# Patient Record
Sex: MALE | Race: WHITE | NOT HISPANIC OR LATINO | URBAN - METROPOLITAN AREA
[De-identification: names, ages, dates, MRNs, and addresses within clinical notes are randomized per-mention and may not be internally consistent; named-entity substitution may affect disease eponyms.]

---

## 2017-08-30 ENCOUNTER — OFFICE (AMBULATORY)
Dept: URBAN - METROPOLITAN AREA CLINIC 64 | Facility: CLINIC | Age: 72
End: 2017-08-30

## 2017-08-30 VITALS
HEIGHT: 73 IN | WEIGHT: 212 LBS | SYSTOLIC BLOOD PRESSURE: 164 MMHG | DIASTOLIC BLOOD PRESSURE: 95 MMHG | HEART RATE: 70 BPM

## 2017-08-30 DIAGNOSIS — K21.9 GASTRO-ESOPHAGEAL REFLUX DISEASE WITHOUT ESOPHAGITIS: ICD-10-CM

## 2017-08-30 PROCEDURE — 99213 OFFICE O/P EST LOW 20 MIN: CPT | Performed by: INTERNAL MEDICINE

## 2017-08-30 RX ORDER — ESOMEPRAZOLE MAGNESIUM 40 MG/1
40 CAPSULE, DELAYED RELEASE ORAL
Qty: 90 | Refills: 3 | Status: ACTIVE
Start: 2013-06-27

## 2017-08-30 RX ORDER — CALCIUM CARBONATE/VITAMIN D3 600 MG-10
TABLET ORAL
Qty: 60 | Refills: 1 | Status: ACTIVE

## 2017-08-30 RX ORDER — ALUMINUM ZIRCONIUM OCTACHLOROHYDREX GLY 16 G/100G
3.4 GEL TOPICAL
Qty: 1 | Refills: 11 | Status: COMPLETED
End: 2023-10-09

## 2017-10-20 ENCOUNTER — OFFICE VISIT (OUTPATIENT)
Dept: NEUROLOGY | Facility: CLINIC | Age: 72
End: 2017-10-20

## 2017-10-20 VITALS
BODY MASS INDEX: 27.83 KG/M2 | SYSTOLIC BLOOD PRESSURE: 140 MMHG | DIASTOLIC BLOOD PRESSURE: 78 MMHG | HEIGHT: 73 IN | WEIGHT: 210 LBS

## 2017-10-20 DIAGNOSIS — E87.1 HYPONATREMIA: ICD-10-CM

## 2017-10-20 DIAGNOSIS — G50.0 FOTHERGILL'S NEURALGIA: Primary | ICD-10-CM

## 2017-10-20 PROCEDURE — 99213 OFFICE O/P EST LOW 20 MIN: CPT | Performed by: PSYCHIATRY & NEUROLOGY

## 2017-10-20 RX ORDER — OXCARBAZEPINE 300 MG/1
450 TABLET, FILM COATED ORAL 2 TIMES DAILY
Qty: 270 TABLET | Refills: 3 | Status: SHIPPED | OUTPATIENT
Start: 2017-10-20 | End: 2018-07-20 | Stop reason: SDUPTHER

## 2017-10-20 NOTE — PROGRESS NOTES
Subjective:     Patient ID: Mode Duffy is a 71 y.o. male.    History of Present Illness     The patient's trigeminal neuralgia symptoms are under fairly good control.  It was worse a few weeks ago but his on back to baseline which is next to nothing.  Is also been diagnosed as having hyponatremia.  Sodium was as low as 126 but is back to 131.  The following portions of the patient's history were reviewed and updated as appropriate: allergies, current medications, past family history, past medical history, past social history, past surgical history and problem list.      Current Outpatient Prescriptions:   •  acebutolol (SECTRAL) 200 MG capsule, , Disp: , Rfl:   •  amLODIPine (NORVASC) 10 MG tablet, Take  by mouth Daily., Disp: , Rfl:   •  aspirin 81 MG chewable tablet, Chew Daily., Disp: , Rfl:   •  atorvastatin (LIPITOR) 20 MG tablet, Take  by mouth., Disp: , Rfl:   •  b complex-C-folic acid 1 MG capsule, Take 1 capsule by mouth Daily., Disp: , Rfl:   •  Cinnamon 500 MG capsule, Take 500 mg by mouth Daily., Disp: , Rfl:   •  enalapril (VASOTEC) 5 MG tablet, Take  by mouth Daily., Disp: , Rfl:   •  esomeprazole (NEXIUM) 40 MG capsule, Take  by mouth Daily., Disp: , Rfl:   •  glucosamine sulfate 500 MG capsule capsule, Take  by mouth 3 (Three) Times a Day With Meals., Disp: , Rfl:   •  isosorbide dinitrate (ISORDIL) 30 MG tablet, Take  by mouth., Disp: , Rfl:   •  Omega-3 Fatty Acids (FISH OIL) 1000 MG capsule capsule, Take  by mouth., Disp: , Rfl:   •  OXcarbazepine (TRILEPTAL) 300 MG tablet, Take 1.5 tablets by mouth 2 (Two) Times a Day., Disp: 270 tablet, Rfl: 3  •  Probiotic Product (PROBIOTIC ADVANCED PO), Take  by mouth., Disp: , Rfl:     Review of Systems   Constitutional: Negative.    Neurological: Negative for dizziness, tremors, seizures, syncope, facial asymmetry, speech difficulty, weakness, light-headedness, numbness and headaches.   Psychiatric/Behavioral: Negative.         Objective:    Neurologic  Exam  Mental status examination was appropriate.  Funduscopy, visual fields, eye movements and pupillary reflexes were normal.  No facial weakness was noted.  Gait was normal.  No pattern of focal weakness was noted.  Physical Exam    Assessment/Plan:     Mode was seen today for neuralgia.    Diagnoses and all orders for this visit:    Fothergill's neuralgia    Hyponatremia    Other orders  -     OXcarbazepine (TRILEPTAL) 300 MG tablet; Take 1.5 tablets by mouth 2 (Two) Times a Day.         Prescription drug management - Trileptal as above.    Encouraged a bit more salt intake.  Follow-up in the office in one year. Thank you for allowing me to share in the care of this patient.  Temo Moody M.D.

## 2018-07-12 ENCOUNTER — TELEPHONE (OUTPATIENT)
Dept: NEUROLOGY | Facility: CLINIC | Age: 73
End: 2018-07-12

## 2018-07-12 NOTE — TELEPHONE ENCOUNTER
----- Message from Hamlet Ballesteros sent at 7/11/2018  2:04 PM EDT -----  Contact: [phone]  PT CALLED AND SAYS THAT HE IS EXPERIENCING PAIN FROM RIGHT EAR TO TEETH. PT SAYS THAT HE DOES NOT THINK THE TRILEPTAL IS WORKING. PLEASE ADVISE AND CALL PT [phone]

## 2018-07-20 ENCOUNTER — OFFICE VISIT (OUTPATIENT)
Dept: NEUROLOGY | Facility: CLINIC | Age: 73
End: 2018-07-20

## 2018-07-20 VITALS
SYSTOLIC BLOOD PRESSURE: 140 MMHG | DIASTOLIC BLOOD PRESSURE: 72 MMHG | WEIGHT: 215 LBS | BODY MASS INDEX: 28.49 KG/M2 | HEIGHT: 73 IN

## 2018-07-20 DIAGNOSIS — G50.0 FOTHERGILL'S NEURALGIA: ICD-10-CM

## 2018-07-20 DIAGNOSIS — E87.1 HYPONATREMIA: Primary | ICD-10-CM

## 2018-07-20 PROCEDURE — 99213 OFFICE O/P EST LOW 20 MIN: CPT | Performed by: PSYCHIATRY & NEUROLOGY

## 2018-07-20 RX ORDER — OXCARBAZEPINE 300 MG/1
600 TABLET, FILM COATED ORAL EVERY 12 HOURS SCHEDULED
Qty: 360 TABLET | Refills: 3 | Status: SHIPPED | OUTPATIENT
Start: 2018-07-20 | End: 2018-12-04 | Stop reason: SDUPTHER

## 2018-07-20 NOTE — PROGRESS NOTES
Subjective:     Patient ID: Mode Duffy is a 72 y.o. male.    History of Present Illness     Pain is increasing on the right side of the face over the past month or so.  It is worse when he is eating.  He is currently on oxcarbazepine 450 mg twice daily.  Last sodium was 1:30.  He is compliant.  He does have a pacemaker but had a normal CAT scan of the head back in 2012.  The following portions of the patient's history were reviewed and updated as appropriate: allergies, current medications, past family history, past medical history, past social history, past surgical history and problem list.      Current Outpatient Prescriptions:   •  acebutolol (SECTRAL) 200 MG capsule, , Disp: , Rfl:   •  amLODIPine (NORVASC) 10 MG tablet, Take  by mouth Daily., Disp: , Rfl:   •  aspirin 81 MG chewable tablet, Chew Daily., Disp: , Rfl:   •  atorvastatin (LIPITOR) 20 MG tablet, Take  by mouth., Disp: , Rfl:   •  b complex-C-folic acid 1 MG capsule, Take 1 capsule by mouth Daily., Disp: , Rfl:   •  Cinnamon 500 MG capsule, Take 500 mg by mouth Daily., Disp: , Rfl:   •  enalapril (VASOTEC) 5 MG tablet, Take  by mouth Daily., Disp: , Rfl:   •  esomeprazole (NEXIUM) 40 MG capsule, Take  by mouth Daily., Disp: , Rfl:   •  glucosamine sulfate 500 MG capsule capsule, Take  by mouth 3 (Three) Times a Day With Meals., Disp: , Rfl:   •  isosorbide dinitrate (ISORDIL) 30 MG tablet, Take  by mouth., Disp: , Rfl:   •  Omega-3 Fatty Acids (FISH OIL) 1000 MG capsule capsule, Take  by mouth., Disp: , Rfl:   •  OXcarbazepine (TRILEPTAL) 300 MG tablet, Take 2 tablets by mouth Every 12 (Twelve) Hours., Disp: 360 tablet, Rfl: 3  •  Probiotic Product (PROBIOTIC ADVANCED PO), Take  by mouth., Disp: , Rfl:     Review of Systems   Constitutional: Negative.    Neurological: Negative.    Psychiatric/Behavioral: Negative.         Objective:    Neurologic Exam  Mental status examination was appropriate.  Funduscopy, visual fields, eye movements and  pupillary reflexes were normal.  No facial weakness was noted.  Gait was normal.  No pattern of focal weakness was noted.  Muscles of mastication were normal.  Facial sensation was normal in all 3 distributions.  Physical Exam    Assessment/Plan:     Mode was seen today for neurologic problem.    Diagnoses and all orders for this visit:    Hyponatremia    Fothergill's neuralgia    Other orders  -     OXcarbazepine (TRILEPTAL) 300 MG tablet; Take 2 tablets by mouth Every 12 (Twelve) Hours.         Increase oxcarbazepine to 600 mg twice daily.  Call with results of next sodium level.  Call in a few weeks if the facial pain is not improved.  Prescription drug management - meds as above    Follow-up in the office in one year. Thank you for allowing me to share in the care of this patient.  Temo Moody M.D.

## 2018-09-06 ENCOUNTER — TELEPHONE (OUTPATIENT)
Dept: NEUROLOGY | Facility: CLINIC | Age: 73
End: 2018-09-06

## 2018-09-06 NOTE — TELEPHONE ENCOUNTER
----- Message from Hamlet Ballesteros sent at 9/6/2018  9:49 AM EDT -----  Contact: [phone]  PT CALLING BECAUSE HE WANTS TO KNOW IF ANY ERECTIL DIFFUNCTION MEDICATION WOULD INTERFERE WITH TRILEPTAL. PLEASE ADVISE AND CALL PT [phone]

## 2018-09-20 ENCOUNTER — TELEPHONE (OUTPATIENT)
Dept: NEUROLOGY | Facility: CLINIC | Age: 73
End: 2018-09-20

## 2018-09-20 NOTE — TELEPHONE ENCOUNTER
The patient called in on Saturday 9/8/18.  His trigeminal neuralgia pain had increased.  He stated he was on oxcarbazepine, 600 mg twice daily.  He indicated some trouble with hyponatremia but most recent sodium he stated was 131.  I told him to increase his oxcarbazepine to 900 mg twice daily and contact Dr. Moody later in the week.    Krzysztofs

## 2018-10-08 ENCOUNTER — OFFICE (AMBULATORY)
Dept: URBAN - METROPOLITAN AREA CLINIC 64 | Facility: CLINIC | Age: 73
End: 2018-10-08

## 2018-10-08 VITALS
HEIGHT: 73 IN | DIASTOLIC BLOOD PRESSURE: 99 MMHG | SYSTOLIC BLOOD PRESSURE: 140 MMHG | HEART RATE: 70 BPM | WEIGHT: 208 LBS

## 2018-10-08 DIAGNOSIS — K21.9 GASTRO-ESOPHAGEAL REFLUX DISEASE WITHOUT ESOPHAGITIS: ICD-10-CM

## 2018-10-08 PROCEDURE — 99213 OFFICE O/P EST LOW 20 MIN: CPT | Performed by: INTERNAL MEDICINE

## 2018-10-08 RX ORDER — ESOMEPRAZOLE MAGNESIUM 40 MG/1
40 CAPSULE, DELAYED RELEASE ORAL
Qty: 90 | Refills: 3 | Status: ACTIVE
Start: 2013-06-27

## 2018-10-08 RX ORDER — AMITRIPTYLINE HYDROCHLORIDE 10 MG/1
TABLET, FILM COATED ORAL
Qty: 0 | Refills: 0 | Status: ACTIVE

## 2018-12-04 RX ORDER — OXCARBAZEPINE 300 MG/1
600 TABLET, FILM COATED ORAL EVERY 12 HOURS SCHEDULED
Qty: 360 TABLET | Refills: 2 | Status: SHIPPED | OUTPATIENT
Start: 2018-12-04 | End: 2019-07-19

## 2019-01-17 ENCOUNTER — TELEPHONE (OUTPATIENT)
Dept: NEUROLOGY | Facility: CLINIC | Age: 74
End: 2019-01-17

## 2019-01-17 NOTE — TELEPHONE ENCOUNTER
----- Message from Tressa Bray sent at 1/16/2019  1:12 PM EST -----  Contact: [phone]  Mr. Duffy called to let Dr. Lucas know that he had the cyber knife procedure at [facility].  So far so good.  No pain.  They want to wean him off of his medication, like a pill at a time until he is off of it, if he has no pain.    Before he quit the medication, he want Dr. Lucas's opinion on this.  They were supposed to send his reports to Dr. Lucas, but I did not see them in his chart.

## 2019-04-29 ENCOUNTER — HOSPITAL ENCOUNTER (OUTPATIENT)
Dept: CARDIOLOGY | Facility: HOSPITAL | Age: 74
Discharge: HOME | End: 2019-04-29
Attending: INTERNAL MEDICINE | Admitting: INTERNAL MEDICINE

## 2019-07-19 ENCOUNTER — OFFICE VISIT (OUTPATIENT)
Dept: NEUROLOGY | Facility: CLINIC | Age: 74
End: 2019-07-19

## 2019-07-19 VITALS
WEIGHT: 205 LBS | HEIGHT: 71 IN | SYSTOLIC BLOOD PRESSURE: 122 MMHG | OXYGEN SATURATION: 98 % | BODY MASS INDEX: 28.7 KG/M2 | DIASTOLIC BLOOD PRESSURE: 80 MMHG | HEART RATE: 75 BPM

## 2019-07-19 DIAGNOSIS — G50.0 FOTHERGILL'S NEURALGIA: ICD-10-CM

## 2019-07-19 DIAGNOSIS — E87.1 HYPONATREMIA: Primary | ICD-10-CM

## 2019-07-19 PROCEDURE — 99212 OFFICE O/P EST SF 10 MIN: CPT | Performed by: PSYCHIATRY & NEUROLOGY

## 2019-07-19 RX ORDER — FLUTICASONE PROPIONATE 50 MCG
SPRAY, SUSPENSION (ML) NASAL
COMMUNITY
Start: 2019-05-18 | End: 2019-10-21

## 2019-07-19 RX ORDER — SODIUM PHOSPHATE,MONO-DIBASIC 19G-7G/118
1 ENEMA (ML) RECTAL 2 TIMES DAILY WITH MEALS
COMMUNITY

## 2019-07-19 NOTE — PROGRESS NOTES
Subjective:     Patient ID: Mode Duffy is a 73 y.o. male.    History of Present Illness  Was seen back in the office for trigeminal neuralgia and associated hyponatremia.  Last November he had CyberKnife surgery and was weaned off oxcarbazepine.  He is starting to have a bit more pain but does not want to go back on medication at this point.    The following portions of the patient's history were reviewed and updated as appropriate: allergies, current medications, past family history, past medical history, past social history, past surgical history and problem list.      Current Outpatient Medications:   •  acebutolol (SECTRAL) 200 MG capsule, , Disp: , Rfl:   •  amLODIPine (NORVASC) 10 MG tablet, Take  by mouth Daily., Disp: , Rfl:   •  aspirin 81 MG chewable tablet, Chew Daily., Disp: , Rfl:   •  atorvastatin (LIPITOR) 20 MG tablet, Take  by mouth., Disp: , Rfl:   •  b complex-C-folic acid 1 MG capsule, Take 1 capsule by mouth Daily., Disp: , Rfl:   •  Cinnamon 500 MG capsule, Take 500 mg by mouth Daily., Disp: , Rfl:   •  enalapril (VASOTEC) 5 MG tablet, Take 20 mg by mouth Daily., Disp: , Rfl:   •  esomeprazole (NEXIUM) 40 MG capsule, Take  by mouth Daily., Disp: , Rfl:   •  fluticasone (FLONASE) 50 MCG/ACT nasal spray, FLUTICASONE PROPIONATE 50 MCG/ACT SUSP, Disp: , Rfl:   •  glucosamine-chondroitin 500-400 MG capsule capsule, Take  by mouth 3 (Three) Times a Day With Meals., Disp: , Rfl:   •  magnesium oxide 250 MG tablet, TAKE 2 TABLETS BY MOUTH IN THE MORNING FOR 30 DAYS, Disp: , Rfl: 0  •  Omega-3 Fatty Acids (FISH OIL) 1000 MG capsule capsule, Take  by mouth., Disp: , Rfl:   •  Probiotic Product (PROBIOTIC ADVANCED PO), Take  by mouth., Disp: , Rfl:   •  TURMERIC PO, Take  by mouth., Disp: , Rfl:   •  glucosamine sulfate 500 MG capsule capsule, Take  by mouth 3 (Three) Times a Day With Meals., Disp: , Rfl:   •  isosorbide dinitrate (ISORDIL) 30 MG tablet, Take  by mouth., Disp: , Rfl:     Review of  Systems   Constitutional: Negative.    HENT: Positive for ear pain. Negative for facial swelling and trouble swallowing.    Eyes: Negative.    Respiratory: Negative.    Cardiovascular: Negative.    Gastrointestinal: Negative.    Endocrine: Negative.    Genitourinary: Negative.    Musculoskeletal: Positive for back pain and gait problem. Negative for neck pain.   Skin: Negative.    Neurological: Negative for dizziness, tremors, seizures, syncope, facial asymmetry, speech difficulty, weakness, light-headedness, numbness and headaches.   Hematological: Negative for adenopathy. Bruises/bleeds easily.   Psychiatric/Behavioral: Positive for sleep disturbance. Negative for agitation, behavioral problems, confusion, decreased concentration, dysphoric mood, hallucinations, self-injury and suicidal ideas. The patient is not nervous/anxious and is not hyperactive.           I have reviewed ROS completed by medical assistant.     Objective:    Neurologic Exam  Mental status examination was appropriate.  Funduscopy, visual fields, eye movements and pupillary reflexes were normal.  No facial weakness was noted.  Gait was normal.  No pattern of focal weakness was noted.  Masseters strong bilaterally.  No facial sensory deficit.  Physical Exam    Assessment/Plan:     Mode was seen today for fothergill's neuralgia.    Diagnoses and all orders for this visit:    Hyponatremia    Fothergill's neuralgia       Will continue without medication for the time being.  Will see as needed in the office.  Thank you for allowing me to share in the care of this patient.  Temo Moody M.D.

## 2019-07-23 ENCOUNTER — TELEPHONE (OUTPATIENT)
Dept: NEUROLOGY | Facility: CLINIC | Age: 74
End: 2019-07-23

## 2019-07-23 RX ORDER — OXCARBAZEPINE 150 MG/1
150 TABLET, FILM COATED ORAL 2 TIMES DAILY
Qty: 60 TABLET | Refills: 11 | Status: SHIPPED | OUTPATIENT
Start: 2019-07-23 | End: 2019-08-13 | Stop reason: SDUPTHER

## 2019-07-23 NOTE — TELEPHONE ENCOUNTER
----- Message from Hamlet Ballesteros sent at 7/23/2019  8:05 AM EDT -----  Contact: pt [phone]  Pt calling because he is in a lot of pain and wants to know how much of the oxcarbazepin he should start taking again. Please advise and call pt at [phone]

## 2019-08-13 ENCOUNTER — TELEPHONE (OUTPATIENT)
Dept: NEUROLOGY | Facility: CLINIC | Age: 74
End: 2019-08-13

## 2019-08-13 RX ORDER — OXCARBAZEPINE 150 MG/1
TABLET, FILM COATED ORAL
Qty: 90 TABLET | Refills: 10 | Status: SHIPPED | OUTPATIENT
Start: 2019-08-13 | End: 2019-12-26 | Stop reason: SDUPTHER

## 2019-09-27 ENCOUNTER — OFFICE (AMBULATORY)
Dept: URBAN - METROPOLITAN AREA CLINIC 64 | Facility: CLINIC | Age: 74
End: 2019-09-27

## 2019-09-27 VITALS
DIASTOLIC BLOOD PRESSURE: 76 MMHG | WEIGHT: 211 LBS | HEIGHT: 73 IN | HEART RATE: 70 BPM | SYSTOLIC BLOOD PRESSURE: 135 MMHG

## 2019-09-27 DIAGNOSIS — K59.00 CONSTIPATION, UNSPECIFIED: ICD-10-CM

## 2019-09-27 DIAGNOSIS — K21.9 GASTRO-ESOPHAGEAL REFLUX DISEASE WITHOUT ESOPHAGITIS: ICD-10-CM

## 2019-09-27 PROCEDURE — 99213 OFFICE O/P EST LOW 20 MIN: CPT | Performed by: INTERNAL MEDICINE

## 2019-09-27 RX ORDER — ESOMEPRAZOLE MAGNESIUM 40 MG/1
CAPSULE, DELAYED RELEASE ORAL
Qty: 90 | Refills: 0 | Status: ACTIVE

## 2019-09-27 RX ORDER — AMITRIPTYLINE HYDROCHLORIDE 10 MG/1
TABLET, FILM COATED ORAL
Qty: 0 | Refills: 0 | Status: ACTIVE

## 2019-10-21 ENCOUNTER — OFFICE VISIT (OUTPATIENT)
Dept: CARDIOLOGY | Facility: CLINIC | Age: 74
End: 2019-10-21

## 2019-10-21 VITALS
HEIGHT: 71 IN | SYSTOLIC BLOOD PRESSURE: 160 MMHG | OXYGEN SATURATION: 95 % | DIASTOLIC BLOOD PRESSURE: 91 MMHG | HEART RATE: 70 BPM | WEIGHT: 214.8 LBS | BODY MASS INDEX: 30.07 KG/M2

## 2019-10-21 DIAGNOSIS — E78.2 MIXED HYPERLIPIDEMIA: ICD-10-CM

## 2019-10-21 DIAGNOSIS — I49.5 SICK SINUS SYNDROME (HCC): ICD-10-CM

## 2019-10-21 DIAGNOSIS — I25.10 CARDIOVASCULAR DISEASE: ICD-10-CM

## 2019-10-21 DIAGNOSIS — R00.1 BRADYCARDIA: ICD-10-CM

## 2019-10-21 DIAGNOSIS — Z95.0 CARDIAC PACEMAKER IN SITU: ICD-10-CM

## 2019-10-21 DIAGNOSIS — I42.9 CARDIOMYOPATHY, UNSPECIFIED TYPE (HCC): ICD-10-CM

## 2019-10-21 DIAGNOSIS — R07.9 CHEST PAIN, UNSPECIFIED TYPE: ICD-10-CM

## 2019-10-21 DIAGNOSIS — I25.10 CORONARY ARTERY DISEASE INVOLVING NATIVE CORONARY ARTERY OF NATIVE HEART, ANGINA PRESENCE UNSPECIFIED: Primary | ICD-10-CM

## 2019-10-21 DIAGNOSIS — I10 ESSENTIAL HYPERTENSION: ICD-10-CM

## 2019-10-21 PROCEDURE — 99214 OFFICE O/P EST MOD 30 MIN: CPT | Performed by: INTERNAL MEDICINE

## 2019-10-21 PROCEDURE — 93000 ELECTROCARDIOGRAM COMPLETE: CPT | Performed by: INTERNAL MEDICINE

## 2019-10-21 NOTE — PROGRESS NOTES
Cardiology Office Visit      Encounter Date:  10/21/2019    Patient ID:   Mode Duffy is a 73 y.o. male.    Reason For Followup:  Coronary artery disease  Sick sinus syndrome status post permanent pacemaker placement    Brief Clinical History:  Dear Dr. Vidal, Temo Hadley MD    I had the pleasure of seeing Mode Duffy today. As you are well aware, this is a 73 y.o. male with a known history of ischemic heart disease. He underwent cardiac catheterization with resultant PCI of an obtuse marginal branch and diagonal branch in November 2014. He has additional history of hypertension with hypertensive cardiovascular disease, dyslipidemia, antiplatelet therapy as well as sick sinus syndrome status post permanent pacemaker placement. He presents today for followup on the above conditions.    Interval History:  He denies any chest pain pressure heaviness or tightness.  He does report some occasional burning in his chest.  There is no relationship to activity.  Its random and is not propagated by exercise.  In fact he does physical therapy and walking and has no discomfort. He denies any shortness of breath out of character.  He denies any PND orthopnea.  He denies any syncope or near-syncope.  He denies any palpitations.  He reports feeling well from a cardiac perspective. His blood pressure is elevated in the office today But is home readings are much better.      We discussed the results of his most recent stress test.  No ischemia was noted however his left ventricular ejection fraction was calculated at 26%.  We discussed options and will proceed with a 2D echocardiogram.  He additionally has a permanent pacemaker and has not seen electrophysiology in some time.  We will set up an appointment for this.      Assessment & Plan    Impressions:  Coronary artery disease status post PCI and stenting most recently in November of 2014.        This was cutting Balloon angioplasty of a circumflex branch and a diagonal  "branch.   Dyslipidemia.  Hypertension with hypertensive cardiovascular disease with a white coat component  Cardiomyopathy most recent calculated ejection fraction 26% nuclear stress test April 2019  Sick sinus syndrome status post permanent pacemaker placement.  Lumbago  Hyponatremia secondary to Trileptal  Renal cysts  History of trigeminal neuralgia presently treated with Trileptal.  Hip pain currently undergoing preoperative cardiovascular risk assessment for right hip surgery.    Recommendations:  Check 2-D echo  Cardiac electrophysiology evaluation  Follow-up in 6 months, sooner should there be difficulties       Objective:    Vitals:  Vitals:    10/21/19 1041   BP: 160/91   BP Location: Left arm   Pulse: 70   SpO2: 95%   Weight: 97.4 kg (214 lb 12.8 oz)   Height: 180.3 cm (71\")       Physical Exam:    General: Alert, cooperative, no distress, appears stated age  Head:  Normocephalic, atraumatic, mucous membranes moist  Eyes:  Conjunctiva/corneas clear, EOM's intact     Neck:  Supple,  no adenopathy;      Lungs: Clear to auscultation bilaterally, no wheezes rhonchi rales are noted  Chest wall: No tenderness  Heart::  Regular rate and rhythm, S1 and S2 normal, 1/6 holosystolic murmur.  No rub or gallop  Abdomen: Soft, non-tender, nondistended bowel sounds active  Extremities: No cyanosis, clubbing, or edema  Pulses: 2+ and symmetric all extremities  Skin:  No rashes or lesions  Neuro/psych: A&O x3. CN II through XII are grossly intact with appropriate affect      Allergies:  No Known Allergies    Medication Review:     Current Outpatient Medications:   •  acebutolol (SECTRAL) 200 MG capsule, , Disp: , Rfl:   •  amLODIPine (NORVASC) 10 MG tablet, Take  by mouth Daily., Disp: , Rfl:   •  aspirin 81 MG chewable tablet, Chew Daily., Disp: , Rfl:   •  atorvastatin (LIPITOR) 20 MG tablet, Take  by mouth., Disp: , Rfl:   •  b complex-C-folic acid 1 MG capsule, Take 1 capsule by mouth Daily., Disp: , Rfl:   •  Cinnamon " 500 MG capsule, Take 500 mg by mouth Daily., Disp: , Rfl:   •  enalapril (VASOTEC) 5 MG tablet, Take 20 mg by mouth Daily., Disp: , Rfl:   •  esomeprazole (NEXIUM) 40 MG capsule, Take  by mouth Daily., Disp: , Rfl:   •  glucosamine-chondroitin 500-400 MG capsule capsule, Take  by mouth 3 (Three) Times a Day With Meals., Disp: , Rfl:   •  isosorbide dinitrate (ISORDIL) 30 MG tablet, Take  by mouth., Disp: , Rfl:   •  magnesium oxide 250 MG tablet, TAKE 2 TABLETS BY MOUTH IN THE MORNING FOR 30 DAYS, Disp: , Rfl: 0  •  Omega-3 Fatty Acids (FISH OIL) 1000 MG capsule capsule, Take  by mouth., Disp: , Rfl:   •  OXcarbazepine (TRILEPTAL) 150 MG tablet, 1 am, 2 hs, Disp: 90 tablet, Rfl: 10  •  Probiotic Product (PROBIOTIC ADVANCED PO), Take  by mouth., Disp: , Rfl:   •  TURMERIC PO, Take  by mouth., Disp: , Rfl:     Family History:  Family History   Problem Relation Age of Onset   • Heart disease Father        Past Medical History:  Past Medical History:   Diagnosis Date   • Coronary artery disease    • Detached retina    • Hyperlipidemia    • Hypertension    • Renal cyst    • Seizures (CMS/HCC)        Past surgical History:  Past Surgical History:   Procedure Laterality Date   • APPENDECTOMY     • CARDIAC CATHETERIZATION     • CORONARY STENT PLACEMENT     • NO PAST SURGERIES     • PROSTATE SURGERY     • REFRACTIVE SURGERY     • TOTAL HIP ARTHROPLASTY         Social History:  Social History     Socioeconomic History   • Marital status:      Spouse name: Not on file   • Number of children: Not on file   • Years of education: Not on file   • Highest education level: Not on file   Tobacco Use   • Smoking status: Former Smoker   • Smokeless tobacco: Never Used   Substance and Sexual Activity   • Alcohol use: Yes     Comment: RED WINE BEFORE DINNER   • Drug use: No       Review of Systems:  The following systems were reviewed as they relate to the cardiovascular system: Constitutional, Eyes, ENT, Cardiovascular,  Respiratory, Gastrointestinal, Integumentary, Neurological, Psychiatric, Hematologic, Endocrine, Musculoskeletal, and Genitourinary. The pertinent cardiovascular findings are reported above with all other cardiovascular points within those systems being negative.    Diagnostic Study Review:     Current Electrocardiogram:    ECG 12 Lead  Date/Time: 10/21/2019 3:44 PM  Performed by: Yonathan Carrizales DO  Authorized by: Yonathan Carrizales DO   Comparison: not compared with previous ECG   Previous ECG: no previous ECG available  Comments: Ventricular paced rhythm with a rate of 70 bpm.  No further analysis attempted.              NOTE: The following portions of the patient's history were reviewed and updated this visit as appropriate: allergies, current medications, past family history, past medical history, past social history, past surgical history and problem list.

## 2019-10-29 ENCOUNTER — HOSPITAL ENCOUNTER (OUTPATIENT)
Dept: CARDIOLOGY | Facility: HOSPITAL | Age: 74
Discharge: HOME | End: 2019-10-29
Admitting: INTERNAL MEDICINE

## 2019-10-29 VITALS
HEIGHT: 71 IN | WEIGHT: 214 LBS | SYSTOLIC BLOOD PRESSURE: 157 MMHG | DIASTOLIC BLOOD PRESSURE: 89 MMHG | BODY MASS INDEX: 29.96 KG/M2 | HEART RATE: 72 BPM

## 2019-10-29 DIAGNOSIS — R07.9 CHEST PAIN, UNSPECIFIED TYPE: ICD-10-CM

## 2019-10-29 DIAGNOSIS — I42.9 CARDIOMYOPATHY, UNSPECIFIED TYPE (HCC): ICD-10-CM

## 2019-10-29 DIAGNOSIS — I25.10 CORONARY ARTERY DISEASE INVOLVING NATIVE CORONARY ARTERY OF NATIVE HEART, ANGINA PRESENCE UNSPECIFIED: ICD-10-CM

## 2019-10-29 PROCEDURE — 93306 TTE W/DOPPLER COMPLETE: CPT

## 2019-10-30 PROBLEM — E78.5 HYPERLIPIDEMIA: Status: ACTIVE | Noted: 2019-10-30

## 2019-10-30 PROBLEM — I10 HYPERTENSION: Status: ACTIVE | Noted: 2019-10-30

## 2019-10-30 PROBLEM — I25.10 CORONARY ARTERY DISEASE: Status: ACTIVE | Noted: 2019-10-30

## 2019-11-19 LAB
ASCENDING AORTA: 3.6 CM
BH CV ECHO MEAS - ACS: 2.5 CM
BH CV ECHO MEAS - AI DEC SLOPE: 387.5 CM/SEC^2
BH CV ECHO MEAS - AI DEC TIME: 1.1 SEC
BH CV ECHO MEAS - AI MAX PG: 83.2 MMHG
BH CV ECHO MEAS - AI MAX VEL: 455.9 CM/SEC
BH CV ECHO MEAS - AI P1/2T: 344.6 MSEC
BH CV ECHO MEAS - AO MAX PG (FULL): 3.9 MMHG
BH CV ECHO MEAS - AO MAX PG: 7.9 MMHG
BH CV ECHO MEAS - AO MEAN PG (FULL): 3 MMHG
BH CV ECHO MEAS - AO MEAN PG: 5.3 MMHG
BH CV ECHO MEAS - AO ROOT AREA (BSA CORRECTED): 1.7
BH CV ECHO MEAS - AO ROOT AREA: 10.3 CM^2
BH CV ECHO MEAS - AO ROOT DIAM: 3.6 CM
BH CV ECHO MEAS - AO V2 MAX: 140.3 CM/SEC
BH CV ECHO MEAS - AO V2 MEAN: 112.4 CM/SEC
BH CV ECHO MEAS - AO V2 VTI: 30.3 CM
BH CV ECHO MEAS - ASC AORTA: 3.6 CM
BH CV ECHO MEAS - AVA(I,A): 3.2 CM^2
BH CV ECHO MEAS - AVA(I,D): 3.2 CM^2
BH CV ECHO MEAS - AVA(V,A): 3.2 CM^2
BH CV ECHO MEAS - AVA(V,D): 3.2 CM^2
BH CV ECHO MEAS - BSA(HAYCOCK): 2.2 M^2
BH CV ECHO MEAS - BSA: 2.2 M^2
BH CV ECHO MEAS - BZI_BMI: 29.8 KILOGRAMS/M^2
BH CV ECHO MEAS - BZI_METRIC_HEIGHT: 180.3 CM
BH CV ECHO MEAS - BZI_METRIC_WEIGHT: 97.1 KG
BH CV ECHO MEAS - EDV(CUBED): 165.6 ML
BH CV ECHO MEAS - EDV(MOD-SP2): 152.3 ML
BH CV ECHO MEAS - EDV(MOD-SP4): 176.6 ML
BH CV ECHO MEAS - EDV(TEICH): 146.9 ML
BH CV ECHO MEAS - EF(CUBED): 62.9 %
BH CV ECHO MEAS - EF(MOD-BP): 41 %
BH CV ECHO MEAS - EF(MOD-SP2): 38.4 %
BH CV ECHO MEAS - EF(MOD-SP4): 42.6 %
BH CV ECHO MEAS - EF(TEICH): 53.8 %
BH CV ECHO MEAS - ESV(CUBED): 61.5 ML
BH CV ECHO MEAS - ESV(MOD-SP2): 93.8 ML
BH CV ECHO MEAS - ESV(MOD-SP4): 101.5 ML
BH CV ECHO MEAS - ESV(TEICH): 67.8 ML
BH CV ECHO MEAS - FS: 28.1 %
BH CV ECHO MEAS - IVS/LVPW: 1.1
BH CV ECHO MEAS - IVSD: 1.3 CM
BH CV ECHO MEAS - LA DIMENSION(2D): 4.4 CM
BH CV ECHO MEAS - LA DIMENSION: 4.5 CM
BH CV ECHO MEAS - LA/AO: 1.2
BH CV ECHO MEAS - LAT PEAK E' VEL: 4 CM/SEC
BH CV ECHO MEAS - LV DIASTOLIC VOL/BSA (35-75): 81.4 ML/M^2
BH CV ECHO MEAS - LV IVRT: 0.1 SEC
BH CV ECHO MEAS - LV MASS(C)D: 274.2 GRAMS
BH CV ECHO MEAS - LV MASS(C)DI: 126.3 GRAMS/M^2
BH CV ECHO MEAS - LV MAX PG: 4 MMHG
BH CV ECHO MEAS - LV MEAN PG: 2.3 MMHG
BH CV ECHO MEAS - LV SYSTOLIC VOL/BSA (12-30): 46.7 ML/M^2
BH CV ECHO MEAS - LV V1 MAX: 99.4 CM/SEC
BH CV ECHO MEAS - LV V1 MEAN: 71.5 CM/SEC
BH CV ECHO MEAS - LV V1 VTI: 21.1 CM
BH CV ECHO MEAS - LVIDD: 5.5 CM
BH CV ECHO MEAS - LVIDS: 3.9 CM
BH CV ECHO MEAS - LVOT AREA: 4.5 CM^2
BH CV ECHO MEAS - LVOT DIAM: 2.4 CM
BH CV ECHO MEAS - LVPWD: 1.1 CM
BH CV ECHO MEAS - MED PEAK E' VEL: 5 CM/SEC
BH CV ECHO MEAS - MV A MAX VEL: 63.6 CM/SEC
BH CV ECHO MEAS - MV DEC SLOPE: 187 CM/SEC^2
BH CV ECHO MEAS - MV DEC TIME: 0.24 SEC
BH CV ECHO MEAS - MV E MAX VEL: 44.2 CM/SEC
BH CV ECHO MEAS - MV E/A: 0.69
BH CV ECHO MEAS - MV MAX PG: 1.8 MMHG
BH CV ECHO MEAS - MV MEAN PG: 0.8 MMHG
BH CV ECHO MEAS - MV P1/2T: 46 MSEC
BH CV ECHO MEAS - MV V2 MAX: 67 CM/SEC
BH CV ECHO MEAS - MV V2 MEAN: 42.2 CM/SEC
BH CV ECHO MEAS - MV V2 VTI: 15.9 CM
BH CV ECHO MEAS - MVA(P1/2T): 4.7 CM2
BH CV ECHO MEAS - MVA(VTI): 6 CM^2
BH CV ECHO MEAS - PA ACC SLOPE: 449 CM/SEC2
BH CV ECHO MEAS - PA ACC TIME: 0.12 SEC
BH CV ECHO MEAS - PA MAX PG (FULL): 2 MMHG
BH CV ECHO MEAS - PA MAX PG: 3.4 MMHG
BH CV ECHO MEAS - PA MEAN PG (FULL): 0.78 MMHG
BH CV ECHO MEAS - PA MEAN PG: 1.6 MMHG
BH CV ECHO MEAS - PA PR(ACCEL): 23.2 MMHG
BH CV ECHO MEAS - PA V2 MAX: 91.9 CM/SEC
BH CV ECHO MEAS - PA V2 MEAN: 60.2 CM/SEC
BH CV ECHO MEAS - PA V2 VTI: 16 CM
BH CV ECHO MEAS - PAPD(PI EDV): 13 MMHG
BH CV ECHO MEAS - PI END-D VEL: 113.7 CM/SEC
BH CV ECHO MEAS - PI MAX PG: 22.8 MMHG
BH CV ECHO MEAS - PI MAX VEL: 238.6 CM/SEC
BH CV ECHO MEAS - PULM A REVS DUR: 0.09 SEC
BH CV ECHO MEAS - PULM A REVS VEL: 17.5 CM/SEC
BH CV ECHO MEAS - PULM DIAS VEL: 53.4 CM/SEC
BH CV ECHO MEAS - PULM S/D: 1.1
BH CV ECHO MEAS - PULM SYS VEL: 59.1 CM/SEC
BH CV ECHO MEAS - PVA(I,A): 4.7 CM^2
BH CV ECHO MEAS - PVA(I,D): 4.7 CM^2
BH CV ECHO MEAS - PVA(V,A): 3.7 CM^2
BH CV ECHO MEAS - PVA(V,D): 3.7 CM^2
BH CV ECHO MEAS - QP/QS: 0.78
BH CV ECHO MEAS - RAP SYSTOLE: 8 MMHG
BH CV ECHO MEAS - RV MAX PG: 1.4 MMHG
BH CV ECHO MEAS - RV MEAN PG: 0.83 MMHG
BH CV ECHO MEAS - RV V1 MAX: 58.1 CM/SEC
BH CV ECHO MEAS - RV V1 MEAN: 43 CM/SEC
BH CV ECHO MEAS - RV V1 VTI: 12.9 CM
BH CV ECHO MEAS - RVOT AREA: 5.8 CM^2
BH CV ECHO MEAS - RVOT DIAM: 2.7 CM
BH CV ECHO MEAS - RVSP: 40.1 MMHG
BH CV ECHO MEAS - SI(AO): 143.7 ML/M^2
BH CV ECHO MEAS - SI(CUBED): 48 ML/M^2
BH CV ECHO MEAS - SI(LVOT): 44.2 ML/M^2
BH CV ECHO MEAS - SI(MOD-SP2): 26.9 ML/M^2
BH CV ECHO MEAS - SI(MOD-SP4): 34.6 ML/M^2
BH CV ECHO MEAS - SI(TEICH): 36.4 ML/M^2
BH CV ECHO MEAS - SV(AO): 312 ML
BH CV ECHO MEAS - SV(CUBED): 104.1 ML
BH CV ECHO MEAS - SV(LVOT): 95.9 ML
BH CV ECHO MEAS - SV(MOD-SP2): 58.5 ML
BH CV ECHO MEAS - SV(MOD-SP4): 75.2 ML
BH CV ECHO MEAS - SV(RVOT): 74.7 ML
BH CV ECHO MEAS - SV(TEICH): 79.1 ML
BH CV ECHO MEAS - TAPSE (>1.6): 2.2 CM2
BH CV ECHO MEAS - TR MAX VEL: 283.2 CM/SEC
BH CV ECHO MEASUREMENTS AVERAGE E/E' RATIO: 9.82
BH CV XLRA - RV BASE: 3.5 CM
BH CV XLRA - RV MID: 3.3 CM
BH CV XLRA - TDI S': 10 CM/SEC
IVRT: 98 MSEC
LEFT ATRIUM VOLUME INDEX: 32 ML/M2
LEFT ATRIUM VOLUME: 70 CM3
MAXIMAL PREDICTED HEART RATE: 147 BPM
PV VALVE AREA: 4.7 CM2
STRESS TARGET HR: 125 BPM

## 2019-11-19 PROCEDURE — 93306 TTE W/DOPPLER COMPLETE: CPT | Performed by: INTERNAL MEDICINE

## 2019-12-11 ENCOUNTER — TELEPHONE (OUTPATIENT)
Dept: CARDIOLOGY | Facility: CLINIC | Age: 74
End: 2019-12-11

## 2019-12-11 NOTE — TELEPHONE ENCOUNTER
His echocardiogram appears to be in line with previous studies as far as his LV systolic function is concerned.  No recommended changes at the present time.

## 2019-12-11 NOTE — TELEPHONE ENCOUNTER
Patient called and l/m v/m requesting his echo results .   Echo done 10-29-19/ results in chart.   Obtained prior echo done in 2014 and scanned into system for comparison.   F/u not until April / Dr. Jackson appointment 1-10-20.

## 2019-12-12 NOTE — TELEPHONE ENCOUNTER
Called patient and informed that Dr. Carrizales reported his echo appears to be in line with previous studies as far as his LV systolic function is concerned. No recommended changes at this time.   May further discuss at f/u appointment with Dr. Carrizales if has further questions.   Patient verbalized understanding and agreeable.

## 2019-12-23 RX ORDER — ATORVASTATIN CALCIUM 20 MG/1
20 TABLET, FILM COATED ORAL DAILY
Qty: 90 TABLET | Refills: 0 | Status: SHIPPED | OUTPATIENT
Start: 2019-12-23 | End: 2020-02-26 | Stop reason: SDUPTHER

## 2019-12-26 ENCOUNTER — TELEPHONE (OUTPATIENT)
Dept: NEUROLOGY | Facility: CLINIC | Age: 74
End: 2019-12-26

## 2019-12-26 RX ORDER — OXCARBAZEPINE 150 MG/1
300 TABLET, FILM COATED ORAL 2 TIMES DAILY
Qty: 120 TABLET | Refills: 7 | Status: SHIPPED | OUTPATIENT
Start: 2019-12-26 | End: 2019-12-27 | Stop reason: SDUPTHER

## 2019-12-27 ENCOUNTER — TELEPHONE (OUTPATIENT)
Dept: NEUROLOGY | Facility: CLINIC | Age: 74
End: 2019-12-27

## 2019-12-27 RX ORDER — OXCARBAZEPINE 150 MG/1
300 TABLET, FILM COATED ORAL 2 TIMES DAILY
Qty: 120 TABLET | Refills: 7 | Status: SHIPPED | OUTPATIENT
Start: 2019-12-27 | End: 2020-01-28 | Stop reason: SDUPTHER

## 2020-01-13 ENCOUNTER — OFFICE VISIT (OUTPATIENT)
Dept: CARDIOLOGY | Facility: CLINIC | Age: 75
End: 2020-01-13

## 2020-01-13 ENCOUNTER — PREP FOR SURGERY (OUTPATIENT)
Dept: OTHER | Facility: HOSPITAL | Age: 75
End: 2020-01-13

## 2020-01-13 ENCOUNTER — CLINICAL SUPPORT NO REQUIREMENTS (OUTPATIENT)
Dept: CARDIOLOGY | Facility: CLINIC | Age: 75
End: 2020-01-13

## 2020-01-13 VITALS
SYSTOLIC BLOOD PRESSURE: 163 MMHG | HEART RATE: 80 BPM | BODY MASS INDEX: 31.1 KG/M2 | OXYGEN SATURATION: 99 % | WEIGHT: 223 LBS | DIASTOLIC BLOOD PRESSURE: 89 MMHG

## 2020-01-13 DIAGNOSIS — I49.5 SICK SINUS SYNDROME (HCC): Primary | ICD-10-CM

## 2020-01-13 DIAGNOSIS — I50.22 CHRONIC SYSTOLIC CONGESTIVE HEART FAILURE (HCC): ICD-10-CM

## 2020-01-13 DIAGNOSIS — I42.9 CARDIOMYOPATHY, UNSPECIFIED TYPE (HCC): ICD-10-CM

## 2020-01-13 DIAGNOSIS — R00.1 BRADYCARDIA: ICD-10-CM

## 2020-01-13 DIAGNOSIS — Z95.0 CARDIAC PACEMAKER IN SITU: ICD-10-CM

## 2020-01-13 DIAGNOSIS — Z95.0 CARDIAC PACEMAKER IN SITU: Primary | ICD-10-CM

## 2020-01-13 DIAGNOSIS — I10 ESSENTIAL HYPERTENSION: ICD-10-CM

## 2020-01-13 PROCEDURE — 99214 OFFICE O/P EST MOD 30 MIN: CPT | Performed by: INTERNAL MEDICINE

## 2020-01-13 RX ORDER — SODIUM CHLORIDE 0.9 % (FLUSH) 0.9 %
3 SYRINGE (ML) INJECTION EVERY 12 HOURS SCHEDULED
Status: CANCELLED | OUTPATIENT
Start: 2020-01-13

## 2020-01-13 RX ORDER — SODIUM CHLORIDE 0.9 % (FLUSH) 0.9 %
10 SYRINGE (ML) INJECTION AS NEEDED
Status: CANCELLED | OUTPATIENT
Start: 2020-01-13

## 2020-01-13 RX ORDER — AMITRIPTYLINE HYDROCHLORIDE 10 MG/1
20 TABLET, FILM COATED ORAL NIGHTLY
COMMUNITY
End: 2020-12-30 | Stop reason: SDUPTHER

## 2020-01-13 NOTE — PROGRESS NOTES
CC--complete heart block, coronary artery disease    Sub--74-year-old male patient has underlying complete heart block with a dual-chamber pacemaker which is implanted several years ago with multiple battery changes  Patient came for evaluation  Unfortunately has continuous RV pacing with LV dysfunction with EF between 35 to 40%  He does have symptoms of lower extremity edema and tiredness and intermittent symptoms of chest discomfort with acid reflux  A prior history of coronary artery disease with remote stenting of his marginal branch  He also has history of hypertension and hyperlipidemia  Denies any TIA or stroke  Denies any bleeding diathesis  Chronic medical problems include complete heart block with pacemaker in situ, iatrogenic left bundle branch block with RV pacing, chronic class II heart failure symptoms with LV dysfunction and coronary artery disease        Past Medical History:   Diagnosis Date   • Coronary artery disease    • Detached retina    • Hyperlipidemia    • Hypertension    • Renal cyst    • Seizures (CMS/HCC)      Past Surgical History:   Procedure Laterality Date   • APPENDECTOMY     • CARDIAC CATHETERIZATION     • CORONARY STENT PLACEMENT     • NO PAST SURGERIES     • PROSTATE SURGERY     • REFRACTIVE SURGERY     • TOTAL HIP ARTHROPLASTY       Family History   Problem Relation Age of Onset   • Heart disease Father      Social History     Tobacco Use   • Smoking status: Former Smoker   • Smokeless tobacco: Never Used   Substance Use Topics   • Alcohol use: Yes     Comment: RED WINE BEFORE DINNER   • Drug use: No     Allergies:  Patient has no known allergies.    Review of Systems   General:  positive for fatigue and tiredness  Eyes: No redness  Cardiovascular: No chest pain, no palpitations  Respiratory:   positive for class 2 shortness of breath  Gastrointestinal: No nausea or vomiting, bleeding  Genitourinary: no hematuria or dysuria  Musculoskeletal: No arthralgia or myalgia  Skin: No  rash  Neurologic: No numbness, tingling, syncope  Hematologic/Lymphatic: No abnormal bleeding      Physical Exam  VITALS REVIEWED--blood pressure 163/89 pulse rate is 70 patient is afebrile respiration 12 times a minute    General:      well developed, well nourished, in no acute distress.    Head:      normocephalic and atraumatic.    Eyes:      PERRL/EOM intact, conjunctiva and sclera clear with out nystagmus.    Neck:      no masses, thyromegaly,  trachea central with normal respiratory effort and PMI displaced laterally  Lungs:      clear bilaterally to auscultation.    Heart:       Underlying paced rhythm without any murmurs gallops or rubs  Msk:      no deformity or scoliosis noted of thoracic or lumbar spine.    Pulses:      pulses normal in all 4 extremities.    Extremities:       no cyanosis or clubbing--trace left pedal edema and trace right pedal edema.    Neurologic:      no focal deficits.   alert oriented x3  Skin:      intact without lesions or rashes.    Psych:      alert and cooperative; normal mood and affect; normal attention span and concentration.        Assessment and plan    Dual-chamber pacemaker in situ with normal function  Continuous RV pacing with iatrogenic left bundle branch block  LV dysfunction with EF between 35 to 40%  Coronary artery disease  Chronic combined class II heart failure symptoms  Patient recommended up gradation to biventricular pacing system to alleviate his symptoms and risks and benefits and outcomes educated and scheduled    Electronically signed by Dominick Jackson MD, 01/13/20, 4:57 PM.

## 2020-01-15 PROCEDURE — 93280 PM DEVICE PROGR EVAL DUAL: CPT | Performed by: INTERNAL MEDICINE

## 2020-01-27 ENCOUNTER — TELEPHONE (OUTPATIENT)
Dept: NEUROLOGY | Facility: CLINIC | Age: 75
End: 2020-01-27

## 2020-01-27 NOTE — TELEPHONE ENCOUNTER
Patient wants to know if Dr. Moody can change his oxcarbazepine rx from 150mg tablets, two tablets BID to 300mg tablets BID and send in a 90 day as this is more cost effective.

## 2020-01-28 ENCOUNTER — LAB (OUTPATIENT)
Dept: LAB | Facility: HOSPITAL | Age: 75
End: 2020-01-28

## 2020-01-28 ENCOUNTER — ANESTHESIA EVENT (OUTPATIENT)
Dept: CARDIOLOGY | Facility: HOSPITAL | Age: 75
End: 2020-01-28

## 2020-01-28 DIAGNOSIS — I42.9 CARDIOMYOPATHY, UNSPECIFIED TYPE (HCC): ICD-10-CM

## 2020-01-28 DIAGNOSIS — Z95.0 CARDIAC PACEMAKER IN SITU: ICD-10-CM

## 2020-01-28 DIAGNOSIS — I50.22 CHRONIC SYSTOLIC CONGESTIVE HEART FAILURE (HCC): ICD-10-CM

## 2020-01-28 LAB
ALBUMIN SERPL-MCNC: 4.3 G/DL (ref 3.5–5.2)
ALBUMIN/GLOB SERPL: 1.8 G/DL
ALP SERPL-CCNC: 62 U/L (ref 39–117)
ALT SERPL W P-5'-P-CCNC: 25 U/L (ref 1–41)
ANION GAP SERPL CALCULATED.3IONS-SCNC: 14 MMOL/L (ref 5–15)
AST SERPL-CCNC: 19 U/L (ref 1–40)
BASOPHILS # BLD AUTO: 0.06 10*3/MM3 (ref 0–0.2)
BASOPHILS NFR BLD AUTO: 1.2 % (ref 0–1.5)
BILIRUB SERPL-MCNC: 0.5 MG/DL (ref 0.2–1.2)
BUN BLD-MCNC: 15 MG/DL (ref 8–23)
BUN/CREAT SERPL: 15.5 (ref 7–25)
CALCIUM SPEC-SCNC: 9.1 MG/DL (ref 8.6–10.5)
CHLORIDE SERPL-SCNC: 98 MMOL/L (ref 98–107)
CO2 SERPL-SCNC: 24 MMOL/L (ref 22–29)
CREAT BLD-MCNC: 0.97 MG/DL (ref 0.76–1.27)
DEPRECATED RDW RBC AUTO: 43 FL (ref 37–54)
EOSINOPHIL # BLD AUTO: 0.19 10*3/MM3 (ref 0–0.4)
EOSINOPHIL NFR BLD AUTO: 3.7 % (ref 0.3–6.2)
ERYTHROCYTE [DISTWIDTH] IN BLOOD BY AUTOMATED COUNT: 12.6 % (ref 12.3–15.4)
GFR SERPL CREATININE-BSD FRML MDRD: 76 ML/MIN/1.73
GLOBULIN UR ELPH-MCNC: 2.4 GM/DL
GLUCOSE BLD-MCNC: 107 MG/DL (ref 65–99)
HCT VFR BLD AUTO: 43.5 % (ref 37.5–51)
HGB BLD-MCNC: 15.5 G/DL (ref 13–17.7)
IMM GRANULOCYTES # BLD AUTO: 0.01 10*3/MM3 (ref 0–0.05)
IMM GRANULOCYTES NFR BLD AUTO: 0.2 % (ref 0–0.5)
LYMPHOCYTES # BLD AUTO: 1.53 10*3/MM3 (ref 0.7–3.1)
LYMPHOCYTES NFR BLD AUTO: 29.5 % (ref 19.6–45.3)
MAGNESIUM SERPL-MCNC: 2.1 MG/DL (ref 1.6–2.4)
MCH RBC QN AUTO: 33.5 PG (ref 26.6–33)
MCHC RBC AUTO-ENTMCNC: 35.6 G/DL (ref 31.5–35.7)
MCV RBC AUTO: 94.2 FL (ref 79–97)
MONOCYTES # BLD AUTO: 0.44 10*3/MM3 (ref 0.1–0.9)
MONOCYTES NFR BLD AUTO: 8.5 % (ref 5–12)
NEUTROPHILS # BLD AUTO: 2.95 10*3/MM3 (ref 1.7–7)
NEUTROPHILS NFR BLD AUTO: 56.9 % (ref 42.7–76)
NRBC BLD AUTO-RTO: 0 /100 WBC (ref 0–0.2)
PLATELET # BLD AUTO: 249 10*3/MM3 (ref 140–450)
PMV BLD AUTO: 8.8 FL (ref 6–12)
POTASSIUM BLD-SCNC: 4.4 MMOL/L (ref 3.5–5.2)
PROT SERPL-MCNC: 6.7 G/DL (ref 6–8.5)
RBC # BLD AUTO: 4.62 10*6/MM3 (ref 4.14–5.8)
SODIUM BLD-SCNC: 136 MMOL/L (ref 136–145)
WBC NRBC COR # BLD: 5.18 10*3/MM3 (ref 3.4–10.8)

## 2020-01-28 PROCEDURE — 85025 COMPLETE CBC W/AUTO DIFF WBC: CPT

## 2020-01-28 PROCEDURE — 36415 COLL VENOUS BLD VENIPUNCTURE: CPT

## 2020-01-28 PROCEDURE — 83735 ASSAY OF MAGNESIUM: CPT

## 2020-01-28 PROCEDURE — 80053 COMPREHEN METABOLIC PANEL: CPT

## 2020-01-28 RX ORDER — OXCARBAZEPINE 300 MG/1
300 TABLET, FILM COATED ORAL 2 TIMES DAILY
Qty: 180 TABLET | Refills: 2 | Status: SHIPPED | OUTPATIENT
Start: 2020-01-28 | End: 2020-10-28 | Stop reason: SDUPTHER

## 2020-01-29 ENCOUNTER — HOSPITAL ENCOUNTER (OUTPATIENT)
Facility: HOSPITAL | Age: 75
Setting detail: HOSPITAL OUTPATIENT SURGERY
Discharge: HOME | End: 2020-01-29
Attending: INTERNAL MEDICINE | Admitting: INTERNAL MEDICINE

## 2020-01-29 ENCOUNTER — ANESTHESIA (OUTPATIENT)
Dept: CARDIOLOGY | Facility: HOSPITAL | Age: 75
End: 2020-01-29

## 2020-01-29 VITALS
SYSTOLIC BLOOD PRESSURE: 91 MMHG | OXYGEN SATURATION: 92 % | DIASTOLIC BLOOD PRESSURE: 57 MMHG | RESPIRATION RATE: 8 BRPM | HEART RATE: 70 BPM

## 2020-01-29 VITALS
DIASTOLIC BLOOD PRESSURE: 82 MMHG | RESPIRATION RATE: 18 BRPM | TEMPERATURE: 98.3 F | HEART RATE: 71 BPM | WEIGHT: 220.24 LBS | HEIGHT: 73 IN | SYSTOLIC BLOOD PRESSURE: 128 MMHG | OXYGEN SATURATION: 95 % | BODY MASS INDEX: 29.19 KG/M2

## 2020-01-29 DIAGNOSIS — I50.22 CHRONIC SYSTOLIC CONGESTIVE HEART FAILURE (HCC): ICD-10-CM

## 2020-01-29 DIAGNOSIS — Z95.0 CARDIAC PACEMAKER IN SITU: ICD-10-CM

## 2020-01-29 DIAGNOSIS — I42.9 CARDIOMYOPATHY, UNSPECIFIED TYPE (HCC): ICD-10-CM

## 2020-01-29 LAB — MRSA DNA SPEC QL NAA+PROBE: NORMAL

## 2020-01-29 PROCEDURE — 0 IOPAMIDOL PER 1 ML: Performed by: INTERNAL MEDICINE

## 2020-01-29 PROCEDURE — 25010000002 CEFAZOLIN PER 500 MG: Performed by: INTERNAL MEDICINE

## 2020-01-29 PROCEDURE — 87641 MR-STAPH DNA AMP PROBE: CPT | Performed by: INTERNAL MEDICINE

## 2020-01-29 PROCEDURE — 36005 INJECTION EXT VENOGRAPHY: CPT | Performed by: INTERNAL MEDICINE

## 2020-01-29 PROCEDURE — C1769 GUIDE WIRE: HCPCS | Performed by: INTERNAL MEDICINE

## 2020-01-29 PROCEDURE — C1887 CATHETER, GUIDING: HCPCS | Performed by: INTERNAL MEDICINE

## 2020-01-29 PROCEDURE — 25010000002 PROPOFOL 10 MG/ML EMULSION: Performed by: ANESTHESIOLOGIST ASSISTANT

## 2020-01-29 PROCEDURE — 75820 VEIN X-RAY ARM/LEG: CPT | Performed by: INTERNAL MEDICINE

## 2020-01-29 PROCEDURE — 25010000002 FENTANYL CITRATE (PF) 100 MCG/2ML SOLUTION: Performed by: ANESTHESIOLOGIST ASSISTANT

## 2020-01-29 PROCEDURE — C1894 INTRO/SHEATH, NON-LASER: HCPCS | Performed by: INTERNAL MEDICINE

## 2020-01-29 RX ORDER — FENTANYL CITRATE 50 UG/ML
INJECTION, SOLUTION INTRAMUSCULAR; INTRAVENOUS AS NEEDED
Status: DISCONTINUED | OUTPATIENT
Start: 2020-01-29 | End: 2020-01-29 | Stop reason: SURG

## 2020-01-29 RX ORDER — SODIUM CHLORIDE 9 MG/ML
9 INJECTION, SOLUTION INTRAVENOUS CONTINUOUS PRN
Status: DISCONTINUED | OUTPATIENT
Start: 2020-01-29 | End: 2020-01-29 | Stop reason: HOSPADM

## 2020-01-29 RX ORDER — HYDROCODONE BITARTRATE AND ACETAMINOPHEN 5; 325 MG/1; MG/1
1 TABLET ORAL EVERY 8 HOURS PRN
Qty: 15 TABLET | Refills: 0 | Status: SHIPPED | OUTPATIENT
Start: 2020-01-29 | End: 2020-03-07 | Stop reason: HOSPADM

## 2020-01-29 RX ORDER — CEPHALEXIN 500 MG/1
500 CAPSULE ORAL 3 TIMES DAILY
Qty: 21 CAPSULE | Refills: 0 | Status: SHIPPED | OUTPATIENT
Start: 2020-01-29 | End: 2020-03-27

## 2020-01-29 RX ORDER — ACETAMINOPHEN 325 MG/1
650 TABLET ORAL EVERY 4 HOURS PRN
Status: DISCONTINUED | OUTPATIENT
Start: 2020-01-29 | End: 2020-01-29 | Stop reason: HOSPADM

## 2020-01-29 RX ORDER — SODIUM CHLORIDE 0.9 % (FLUSH) 0.9 %
10 SYRINGE (ML) INJECTION AS NEEDED
Status: DISCONTINUED | OUTPATIENT
Start: 2020-01-29 | End: 2020-01-29 | Stop reason: HOSPADM

## 2020-01-29 RX ORDER — HYDROCODONE BITARTRATE AND ACETAMINOPHEN 5; 325 MG/1; MG/1
1 TABLET ORAL EVERY 4 HOURS PRN
Status: DISCONTINUED | OUTPATIENT
Start: 2020-01-29 | End: 2020-01-29 | Stop reason: HOSPADM

## 2020-01-29 RX ORDER — SODIUM CHLORIDE 0.9 % (FLUSH) 0.9 %
10 SYRINGE (ML) INJECTION EVERY 12 HOURS SCHEDULED
Status: DISCONTINUED | OUTPATIENT
Start: 2020-01-29 | End: 2020-01-29 | Stop reason: HOSPADM

## 2020-01-29 RX ORDER — SODIUM CHLORIDE 0.9 % (FLUSH) 0.9 %
3 SYRINGE (ML) INJECTION EVERY 12 HOURS SCHEDULED
Status: DISCONTINUED | OUTPATIENT
Start: 2020-01-29 | End: 2020-01-29 | Stop reason: HOSPADM

## 2020-01-29 RX ORDER — LIDOCAINE HYDROCHLORIDE AND EPINEPHRINE BITARTRATE 20; .01 MG/ML; MG/ML
INJECTION, SOLUTION SUBCUTANEOUS AS NEEDED
Status: DISCONTINUED | OUTPATIENT
Start: 2020-01-29 | End: 2020-01-29 | Stop reason: HOSPADM

## 2020-01-29 RX ORDER — ACETAMINOPHEN 650 MG/1
650 SUPPOSITORY RECTAL EVERY 4 HOURS PRN
Status: DISCONTINUED | OUTPATIENT
Start: 2020-01-29 | End: 2020-01-29 | Stop reason: HOSPADM

## 2020-01-29 RX ORDER — PHENYLEPHRINE HCL IN 0.9% NACL 0.5 MG/5ML
SYRINGE (ML) INTRAVENOUS AS NEEDED
Status: DISCONTINUED | OUTPATIENT
Start: 2020-01-29 | End: 2020-01-29 | Stop reason: SURG

## 2020-01-29 RX ADMIN — WATER 2 G: 1000 INJECTION, SOLUTION INTRAVENOUS at 07:45

## 2020-01-29 RX ADMIN — SODIUM CHLORIDE 9 ML/HR: 900 INJECTION, SOLUTION INTRAVENOUS at 07:20

## 2020-01-29 RX ADMIN — PROPOFOL 100 MCG/KG/MIN: 10 INJECTION, EMULSION INTRAVENOUS at 08:01

## 2020-01-29 RX ADMIN — FENTANYL CITRATE 50 MCG: 50 INJECTION, SOLUTION INTRAMUSCULAR; INTRAVENOUS at 07:59

## 2020-01-29 RX ADMIN — PHENYLEPHRINE HYDROCHLORIDE 100 MCG: 10 INJECTION INTRAVENOUS at 08:38

## 2020-01-29 NOTE — ANESTHESIA POSTPROCEDURE EVALUATION
Patient: Mode Duffy    Procedure Summary     Date:  01/29/20 Room / Location:  [place] CATH LAB 3 / [facility] CATH INVASIVE LOCATION    Anesthesia Start:  0755 Anesthesia Stop:  0900    Procedure:  Biventricular Device Upgrade (N/A ) Diagnosis:       Cardiac pacemaker in situ      Chronic systolic congestive heart failure (CMS/HCC)      Cardiomyopathy, unspecified type (CMS/HCC)      (Selective and nonselective venography of subclavian brachiocephalic trunk without complications)    Provider:  Dominick Jackson MD Provider:  Dede Alvarado MD    Anesthesia Type:  MAC ASA Status:  4          Anesthesia Type: MAC    Vitals  Vitals Value Taken Time   /77 1/29/2020  9:31 AM   Temp     Pulse 70 1/29/2020  9:31 AM   Resp 8 1/29/2020  8:54 AM   SpO2 99 % 1/29/2020  9:31 AM   Vitals shown include unvalidated device data.        Post Anesthesia Care and Evaluation    Patient location during evaluation: PACU  Patient participation: complete - patient participated  Level of consciousness: awake  Pain score: 0 (See nurse's notes for pain score)  Pain management: adequate  Airway patency: patent  Anesthetic complications: No anesthetic complications  PONV Status: none  Cardiovascular status: acceptable  Respiratory status: acceptable  Hydration status: acceptable    Comments: Patient seen and examined postoperatively; vital signs stable; SpO2 greater than or equal to 90%; cardiopulmonary status stable; nausea/vomiting adequately controlled; pain adequately controlled; no apparent anesthesia complications; patient discharged from anesthesia care when discharge criteria were met

## 2020-01-29 NOTE — ANESTHESIA PREPROCEDURE EVALUATION
Anesthesia Evaluation     Patient summary reviewed and Nursing notes reviewed   NPO Solid Status: > 8 hours  NPO Liquid Status: > 8 hours           Airway   Mallampati: II  TM distance: >3 FB  Neck ROM: full  No difficulty expected  Dental - normal exam     Pulmonary - negative pulmonary ROS and normal exam   Cardiovascular - normal exam    (+) pacemaker pacemaker, hypertension, CAD, CHF Systolic <55%, hyperlipidemia,       Neuro/Psych  (+) seizures, numbness,     GI/Hepatic/Renal/Endo    (+)   renal disease,     Musculoskeletal (-) negative ROS    Abdominal  - normal exam    Bowel sounds: normal.   Substance History - negative use     OB/GYN negative ob/gyn ROS         Other                        Anesthesia Plan    ASA 4     MAC     intravenous induction     Anesthetic plan, all risks, benefits, and alternatives have been provided, discussed and informed consent has been obtained with: patient.    Plan discussed with CAA.

## 2020-01-30 ENCOUNTER — OFFICE VISIT (OUTPATIENT)
Dept: CARDIAC SURGERY | Facility: CLINIC | Age: 75
End: 2020-01-30

## 2020-01-30 VITALS
RESPIRATION RATE: 20 BRPM | SYSTOLIC BLOOD PRESSURE: 137 MMHG | BODY MASS INDEX: 29.05 KG/M2 | WEIGHT: 219.2 LBS | TEMPERATURE: 98.1 F | HEART RATE: 71 BPM | DIASTOLIC BLOOD PRESSURE: 75 MMHG | OXYGEN SATURATION: 92 % | HEIGHT: 73 IN

## 2020-01-30 DIAGNOSIS — Z95.0 CARDIAC PACEMAKER IN SITU: Primary | ICD-10-CM

## 2020-01-30 DIAGNOSIS — I10 ESSENTIAL HYPERTENSION: ICD-10-CM

## 2020-01-30 DIAGNOSIS — I49.5 SICK SINUS SYNDROME (HCC): ICD-10-CM

## 2020-01-30 DIAGNOSIS — I42.9 CARDIOMYOPATHY, UNSPECIFIED TYPE (HCC): ICD-10-CM

## 2020-01-30 PROCEDURE — 99204 OFFICE O/P NEW MOD 45 MIN: CPT | Performed by: THORACIC SURGERY (CARDIOTHORACIC VASCULAR SURGERY)

## 2020-02-01 NOTE — PROGRESS NOTES
2/1/2020    Seen on 1/30/20    Chief Complaint     Evaluation for LV lead placement    History of Present Illness:       Dear Dr. Jackson and Colleagues,  It was nice to see Mode Duffy in consultation at your request. He is a 74 y.o. male with a history of cardiomyopathy, sick sinus syndrome status post dual-chamber pacemaker who has had continuous RV pacing for many years with the development of an ejection fraction of 35 to 40% and has developed more fatigue and dyspnea.  He has a complete heart block, iatrogenic left bundle branch block with RV pacing, and chronic class II heart failure symptoms with LV dysfunction and some coronary artery disease. He denies chest pain, syncope, TIA or lower extremity edema.  His echocardiogram showed an ejection fraction of 36%, mild to moderate aortic valve regurgitation, left atrial cavity enlargement and LV hypertrophy.  He also has hypertension hypercholesterolemia.  Pacemaker lead placement was attempted and not possible    Patient Active Problem List   Diagnosis   • Fothergill's neuralgia   • Hyponatremia   • Cardiac pacemaker in situ   • Bradycardia   • Cardiovascular disease   • Hypertension   • Coronary artery disease   • Hyperlipidemia   • Sick sinus syndrome (CMS/HCC)   • Chronic systolic congestive heart failure (CMS/HCC)   • Cardiomyopathy (CMS/HCC)       Past Medical History:   Diagnosis Date   • Coronary artery disease    • Detached retina    • Hyperlipidemia    • Hypertension    • Renal cyst    • Seizures (CMS/HCC)        Past Surgical History:   Procedure Laterality Date   • APPENDECTOMY     • CARDIAC CATHETERIZATION     • CARDIAC ELECTROPHYSIOLOGY PROCEDURE N/A 1/29/2020    Procedure: Biventricular Device Upgrade;  Surgeon: Dominick Jackson MD;  Location: [facility] INVASIVE LOCATION;  Service: Cardiovascular   • CORONARY STENT PLACEMENT     • NO PAST SURGERIES     • PROSTATE SURGERY     • REFRACTIVE SURGERY     • TOTAL HIP ARTHROPLASTY         No  Known Allergies      Current Outpatient Medications:   •  acebutolol (SECTRAL) 200 MG capsule, , Disp: , Rfl:   •  amitriptyline (ELAVIL) 10 MG tablet, Take 20 mg by mouth Every Night., Disp: , Rfl:   •  amLODIPine (NORVASC) 10 MG tablet, Take 10 mg by mouth Daily., Disp: , Rfl:   •  aspirin 81 MG tablet, Take 81 mg by mouth Daily., Disp: , Rfl:   •  atorvastatin (LIPITOR) 20 MG tablet, Take 1 tablet by mouth Daily. 200001, Disp: 90 tablet, Rfl: 0  •  b complex-C-folic acid 1 MG capsule, Take 1 capsule by mouth Daily., Disp: , Rfl:   •  cephalexin (KEFLEX) 500 MG capsule, Take 1 capsule by mouth 3 (Three) Times a Day., Disp: 21 capsule, Rfl: 0  •  Cinnamon 500 MG capsule, Take 500 mg by mouth 2 (Two) Times a Day., Disp: , Rfl:   •  enalapril (VASOTEC) 5 MG tablet, Take 20 mg by mouth Daily., Disp: , Rfl:   •  esomeprazole (NEXIUM) 40 MG capsule, Take 40 mg by mouth Daily., Disp: , Rfl:   •  glucosamine-chondroitin 500-400 MG capsule capsule, Take 1 capsule by mouth 2 (Two) Times a Day With Meals., Disp: , Rfl:   •  HYDROcodone-acetaminophen (NORCO) 5-325 MG per tablet, Take 1 tablet by mouth Every 8 (Eight) Hours As Needed for Moderate Pain ., Disp: 15 tablet, Rfl: 0  •  isosorbide dinitrate (ISORDIL) 30 MG tablet, Take 30 mg by mouth Daily., Disp: , Rfl:   •  magnesium oxide 250 MG tablet, Take 250 mg by mouth Daily., Disp: , Rfl: 0  •  Omega-3 Fatty Acids (FISH OIL) 1000 MG capsule capsule, Take 1,000 mg by mouth 2 (Two) Times a Day With Meals., Disp: , Rfl:   •  OXcarbazepine (TRILEPTAL) 300 MG tablet, Take 1 tablet by mouth 2 (Two) Times a Day., Disp: 180 tablet, Rfl: 2  •  Probiotic Product (PROBIOTIC ADVANCED PO), Take 1 capsule by mouth Daily., Disp: , Rfl:   •  TURMERIC PO, Take 1 capsule by mouth Daily., Disp: , Rfl:     Social History     Socioeconomic History   • Marital status:      Spouse name: Not on file   • Number of children: Not on file   • Years of education: Not on file   • Highest  education level: Not on file   Tobacco Use   • Smoking status: Former Smoker     Last attempt to quit: 1968     Years since quittin.1   • Smokeless tobacco: Never Used   Substance and Sexual Activity   • Alcohol use: Yes     Comment: RED WINE BEFORE DINNER   • Drug use: No       Family History   Problem Relation Age of Onset   • Heart disease Father      Review of Systems   Constitutional: Positive for fatigue.   Respiratory: Positive for shortness of breath.    Cardiovascular: Positive for palpitations. Negative for chest pain.   Genitourinary: Negative for hematuria.   Neurological: Positive for syncope. Negative for dizziness.   All other systems reviewed and are negative.      Physical Exam:    Vital Signs:  Weight: 99.4 kg (219 lb 3.2 oz)   Body mass index is 28.92 kg/m².  Temp: 98.1 °F (36.7 °C)   Heart Rate: 71   BP: 137/75     Physical Exam   Constitutional: He is oriented to person, place, and time. He appears well-developed and well-nourished.   HENT:   Head: Normocephalic and atraumatic.   Nose: Nose normal.   Mouth/Throat: Oropharynx is clear and moist.   Eyes: Pupils are equal, round, and reactive to light. Conjunctivae are normal.   Neck: Normal range of motion. Neck supple. No JVD present. No thyromegaly present.   Cardiovascular: Normal rate, regular rhythm, normal heart sounds and intact distal pulses. PMI is not displaced. Exam reveals no gallop and no friction rub.   No murmur heard.  Pulses:       Radial pulses are 2+ on the right side, and 2+ on the left side.        Posterior tibial pulses are 2+ on the right side, and 2+ on the left side.   Pulmonary/Chest: Effort normal and breath sounds normal. He has no rales.   Abdominal: Soft. Bowel sounds are normal. He exhibits no distension and no mass. There is no tenderness.   Musculoskeletal: Normal range of motion. He exhibits no tenderness or deformity.   Lymphadenopathy:     He has no cervical adenopathy.   Neurological: He is alert and  oriented to person, place, and time. He has normal reflexes.   Skin: Skin is warm and dry. No rash noted. No erythema.   Psychiatric: He has a normal mood and affect. His behavior is normal.        Assessment:     Problem List Items Addressed This Visit        Cardiovascular and Mediastinum    Cardiac pacemaker in situ - Primary    Hypertension    Sick sinus syndrome (CMS/HCC)    Cardiomyopathy (CMS/HCC)          1. Sick sinus syndrome status post pacemaker placement in the past with recent attempt to lead placement to upgrade pacemaker to biventricular  2. Hypertension, well controlled  3. Heart failure class II with progression of symptoms.  Presumably there is sequela of chronic RV pacing and cardiomyopathy      Recommendation/Plan:     1. I offered the patient a minithoracotomy LV lead placement and hopefully upgrade to biventricular pacing.  I discussed with the patient the risks, benefits and alternatives of surgery and he wishes to proceed.  Operative mortality less than 1% and risk of complications around 15%.  He wishes to proceed . I would like to wait 2 to 4 weeks to allow better healing of the pacemaker pocket that was recently revised    Thank you for allowing me to participate in his care.    Regards,    Justin Aguilar M.D.

## 2020-02-04 ENCOUNTER — PREP FOR SURGERY (OUTPATIENT)
Dept: OTHER | Facility: HOSPITAL | Age: 75
End: 2020-02-04

## 2020-02-04 DIAGNOSIS — R09.89 OTHER SPECIFIED SYMPTOMS AND SIGNS INVOLVING THE CIRCULATORY AND RESPIRATORY SYSTEMS: ICD-10-CM

## 2020-02-04 DIAGNOSIS — Z01.810 PRE-OPERATIVE CARDIOVASCULAR EXAMINATION, HIGH RISK SURGERY: ICD-10-CM

## 2020-02-04 DIAGNOSIS — I50.9 CHRONIC HEART FAILURE, UNSPECIFIED HEART FAILURE TYPE (HCC): Primary | ICD-10-CM

## 2020-02-04 DIAGNOSIS — R79.9 ABNORMAL FINDING OF BLOOD CHEMISTRY, UNSPECIFIED: ICD-10-CM

## 2020-02-04 RX ORDER — CHLORHEXIDINE GLUCONATE 0.12 MG/ML
15 RINSE ORAL EVERY 12 HOURS
Status: CANCELLED | OUTPATIENT
Start: 2020-02-04 | End: 2020-02-05

## 2020-02-04 RX ORDER — ACETAMINOPHEN 325 MG/1
650 TABLET ORAL EVERY 4 HOURS PRN
Status: CANCELLED | OUTPATIENT
Start: 2020-02-04

## 2020-02-04 RX ORDER — CHLORHEXIDINE GLUCONATE 500 MG/1
1 CLOTH TOPICAL EVERY 12 HOURS PRN
Status: CANCELLED | OUTPATIENT
Start: 2020-02-04

## 2020-02-04 RX ORDER — CHLORHEXIDINE GLUCONATE 0.12 MG/ML
15 RINSE ORAL ONCE
Status: CANCELLED | OUTPATIENT
Start: 2020-02-04 | End: 2020-02-04

## 2020-02-04 RX ORDER — SODIUM CHLORIDE 0.9 % (FLUSH) 0.9 %
30 SYRINGE (ML) INJECTION ONCE AS NEEDED
Status: CANCELLED | OUTPATIENT
Start: 2020-02-04

## 2020-02-04 RX ORDER — SODIUM CHLORIDE 9 MG/ML
30 INJECTION, SOLUTION INTRAVENOUS CONTINUOUS PRN
Status: CANCELLED | OUTPATIENT
Start: 2020-02-04

## 2020-02-04 RX ORDER — NITROGLYCERIN 0.4 MG/1
0.4 TABLET SUBLINGUAL
Status: CANCELLED | OUTPATIENT
Start: 2020-02-04

## 2020-02-04 RX ORDER — ALPRAZOLAM 0.25 MG/1
0.25 TABLET ORAL ONCE
Status: CANCELLED | OUTPATIENT
Start: 2020-02-04 | End: 2020-02-04

## 2020-02-04 RX ORDER — SODIUM CHLORIDE 0.9 % (FLUSH) 0.9 %
3-10 SYRINGE (ML) INJECTION AS NEEDED
Status: CANCELLED | OUTPATIENT
Start: 2020-02-04

## 2020-02-04 RX ORDER — SODIUM CHLORIDE 0.9 % (FLUSH) 0.9 %
3 SYRINGE (ML) INJECTION EVERY 12 HOURS SCHEDULED
Status: CANCELLED | OUTPATIENT
Start: 2020-02-04

## 2020-02-11 RX ORDER — ACEBUTOLOL HYDROCHLORIDE 200 MG/1
CAPSULE ORAL
Qty: 90 CAPSULE | Refills: 1 | Status: SHIPPED | OUTPATIENT
Start: 2020-02-11 | End: 2020-02-18

## 2020-02-18 ENCOUNTER — TELEPHONE (OUTPATIENT)
Dept: CARDIAC SURGERY | Facility: CLINIC | Age: 75
End: 2020-02-18

## 2020-02-18 RX ORDER — ACEBUTOLOL HYDROCHLORIDE 200 MG/1
200 CAPSULE ORAL DAILY
COMMUNITY
End: 2020-07-30

## 2020-02-18 NOTE — TELEPHONE ENCOUNTER
Spoke to Mr. Duffy with his surgery information. PAT is scheduled for 3-3-2020 with an arrival time of 1100. Surgery is scheduled for 3-4-2020 as a to follow case. PAT will notify him as to his arrival time. He is to stop his TUMERIC and FISH OIL one week prior to surgery. He expressed a verbal understanding of these times. He was instructed to call the office with any further questions.

## 2020-02-26 ENCOUNTER — APPOINTMENT (OUTPATIENT)
Dept: PREADMISSION TESTING | Facility: HOSPITAL | Age: 75
End: 2020-02-26

## 2020-02-26 ENCOUNTER — APPOINTMENT (OUTPATIENT)
Dept: CARDIOLOGY | Facility: HOSPITAL | Age: 75
End: 2020-02-26

## 2020-02-26 RX ORDER — ATORVASTATIN CALCIUM 20 MG/1
20 TABLET, FILM COATED ORAL DAILY
Qty: 90 TABLET | Refills: 0 | Status: SHIPPED | OUTPATIENT
Start: 2020-02-26 | End: 2020-05-18

## 2020-02-26 NOTE — TELEPHONE ENCOUNTER
Patient to have lipids done at [facility] as pre-op . Order in chart dated 2-4-20.   Informed patient must have current cholestrol labs for further refills.

## 2020-03-02 ENCOUNTER — HOSPITAL ENCOUNTER (OUTPATIENT)
Dept: RESPIRATORY THERAPY | Facility: HOSPITAL | Age: 75
Discharge: HOME | End: 2020-03-02

## 2020-03-02 ENCOUNTER — APPOINTMENT (OUTPATIENT)
Dept: PREADMISSION TESTING | Facility: HOSPITAL | Age: 75
End: 2020-03-02

## 2020-03-02 ENCOUNTER — HOSPITAL ENCOUNTER (OUTPATIENT)
Dept: GENERAL RADIOLOGY | Facility: HOSPITAL | Age: 75
Discharge: HOME | End: 2020-03-02

## 2020-03-02 ENCOUNTER — APPOINTMENT (OUTPATIENT)
Dept: CARDIOLOGY | Facility: HOSPITAL | Age: 75
End: 2020-03-02

## 2020-03-02 ENCOUNTER — HOSPITAL ENCOUNTER (OUTPATIENT)
Dept: CARDIOLOGY | Facility: HOSPITAL | Age: 75
Discharge: HOME | End: 2020-03-02
Admitting: NURSE PRACTITIONER

## 2020-03-02 ENCOUNTER — APPOINTMENT (OUTPATIENT)
Dept: LAB | Facility: HOSPITAL | Age: 75
End: 2020-03-02

## 2020-03-02 VITALS
TEMPERATURE: 98.4 F | BODY MASS INDEX: 28.93 KG/M2 | HEIGHT: 73 IN | DIASTOLIC BLOOD PRESSURE: 84 MMHG | SYSTOLIC BLOOD PRESSURE: 153 MMHG | WEIGHT: 218.25 LBS | OXYGEN SATURATION: 95 % | HEART RATE: 70 BPM

## 2020-03-02 DIAGNOSIS — I50.9 CHRONIC HEART FAILURE, UNSPECIFIED HEART FAILURE TYPE (HCC): ICD-10-CM

## 2020-03-02 DIAGNOSIS — Z01.810 PRE-OPERATIVE CARDIOVASCULAR EXAMINATION, HIGH RISK SURGERY: ICD-10-CM

## 2020-03-02 DIAGNOSIS — R09.89 OTHER SPECIFIED SYMPTOMS AND SIGNS INVOLVING THE CIRCULATORY AND RESPIRATORY SYSTEMS: ICD-10-CM

## 2020-03-02 DIAGNOSIS — R79.9 ABNORMAL FINDING OF BLOOD CHEMISTRY, UNSPECIFIED: ICD-10-CM

## 2020-03-02 LAB
ABO GROUP BLD: NORMAL
ALBUMIN SERPL-MCNC: 4.2 G/DL (ref 3.5–5.2)
ALBUMIN/GLOB SERPL: 2 G/DL
ALP SERPL-CCNC: 75 U/L (ref 39–117)
ALT SERPL W P-5'-P-CCNC: 30 U/L (ref 1–41)
ANION GAP SERPL CALCULATED.3IONS-SCNC: 11 MMOL/L (ref 5–15)
APTT PPP: 23.3 SECONDS (ref 24–31)
AST SERPL-CCNC: 20 U/L (ref 1–40)
BASOPHILS # BLD AUTO: 0 10*3/MM3 (ref 0–0.2)
BASOPHILS NFR BLD AUTO: 0.9 % (ref 0–1.5)
BH CV XLRA MEAS LEFT CCA RATIO VEL: 104 CM/SEC
BH CV XLRA MEAS LEFT DIST CCA EDV: 17.2 CM/SEC
BH CV XLRA MEAS LEFT DIST CCA PSV: 70.2 CM/SEC
BH CV XLRA MEAS LEFT DIST ICA EDV: -16.9 CM/SEC
BH CV XLRA MEAS LEFT DIST ICA PSV: -47.4 CM/SEC
BH CV XLRA MEAS LEFT ICA RATIO VEL: -57.4 CM/SEC
BH CV XLRA MEAS LEFT ICA/CCA RATIO: -0.55
BH CV XLRA MEAS LEFT PROX CCA EDV: 16.2 CM/SEC
BH CV XLRA MEAS LEFT PROX CCA PSV: 104 CM/SEC
BH CV XLRA MEAS LEFT PROX ECA PSV: -92.7 CM/SEC
BH CV XLRA MEAS LEFT PROX ICA EDV: -18.8 CM/SEC
BH CV XLRA MEAS LEFT PROX ICA PSV: -57.4 CM/SEC
BH CV XLRA MEAS LEFT PROX SCLA PSV: 234 CM/SEC
BH CV XLRA MEAS LEFT VERTEBRAL A PSV: 29.5 CM/SEC
BH CV XLRA MEAS RIGHT CCA RATIO VEL: 60.6 CM/SEC
BH CV XLRA MEAS RIGHT DIST CCA EDV: 14 CM/SEC
BH CV XLRA MEAS RIGHT DIST CCA PSV: 60.6 CM/SEC
BH CV XLRA MEAS RIGHT DIST ICA EDV: -15.2 CM/SEC
BH CV XLRA MEAS RIGHT DIST ICA PSV: -51.5 CM/SEC
BH CV XLRA MEAS RIGHT ICA RATIO VEL: -90.4 CM/SEC
BH CV XLRA MEAS RIGHT ICA/CCA RATIO: -1.5
BH CV XLRA MEAS RIGHT PROX CCA EDV: -16.2 CM/SEC
BH CV XLRA MEAS RIGHT PROX CCA PSV: -95.7 CM/SEC
BH CV XLRA MEAS RIGHT PROX ECA PSV: -135 CM/SEC
BH CV XLRA MEAS RIGHT PROX ICA EDV: -21.7 CM/SEC
BH CV XLRA MEAS RIGHT PROX ICA PSV: -90.4 CM/SEC
BH CV XLRA MEAS RIGHT PROX SCLA PSV: 206 CM/SEC
BH CV XLRA MEAS RIGHT VERTEBRAL A PSV: -46.6 CM/SEC
BILIRUB SERPL-MCNC: 0.5 MG/DL (ref 0.2–1.2)
BILIRUB UR QL STRIP: NEGATIVE
BLD GP AB SCN SERPL QL: NEGATIVE
BUN BLD-MCNC: 18 MG/DL (ref 8–23)
BUN/CREAT SERPL: 18.9 (ref 7–25)
CALCIUM SPEC-SCNC: 9 MG/DL (ref 8.6–10.5)
CHLORIDE SERPL-SCNC: 102 MMOL/L (ref 98–107)
CHOLEST SERPL-MCNC: 137 MG/DL (ref 0–200)
CLARITY UR: CLEAR
CLOSE TME COLL+ADP + EPINEP PNL BLD: 99 % (ref 86–100)
CO2 SERPL-SCNC: 26 MMOL/L (ref 22–29)
COLOR UR: YELLOW
CREAT BLD-MCNC: 0.95 MG/DL (ref 0.76–1.27)
DEPRECATED RDW RBC AUTO: 43.8 FL (ref 37–54)
EOSINOPHIL # BLD AUTO: 0.2 10*3/MM3 (ref 0–0.4)
EOSINOPHIL NFR BLD AUTO: 4.6 % (ref 0.3–6.2)
ERYTHROCYTE [DISTWIDTH] IN BLOOD BY AUTOMATED COUNT: 13.2 % (ref 12.3–15.4)
GFR SERPL CREATININE-BSD FRML MDRD: 77 ML/MIN/1.73
GLOBULIN UR ELPH-MCNC: 2.1 GM/DL
GLUCOSE BLD-MCNC: 163 MG/DL (ref 65–99)
GLUCOSE UR STRIP-MCNC: NEGATIVE MG/DL
HBA1C MFR BLD: 5.9 % (ref 3.5–5.6)
HCT VFR BLD AUTO: 42.6 % (ref 37.5–51)
HDLC SERPL-MCNC: 55 MG/DL (ref 40–60)
HGB BLD-MCNC: 15 G/DL (ref 13–17.7)
HGB UR QL STRIP.AUTO: NEGATIVE
INR PPP: 0.98 (ref 0.9–1.1)
KETONES UR QL STRIP: NEGATIVE
LDLC SERPL CALC-MCNC: 59 MG/DL (ref 0–100)
LDLC/HDLC SERPL: 1.07 {RATIO}
LEUKOCYTE ESTERASE UR QL STRIP.AUTO: NEGATIVE
LYMPHOCYTES # BLD AUTO: 1.3 10*3/MM3 (ref 0.7–3.1)
LYMPHOCYTES NFR BLD AUTO: 26.1 % (ref 19.6–45.3)
MCH RBC QN AUTO: 33.6 PG (ref 26.6–33)
MCHC RBC AUTO-ENTMCNC: 35.3 G/DL (ref 31.5–35.7)
MCV RBC AUTO: 95.1 FL (ref 79–97)
MONOCYTES # BLD AUTO: 0.3 10*3/MM3 (ref 0.1–0.9)
MONOCYTES NFR BLD AUTO: 6.5 % (ref 5–12)
MRSA DNA SPEC QL NAA+PROBE: NORMAL
NEUTROPHILS # BLD AUTO: 3 10*3/MM3 (ref 1.7–7)
NEUTROPHILS NFR BLD AUTO: 61.9 % (ref 42.7–76)
NITRITE UR QL STRIP: NEGATIVE
NRBC BLD AUTO-RTO: 0 /100 WBC (ref 0–0.2)
PH UR STRIP.AUTO: 5.5 [PH] (ref 5–8)
PLATELET # BLD AUTO: 225 10*3/MM3 (ref 140–450)
PMV BLD AUTO: 6.6 FL (ref 6–12)
POTASSIUM BLD-SCNC: 4.1 MMOL/L (ref 3.5–5.2)
PROT SERPL-MCNC: 6.3 G/DL (ref 6–8.5)
PROT UR QL STRIP: NEGATIVE
PROTHROMBIN TIME: 10.3 SECONDS (ref 9.6–11.7)
RBC # BLD AUTO: 4.48 10*6/MM3 (ref 4.14–5.8)
RH BLD: POSITIVE
SODIUM BLD-SCNC: 139 MMOL/L (ref 136–145)
SP GR UR STRIP: 1.02 (ref 1–1.03)
T&S EXPIRATION DATE: NORMAL
TRIGL SERPL-MCNC: 115 MG/DL (ref 0–150)
UROBILINOGEN UR QL STRIP: NORMAL
VLDLC SERPL-MCNC: 23 MG/DL
WBC NRBC COR # BLD: 4.9 10*3/MM3 (ref 3.4–10.8)

## 2020-03-02 PROCEDURE — 81003 URINALYSIS AUTO W/O SCOPE: CPT | Performed by: NURSE PRACTITIONER

## 2020-03-02 PROCEDURE — 85730 THROMBOPLASTIN TIME PARTIAL: CPT | Performed by: NURSE PRACTITIONER

## 2020-03-02 PROCEDURE — 94729 DIFFUSING CAPACITY: CPT

## 2020-03-02 PROCEDURE — 93010 ELECTROCARDIOGRAM REPORT: CPT | Performed by: INTERNAL MEDICINE

## 2020-03-02 PROCEDURE — 86923 COMPATIBILITY TEST ELECTRIC: CPT

## 2020-03-02 PROCEDURE — 85018 HEMOGLOBIN: CPT

## 2020-03-02 PROCEDURE — 86900 BLOOD TYPING SEROLOGIC ABO: CPT

## 2020-03-02 PROCEDURE — 94799 UNLISTED PULMONARY SVC/PX: CPT

## 2020-03-02 PROCEDURE — 82330 ASSAY OF CALCIUM: CPT

## 2020-03-02 PROCEDURE — 82803 BLOOD GASES ANY COMBINATION: CPT

## 2020-03-02 PROCEDURE — 83605 ASSAY OF LACTIC ACID: CPT

## 2020-03-02 PROCEDURE — 93005 ELECTROCARDIOGRAM TRACING: CPT

## 2020-03-02 PROCEDURE — 80053 COMPREHEN METABOLIC PANEL: CPT | Performed by: NURSE PRACTITIONER

## 2020-03-02 PROCEDURE — 85025 COMPLETE CBC W/AUTO DIFF WBC: CPT | Performed by: NURSE PRACTITIONER

## 2020-03-02 PROCEDURE — 85576 BLOOD PLATELET AGGREGATION: CPT | Performed by: NURSE PRACTITIONER

## 2020-03-02 PROCEDURE — 86850 RBC ANTIBODY SCREEN: CPT | Performed by: NURSE PRACTITIONER

## 2020-03-02 PROCEDURE — 36600 WITHDRAWAL OF ARTERIAL BLOOD: CPT

## 2020-03-02 PROCEDURE — 83036 HEMOGLOBIN GLYCOSYLATED A1C: CPT | Performed by: NURSE PRACTITIONER

## 2020-03-02 PROCEDURE — 86900 BLOOD TYPING SEROLOGIC ABO: CPT | Performed by: NURSE PRACTITIONER

## 2020-03-02 PROCEDURE — 94010 BREATHING CAPACITY TEST: CPT

## 2020-03-02 PROCEDURE — 36415 COLL VENOUS BLD VENIPUNCTURE: CPT

## 2020-03-02 PROCEDURE — 87641 MR-STAPH DNA AMP PROBE: CPT | Performed by: NURSE PRACTITIONER

## 2020-03-02 PROCEDURE — 71046 X-RAY EXAM CHEST 2 VIEWS: CPT

## 2020-03-02 PROCEDURE — 85610 PROTHROMBIN TIME: CPT | Performed by: NURSE PRACTITIONER

## 2020-03-02 PROCEDURE — 94727 GAS DIL/WSHOT DETER LNG VOL: CPT

## 2020-03-02 PROCEDURE — 80061 LIPID PANEL: CPT | Performed by: NURSE PRACTITIONER

## 2020-03-02 PROCEDURE — 80051 ELECTROLYTE PANEL: CPT

## 2020-03-02 PROCEDURE — 86901 BLOOD TYPING SEROLOGIC RH(D): CPT

## 2020-03-02 PROCEDURE — 86901 BLOOD TYPING SEROLOGIC RH(D): CPT | Performed by: NURSE PRACTITIONER

## 2020-03-02 PROCEDURE — 93880 EXTRACRANIAL BILAT STUDY: CPT

## 2020-03-02 RX ORDER — CETIRIZINE HYDROCHLORIDE 10 MG/1
1 TABLET ORAL DAILY
COMMUNITY

## 2020-03-02 NOTE — H&P
Patient Care Team:  Temo Vidal MD as PCP - General  Temo Vidal MD as PCP - Family Medicine  Simone Shepherd MD as PCP - Claims Attributed    Chief complaint I need heart surgery    Subjective     History of Present Illness: Patient is a 74 year old  male with known PMH of Cardiomyopathy, SSS s/p PPM, HTN, and HLD. He presents to [facility] for pre-admission testing prior to scheduled LV lead placement with Dr. Aguilar on 3/4/2020. He denies any exacerbation of symptoms and states he is still ambulating well at home. Labs and studies reviewed and all questions answered.    Review of Systems   Constitutional: Positive for fatigue.   Respiratory: Positive for shortness of breath.    Cardiovascular: Negative for chest pain.        Past Medical History:   Diagnosis Date   • Arthritis    • BPH (benign prostatic hyperplasia)    • Coronary artery disease     dr. Carrizales   • Deviated septum    • GERD (gastroesophageal reflux disease)    • History of trigeminal neuralgia    • Hyperlipidemia    • Hypertension    • Renal cyst    • Sciatica    • Sleep apnea     unable to tolerate machine     Past Surgical History:   Procedure Laterality Date   • APPENDECTOMY     • CARDIAC CATHETERIZATION     • CARDIAC ELECTROPHYSIOLOGY PROCEDURE N/A 2020    Procedure: Biventricular Device Upgrade;  Surgeon: Dominick Jackson MD;  Location: [facility] INVASIVE LOCATION;  Service: Cardiovascular   • CORONARY STENT PLACEMENT  2014    x 2   • PROSTATE SURGERY     • REFRACTIVE SURGERY     • TOTAL HIP ARTHROPLASTY Bilateral     different times     Family History   Problem Relation Age of Onset   • Heart disease Father      Social History     Tobacco Use   • Smoking status: Former Smoker     Last attempt to quit: 1968     Years since quittin.2   • Smokeless tobacco: Never Used   Substance Use Topics   • Alcohol use: Yes     Alcohol/week: 7.0 standard drinks     Types: 7 Glasses of wine per week     Comment: RED  WINE BEFORE DINNER   • Drug use: No       Objective      Vital Signs  Temp:  [98.4 °F (36.9 °C)] 98.4 °F (36.9 °C)  Heart Rate:  [70] 70  BP: (153)/(84) 153/84    Physical Exam   Constitutional: He is oriented to person, place, and time. He appears well-developed and well-nourished.   HENT:   Head: Normocephalic and atraumatic.   Eyes: Pupils are equal, round, and reactive to light. EOM are normal.   Neck: Normal range of motion. Neck supple.   Cardiovascular: Normal rate, regular rhythm, normal heart sounds and intact distal pulses.   Pulmonary/Chest: Effort normal and breath sounds normal.   Abdominal: Soft. Bowel sounds are normal.   Musculoskeletal: Normal range of motion.   Neurological: He is alert and oriented to person, place, and time.   Skin: Skin is warm and dry.   Psychiatric: He has a normal mood and affect. His behavior is normal. Judgment and thought content normal.       Results Review:   I reviewed the patient's new clinical results.      Assessment/Plan       Chronic heart failure (CMS/HCC)      Assessment & Plan  Cardiomyopathy  SSS s/p PPM  CAD s/p PCI (2014)  HTN  HLD    Plans for LV lead placement with Dr. Aguliar on 3/4/2020.    I discussed the patients findings and my recommendations with patient    YVAN DIEHLBERTHA  03/02/20  4:36 PM

## 2020-03-03 ENCOUNTER — ANESTHESIA EVENT (OUTPATIENT)
Dept: PERIOP | Facility: HOSPITAL | Age: 75
End: 2020-03-03

## 2020-03-03 ENCOUNTER — APPOINTMENT (OUTPATIENT)
Dept: PREADMISSION TESTING | Facility: HOSPITAL | Age: 75
End: 2020-03-03

## 2020-03-04 ENCOUNTER — HOSPITAL ENCOUNTER (INPATIENT)
Facility: HOSPITAL | Age: 75
LOS: 3 days | Discharge: HOME | End: 2020-03-07
Attending: THORACIC SURGERY (CARDIOTHORACIC VASCULAR SURGERY) | Admitting: THORACIC SURGERY (CARDIOTHORACIC VASCULAR SURGERY)

## 2020-03-04 ENCOUNTER — APPOINTMENT (OUTPATIENT)
Dept: GENERAL RADIOLOGY | Facility: HOSPITAL | Age: 75
End: 2020-03-04

## 2020-03-04 ENCOUNTER — ANESTHESIA (OUTPATIENT)
Dept: PERIOP | Facility: HOSPITAL | Age: 75
End: 2020-03-04

## 2020-03-04 DIAGNOSIS — I44.2 COMPLETE HEART BLOCK (HCC): ICD-10-CM

## 2020-03-04 DIAGNOSIS — I49.5 SICK SINUS SYNDROME (HCC): Primary | ICD-10-CM

## 2020-03-04 DIAGNOSIS — I50.22 CHRONIC SYSTOLIC CONGESTIVE HEART FAILURE (HCC): ICD-10-CM

## 2020-03-04 DIAGNOSIS — I50.9 CHRONIC HEART FAILURE, UNSPECIFIED HEART FAILURE TYPE (HCC): ICD-10-CM

## 2020-03-04 DIAGNOSIS — Z95.0 CARDIAC PACEMAKER IN SITU: ICD-10-CM

## 2020-03-04 DIAGNOSIS — I42.9 CARDIOMYOPATHY, UNSPECIFIED TYPE (HCC): ICD-10-CM

## 2020-03-04 LAB
ALBUMIN SERPL-MCNC: 4.2 G/DL (ref 3.5–5.2)
ANION GAP SERPL CALCULATED.3IONS-SCNC: 12 MMOL/L (ref 5–15)
APTT PPP: 23.4 SECONDS (ref 24–31)
ARTERIAL PATENCY WRIST A: ABNORMAL
ARTERIAL PATENCY WRIST A: POSITIVE
ATMOSPHERIC PRESS: ABNORMAL MM[HG]
ATMOSPHERIC PRESS: ABNORMAL MM[HG]
BASE EXCESS BLDA CALC-SCNC: -1.1 MMOL/L (ref 0–3)
BASE EXCESS BLDA CALC-SCNC: -2.4 MMOL/L (ref 0–3)
BASOPHILS # BLD AUTO: 0 10*3/MM3 (ref 0–0.2)
BASOPHILS NFR BLD AUTO: 0.3 % (ref 0–1.5)
BDY SITE: ABNORMAL
BDY SITE: ABNORMAL
BUN BLD-MCNC: 18 MG/DL (ref 8–23)
BUN/CREAT SERPL: 24.3 (ref 7–25)
CA-I BLDA-SCNC: 1.11 MMOL/L (ref 1.15–1.33)
CA-I BLDA-SCNC: 1.2 MMOL/L (ref 1.15–1.33)
CA-I SERPL ISE-MCNC: 1.17 MMOL/L (ref 1.2–1.3)
CALCIUM SPEC-SCNC: 8.5 MG/DL (ref 8.6–10.5)
CHLORIDE SERPL-SCNC: 104 MMOL/L (ref 98–107)
CO2 BLDA-SCNC: 23.7 MMOL/L (ref 22–29)
CO2 BLDA-SCNC: 25.7 MMOL/L (ref 22–29)
CO2 SERPL-SCNC: 20 MMOL/L (ref 22–29)
CREAT BLD-MCNC: 0.74 MG/DL (ref 0.76–1.27)
D-LACTATE SERPL-SCNC: 1.7 MMOL/L (ref 0.5–2)
DEPRECATED RDW RBC AUTO: 43.8 FL (ref 37–54)
EOSINOPHIL # BLD AUTO: 0 10*3/MM3 (ref 0–0.4)
EOSINOPHIL NFR BLD AUTO: 0 % (ref 0.3–6.2)
ERYTHROCYTE [DISTWIDTH] IN BLOOD BY AUTOMATED COUNT: 13.1 % (ref 12.3–15.4)
GFR SERPL CREATININE-BSD FRML MDRD: 103 ML/MIN/1.73
GLUCOSE BLD-MCNC: 154 MG/DL (ref 65–99)
GLUCOSE BLDC GLUCOMTR-MCNC: 102 MG/DL (ref 70–105)
GLUCOSE BLDC GLUCOMTR-MCNC: 139 MG/DL (ref 70–105)
GLUCOSE BLDC GLUCOMTR-MCNC: 157 MG/DL (ref 74–100)
GLUCOSE BLDC GLUCOMTR-MCNC: 189 MG/DL (ref 74–100)
HCO3 BLDA-SCNC: 22.6 MMOL/L (ref 21–28)
HCO3 BLDA-SCNC: 24.2 MMOL/L (ref 21–28)
HCT VFR BLD AUTO: 42.8 % (ref 37.5–51)
HCT VFR BLDA CALC: 42 % (ref 38–51)
HCT VFR BLDA CALC: 42 % (ref 38–51)
HEMODILUTION: NO
HEMODILUTION: NO
HGB BLD-MCNC: 14.9 G/DL (ref 13–17.7)
HGB BLDA-MCNC: 14.2 G/DL (ref 12–17)
HGB BLDA-MCNC: 14.4 G/DL (ref 12–17)
HOROWITZ INDEX BLD+IHG-RTO: 21 %
INR PPP: 0.94 (ref 0.9–1.1)
LYMPHOCYTES # BLD AUTO: 0.4 10*3/MM3 (ref 0.7–3.1)
LYMPHOCYTES NFR BLD AUTO: 4.3 % (ref 19.6–45.3)
MCH RBC QN AUTO: 33.4 PG (ref 26.6–33)
MCHC RBC AUTO-ENTMCNC: 34.8 G/DL (ref 31.5–35.7)
MCV RBC AUTO: 95.7 FL (ref 79–97)
MODALITY: ABNORMAL
MODALITY: ABNORMAL
MONOCYTES # BLD AUTO: 0.2 10*3/MM3 (ref 0.1–0.9)
MONOCYTES NFR BLD AUTO: 2 % (ref 5–12)
NEUTROPHILS # BLD AUTO: 9.4 10*3/MM3 (ref 1.7–7)
NEUTROPHILS NFR BLD AUTO: 93.4 % (ref 42.7–76)
NRBC BLD AUTO-RTO: 0 /100 WBC (ref 0–0.2)
PCO2 BLDA: 34.1 MM HG (ref 35–48)
PCO2 BLDA: 47.9 MM HG (ref 35–48)
PH BLDA: 7.31 PH UNITS (ref 7.35–7.45)
PH BLDA: 7.43 PH UNITS (ref 7.35–7.45)
PHOSPHATE SERPL-MCNC: 3.5 MG/DL (ref 2.5–4.5)
PLATELET # BLD AUTO: 210 10*3/MM3 (ref 140–450)
PMV BLD AUTO: 6.4 FL (ref 6–12)
PO2 BLDA: 58.9 MM HG (ref 83–108)
PO2 BLDA: 89.7 MM HG (ref 83–108)
POTASSIUM BLD-SCNC: 4.4 MMOL/L (ref 3.5–5.2)
POTASSIUM BLDA-SCNC: 4 MMOL/L (ref 3.5–4.5)
POTASSIUM BLDA-SCNC: 4.3 MMOL/L (ref 3.5–4.5)
PROTHROMBIN TIME: 10 SECONDS (ref 9.6–11.7)
RBC # BLD AUTO: 4.47 10*6/MM3 (ref 4.14–5.8)
SAO2 % BLDCOA: 87.4 % (ref 94–98)
SAO2 % BLDCOA: 97.2 % (ref 94–98)
SODIUM BLD-SCNC: 136 MMOL/L (ref 136–145)
SODIUM BLDA-SCNC: 137 MMOL/L (ref 138–146)
SODIUM BLDA-SCNC: 139 MMOL/L (ref 138–146)
WBC NRBC COR # BLD: 10.1 10*3/MM3 (ref 3.4–10.8)

## 2020-03-04 PROCEDURE — 0JH606Z INSERTION OF PACEMAKER, DUAL CHAMBER INTO CHEST SUBCUTANEOUS TISSUE AND FASCIA, OPEN APPROACH: ICD-10-PCS | Performed by: THORACIC SURGERY (CARDIOTHORACIC VASCULAR SURGERY)

## 2020-03-04 PROCEDURE — 25010000003 BUPIVACAINE LIPOSOME 1.3 % SUSPENSION: Performed by: THORACIC SURGERY (CARDIOTHORACIC VASCULAR SURGERY)

## 2020-03-04 PROCEDURE — 71045 X-RAY EXAM CHEST 1 VIEW: CPT

## 2020-03-04 PROCEDURE — 25010000002 MIDAZOLAM PER 1 MG: Performed by: ANESTHESIOLOGY

## 2020-03-04 PROCEDURE — 25010000003 CEFAZOLIN PER 500 MG: Performed by: THORACIC SURGERY (CARDIOTHORACIC VASCULAR SURGERY)

## 2020-03-04 PROCEDURE — 25010000002 HYDRALAZINE PER 20 MG: Performed by: NURSE PRACTITIONER

## 2020-03-04 PROCEDURE — 82330 ASSAY OF CALCIUM: CPT

## 2020-03-04 PROCEDURE — 33228 REMV&REPLC PM GEN DUAL LEAD: CPT | Performed by: THORACIC SURGERY (CARDIOTHORACIC VASCULAR SURGERY)

## 2020-03-04 PROCEDURE — C2621 PMKR, OTHER THAN SING/DUAL: HCPCS | Performed by: THORACIC SURGERY (CARDIOTHORACIC VASCULAR SURGERY)

## 2020-03-04 PROCEDURE — 93005 ELECTROCARDIOGRAM TRACING: CPT | Performed by: NURSE PRACTITIONER

## 2020-03-04 PROCEDURE — 82803 BLOOD GASES ANY COMBINATION: CPT

## 2020-03-04 PROCEDURE — 0JPT0PZ REMOVAL OF CARDIAC RHYTHM RELATED DEVICE FROM TRUNK SUBCUTANEOUS TISSUE AND FASCIA, OPEN APPROACH: ICD-10-PCS | Performed by: THORACIC SURGERY (CARDIOTHORACIC VASCULAR SURGERY)

## 2020-03-04 PROCEDURE — 25010000002 MAGNESIUM SULFATE IN D5W 1G/100ML (PREMIX) 1-5 GM/100ML-% SOLUTION: Performed by: NURSE PRACTITIONER

## 2020-03-04 PROCEDURE — 25010000002 MORPHINE PER 10 MG: Performed by: NURSE PRACTITIONER

## 2020-03-04 PROCEDURE — 85610 PROTHROMBIN TIME: CPT | Performed by: NURSE PRACTITIONER

## 2020-03-04 PROCEDURE — 99233 SBSQ HOSP IP/OBS HIGH 50: CPT | Performed by: INTERNAL MEDICINE

## 2020-03-04 PROCEDURE — 80069 RENAL FUNCTION PANEL: CPT | Performed by: NURSE PRACTITIONER

## 2020-03-04 PROCEDURE — 03HY32Z INSERTION OF MONITORING DEVICE INTO UPPER ARTERY, PERCUTANEOUS APPROACH: ICD-10-PCS | Performed by: ANESTHESIOLOGY

## 2020-03-04 PROCEDURE — 82330 ASSAY OF CALCIUM: CPT | Performed by: NURSE PRACTITIONER

## 2020-03-04 PROCEDURE — 94799 UNLISTED PULMONARY SVC/PX: CPT

## 2020-03-04 PROCEDURE — 85730 THROMBOPLASTIN TIME PARTIAL: CPT | Performed by: NURSE PRACTITIONER

## 2020-03-04 PROCEDURE — C1898 LEAD, PMKR, OTHER THAN TRANS: HCPCS | Performed by: THORACIC SURGERY (CARDIOTHORACIC VASCULAR SURGERY)

## 2020-03-04 PROCEDURE — C9290 INJ, BUPIVACAINE LIPOSOME: HCPCS | Performed by: THORACIC SURGERY (CARDIOTHORACIC VASCULAR SURGERY)

## 2020-03-04 PROCEDURE — 80051 ELECTROLYTE PANEL: CPT

## 2020-03-04 PROCEDURE — 33222 RELOCATION POCKET PACEMAKER: CPT | Performed by: THORACIC SURGERY (CARDIOTHORACIC VASCULAR SURGERY)

## 2020-03-04 PROCEDURE — 25010000002 DEXAMETHASONE PER 1 MG: Performed by: ANESTHESIOLOGY

## 2020-03-04 PROCEDURE — 25010000002 PROPOFOL 10 MG/ML EMULSION: Performed by: ANESTHESIOLOGY

## 2020-03-04 PROCEDURE — 33202 INSERT EPICARD ELTRD OPEN: CPT | Performed by: THORACIC SURGERY (CARDIOTHORACIC VASCULAR SURGERY)

## 2020-03-04 PROCEDURE — 25010000002 HYDROMORPHONE PER 4 MG: Performed by: ANESTHESIOLOGY

## 2020-03-04 PROCEDURE — 4A133B1 MONITORING OF ARTERIAL PRESSURE, PERIPHERAL, PERCUTANEOUS APPROACH: ICD-10-PCS | Performed by: ANESTHESIOLOGY

## 2020-03-04 PROCEDURE — 25010000002 CEFAZOLIN PER 500 MG: Performed by: NURSE PRACTITIONER

## 2020-03-04 PROCEDURE — 25010000002 FENTANYL CITRATE (PF) 100 MCG/2ML SOLUTION: Performed by: ANESTHESIOLOGY

## 2020-03-04 PROCEDURE — 4A133J1 MONITORING OF ARTERIAL PULSE, PERIPHERAL, PERCUTANEOUS APPROACH: ICD-10-PCS | Performed by: ANESTHESIOLOGY

## 2020-03-04 PROCEDURE — 25010000002 ONDANSETRON PER 1 MG: Performed by: ANESTHESIOLOGY

## 2020-03-04 PROCEDURE — 85025 COMPLETE CBC W/AUTO DIFF WBC: CPT | Performed by: NURSE PRACTITIONER

## 2020-03-04 PROCEDURE — C1729 CATH, DRAINAGE: HCPCS | Performed by: THORACIC SURGERY (CARDIOTHORACIC VASCULAR SURGERY)

## 2020-03-04 PROCEDURE — 02HL3JZ INSERTION OF PACEMAKER LEAD INTO LEFT VENTRICLE, PERCUTANEOUS APPROACH: ICD-10-PCS | Performed by: THORACIC SURGERY (CARDIOTHORACIC VASCULAR SURGERY)

## 2020-03-04 PROCEDURE — 02H63JZ INSERTION OF PACEMAKER LEAD INTO RIGHT ATRIUM, PERCUTANEOUS APPROACH: ICD-10-PCS | Performed by: THORACIC SURGERY (CARDIOTHORACIC VASCULAR SURGERY)

## 2020-03-04 PROCEDURE — 85018 HEMOGLOBIN: CPT

## 2020-03-04 PROCEDURE — 93010 ELECTROCARDIOGRAM REPORT: CPT | Performed by: INTERNAL MEDICINE

## 2020-03-04 PROCEDURE — 82962 GLUCOSE BLOOD TEST: CPT

## 2020-03-04 PROCEDURE — C1751 CATH, INF, PER/CENT/MIDLINE: HCPCS | Performed by: ANESTHESIOLOGY

## 2020-03-04 PROCEDURE — 93005 ELECTROCARDIOGRAM TRACING: CPT | Performed by: THORACIC SURGERY (CARDIOTHORACIC VASCULAR SURGERY)

## 2020-03-04 DEVICE — IMPLANTABLE DEVICE: Type: IMPLANTABLE DEVICE | Site: HEART | Status: FUNCTIONAL

## 2020-03-04 DEVICE — IMPLANTABLE DEVICE
Type: IMPLANTABLE DEVICE | Site: HEART | Status: FUNCTIONAL
Brand: EDORA 8 HF-T

## 2020-03-04 RX ORDER — CHLORHEXIDINE GLUCONATE 500 MG/1
1 CLOTH TOPICAL EVERY 12 HOURS PRN
Status: DISCONTINUED | OUTPATIENT
Start: 2020-03-04 | End: 2020-03-04

## 2020-03-04 RX ORDER — MORPHINE SULFATE 4 MG/ML
2 INJECTION, SOLUTION INTRAMUSCULAR; INTRAVENOUS
Status: DISCONTINUED | OUTPATIENT
Start: 2020-03-04 | End: 2020-03-07 | Stop reason: HOSPADM

## 2020-03-04 RX ORDER — SODIUM CHLORIDE 0.9 % (FLUSH) 0.9 %
3-10 SYRINGE (ML) INJECTION AS NEEDED
Status: DISCONTINUED | OUTPATIENT
Start: 2020-03-04 | End: 2020-03-04 | Stop reason: HOSPADM

## 2020-03-04 RX ORDER — NALOXONE HCL 0.4 MG/ML
0.4 VIAL (ML) INJECTION
Status: DISCONTINUED | OUTPATIENT
Start: 2020-03-04 | End: 2020-03-04

## 2020-03-04 RX ORDER — POTASSIUM CHLORIDE 20 MEQ/1
20 TABLET, EXTENDED RELEASE ORAL AS NEEDED
Status: DISCONTINUED | OUTPATIENT
Start: 2020-03-05 | End: 2020-03-07 | Stop reason: HOSPADM

## 2020-03-04 RX ORDER — OXCARBAZEPINE 150 MG/1
300 TABLET, FILM COATED ORAL 2 TIMES DAILY
Status: DISCONTINUED | OUTPATIENT
Start: 2020-03-05 | End: 2020-03-04

## 2020-03-04 RX ORDER — POTASSIUM CHLORIDE 7.45 MG/ML
10 INJECTION INTRAVENOUS
Status: DISCONTINUED | OUTPATIENT
Start: 2020-03-04 | End: 2020-03-07 | Stop reason: HOSPADM

## 2020-03-04 RX ORDER — OXCARBAZEPINE 150 MG/1
300 TABLET, FILM COATED ORAL 2 TIMES DAILY
Status: DISCONTINUED | OUTPATIENT
Start: 2020-03-04 | End: 2020-03-07 | Stop reason: HOSPADM

## 2020-03-04 RX ORDER — CHLORHEXIDINE GLUCONATE 500 MG/1
1 CLOTH TOPICAL EVERY 12 HOURS PRN
Status: DISCONTINUED | OUTPATIENT
Start: 2020-03-04 | End: 2020-03-04 | Stop reason: HOSPADM

## 2020-03-04 RX ORDER — ACETAMINOPHEN 650 MG/1
650 SUPPOSITORY RECTAL EVERY 6 HOURS
Status: COMPLETED | OUTPATIENT
Start: 2020-03-04 | End: 2020-03-05

## 2020-03-04 RX ORDER — HYDROMORPHONE HCL 110MG/55ML
0.5 PATIENT CONTROLLED ANALGESIA SYRINGE INTRAVENOUS
Status: DISCONTINUED | OUTPATIENT
Start: 2020-03-04 | End: 2020-03-04 | Stop reason: HOSPADM

## 2020-03-04 RX ORDER — NALOXONE HYDROCHLORIDE 0.4 MG/ML
0.2 INJECTION, SOLUTION INTRAMUSCULAR; INTRAVENOUS; SUBCUTANEOUS
Status: DISCONTINUED | OUTPATIENT
Start: 2020-03-04 | End: 2020-03-04

## 2020-03-04 RX ORDER — CHLORHEXIDINE GLUCONATE 0.12 MG/ML
15 RINSE ORAL ONCE
Status: COMPLETED | OUTPATIENT
Start: 2020-03-04 | End: 2020-03-04

## 2020-03-04 RX ORDER — LIDOCAINE HYDROCHLORIDE 10 MG/ML
0.5 INJECTION, SOLUTION EPIDURAL; INFILTRATION; INTRACAUDAL; PERINEURAL ONCE AS NEEDED
Status: DISCONTINUED | OUTPATIENT
Start: 2020-03-04 | End: 2020-03-04 | Stop reason: HOSPADM

## 2020-03-04 RX ORDER — XYLITOL/YERBA SANTA
2 AEROSOL, SPRAY WITH PUMP (ML) MUCOUS MEMBRANE EVERY 4 HOURS PRN
Status: DISCONTINUED | OUTPATIENT
Start: 2020-03-04 | End: 2020-03-07 | Stop reason: HOSPADM

## 2020-03-04 RX ORDER — HYDRALAZINE HYDROCHLORIDE 20 MG/ML
20 INJECTION INTRAMUSCULAR; INTRAVENOUS ONCE
Status: COMPLETED | OUTPATIENT
Start: 2020-03-04 | End: 2020-03-04

## 2020-03-04 RX ORDER — MAGNESIUM SULFATE 1 G/100ML
1 INJECTION INTRAVENOUS EVERY 8 HOURS
Status: COMPLETED | OUTPATIENT
Start: 2020-03-04 | End: 2020-03-05

## 2020-03-04 RX ORDER — ASPIRIN 325 MG
325 TABLET ORAL ONCE
Status: COMPLETED | OUTPATIENT
Start: 2020-03-04 | End: 2020-03-04

## 2020-03-04 RX ORDER — DEXAMETHASONE SODIUM PHOSPHATE 4 MG/ML
INJECTION, SOLUTION INTRA-ARTICULAR; INTRALESIONAL; INTRAMUSCULAR; INTRAVENOUS; SOFT TISSUE AS NEEDED
Status: DISCONTINUED | OUTPATIENT
Start: 2020-03-04 | End: 2020-03-04 | Stop reason: SURG

## 2020-03-04 RX ORDER — HYDRALAZINE HYDROCHLORIDE 20 MG/ML
10 INJECTION INTRAMUSCULAR; INTRAVENOUS EVERY 4 HOURS PRN
Status: DISCONTINUED | OUTPATIENT
Start: 2020-03-04 | End: 2020-03-07 | Stop reason: HOSPADM

## 2020-03-04 RX ORDER — MAGNESIUM HYDROXIDE 1200 MG/15ML
LIQUID ORAL AS NEEDED
Status: DISCONTINUED | OUTPATIENT
Start: 2020-03-04 | End: 2020-03-04 | Stop reason: HOSPADM

## 2020-03-04 RX ORDER — CETIRIZINE HYDROCHLORIDE 10 MG/1
10 TABLET ORAL DAILY
Status: DISCONTINUED | OUTPATIENT
Start: 2020-03-05 | End: 2020-03-07 | Stop reason: HOSPADM

## 2020-03-04 RX ORDER — AMITRIPTYLINE HYDROCHLORIDE 10 MG/1
20 TABLET, FILM COATED ORAL NIGHTLY
Status: DISCONTINUED | OUTPATIENT
Start: 2020-03-05 | End: 2020-03-04

## 2020-03-04 RX ORDER — NITROGLYCERIN 0.4 MG/1
0.4 TABLET SUBLINGUAL
Status: DISCONTINUED | OUTPATIENT
Start: 2020-03-04 | End: 2020-03-04 | Stop reason: HOSPADM

## 2020-03-04 RX ORDER — SODIUM CHLORIDE 9 MG/ML
30 INJECTION, SOLUTION INTRAVENOUS CONTINUOUS PRN
Status: DISCONTINUED | OUTPATIENT
Start: 2020-03-04 | End: 2020-03-04 | Stop reason: HOSPADM

## 2020-03-04 RX ORDER — PROPOFOL 10 MG/ML
VIAL (ML) INTRAVENOUS AS NEEDED
Status: DISCONTINUED | OUTPATIENT
Start: 2020-03-04 | End: 2020-03-04 | Stop reason: SURG

## 2020-03-04 RX ORDER — HYDROCODONE BITARTRATE AND ACETAMINOPHEN 7.5; 325 MG/1; MG/1
2 TABLET ORAL EVERY 4 HOURS PRN
Status: DISCONTINUED | OUTPATIENT
Start: 2020-03-04 | End: 2020-03-04 | Stop reason: HOSPADM

## 2020-03-04 RX ORDER — SODIUM CHLORIDE 9 MG/ML
9 INJECTION, SOLUTION INTRAVENOUS CONTINUOUS PRN
Status: DISCONTINUED | OUTPATIENT
Start: 2020-03-04 | End: 2020-03-04 | Stop reason: HOSPADM

## 2020-03-04 RX ORDER — SODIUM CHLORIDE 0.9 % (FLUSH) 0.9 %
3 SYRINGE (ML) INJECTION EVERY 12 HOURS SCHEDULED
Status: DISCONTINUED | OUTPATIENT
Start: 2020-03-04 | End: 2020-03-04 | Stop reason: HOSPADM

## 2020-03-04 RX ORDER — MIDAZOLAM HYDROCHLORIDE 1 MG/ML
INJECTION INTRAMUSCULAR; INTRAVENOUS AS NEEDED
Status: DISCONTINUED | OUTPATIENT
Start: 2020-03-04 | End: 2020-03-04 | Stop reason: SURG

## 2020-03-04 RX ORDER — ASPIRIN 81 MG/1
81 TABLET ORAL DAILY
Status: DISCONTINUED | OUTPATIENT
Start: 2020-03-05 | End: 2020-03-07 | Stop reason: HOSPADM

## 2020-03-04 RX ORDER — NALOXONE HYDROCHLORIDE 0.4 MG/ML
0.4 INJECTION, SOLUTION INTRAMUSCULAR; INTRAVENOUS; SUBCUTANEOUS
Status: DISCONTINUED | OUTPATIENT
Start: 2020-03-04 | End: 2020-03-07 | Stop reason: HOSPADM

## 2020-03-04 RX ORDER — MEPERIDINE HYDROCHLORIDE 25 MG/ML
12.5 INJECTION INTRAMUSCULAR; INTRAVENOUS; SUBCUTANEOUS
Status: DISCONTINUED | OUTPATIENT
Start: 2020-03-04 | End: 2020-03-04 | Stop reason: HOSPADM

## 2020-03-04 RX ORDER — PANTOPRAZOLE SODIUM 40 MG/1
40 TABLET, DELAYED RELEASE ORAL
Status: DISCONTINUED | OUTPATIENT
Start: 2020-03-05 | End: 2020-03-07 | Stop reason: HOSPADM

## 2020-03-04 RX ORDER — PANTOPRAZOLE SODIUM 40 MG/10ML
40 INJECTION, POWDER, LYOPHILIZED, FOR SOLUTION INTRAVENOUS ONCE
Status: COMPLETED | OUTPATIENT
Start: 2020-03-04 | End: 2020-03-04

## 2020-03-04 RX ORDER — NEOSTIGMINE METHYLSULFATE 5 MG/5 ML
SYRINGE (ML) INTRAVENOUS AS NEEDED
Status: DISCONTINUED | OUTPATIENT
Start: 2020-03-04 | End: 2020-03-04 | Stop reason: SURG

## 2020-03-04 RX ORDER — ALPRAZOLAM 0.25 MG/1
0.25 TABLET ORAL ONCE
Status: COMPLETED | OUTPATIENT
Start: 2020-03-04 | End: 2020-03-04

## 2020-03-04 RX ORDER — CHLORHEXIDINE GLUCONATE 0.12 MG/ML
15 RINSE ORAL EVERY 12 HOURS
Status: DISCONTINUED | OUTPATIENT
Start: 2020-03-04 | End: 2020-03-04 | Stop reason: SDUPTHER

## 2020-03-04 RX ORDER — HYDROCODONE BITARTRATE AND ACETAMINOPHEN 5; 325 MG/1; MG/1
1 TABLET ORAL EVERY 4 HOURS PRN
Status: DISCONTINUED | OUTPATIENT
Start: 2020-03-04 | End: 2020-03-07 | Stop reason: HOSPADM

## 2020-03-04 RX ORDER — ACETAMINOPHEN 325 MG/1
650 TABLET ORAL EVERY 6 HOURS
Status: COMPLETED | OUTPATIENT
Start: 2020-03-04 | End: 2020-03-05

## 2020-03-04 RX ORDER — ACETAMINOPHEN 325 MG/1
650 TABLET ORAL EVERY 4 HOURS PRN
Status: DISCONTINUED | OUTPATIENT
Start: 2020-03-04 | End: 2020-03-04 | Stop reason: HOSPADM

## 2020-03-04 RX ORDER — SODIUM CHLORIDE 0.9 % (FLUSH) 0.9 %
30 SYRINGE (ML) INJECTION ONCE AS NEEDED
Status: DISCONTINUED | OUTPATIENT
Start: 2020-03-04 | End: 2020-03-04 | Stop reason: HOSPADM

## 2020-03-04 RX ORDER — AMITRIPTYLINE HYDROCHLORIDE 10 MG/1
20 TABLET, FILM COATED ORAL NIGHTLY
Status: DISCONTINUED | OUTPATIENT
Start: 2020-03-04 | End: 2020-03-07 | Stop reason: HOSPADM

## 2020-03-04 RX ORDER — POTASSIUM CHLORIDE 1.5 G/1.77G
20 POWDER, FOR SOLUTION ORAL AS NEEDED
Status: DISCONTINUED | OUTPATIENT
Start: 2020-03-05 | End: 2020-03-07 | Stop reason: HOSPADM

## 2020-03-04 RX ORDER — CHLORHEXIDINE GLUCONATE 0.12 MG/ML
15 RINSE ORAL EVERY 12 HOURS
Status: DISCONTINUED | OUTPATIENT
Start: 2020-03-05 | End: 2020-03-07 | Stop reason: HOSPADM

## 2020-03-04 RX ORDER — ROCURONIUM BROMIDE 10 MG/ML
INJECTION, SOLUTION INTRAVENOUS AS NEEDED
Status: DISCONTINUED | OUTPATIENT
Start: 2020-03-04 | End: 2020-03-04 | Stop reason: SURG

## 2020-03-04 RX ORDER — ATORVASTATIN CALCIUM 40 MG/1
40 TABLET, FILM COATED ORAL NIGHTLY
Status: DISCONTINUED | OUTPATIENT
Start: 2020-03-05 | End: 2020-03-06 | Stop reason: SDUPTHER

## 2020-03-04 RX ORDER — FENTANYL CITRATE 50 UG/ML
INJECTION, SOLUTION INTRAMUSCULAR; INTRAVENOUS AS NEEDED
Status: DISCONTINUED | OUTPATIENT
Start: 2020-03-04 | End: 2020-03-04 | Stop reason: SURG

## 2020-03-04 RX ORDER — SENNA PLUS 8.6 MG/1
1 TABLET ORAL 2 TIMES DAILY
Status: DISCONTINUED | OUTPATIENT
Start: 2020-03-05 | End: 2020-03-07 | Stop reason: HOSPADM

## 2020-03-04 RX ORDER — ONDANSETRON 2 MG/ML
4 INJECTION INTRAMUSCULAR; INTRAVENOUS ONCE AS NEEDED
Status: DISCONTINUED | OUTPATIENT
Start: 2020-03-04 | End: 2020-03-04 | Stop reason: HOSPADM

## 2020-03-04 RX ORDER — POLYETHYLENE GLYCOL 3350 17 G/17G
17 POWDER, FOR SOLUTION ORAL 2 TIMES DAILY
Status: DISCONTINUED | OUTPATIENT
Start: 2020-03-05 | End: 2020-03-07 | Stop reason: HOSPADM

## 2020-03-04 RX ORDER — NALOXONE HYDROCHLORIDE 0.4 MG/ML
0.2 INJECTION, SOLUTION INTRAMUSCULAR; INTRAVENOUS; SUBCUTANEOUS ONCE
Status: DISCONTINUED | OUTPATIENT
Start: 2020-03-04 | End: 2020-03-07 | Stop reason: HOSPADM

## 2020-03-04 RX ORDER — LABETALOL HYDROCHLORIDE 5 MG/ML
5 INJECTION, SOLUTION INTRAVENOUS
Status: DISCONTINUED | OUTPATIENT
Start: 2020-03-04 | End: 2020-03-04 | Stop reason: HOSPADM

## 2020-03-04 RX ORDER — GLYCOPYRROLATE 1 MG/5 ML
SYRINGE (ML) INTRAVENOUS AS NEEDED
Status: DISCONTINUED | OUTPATIENT
Start: 2020-03-04 | End: 2020-03-04 | Stop reason: SURG

## 2020-03-04 RX ORDER — ONDANSETRON 2 MG/ML
INJECTION INTRAMUSCULAR; INTRAVENOUS AS NEEDED
Status: DISCONTINUED | OUTPATIENT
Start: 2020-03-04 | End: 2020-03-04 | Stop reason: SURG

## 2020-03-04 RX ORDER — DEXMEDETOMIDINE HYDROCHLORIDE 4 UG/ML
.2-1.5 INJECTION, SOLUTION INTRAVENOUS
Status: DISCONTINUED | OUTPATIENT
Start: 2020-03-04 | End: 2020-03-05

## 2020-03-04 RX ORDER — ONDANSETRON 2 MG/ML
4 INJECTION INTRAMUSCULAR; INTRAVENOUS EVERY 6 HOURS PRN
Status: DISCONTINUED | OUTPATIENT
Start: 2020-03-04 | End: 2020-03-07 | Stop reason: HOSPADM

## 2020-03-04 RX ORDER — EPHEDRINE SULFATE/0.9% NACL/PF 25 MG/5 ML
SYRINGE (ML) INTRAVENOUS AS NEEDED
Status: DISCONTINUED | OUTPATIENT
Start: 2020-03-04 | End: 2020-03-04 | Stop reason: SURG

## 2020-03-04 RX ADMIN — ALPRAZOLAM 0.25 MG: 0.25 TABLET ORAL at 08:11

## 2020-03-04 RX ADMIN — ASPIRIN 325 MG ORAL TABLET 325 MG: 325 PILL ORAL at 21:27

## 2020-03-04 RX ADMIN — MUPIROCIN 1 APPLICATION: 20 OINTMENT TOPICAL at 21:27

## 2020-03-04 RX ADMIN — HYDROMORPHONE HYDROCHLORIDE 0.5 MG: 2 INJECTION INTRAMUSCULAR; INTRAVENOUS; SUBCUTANEOUS at 12:09

## 2020-03-04 RX ADMIN — PROPOFOL 150 MG: 10 INJECTION, EMULSION INTRAVENOUS at 09:51

## 2020-03-04 RX ADMIN — SODIUM CHLORIDE: 0.9 INJECTION, SOLUTION INTRAVENOUS at 10:36

## 2020-03-04 RX ADMIN — HYDRALAZINE HYDROCHLORIDE 20 MG: 20 INJECTION INTRAMUSCULAR; INTRAVENOUS at 15:21

## 2020-03-04 RX ADMIN — PANTOPRAZOLE SODIUM 40 MG: 40 INJECTION, POWDER, FOR SOLUTION INTRAVENOUS at 17:13

## 2020-03-04 RX ADMIN — FENTANYL CITRATE 50 MCG: 50 INJECTION, SOLUTION INTRAMUSCULAR; INTRAVENOUS at 10:58

## 2020-03-04 RX ADMIN — ROCURONIUM BROMIDE 40 MG: 10 INJECTION, SOLUTION INTRAVENOUS at 09:51

## 2020-03-04 RX ADMIN — Medication 0.4 MG: at 11:04

## 2020-03-04 RX ADMIN — HYDROCODONE BITARTRATE AND ACETAMINOPHEN 1 TABLET: 5; 325 TABLET ORAL at 17:14

## 2020-03-04 RX ADMIN — PROPOFOL 50 MG: 10 INJECTION, EMULSION INTRAVENOUS at 10:58

## 2020-03-04 RX ADMIN — Medication 3 MG: at 11:04

## 2020-03-04 RX ADMIN — FENTANYL CITRATE 50 MCG: 50 INJECTION, SOLUTION INTRAMUSCULAR; INTRAVENOUS at 09:51

## 2020-03-04 RX ADMIN — MAGNESIUM SULFATE HEPTAHYDRATE 1 G: 1 INJECTION, SOLUTION INTRAVENOUS at 14:01

## 2020-03-04 RX ADMIN — MORPHINE SULFATE 2 MG: 4 INJECTION INTRAVENOUS at 15:21

## 2020-03-04 RX ADMIN — OXCARBAZEPINE 300 MG: 150 TABLET, FILM COATED ORAL at 23:28

## 2020-03-04 RX ADMIN — CEFAZOLIN SODIUM 2 G: 1 INJECTION, POWDER, FOR SOLUTION INTRAMUSCULAR; INTRAVENOUS at 09:54

## 2020-03-04 RX ADMIN — CHLORHEXIDINE GLUCONATE 1 APPLICATION: 500 CLOTH TOPICAL at 08:52

## 2020-03-04 RX ADMIN — ONDANSETRON 4 MG: 2 INJECTION INTRAMUSCULAR; INTRAVENOUS at 10:37

## 2020-03-04 RX ADMIN — MUPIROCIN 1 APPLICATION: 20 OINTMENT TOPICAL at 08:52

## 2020-03-04 RX ADMIN — SODIUM CHLORIDE: 0.9 INJECTION, SOLUTION INTRAVENOUS at 09:46

## 2020-03-04 RX ADMIN — ACETAMINOPHEN 650 MG: 325 TABLET, FILM COATED ORAL at 21:27

## 2020-03-04 RX ADMIN — MAGNESIUM SULFATE HEPTAHYDRATE 1 G: 1 INJECTION, SOLUTION INTRAVENOUS at 23:30

## 2020-03-04 RX ADMIN — CHLORHEXIDINE GLUCONATE 0.12% ORAL RINSE 15 ML: 1.2 LIQUID ORAL at 08:51

## 2020-03-04 RX ADMIN — ROCURONIUM BROMIDE 10 MG: 10 INJECTION, SOLUTION INTRAVENOUS at 10:25

## 2020-03-04 RX ADMIN — HYDROMORPHONE HYDROCHLORIDE 0.5 MG: 2 INJECTION INTRAMUSCULAR; INTRAVENOUS; SUBCUTANEOUS at 12:30

## 2020-03-04 RX ADMIN — HYDROMORPHONE HYDROCHLORIDE 0.5 MG: 2 INJECTION INTRAMUSCULAR; INTRAVENOUS; SUBCUTANEOUS at 12:13

## 2020-03-04 RX ADMIN — Medication 0.2 MG: at 10:30

## 2020-03-04 RX ADMIN — AMITRIPTYLINE HYDROCHLORIDE 20 MG: 10 TABLET, FILM COATED ORAL at 23:28

## 2020-03-04 RX ADMIN — DEXAMETHASONE SODIUM PHOSPHATE 4 MG: 4 INJECTION, SOLUTION INTRAMUSCULAR; INTRAVENOUS at 10:35

## 2020-03-04 RX ADMIN — ACETAMINOPHEN 650 MG: 325 TABLET, FILM COATED ORAL at 14:01

## 2020-03-04 RX ADMIN — METOPROLOL TARTRATE 12.5 MG: 25 TABLET ORAL at 08:11

## 2020-03-04 RX ADMIN — Medication 10 MG: at 10:25

## 2020-03-04 RX ADMIN — FENTANYL CITRATE 50 MCG: 50 INJECTION, SOLUTION INTRAMUSCULAR; INTRAVENOUS at 09:46

## 2020-03-04 RX ADMIN — PROPOFOL 40 MG: 10 INJECTION, EMULSION INTRAVENOUS at 11:24

## 2020-03-04 RX ADMIN — CEFAZOLIN SODIUM 2 G: 10 INJECTION, POWDER, FOR SOLUTION INTRAVENOUS at 17:14

## 2020-03-04 RX ADMIN — MIDAZOLAM 2 MG: 1 INJECTION INTRAMUSCULAR; INTRAVENOUS at 09:46

## 2020-03-04 NOTE — ANESTHESIA PROCEDURE NOTES
Central Line      Patient reassessed immediately prior to procedure    Patient location during procedure: OR  Start time: 3/4/2020 9:54 AM  Stop Time:3/4/2020 10:04 AM  Indications: vascular access and MD/Surgeon request  Staff  Anesthesiologist: Azeem Conklin MD  Preanesthetic Checklist  Completed: patient identified, site marked, surgical consent, pre-op evaluation, timeout performed, IV checked, risks and benefits discussed and monitors and equipment checked  Central Line Prep  Sterile Tech:cap, gloves, mask, sterile barriers and gown  Prep: chloraprep  Patient monitoring: continuous pulse oximetry, EKG and blood pressure monitoring  Central Line Procedure  Laterality:right  Location:internal jugular  Catheter Type:Cordis, MAC and double lumen  Catheter Size:9 Fr  Guidance:ultrasound guided  Assessment  Post procedure:biopatch applied, line sutured, occlusive dressing applied and secured with tape  Assessement:chest x-ray ordered, blood return through all ports, Kristian Test and free fluid flow  Complications:no  Patient Tolerance:patient tolerated the procedure well with no apparent complications             Patient taken to treatment room before rooming/vitals done today.Sharon Preston/RUDY

## 2020-03-04 NOTE — ANESTHESIA PREPROCEDURE EVALUATION
Anesthesia Evaluation     Patient summary reviewed and Nursing notes reviewed   NPO Solid Status: > 8 hours  NPO Liquid Status: > 8 hours           Airway   Mallampati: II  TM distance: >3 FB  Neck ROM: full  No difficulty expected  Dental - normal exam     Pulmonary - normal exam   (+) sleep apnea,   Cardiovascular - normal exam    (+) hypertension, CAD, cardiac stents more than 12 months ago CHF Systolic <55%,       Neuro/Psych- negative ROS  GI/Hepatic/Renal/Endo    (+)  GERD,      Musculoskeletal (-) negative ROS    Abdominal  - normal exam    Bowel sounds: normal.   Substance History - negative use     OB/GYN negative ob/gyn ROS         Other        ROS/Med Hx Other: EF 35-40%                  Anesthesia Plan    ASA 3     general     intravenous induction     Anesthetic plan, all risks, benefits, and alternatives have been provided, discussed and informed consent has been obtained with: patient.

## 2020-03-04 NOTE — ANESTHESIA PROCEDURE NOTES
Arterial Line      Patient reassessed immediately prior to procedure    Patient location during procedure: OR  Start time: 3/4/2020 9:48 AM  Stop Time:3/4/2020 9:51 AM       Line placed for hemodynamic monitoring and MD/Surgeon request.  Performed By   Anesthesiologist: Azeem Conklin MD  Preanesthetic Checklist  Completed: patient identified, site marked, surgical consent, pre-op evaluation, timeout performed, IV checked, risks and benefits discussed and monitors and equipment checked  Arterial Line Prep   Sterile Tech: cap, gloves and mask  Prep: alcohol swabs  Patient monitoring: blood pressure monitoring, continuous pulse oximetry and EKG  Arterial Line Procedure   Laterality:left  Location:  radial artery  Catheter size: 20 G   Guidance: landmark technique and palpation technique  Number of attempts: 1  Successful placement: yes  Post Assessment   Dressing Type: occlusive dressing applied, secured with tape and wrist guard applied.   Complications no  Circ/Move/Sens Assessment: normal.   Patient Tolerance: patient tolerated the procedure well with no apparent complications

## 2020-03-04 NOTE — ANESTHESIA PROCEDURE NOTES
Airway  Urgency: elective    Date/Time: 3/4/2020 9:52 AM  Airway not difficult    General Information and Staff    Patient location during procedure: OR  Anesthesiologist: Azeem Conklin MD    Indications and Patient Condition  Indications for airway management: airway protection    Preoxygenated: yes  MILS not maintained throughout  Mask difficulty assessment: 2 - vent by mask + OA or adjuvant +/- NMBA    Final Airway Details  Final airway type: endotracheal airway      Successful airway: ETT  Cuffed: yes   Successful intubation technique: direct laryngoscopy  Facilitating devices/methods: intubating stylet  Endotracheal tube insertion site: oral  Blade: Sin  Blade size: 4  ETT size (mm): 7.5  Cormack-Lehane Classification: grade IIa - partial view of glottis  Placement verified by: chest auscultation and capnometry   Measured from: teeth  Number of attempts at approach: 1  Assessment: lips, teeth, and gum same as pre-op and atraumatic intubation

## 2020-03-04 NOTE — CONSULTS
Pulmonary/ Critical Care/ sleep medicine Consult Note        Patient Name:  Mode Duffy    :  1945    Medical Record:  [id number]    Requesting Physician    Justin Aguilar MD    Primary Care Physician     Temo Vidal MD    Reason for consultation    Mode Duffy is a 74 y.o. male who was referred for consultation for pulmonary management.  74 year old male presented to the hospital and underwent a LV lead placement with Dr. Aguilar today 3/4/20.  Post-operatively the patient is requiring 6 liters per nasal cannula.  He denies use of oxygen at home, no past history of thrombus, and no COPD.  He is a past smoker in his early 20's.  He does have a SHELTON diagnosis with a past sleep study evaluation at [facility] approximately 10 years ago.  He is not using a CPAP.     Typical bedtime is 10 pm and awakens at 7 am.  He does snore and takes occasional naps.    Review of Systems    Constitutional:  Denies fever or chills + post op pain  Eyes:  Denies change in visual acuity   HENT:  Denies nasal congestion or sore throat   Respiratory:  Denies cough or shortness of breath   Cardiovascular:  Denies chest pain or edema   GI:  Denies abdominal pain, nausea, vomiting, bloody stools or diarrhea   :  Denies dysuria   Musculoskeletal:  Denies back pain or joint pain   Integument:  Denies rash   Neurologic:  Denies headache, focal weakness or sensory changes   Endocrine:  Denies polyuria or polydipsia   Lymphatic:  Denies swollen glands   Psychiatric:  Denies depression or anxiety     Medical History    Past Medical History:   Diagnosis Date   • Arthritis    • BPH (benign prostatic hyperplasia)    • Coronary artery disease     dr. Carrizales   • Deviated septum    • GERD (gastroesophageal reflux disease)    • History of trigeminal neuralgia    • Hyperlipidemia    • Hypertension    • Renal cyst    • Sciatica    • Sleep apnea     unable to tolerate machine        Surgical History    Past Surgical History:    Procedure Laterality Date   • APPENDECTOMY     • CARDIAC CATHETERIZATION     • CARDIAC ELECTROPHYSIOLOGY PROCEDURE N/A 2020    Procedure: Biventricular Device Upgrade;  Surgeon: Dominick Jackson MD;  Location: [facility] INVASIVE LOCATION;  Service: Cardiovascular   • CORONARY STENT PLACEMENT  2014    x 2   • PROSTATE SURGERY     • REFRACTIVE SURGERY     • TOTAL HIP ARTHROPLASTY Bilateral     different times        Family History    Family History   Problem Relation Age of Onset   • Heart disease Father        Social History    Social History     Tobacco Use   • Smoking status: Former Smoker     Last attempt to quit: 1968     Years since quittin.2   • Smokeless tobacco: Never Used   Substance Use Topics   • Alcohol use: Yes     Alcohol/week: 7.0 standard drinks     Types: 7 Glasses of wine per week     Comment: RED WINE BEFORE DINNER        Allergies    No Known Allergies    Medications    Scheduled Meds:  acetaminophen 650 mg Rectal Q6H   Or      acetaminophen 650 mg Oral Q6H   [START ON 3/5/2020] amitriptyline 20 mg Oral Nightly   [START ON 3/5/2020] aspirin 81 mg Oral Daily   aspirin 325 mg Oral Once   [START ON 3/5/2020] atorvastatin 40 mg Oral Nightly   ceFAZolin 2 g Intravenous Q8H   [START ON 3/5/2020] cetirizine 10 mg Oral Daily   [START ON 3/5/2020] chlorhexidine 15 mL Mouth/Throat Q12H   [START ON 3/6/2020] enoxaparin 40 mg Subcutaneous Daily   magnesium sulfate 1 g Intravenous Q8H   mupirocin 1 application Each Nare BID   naloxone 0.2 mg Intravenous Once   [START ON 3/5/2020] OXcarbazepine 300 mg Oral BID   [START ON 3/5/2020] pantoprazole 40 mg Oral Q AM   pantoprazole 40 mg Intravenous Once   [START ON 3/5/2020] polyethylene glycol 17 g Oral BID   [START ON 3/5/2020] senna 1 tablet Oral BID     Continuous Infusions:  dexmedetomidine 0.2-1.5 mcg/kg/hr   insulin 0-50 Units/hr     PRN Meds:.HYDROcodone-acetaminophen  •  insulin  •  Morphine **AND** [DISCONTINUED] naloxone  •  MOUTH KOTE  •   naloxone  •  ondansetron  •  [START ON 3/5/2020] potassium chloride **OR** [START ON 3/5/2020] potassium chloride  •  potassium chloride **OR** potassium chloride      Physical Exam    tMax 24 hrs:  Temp (24hrs), Av.2 °F (36.2 °C), Min:95.9 °F (35.5 °C), Max:98.6 °F (37 °C)    Vitals Ranges:  Temp:  [95.9 °F (35.5 °C)-98.6 °F (37 °C)] 97.5 °F (36.4 °C)  Heart Rate:  [60-70] 60  Resp:  [18-27] 20  BP: (126-155)/(72-87) 149/82  Arterial Line BP: (151-161)/(57-70) 161/69  Intake and Output Last 3 Shifts:  No intake/output data recorded.    Constitutional:  no acute distress, non-toxic appearance   Eyes:  PERRL, conjunctiva normal   HENT:  Atraumatic, external ears normal, nose normal, oropharynx moist, no pharyngeal exudates. Neck supple. Mallampati 3  Respiratory:  No respiratory distress, normal breath sounds, no rales, no wheezing, chest tube in place   Cardiovascular:  Normal rate, normal rhythm, no murmurs, no gallops, no rubs   GI:  Soft, nondistended, normal bowel sounds, nontender, no organomegaly, no mass, no rebound, no guarding   :  No costovertebral angle tenderness   Musculoskeletal:  No edema, no tenderness, no deformities. Back- no tenderness  Integument:  Well hydrated, no rash. Left upper chest surgical incision intact    Lymphatic:  No lymphadenopathy noted   Neurologic:  Alert & oriented x 3, CN 2-12 normal, normal motor function, normal sensory function, no focal deficits noted   Psychiatric:  Speech and behavior appropriate     labs    Lab Results (last 24 hours)     Procedure Component Value Units Date/Time    Blood Gas, Arterial [[id number]]  (Abnormal) Collected:  20 1400    Specimen:  Arterial Blood Updated:  20 1404     Site Arterial Line     Naveed's Test N/A     pH, Arterial 7.312 pH units      pCO2, Arterial 47.9 mm Hg      pO2, Arterial 58.9 mm Hg      HCO3, Arterial 24.2 mmol/L      Base Excess, Arterial -2.4 mmol/L      Comment: Serial Number: [id number]:  [id number]         O2 Saturation, Arterial 87.4 %      CO2 Content 25.7 mmol/L      Barometric Pressure for Blood Gas --     Comment: N/A        Modality Oxy Mask     Hemodilution No    POCT Electrolytes +HGB +HCT [[id number]]  (Abnormal) Collected:  03/04/20 1400    Specimen:  Blood Updated:  03/04/20 1404     Sodium 137 mmol/L      POC Potassium 4.3 mmol/L      Ionized Calcium 1.11 mmol/L      Comment: Serial Number: [id number]:  [id number]        Glucose 157 mg/dL      Hematocrit 42 %      Hemoglobin 14.2 g/dL     POC Glucose Once [[id number]]  (Abnormal) Collected:  03/04/20 1354    Specimen:  Blood Updated:  03/04/20 1356     Glucose 139 mg/dL      Comment: Serial Number: [id number]:  [id number]       POC Glucose Once [[id number]]  (Normal) Collected:  03/04/20 0734    Specimen:  Blood Updated:  03/04/20 0741     Glucose 102 mg/dL      Comment: Serial Number: [id number]:  [id number]             Imaging & Other Studies    Imaging Results (Last 72 Hours)     Procedure Component Value Units Date/Time    XR Chest 1 View [[id number]] Collected:  03/04/20 1326     Updated:  03/04/20 1329    Narrative:       DATE OF EXAM:  3/4/2020 12:38 PM     PROCEDURE:  XR CHEST 1 VW-     INDICATIONS:  Central Line placement RIJ, left thoracotomy/chest tube; I50.9-Heart  failure, unspecified     COMPARISON:  No Comparisons Available     TECHNIQUE:   Single radiographic AP view of the chest was obtained.     FINDINGS:  Cardiac size is enlarged. There are postoperative changes of pacemaker  placement. There is a right IJ sheath in place with the tip in the SVC  and no pneumothorax.        Impression:       Cardiomegaly with postoperative changes of transvenous pacing device  placement. New right IJ sheath with the tip in the SVC and no  pneumothorax     Electronically Signed By-Adriel Gamino On:3/4/2020 1:27 PM  This report was finalized on [id number] by  Adriel Gamino, .          Assessment      Chronic heart failure (CMS/HCC)    Acute hypoxia    -likely multi-factorial to anesthesia, possible some atelectasis, and pain  -cont with supplemental oxygen as needed for sats 92%.  Has improved from 10 L to 6 L  -CXR reviewed  -encourage IS   -OOB when ok with cv sx     SHELTON  -recommend a repeat sleep study as an outpatient to re-establish use of CPAP   -Typical bedtime is 10 pm and awakens at 7 am.  He does snore and takes occasional naps.    SSS s/p PPM  -s/p LV lead placement 3/4/20  -Chest tube per CV sx     HTN  -resume home med when appropriate     HLD  -statin    DVT ppy:  -SCDs  -lovenox 3/6/20    Diet per sx       See orders. The plan was discussed with the patient and/or family.   I thank you for this opportunity to take part in this patient's care and will follow the patient along with you.

## 2020-03-04 NOTE — ANESTHESIA POSTPROCEDURE EVALUATION
Patient: Mode Duffy    Procedure Summary     Date:  03/04/20 Room / Location:  [facility] CVOR 01 / [facility]    Anesthesia Start:  0946 Anesthesia Stop:  1147    Procedure:  THORACOTOMY MINI WITH LEAD PLACEMENT (Left Chest) Diagnosis:       Chronic heart failure, unspecified heart failure type (CMS/HCC)      (Chronic heart failure, unspecified heart failure type (CMS/HCC) [I50.9])    Surgeon:  Justin Aguilar MD Provider:  Azeem Conklin MD    Anesthesia Type:  general ASA Status:  3          Anesthesia Type: general    Vitals  Vitals Value Taken Time   /82 3/4/2020 12:31 PM   Temp 96.6 °F (35.9 °C) 3/4/2020 12:31 PM   Pulse 60 3/4/2020 12:18 PM   Resp 18 3/4/2020 12:31 PM   SpO2 91 % 3/4/2020 12:31 PM           Post Anesthesia Care and Evaluation    Patient location during evaluation: PACU  Patient participation: complete - patient participated  Level of consciousness: awake  Pain scale: See nurse's notes for pain score.  Pain management: adequate  Airway patency: patent  Anesthetic complications: No anesthetic complications  PONV Status: none  Cardiovascular status: acceptable  Respiratory status: acceptable  Hydration status: acceptable    Comments: Patient seen and examined postoperatively; vital signs stable; SpO2 greater than or equal to 90%; cardiopulmonary status stable; nausea/vomiting adequately controlled; pain adequately controlled; no apparent anesthesia complications; patient discharged from anesthesia care when discharge criteria were met

## 2020-03-04 NOTE — PROGRESS NOTES
CC--status post biventricular pacemaker upgrade with placement of epicardial LV lead and incidental finding of RV lead pacing malfunction and requested my evaluation for pacemaker optimization         Sub--74-year-old male patient has underlying complete heart block with a dual-chamber pacemaker which is implanted several years ago with multiple battery changes--Unfortunately has continuous RV pacing with LV dysfunction with EF between 35 to 40%--He does have symptoms of lower extremity edema and tiredness and intermittent symptoms of chest discomfort with acid reflux  A prior history of coronary artery disease with remote stenting of his marginal branch  He also has history of hypertension and hyperlipidemia  Denies any TIA or stroke  Denies any bleeding diathesis  Chronic medical problems include complete heart block with pacemaker in situ, iatrogenic left bundle branch block with RV pacing, chronic class II heart failure symptoms with LV dysfunction and coronary artery disease  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 and transvenous LV lead could not be placed on this patient and was sent for epicardial LV lead placement for symptomatic relief and patient underwent surgical LV lead epicardial placement today and post placement of LV lead patient was found to have RV lead malfunction with severely reduced impedance consistent with insulation problem with a lack of pacing from bipolar mode and could only be paced unipolar after discussing with me about different options  Patient's RV lead impedance was 430 ohms before procedure and it is below 150 ohms postprocedure consistent with insulation break  My consultation was requested for further evaluation to see if transvenous RV lead could be placed from right side with tunneling to the left side  Patient was seen for surgery and was complaining of significant chest wall pain from chest tube placement           Medical History        Past  Medical History:   Diagnosis Date   • Coronary artery disease     • Detached retina     • Hyperlipidemia     • Hypertension     • Renal cyst     • Seizures (CMS/HCC)           Surgical History         Past Surgical History:   Procedure Laterality Date   • APPENDECTOMY       • CARDIAC CATHETERIZATION       • CORONARY STENT PLACEMENT       • NO PAST SURGERIES       • PROSTATE SURGERY       • REFRACTIVE SURGERY       • TOTAL HIP ARTHROPLASTY                   Family History   Problem Relation Age of Onset   • Heart disease Father        Social History            Tobacco Use   • Smoking status: Former Smoker   • Smokeless tobacco: Never Used   Substance Use Topics   • Alcohol use: Yes       Comment: RED WINE BEFORE DINNER   • Drug use: No      Allergies:  Patient has no known allergies.     Review of Systems   General:  positive for fatigue and tiredness  Eyes: No redness  Cardiovascular: No chest pain, no palpitations  Respiratory:   positive for class 2 shortness of breath  Gastrointestinal: No nausea or vomiting, bleeding  Genitourinary: no hematuria or dysuria  Musculoskeletal: No arthralgia or myalgia  Skin: No rash  Neurologic: No numbness, tingling, syncope  Hematologic/Lymphatic: No abnormal bleeding        Physical Exam  VITALS REVIEWED--blood pressure 163/89 pulse rate is 70 patient is afebrile respiration 12 times a minute     General:      well developed, well nourished, in no acute distress.    Head:      normocephalic and atraumatic.    Eyes:      PERRL/EOM intact, conjunctiva and sclera clear with out nystagmus.    Neck:      no masses, thyromegaly,  trachea central with normal respiratory effort and PMI displaced laterally  Lungs:      clear bilaterally to auscultation.    Heart:       Underlying paced rhythm without any murmurs gallops or rubs  Msk:      no deformity or scoliosis noted of thoracic or lumbar spine.    Pulses:      pulses normal in all 4 extremities.    Extremities:       no cyanosis or  clubbing--trace left pedal edema and trace right pedal edema.    Neurologic:      no focal deficits.   alert oriented x3  Skin:      intact without lesions or rashes.    Psych:      alert and cooperative; normal mood and affect; normal attention span and concentration.          Assessment and plan     Dual-chamber pacemaker in situ --upgrade to biventricular pacing system with appropriately functioning epicardial LV lead and was found to have RV lead malfunction consistent with insulation failure --very low impedance and no pacing through bipolar mode and could only be paced unipolar --options for placement of RV pacing lead includes surgical versus transvenous and the transvenous options include--vascular specialist referral with the balloon venoplasty of subclavian brachiocephalic junction with placement of transvenous RV pacing lead versus placing a right ventricular pacing lead via right subclavian approach and tunneling the lead to the left side--after discussing options with the patient and family--family consented for balloon venoplasty and right ventricular transvenous lead placement and risks and benefits and outcomes educated and this was discussed with Dr. Aguilar and Dr. Greenberg  Continuous RV pacing with iatrogenic left bundle branch block  LV dysfunction with EF between 35 to 40%  Coronary artery disease  Chronic combined class II heart failure symptoms        CBC    Results from last 7 days   Lab Units 03/04/20  1641 03/04/20  1400 03/02/20  0853 03/02/20  0840   WBC 10*3/mm3 10.10  --   --  4.90   HEMOGLOBIN g/dL 14.9  --   --  15.0   HEMOGLOBIN, POC g/dL  --  14.2 14.4  --    PLATELETS 10*3/mm3 210  --   --  225     BMP   Results from last 7 days   Lab Units 03/04/20  1641 03/02/20  0840   SODIUM mmol/L 136 139   POTASSIUM mmol/L 4.4 4.1   CHLORIDE mmol/L 104 102   CO2 mmol/L 20.0* 26.0   BUN mg/dL 18 18   CREATININE mg/dL 0.74* 0.95   GLUCOSE mg/dL 154* 163*   PHOSPHORUS mg/dL 3.5  --      CMP    Results from last 7 days   Lab Units 03/04/20  1641 03/02/20  0840   SODIUM mmol/L 136 139   POTASSIUM mmol/L 4.4 4.1   CHLORIDE mmol/L 104 102   CO2 mmol/L 20.0* 26.0   BUN mg/dL 18 18   CREATININE mg/dL 0.74* 0.95   GLUCOSE mg/dL 154* 163*   ALBUMIN g/dL 4.20 4.20   BILIRUBIN mg/dL  --  0.5   ALK PHOS U/L  --  75   AST (SGOT) U/L  --  20   ALT (SGPT) U/L  --  30     Radiology(recent) Xr Chest 1 View    Result Date: 3/4/2020  Cardiomegaly with postoperative changes of transvenous pacing device placement. New right IJ sheath with the tip in the SVC and no pneumothorax  Electronically Signed By-Adriel Gamino On:3/4/2020 1:27 PM This report was finalized on [id number] by  Adriel Gamino, .      Electronically signed by Dominick Jackson MD, 03/04/20, 6:03 PM.

## 2020-03-05 ENCOUNTER — ANESTHESIA (OUTPATIENT)
Dept: CARDIOLOGY | Facility: HOSPITAL | Age: 75
End: 2020-03-05

## 2020-03-05 ENCOUNTER — APPOINTMENT (OUTPATIENT)
Dept: GENERAL RADIOLOGY | Facility: HOSPITAL | Age: 75
End: 2020-03-05

## 2020-03-05 ENCOUNTER — ANESTHESIA EVENT (OUTPATIENT)
Dept: CARDIOLOGY | Facility: HOSPITAL | Age: 75
End: 2020-03-05

## 2020-03-05 PROBLEM — T82.110A PACEMAKER LEAD MALFUNCTION: Status: ACTIVE | Noted: 2020-03-05

## 2020-03-05 PROBLEM — I44.2 COMPLETE HEART BLOCK (HCC): Status: ACTIVE | Noted: 2020-03-05

## 2020-03-05 LAB
ABO + RH BLD: NORMAL
ABO + RH BLD: NORMAL
ALBUMIN SERPL-MCNC: 3.8 G/DL (ref 3.5–5.2)
ANION GAP SERPL CALCULATED.3IONS-SCNC: 9 MMOL/L (ref 5–15)
BASOPHILS # BLD AUTO: 0 10*3/MM3 (ref 0–0.2)
BASOPHILS NFR BLD AUTO: 0.3 % (ref 0–1.5)
BH BB BLOOD EXPIRATION DATE: NORMAL
BH BB BLOOD EXPIRATION DATE: NORMAL
BH BB BLOOD TYPE BARCODE: 5100
BH BB BLOOD TYPE BARCODE: 5100
BH BB DISPENSE STATUS: NORMAL
BH BB DISPENSE STATUS: NORMAL
BH BB PRODUCT CODE: NORMAL
BH BB PRODUCT CODE: NORMAL
BH BB UNIT NUMBER: NORMAL
BH BB UNIT NUMBER: NORMAL
BUN BLD-MCNC: 14 MG/DL (ref 8–23)
BUN/CREAT SERPL: 17.1 (ref 7–25)
CA-I SERPL ISE-MCNC: 1.17 MMOL/L (ref 1.2–1.3)
CALCIUM SPEC-SCNC: 8.5 MG/DL (ref 8.6–10.5)
CHLORIDE SERPL-SCNC: 101 MMOL/L (ref 98–107)
CO2 SERPL-SCNC: 24 MMOL/L (ref 22–29)
CREAT BLD-MCNC: 0.82 MG/DL (ref 0.76–1.27)
DEPRECATED RDW RBC AUTO: 44.2 FL (ref 37–54)
EOSINOPHIL # BLD AUTO: 0 10*3/MM3 (ref 0–0.4)
EOSINOPHIL NFR BLD AUTO: 0.1 % (ref 0.3–6.2)
ERYTHROCYTE [DISTWIDTH] IN BLOOD BY AUTOMATED COUNT: 13.2 % (ref 12.3–15.4)
GFR SERPL CREATININE-BSD FRML MDRD: 92 ML/MIN/1.73
GLUCOSE BLD-MCNC: 130 MG/DL (ref 65–99)
HCT VFR BLD AUTO: 39.5 % (ref 37.5–51)
HGB BLD-MCNC: 14 G/DL (ref 13–17.7)
INR PPP: 1.04 (ref 0.9–1.1)
LYMPHOCYTES # BLD AUTO: 0.6 10*3/MM3 (ref 0.7–3.1)
LYMPHOCYTES NFR BLD AUTO: 7.2 % (ref 19.6–45.3)
MAGNESIUM SERPL-MCNC: 2.3 MG/DL (ref 1.6–2.4)
MCH RBC QN AUTO: 33.9 PG (ref 26.6–33)
MCHC RBC AUTO-ENTMCNC: 35.4 G/DL (ref 31.5–35.7)
MCV RBC AUTO: 95.6 FL (ref 79–97)
MONOCYTES # BLD AUTO: 0.7 10*3/MM3 (ref 0.1–0.9)
MONOCYTES NFR BLD AUTO: 7.7 % (ref 5–12)
NEUTROPHILS # BLD AUTO: 7.6 10*3/MM3 (ref 1.7–7)
NEUTROPHILS NFR BLD AUTO: 84.7 % (ref 42.7–76)
NRBC BLD AUTO-RTO: 0 /100 WBC (ref 0–0.2)
NT-PROBNP SERPL-MCNC: 770 PG/ML (ref 5–900)
PHOSPHATE SERPL-MCNC: 3.3 MG/DL (ref 2.5–4.5)
PLATELET # BLD AUTO: 208 10*3/MM3 (ref 140–450)
PMV BLD AUTO: 6.5 FL (ref 6–12)
POTASSIUM BLD-SCNC: 4.4 MMOL/L (ref 3.5–5.2)
PROTHROMBIN TIME: 10.9 SECONDS (ref 9.6–11.7)
RBC # BLD AUTO: 4.13 10*6/MM3 (ref 4.14–5.8)
SODIUM BLD-SCNC: 134 MMOL/L (ref 136–145)
UNIT  ABO: NORMAL
UNIT  ABO: NORMAL
UNIT  RH: NORMAL
UNIT  RH: NORMAL
WBC NRBC COR # BLD: 9 10*3/MM3 (ref 3.4–10.8)

## 2020-03-05 PROCEDURE — 99152 MOD SED SAME PHYS/QHP 5/>YRS: CPT | Performed by: INTERNAL MEDICINE

## 2020-03-05 PROCEDURE — C1769 GUIDE WIRE: HCPCS | Performed by: INTERNAL MEDICINE

## 2020-03-05 PROCEDURE — 25010000002 FENTANYL CITRATE (PF) 100 MCG/2ML SOLUTION: Performed by: INTERNAL MEDICINE

## 2020-03-05 PROCEDURE — 93005 ELECTROCARDIOGRAM TRACING: CPT | Performed by: NURSE PRACTITIONER

## 2020-03-05 PROCEDURE — 97530 THERAPEUTIC ACTIVITIES: CPT

## 2020-03-05 PROCEDURE — C1894 INTRO/SHEATH, NON-LASER: HCPCS | Performed by: INTERNAL MEDICINE

## 2020-03-05 PROCEDURE — 33216 INSERT 1 ELECTRODE PM-DEFIB: CPT | Performed by: INTERNAL MEDICINE

## 2020-03-05 PROCEDURE — C1887 CATHETER, GUIDING: HCPCS | Performed by: INTERNAL MEDICINE

## 2020-03-05 PROCEDURE — 25010000002 CEFAZOLIN PER 500 MG: Performed by: NURSE PRACTITIONER

## 2020-03-05 PROCEDURE — 02HK3JZ INSERTION OF PACEMAKER LEAD INTO RIGHT VENTRICLE, PERCUTANEOUS APPROACH: ICD-10-PCS | Performed by: INTERNAL MEDICINE

## 2020-03-05 PROCEDURE — 25010000002 PROPOFOL 10 MG/ML EMULSION: Performed by: ANESTHESIOLOGY

## 2020-03-05 PROCEDURE — 25010000002 MAGNESIUM SULFATE IN D5W 1G/100ML (PREMIX) 1-5 GM/100ML-% SOLUTION: Performed by: NURSE PRACTITIONER

## 2020-03-05 PROCEDURE — 80069 RENAL FUNCTION PANEL: CPT | Performed by: NURSE PRACTITIONER

## 2020-03-05 PROCEDURE — 99024 POSTOP FOLLOW-UP VISIT: CPT | Performed by: NURSE PRACTITIONER

## 2020-03-05 PROCEDURE — 25010000002 HYDRALAZINE PER 20 MG: Performed by: NURSE PRACTITIONER

## 2020-03-05 PROCEDURE — 25010000002 MIDAZOLAM PER 1 MG: Performed by: INTERNAL MEDICINE

## 2020-03-05 PROCEDURE — 97167 OT EVAL HIGH COMPLEX 60 MIN: CPT

## 2020-03-05 PROCEDURE — 83735 ASSAY OF MAGNESIUM: CPT | Performed by: NURSE PRACTITIONER

## 2020-03-05 PROCEDURE — 99153 MOD SED SAME PHYS/QHP EA: CPT | Performed by: INTERNAL MEDICINE

## 2020-03-05 PROCEDURE — 25010000002 PROPOFOL 1000 MG/100ML EMULSION: Performed by: INTERNAL MEDICINE

## 2020-03-05 PROCEDURE — 25010000002 HYDROMORPHONE PER 4 MG: Performed by: INTERNAL MEDICINE

## 2020-03-05 PROCEDURE — 85025 COMPLETE CBC W/AUTO DIFF WBC: CPT | Performed by: NURSE PRACTITIONER

## 2020-03-05 PROCEDURE — 85610 PROTHROMBIN TIME: CPT | Performed by: NURSE PRACTITIONER

## 2020-03-05 PROCEDURE — 02PA0MZ REMOVAL OF CARDIAC LEAD FROM HEART, OPEN APPROACH: ICD-10-PCS | Performed by: INTERNAL MEDICINE

## 2020-03-05 PROCEDURE — 25010000002 HYDRALAZINE PER 20 MG: Performed by: INTERNAL MEDICINE

## 2020-03-05 PROCEDURE — 71045 X-RAY EXAM CHEST 1 VIEW: CPT

## 2020-03-05 PROCEDURE — 82330 ASSAY OF CALCIUM: CPT | Performed by: NURSE PRACTITIONER

## 2020-03-05 PROCEDURE — C1898 LEAD, PMKR, OTHER THAN TRANS: HCPCS | Performed by: INTERNAL MEDICINE

## 2020-03-05 PROCEDURE — 25010000002 VANCOMYCIN 10 G RECONSTITUTED SOLUTION: Performed by: NURSE PRACTITIONER

## 2020-03-05 PROCEDURE — 0 IOPAMIDOL PER 1 ML: Performed by: INTERNAL MEDICINE

## 2020-03-05 PROCEDURE — 25010000002 MORPHINE PER 10 MG: Performed by: INTERNAL MEDICINE

## 2020-03-05 PROCEDURE — 0JWT0PZ REVISION OF CARDIAC RHYTHM RELATED DEVICE IN TRUNK SUBCUTANEOUS TISSUE AND FASCIA, OPEN APPROACH: ICD-10-PCS | Performed by: INTERNAL MEDICINE

## 2020-03-05 PROCEDURE — 83880 ASSAY OF NATRIURETIC PEPTIDE: CPT | Performed by: INTERNAL MEDICINE

## 2020-03-05 PROCEDURE — C1892 INTRO/SHEATH,FIXED,PEEL-AWAY: HCPCS | Performed by: INTERNAL MEDICINE

## 2020-03-05 DEVICE — IMPLANTABLE DEVICE: Type: IMPLANTABLE DEVICE | Status: FUNCTIONAL

## 2020-03-05 RX ORDER — FENTANYL CITRATE 50 UG/ML
INJECTION, SOLUTION INTRAMUSCULAR; INTRAVENOUS AS NEEDED
Status: DISCONTINUED | OUTPATIENT
Start: 2020-03-05 | End: 2020-03-05 | Stop reason: HOSPADM

## 2020-03-05 RX ORDER — SODIUM CHLORIDE 0.9 % (FLUSH) 0.9 %
10 SYRINGE (ML) INJECTION AS NEEDED
Status: DISCONTINUED | OUTPATIENT
Start: 2020-03-05 | End: 2020-03-05 | Stop reason: HOSPADM

## 2020-03-05 RX ORDER — ONDANSETRON 2 MG/ML
4 INJECTION INTRAMUSCULAR; INTRAVENOUS EVERY 6 HOURS PRN
Status: DISCONTINUED | OUTPATIENT
Start: 2020-03-05 | End: 2020-03-07 | Stop reason: HOSPADM

## 2020-03-05 RX ORDER — SODIUM CHLORIDE 0.9 % (FLUSH) 0.9 %
3 SYRINGE (ML) INJECTION EVERY 12 HOURS SCHEDULED
Status: DISCONTINUED | OUTPATIENT
Start: 2020-03-05 | End: 2020-03-05 | Stop reason: HOSPADM

## 2020-03-05 RX ORDER — PROPOFOL 10 MG/ML
INJECTION, EMULSION INTRAVENOUS CONTINUOUS PRN
Status: COMPLETED | OUTPATIENT
Start: 2020-03-05 | End: 2020-03-05

## 2020-03-05 RX ORDER — VANCOMYCIN HYDROCHLORIDE
1500
Status: COMPLETED | OUTPATIENT
Start: 2020-03-05 | End: 2020-03-05

## 2020-03-05 RX ORDER — HYDROMORPHONE HCL 110MG/55ML
PATIENT CONTROLLED ANALGESIA SYRINGE INTRAVENOUS AS NEEDED
Status: DISCONTINUED | OUTPATIENT
Start: 2020-03-05 | End: 2020-03-05 | Stop reason: HOSPADM

## 2020-03-05 RX ORDER — LIDOCAINE HYDROCHLORIDE 20 MG/ML
INJECTION, SOLUTION INFILTRATION; PERINEURAL AS NEEDED
Status: DISCONTINUED | OUTPATIENT
Start: 2020-03-05 | End: 2020-03-05 | Stop reason: HOSPADM

## 2020-03-05 RX ORDER — MIDAZOLAM HYDROCHLORIDE 1 MG/ML
INJECTION INTRAMUSCULAR; INTRAVENOUS AS NEEDED
Status: DISCONTINUED | OUTPATIENT
Start: 2020-03-05 | End: 2020-03-05 | Stop reason: HOSPADM

## 2020-03-05 RX ORDER — SODIUM CHLORIDE 9 MG/ML
INJECTION, SOLUTION INTRAVENOUS CONTINUOUS PRN
Status: DISCONTINUED | OUTPATIENT
Start: 2020-03-05 | End: 2020-03-05 | Stop reason: SURG

## 2020-03-05 RX ORDER — ATORVASTATIN CALCIUM 20 MG/1
20 TABLET, FILM COATED ORAL NIGHTLY
Status: DISCONTINUED | OUTPATIENT
Start: 2020-03-05 | End: 2020-03-07 | Stop reason: HOSPADM

## 2020-03-05 RX ORDER — CALCIUM GLUCONATE 20 MG/ML
2 INJECTION, SOLUTION INTRAVENOUS ONCE
Status: DISCONTINUED | OUTPATIENT
Start: 2020-03-05 | End: 2020-03-07 | Stop reason: HOSPADM

## 2020-03-05 RX ORDER — AMLODIPINE BESYLATE 5 MG/1
10 TABLET ORAL NIGHTLY
Status: DISCONTINUED | OUTPATIENT
Start: 2020-03-05 | End: 2020-03-07 | Stop reason: HOSPADM

## 2020-03-05 RX ADMIN — HYDRALAZINE HYDROCHLORIDE 10 MG: 20 INJECTION INTRAMUSCULAR; INTRAVENOUS at 20:26

## 2020-03-05 RX ADMIN — SODIUM CHLORIDE: 0.9 INJECTION, SOLUTION INTRAVENOUS at 18:46

## 2020-03-05 RX ADMIN — PANTOPRAZOLE SODIUM 40 MG: 40 TABLET, DELAYED RELEASE ORAL at 07:20

## 2020-03-05 RX ADMIN — STANDARDIZED SENNA CONCENTRATE 1 TABLET: 8.6 TABLET ORAL at 08:12

## 2020-03-05 RX ADMIN — MORPHINE SULFATE 2 MG: 4 INJECTION INTRAVENOUS at 20:58

## 2020-03-05 RX ADMIN — OXCARBAZEPINE 300 MG: 150 TABLET, FILM COATED ORAL at 08:12

## 2020-03-05 RX ADMIN — PROPOFOL 150 MCG/KG/MIN: 10 INJECTION, EMULSION INTRAVENOUS at 18:54

## 2020-03-05 RX ADMIN — CETIRIZINE HYDROCHLORIDE 10 MG: 10 TABLET, FILM COATED ORAL at 08:12

## 2020-03-05 RX ADMIN — CEFAZOLIN SODIUM 2 G: 10 INJECTION, POWDER, FOR SOLUTION INTRAVENOUS at 09:48

## 2020-03-05 RX ADMIN — ASPIRIN 81 MG: 81 TABLET, DELAYED RELEASE ORAL at 08:12

## 2020-03-05 RX ADMIN — MUPIROCIN 1 APPLICATION: 20 OINTMENT TOPICAL at 22:40

## 2020-03-05 RX ADMIN — CHLORHEXIDINE GLUCONATE 0.12% ORAL RINSE 15 ML: 1.2 LIQUID ORAL at 08:12

## 2020-03-05 RX ADMIN — VANCOMYCIN HYDROCHLORIDE 1500 MG: 10 INJECTION, POWDER, LYOPHILIZED, FOR SOLUTION INTRAVENOUS at 16:27

## 2020-03-05 RX ADMIN — AMLODIPINE BESYLATE 10 MG: 5 TABLET ORAL at 23:53

## 2020-03-05 RX ADMIN — MUPIROCIN 1 APPLICATION: 20 OINTMENT TOPICAL at 08:13

## 2020-03-05 RX ADMIN — ACETAMINOPHEN 650 MG: 325 TABLET, FILM COATED ORAL at 03:01

## 2020-03-05 RX ADMIN — ATORVASTATIN CALCIUM 40 MG: 40 TABLET, FILM COATED ORAL at 23:53

## 2020-03-05 RX ADMIN — CEFAZOLIN SODIUM 2 G: 10 INJECTION, POWDER, FOR SOLUTION INTRAVENOUS at 03:01

## 2020-03-05 RX ADMIN — HYDROCODONE BITARTRATE AND ACETAMINOPHEN 1 TABLET: 5; 325 TABLET ORAL at 23:53

## 2020-03-05 RX ADMIN — ACETAMINOPHEN 650 MG: 325 TABLET, FILM COATED ORAL at 08:12

## 2020-03-05 RX ADMIN — MAGNESIUM SULFATE HEPTAHYDRATE 1 G: 1 INJECTION, SOLUTION INTRAVENOUS at 07:20

## 2020-03-05 RX ADMIN — HYDRALAZINE HYDROCHLORIDE 10 MG: 20 INJECTION INTRAMUSCULAR; INTRAVENOUS at 08:12

## 2020-03-05 NOTE — PROGRESS NOTES
" LOS: 1 day   Patient Care Team:  Temo Vidal MD as PCP - General  Temo Vidal MD as PCP - Family Medicine  Simone Shepherd MD as PCP - Claims Attributed    Chief Complaint: heart block    Subjective:  Symptoms:  No shortness of breath or chest pain.    Diet:  No nausea or vomiting.    Activity level: Normal.    Pain:  Pain is requiring pain medication and well controlled.          Vital Signs  Temp:  [95.9 °F (35.5 °C)-98 °F (36.7 °C)] 98 °F (36.7 °C)  Heart Rate:  [60-84] 62  Resp:  [18-27] 20  BP: (126-149)/(72-82) 149/82  Arterial Line BP: (126-161)/(50-70) 144/64  Body mass index is 28.94 kg/m².    Intake/Output Summary (Last 24 hours) at 3/5/2020 0931  Last data filed at 3/5/2020 0700  Gross per 24 hour   Intake 2312 ml   Output 1135 ml   Net 1177 ml     No intake/output data recorded.          03/04/20  1318 03/05/20  0400 03/05/20  0700   Weight: 99.7 kg (219 lb 12.8 oz) 99 kg (218 lb 4.1 oz) 99.5 kg (219 lb 5.7 oz)         Objective:  General Appearance:  Comfortable.    Vital signs: (most recent): Blood pressure 149/82, pulse 62, temperature 98 °F (36.7 °C), temperature source Oral, resp. rate 20, height 185.4 cm (73\"), weight 99.5 kg (219 lb 5.7 oz), SpO2 94 %.    Lungs:  Normal effort and normal respiratory rate.  Breath sounds clear to auscultation.    Heart: Normal rate.  Regular rhythm.  S1 normal and S2 normal.    Abdomen: Abdomen is soft and non-distended.    Extremities: There is no dependent edema.    Pulses: Distal pulses are intact.    Neurological: Patient is alert and oriented to person, place and time.    Skin:  Warm and dry.              Results Review:        WBC WBC   Date Value Ref Range Status   03/05/2020 9.00 3.40 - 10.80 10*3/mm3 Final   03/04/2020 10.10 3.40 - 10.80 10*3/mm3 Final      HGB Hemoglobin   Date Value Ref Range Status   03/05/2020 14.0 13.0 - 17.7 g/dL Final   03/04/2020 14.9 13.0 - 17.7 g/dL Final   03/04/2020 14.2 12.0 - 17.0 g/dL Final      HCT " Hematocrit   Date Value Ref Range Status   03/05/2020 39.5 37.5 - 51.0 % Final   03/04/2020 42.8 37.5 - 51.0 % Final   03/04/2020 42 38 - 51 % Final      Platelets Platelets   Date Value Ref Range Status   03/05/2020 208 140 - 450 10*3/mm3 Final   03/04/2020 210 140 - 450 10*3/mm3 Final        PT/INR:    Protime   Date Value Ref Range Status   03/05/2020 10.9 9.6 - 11.7 Seconds Final   03/04/2020 10.0 9.6 - 11.7 Seconds Final   /  INR   Date Value Ref Range Status   03/05/2020 1.04 0.90 - 1.10 Final   03/04/2020 0.94 0.90 - 1.10 Final       Sodium Sodium   Date Value Ref Range Status   03/05/2020 134 (L) 136 - 145 mmol/L Final   03/04/2020 136 136 - 145 mmol/L Final      Potassium Potassium   Date Value Ref Range Status   03/05/2020 4.4 3.5 - 5.2 mmol/L Final   03/04/2020 4.4 3.5 - 5.2 mmol/L Final      Chloride Chloride   Date Value Ref Range Status   03/05/2020 101 98 - 107 mmol/L Final   03/04/2020 104 98 - 107 mmol/L Final      Bicarbonate CO2   Date Value Ref Range Status   03/05/2020 24.0 22.0 - 29.0 mmol/L Final   03/04/2020 20.0 (L) 22.0 - 29.0 mmol/L Final      BUN BUN   Date Value Ref Range Status   03/05/2020 14 8 - 23 mg/dL Final   03/04/2020 18 8 - 23 mg/dL Final      Creatinine Creatinine   Date Value Ref Range Status   03/05/2020 0.82 0.76 - 1.27 mg/dL Final   03/04/2020 0.74 (L) 0.76 - 1.27 mg/dL Final      Calcium Calcium   Date Value Ref Range Status   03/05/2020 8.5 (L) 8.6 - 10.5 mg/dL Final   03/04/2020 8.5 (L) 8.6 - 10.5 mg/dL Final      Magnesium Magnesium   Date Value Ref Range Status   03/05/2020 2.3 1.6 - 2.4 mg/dL Final        Troponin No components found for: TROPONIN*   BNP No results found for: BNP    ECHOCARDIOGRAM:     STRESS MYOVIEW:    CARDIAC CATHETERIZATION:    OTHER:           amitriptyline 20 mg Oral Nightly   aspirin 81 mg Oral Daily   atorvastatin 40 mg Oral Nightly   ceFAZolin 2 g Intravenous Q8H   cetirizine 10 mg Oral Daily   chlorhexidine 15 mL Mouth/Throat Q12H   [START  ON 3/6/2020] enoxaparin 40 mg Subcutaneous Daily   mupirocin 1 application Each Nare BID   naloxone 0.2 mg Intravenous Once   OXcarbazepine 300 mg Oral BID   pantoprazole 40 mg Oral Q AM   polyethylene glycol 17 g Oral BID   senna 1 tablet Oral BID       insulin 0-50 Units/hr         Patient Active Problem List   Diagnosis Code   • Fothergill's neuralgia G50.0   • Hyponatremia E87.1   • Cardiac pacemaker in situ Z95.0   • Bradycardia R00.1   • Cardiovascular disease I25.10   • Hypertension I10   • Coronary artery disease I25.10   • Hyperlipidemia E78.5   • Sick sinus syndrome (CMS/HCC) I49.5   • Chronic systolic congestive heart failure (CMS/HCC) I50.22   • Cardiomyopathy (CMS/HCC) I42.9   • Chronic heart failure (CMS/HCC) I50.9       Assessment & Plan    -Complete heart block, iatrogenic LBBB with continuous RV pacing----s/p surgical LV epicardial lead 3/4/2020  -RV lead malfunction post op, insulation break (RV sensing driven device)  -Chronic class II systolic heart failure  -LV dysfunction, EF 35-40%  -CAD, remote PCI  -HTN  -HL    Plan for RV revision with Dr. Greenberg this afternoon, orders placed.  Educated patient and family regarding need for synchronization of ventricles, RV issue, and pacer dependency.      Please call for any questions or concerns.  Mohini Kaur, BERTHA  03/05/20  9:31 AM  Mohini Kaur, EP cardiology, [phone] (cell)  **>25 minutes in face to face conversation regarding treatment plan, education, and answering any questions the patient may have had

## 2020-03-05 NOTE — THERAPY DISCHARGE NOTE
Acute Care - Occupational Therapy Initial Eval/Discharge  [facility]     Patient Name: Mode Duffy  : 1945  MRN: [id number]  Today's Date: 3/5/2020               Admit Date: 3/4/2020       ICD-10-CM ICD-9-CM   1. Sick sinus syndrome (CMS/HCC) I49.5 427.81   2. Chronic heart failure, unspecified heart failure type (CMS/HCC) I50.9 428.9     Patient Active Problem List   Diagnosis   • Fothergill's neuralgia   • Hyponatremia   • Cardiac pacemaker in situ   • Bradycardia   • Cardiovascular disease   • Hypertension   • Coronary artery disease   • Hyperlipidemia   • Sick sinus syndrome (CMS/HCC)   • Chronic systolic congestive heart failure (CMS/HCC)   • Cardiomyopathy (CMS/HCC)   • Chronic heart failure (CMS/HCC)     Past Medical History:   Diagnosis Date   • Arthritis    • BPH (benign prostatic hyperplasia)    • Coronary artery disease     dr. Carrizales   • Deviated septum    • GERD (gastroesophageal reflux disease)    • History of trigeminal neuralgia    • Hyperlipidemia    • Hypertension    • Renal cyst    • Sciatica    • Sleep apnea     unable to tolerate machine     Past Surgical History:   Procedure Laterality Date   • APPENDECTOMY     • CARDIAC CATHETERIZATION     • CARDIAC ELECTROPHYSIOLOGY PROCEDURE N/A 2020    Procedure: Biventricular Device Upgrade;  Surgeon: Dominick Jackson MD;  Location: [facility] INVASIVE LOCATION;  Service: Cardiovascular   • CORONARY STENT PLACEMENT  2014    x 2   • PROSTATE SURGERY     • REFRACTIVE SURGERY     • TOTAL HIP ARTHROPLASTY Bilateral     different times          OT ASSESSMENT FLOWSHEET (last 12 hours)      Occupational Therapy Evaluation     Row Name 20 1200                   OT Evaluation Time/Intention    Subjective Information  no complaints  -MH        Document Type  evaluation  -MH        Mode of Treatment  occupational therapy  -MH        Patient Effort  good  -MH        Symptoms Noted During/After Treatment  none  -MH        Comment  RN states  OT cleared for conservative activity only due to the need to have a second procedure to replace the 2nd pacemaker lead. Only one was able to be done yesterday. Pt was able to self-monitor most of his mvmts & pain was limiting his use of the Lt arm because he had a CT placed through his Lt ribcage. Pt is Lt-hand dominant. RN denied the need to place arm in sling.   -           General Information    Patient Observations  alert;cooperative;agree to therapy  -        Patient/Family Observations  spouse & other family member present initially but took leave.  -        Prior Level of Function  independent:;all household mobility;ADL's;driving  -        Equipment Currently Used at Home  none  -        Pertinent History of Current Functional Problem  Pt is 75 y/o M who had pacemaker placed origionally in 1995 or therabouts. He has come to [facility] to have the pacemaker leads revisioned. One lead was done already but he will need another procedure today at 4 to revise the 2nd unstable lead.  -        Existing Precautions/Restrictions  fall;cardiac;pacemaker  -           Relationship/Environment    Lives With  spouse he & spouse are retired  -           Resource/Environmental Concerns    Current Living Arrangements  home/apartment/condo  -           Home Main Entrance    Number of Stairs, Main Entrance  one  -           Cognitive Assessment/Intervention- PT/OT    Orientation Status (Cognition)  oriented x 4  -        Follows Commands (Cognition)  WFL  -           Safety Issues, Functional Mobility    Safety Issues Affecting Function (Mobility)  safety precautions follow-through/compliance mildly impulsive w/ his LUE.   -        Impairments Affecting Function (Mobility)  balance  -        Comment, Safety Issues/Impairments (Mobility)  Pt reports he feels his balance has been declining for several months, but denies falls. Able to stand briefly on each leg alone w/ CGA.  -           Functional Mobility     Functional Mobility- Ind. Level  supervision required  -           Transfer Assessment/Treatment    Transfer Assessment/Treatment  sit-stand transfer;stand-sit transfer  -           Sit-Stand Transfer    Sit-Stand [place] (Transfers)  supervision;verbal cues  -           Stand-Sit Transfer    Stand-Sit [place] (Transfers)  supervision;verbal cues  -           ADL Assessment/Intervention    BADL Assessment/Intervention  other (see comments) discussed changes in mvmt patterns w/ LUE.  -           BADL Safety/Performance    Skilled BADL Treatment/Intervention  cognitive/safety deficit modifications;[facility] process/adaptation training;compensatory training  -           General ROM    GENERAL ROM COMMENTS  WFL except LUE is restricted due to pacemaker precautions.  -           MMT (Manual Muscle Testing)    General MMT Comments  grossly WFL.  was 4-/5  -           Positioning and Restraints    Pre-Treatment Position  sitting in chair/recliner  -        Post Treatment Position  chair  -           Pain Assessment    Additional Documentation  Pain Scale: Numbers Pre/Post-Treatment (Group)  -           Pain Scale: Numbers Pre/Post-Treatment    Pain Scale: Numbers, Pretreatment  2/10  -        Pain Scale: Numbers, Post-Treatment  3/10  -        Pain Location - Side  -- Lt chest wall  -           Wound 03/04/20 Left lower;lateral chest Incision    Wound - Properties Group Date first assessed: 03/04/20  -AT Present on Hospital Admission: N  -AT Side: Left  -AT Orientation: lower;lateral  -AT Location: chest  -AT Primary Wound Type: Incision  -AT       Plan of Care Review    Plan of Care Reviewed With  patient  -        Progress  no change  -        Outcome Summary  Pt has mildly impaired balance but no falls are reported. He is having his pacemaker leads revised & should be safe from a functional standpoint to go home w/ spouse at d/c. He was mildly impulsive w/ his LUE & itis noted  to be his dominany extremity. OT provided education & handout to prevent overuse or overreach of his LUE during the healing phase. Pt would benefit from OP cardiac rehab to address his balance issue & to safely increase his exercise regimen. Spouse can drive him to appointments. Pt would like to know his copay for OP cardiac rehab visits at a clinic near his home. OT will sign off. Appreciate the oportunity to educate & assess this pleasant man.  -           Clinical Impression (OT)    Criteria for Skilled Therapeutic Interventions Met (OT Eval)  other (see comments) needs addressed. No other needs identified.  -        Therapy Frequency (OT Eval)  evaluation only  -        Anticipated Discharge Disposition (OT)  home with OP services;home with assist  -           Living Environment    Home Accessibility  stairs to enter home  -          User Key  (r) = Recorded By, (t) = Taken By, (c) = Cosigned By    Initials Name Effective Dates    Sofia Gleason OT 03/01/19 -     AT Hardeep Gomez RN 03/01/19 -               OT Recommendation and Plan  Outcome Summary/Treatment Plan (OT)  Anticipated Discharge Disposition (OT): home with OP services, home with assist  Therapy Frequency (OT Eval): evaluation only  Plan of Care Review  Plan of Care Reviewed With: patient  Plan of Care Reviewed With: patient  Outcome Summary: Pt has mildly impaired balance but no falls are reported. He is having his pacemaker leads revised & should be safe from a functional standpoint to go home w/ spouse at d/c. He was mildly impulsive w/ his LUE & itis noted to be his dominany extremity. OT provided education & handout to prevent overuse or overreach of his LUE during the healing phase. Pt would benefit from OP cardiac rehab to address his balance issue & to safely increase his exercise regimen. Spouse can drive him to appointments. Pt would like to know his copay for OP cardiac rehab visits at a clinic near his home. OT will sign  off. Appreciate the oportunity to educate & assess this pleasant man.         Outcome Measures     Row Name 03/05/20 1300             How much help from another person do you currently need...    Turning from your back to your side while in flat bed without using bedrails?  4  -MH      Moving from lying on back to sitting on the side of a flat bed without bedrails?  3  -MH      Moving to and from a bed to a chair (including a wheelchair)?  4  -MH      Standing up from a chair using your arms (e.g., wheelchair, bedside chair)?  4  -MH      Climbing 3-5 steps with a railing?  3  -MH      To walk in hospital room?  4  -MH      AM-PAC 6 Clicks Score (PT)  22  -         Functional Assessment    Outcome Measure Options  AM-PAC 6 Clicks Basic Mobility (PT)  -        User Key  (r) = Recorded By, (t) = Taken By, (c) = Cosigned By    Initials Name Provider Type     Sofia Siu OT Occupational Therapist          Time Calculation:   Time Calculation- OT     Row Name 03/05/20 1323             Time Calculation- OT    OT Start Time  1030  -      OT Stop Time  1050  -      OT Time Calculation (min)  20 min  -      Total Timed Code Minutes- OT  10 minute(s)  -      OT Received On  03/05/20  -        User Key  (r) = Recorded By, (t) = Taken By, (c) = Cosigned By    Initials Name Provider Type     Sofia Siu OT Occupational Therapist        Therapy Suggested Charges     Code   Minutes Charges    None           Therapy Charges for Today     Code Description Service Date Service Provider Modifiers Qty    [id number]  OT EVAL HIGH COMPLEXITY 3 3/5/2020 Sofia Siu OT GO 1    [id number]  OT THERAPEUTIC ACT EA 15 MIN 3/5/2020 Sofia Siu OT GO 1               OT Discharge Summary  Anticipated Discharge Disposition (OT): home with OP services, home with assist    Sofia Siu OT  3/5/2020

## 2020-03-05 NOTE — OP NOTE
Operative Note    Date of Dictation: 03/05/20    Date of Procedure: 03/04/20    Referring Physician: Renetta Tan M.D.    Preoperative diagnosis:   1.  Cardiomyopathy  2.  Status post dual-chamber pacemaker implantation  3.  Congestive heart failure  4.  Left subclavian vein stenosis with inability to place coronary sinus lead for dual-chamber pacemaker    Postoperative diagnosis:   Same    Procedure:   1.  Left mini anterolateral thoracotomy with placement of LV pacing lead  2.  Revision of left infraclavicular pacemaker pocket  3.  Placement of a dual-chamber pacemaker generator    Surgeon: Justin Aguilar MD     Assistants: MAKSIM Elliott    Anesthesia: General endotracheal anesthesia and CAILIN    Findings:  Severe scarring in the pacemaker pocket    Estimated Blood Loss: Minimal documented in the anesthesia record    Description of the procedure:     The patient was placed supine on the operative table. Anesthesia was given and lines placed. The patients chest was prepped and draped using the usual sterile technique.  The patient was placed supine with the left side elevated 10 degrees.  A 6 cm fourth intercostal space left thoracotomy incision was performed with knife and all the layers carried down to the chest wall using electrocautery.  I entered into the fourth intercostal space above the fifth rib with electrocautery and extended incision approximately 10 cm.  I placed a Finochietto retractor and spread the rib space further for exposure.  I opened the pericardium above the phrenic nerve and extended incision North and South with exposure of the lateral wall of the left ventricle.  Use a screw lead in the mid upper left lateral wall,[facility] reference #[id number], serial #[id number] which was placed without difficulty.  The pacing threshold was approximately 1 mV, impedance was 600 ohms.  Following I made an incision in the same prior incision in the pacemaker pocket and carried down all the  layers using electrocautery.  It was significant scarring and I exteriorized the prior generator.  That was removed and a new generator was placed. [place] 8 HF-T Biotronik generator was placed and the RV LV lead and RA leads were connected.  The LV lead looked better with a decrease in the pacing threshold from 3 to 1 mV however the RV lead had an impedance of less than 100 OHMS suggesting an  Insulation problem.  I did not see any break in the silicone cover but is possible that there was a cautery injury that somewhat affected the lead.  The generator settings were mode of DDD, lower rate of 60 upper rate of 130.  I irrigated the cavity with antibiotic solution and I opened the pocket further posteriorly into the chest to be able to accommodate the new generator.  Of note, the LV lead was transposed over the chest wall under the pectoralis muscle into the generator to be able to connect it.  I closed the generator pocket in layers using rupture material and subcuticular for the skin.  I placed a 28 Russian chest tube in the left pleural space and a #2 Vicryl pericostal suture and then closed the wound in layers again using rupture material and subcuticular for the skin.  I secured the chest tube to the skin and connected to a Pleur-evac.  We applied  sterile dressings.    Specimen removed: none    Complications:  none           Disposition: Cardiovascular recovery room           Condition: Critical but stable.

## 2020-03-05 NOTE — PLAN OF CARE
Problem: Patient Care Overview  Goal: Plan of Care Review  Outcome: Ongoing (interventions implemented as appropriate)  Flowsheets  Taken 3/5/2020 1322  Progress: no change  Plan of Care Reviewed With: patient  Taken 3/5/2020 1200  Outcome Summary: Pt has mildly impaired balance but no falls are reported. He is having his pacemaker leads revised & should be safe from a functional standpoint to go home w/ spouse at d/c. He was mildly impulsive w/ his LUE & it is noted to be his dominany extremity. OT provided education & handout to prevent overuse or overreach of his LUE during the healing phase. Pt would benefit from OP cardiac rehab to address his balance issue & to safely increase his exercise regimen. Spouse can drive him to appointments. Pt would like to know his copay for OP cardiac rehab visits at a clinic near his home. OT will sign off. Appreciate the oportunity to educate & assess this pleasant man.

## 2020-03-05 NOTE — PLAN OF CARE
Problem: Patient Care Overview  Goal: Plan of Care Review  Outcome: Ongoing (interventions implemented as appropriate)  Note:   Pt resting comfortably in the chair, Minimal pain to incision and chest tube sites. No acute distress noted at this time.

## 2020-03-05 NOTE — PROGRESS NOTES
Pulmonary/ Critical Care/ sleep medicine PROGRESS Note        Patient Name:  Mode Duffy    :  1945    Medical Record:  [id number]    Requesting Physician    Justin Aguilar MD    Primary Care Physician     Temo Vidal MD    Reason for consultation    Mode Duffy is a 74 y.o. male who was referred for consultation for pulmonary management.  74 year old male presented to the hospital and underwent a LV lead placement with Dr. Aguilar today 3/4/20.  Post-operatively the patient is requiring 6 liters per nasal cannula.  He denies use of oxygen at home, no past history of thrombus, and no COPD.  He is a past smoker in his early 20's.  He does have a SHELTON diagnosis with a past sleep study evaluation at [place] approximately 10 years ago.  He is not using a CPAP.     Typical bedtime is 10 pm and awakens at 7 am.  He does snore and takes occasional naps.    3/5:  On 5 liters per nasal cannula.  No issues overnight. OOB to chair    Review of Systems    As above     Medical History    Past Medical History:   Diagnosis Date   • Arthritis    • BPH (benign prostatic hyperplasia)    • Coronary artery disease     dr. Carrizales   • Deviated septum    • GERD (gastroesophageal reflux disease)    • History of trigeminal neuralgia    • Hyperlipidemia    • Hypertension    • Renal cyst    • Sciatica    • Sleep apnea     unable to tolerate machine        Surgical History    Past Surgical History:   Procedure Laterality Date   • APPENDECTOMY     • CARDIAC CATHETERIZATION     • CARDIAC ELECTROPHYSIOLOGY PROCEDURE N/A 2020    Procedure: Biventricular Device Upgrade;  Surgeon: Dominick Jackson MD;  Location: [facility] INVASIVE LOCATION;  Service: Cardiovascular   • CORONARY STENT PLACEMENT  2014    x 2   • PROSTATE SURGERY     • REFRACTIVE SURGERY     • TOTAL HIP ARTHROPLASTY Bilateral     different times        Family History    Family History   Problem Relation Age of Onset   • Heart disease Father         Social History    Social History     Tobacco Use   • Smoking status: Former Smoker     Last attempt to quit: 1968     Years since quittin.2   • Smokeless tobacco: Never Used   Substance Use Topics   • Alcohol use: Yes     Alcohol/week: 7.0 standard drinks     Types: 7 Glasses of wine per week     Comment: RED WINE BEFORE DINNER        Allergies    No Known Allergies    Medications    Scheduled Meds:    amitriptyline 20 mg Oral Nightly   aspirin 81 mg Oral Daily   atorvastatin 40 mg Oral Nightly   ceFAZolin 2 g Intravenous Q8H   cetirizine 10 mg Oral Daily   chlorhexidine 15 mL Mouth/Throat Q12H   [START ON 3/6/2020] enoxaparin 40 mg Subcutaneous Daily   mupirocin 1 application Each Nare BID   naloxone 0.2 mg Intravenous Once   OXcarbazepine 300 mg Oral BID   pantoprazole 40 mg Oral Q AM   polyethylene glycol 17 g Oral BID   senna 1 tablet Oral BID     Continuous Infusions:    dexmedetomidine 0.2-1.5 mcg/kg/hr   insulin 0-50 Units/hr     PRN Meds:.hydrALAZINE  •  HYDROcodone-acetaminophen  •  insulin  •  Morphine **AND** [DISCONTINUED] naloxone  •  MOUTH KOTE  •  naloxone  •  ondansetron  •  potassium chloride **OR** potassium chloride  •  potassium chloride **OR** potassium chloride      Physical Exam    tMax 24 hrs:  Temp (24hrs), Av.3 °F (36.3 °C), Min:95.9 °F (35.5 °C), Max:98 °F (36.7 °C)    Vitals Ranges:  Temp:  [95.9 °F (35.5 °C)-98 °F (36.7 °C)] 98 °F (36.7 °C)  Heart Rate:  [60-84] 62  Resp:  [18-27] 20  BP: (126-149)/(72-82) 149/82  Arterial Line BP: (126-161)/(50-70) 144/64  Intake and Output Last 3 Shifts:  I/O last 3 completed shifts:  In: 2312 [P.O.:960; I.V.:1352]  Out: 1135 [Urine:925; Blood:20; Chest Tube:190]    Constitutional:  no acute distress, non-toxic appearance   Eyes:  PERRL, conjunctiva normal   HENT:  Atraumatic, external ears normal, nose normal, oropharynx moist, no pharyngeal exudates. Neck supple. Mallampati 3  Respiratory:  No respiratory distress, normal breath  sounds, no rales, no wheezing, chest tube in place   Cardiovascular:  Normal rate, normal rhythm, no murmurs, no gallops, no rubs   GI:  Soft, nondistended, normal bowel sounds, nontender, no organomegaly, no mass, no rebound, no guarding   :  No costovertebral angle tenderness   Musculoskeletal:  No edema, no tenderness, no deformities. Back- no tenderness  Integument:  Well hydrated, no rash. Left upper chest surgical incision intact    Lymphatic:  No lymphadenopathy noted   Neurologic:  Alert & oriented x 3, CN 2-12 normal, normal motor function, normal sensory function, no focal deficits noted   Psychiatric:  Speech and behavior appropriate     labs    Lab Results (last 24 hours)     Procedure Component Value Units Date/Time    Calcium, Ionized [[id number]]  (Abnormal) Collected:  03/05/20 0452    Specimen:  Blood Updated:  03/05/20 0527     Ionized Calcium 1.17 mmol/L     Renal Function Panel [[id number]]  (Abnormal) Collected:  03/05/20 0452    Specimen:  Blood Updated:  03/05/20 0524     Glucose 130 mg/dL      BUN 14 mg/dL      Creatinine 0.82 mg/dL      Sodium 134 mmol/L      Potassium 4.4 mmol/L      Chloride 101 mmol/L      CO2 24.0 mmol/L      Calcium 8.5 mg/dL      Albumin 3.80 g/dL      Phosphorus 3.3 mg/dL      Anion Gap 9.0 mmol/L      BUN/Creatinine Ratio 17.1     eGFR Non African Amer 92 mL/min/1.73     Narrative:       GFR Normal >60  Chronic Kidney Disease <60  Kidney Failure <15      Magnesium [[id number]]  (Normal) Collected:  03/05/20 0452    Specimen:  Blood Updated:  03/05/20 0522     Magnesium 2.3 mg/dL     CBC & Differential [[id number]] Collected:  03/05/20 0452    Specimen:  Blood Updated:  03/05/20 0510    Narrative:       The following orders were created for panel order CBC & Differential.  Procedure                               Abnormality         Status                     ---------                               -----------         ------                     CBC Auto  Differential[[id number]]        Abnormal            Final result                 Please view results for these tests on the individual orders.    Protime-INR [[id number]]  (Normal) Collected:  03/05/20 0452    Specimen:  Blood Updated:  03/05/20 0510     Protime 10.9 Seconds      INR 1.04    CBC Auto Differential [[id number]]  (Abnormal) Collected:  03/05/20 0452    Specimen:  Blood Updated:  03/05/20 0510     WBC 9.00 10*3/mm3      RBC 4.13 10*6/mm3      Hemoglobin 14.0 g/dL      Hematocrit 39.5 %      MCV 95.6 fL      MCH 33.9 pg      MCHC 35.4 g/dL      RDW 13.2 %      RDW-SD 44.2 fl      MPV 6.5 fL      Platelets 208 10*3/mm3      Neutrophil % 84.7 %      Lymphocyte % 7.2 %      Monocyte % 7.7 %      Eosinophil % 0.1 %      Basophil % 0.3 %      Neutrophils, Absolute 7.60 10*3/mm3      Lymphocytes, Absolute 0.60 10*3/mm3      Monocytes, Absolute 0.70 10*3/mm3      Eosinophils, Absolute 0.00 10*3/mm3      Basophils, Absolute 0.00 10*3/mm3      nRBC 0.0 /100 WBC     Renal Function Panel [[id number]]  (Abnormal) Collected:  03/04/20 1641    Specimen:  Blood Updated:  03/04/20 1722     Glucose 154 mg/dL      BUN 18 mg/dL      Creatinine 0.74 mg/dL      Sodium 136 mmol/L      Potassium 4.4 mmol/L      Chloride 104 mmol/L      CO2 20.0 mmol/L      Calcium 8.5 mg/dL      Albumin 4.20 g/dL      Phosphorus 3.5 mg/dL      Anion Gap 12.0 mmol/L      BUN/Creatinine Ratio 24.3     eGFR Non African Amer 103 mL/min/1.73     Narrative:       GFR Normal >60  Chronic Kidney Disease <60  Kidney Failure <15      Calcium, Ionized [[id number]]  (Abnormal) Collected:  03/04/20 1641    Specimen:  Blood Updated:  03/04/20 1710     Ionized Calcium 1.17 mmol/L     Protime-INR [[id number]]  (Normal) Collected:  03/04/20 1641    Specimen:  Blood Updated:  03/04/20 1708     Protime 10.0 Seconds      INR 0.94    aPTT [[id number]]  (Abnormal) Collected:  03/04/20 1641    Specimen:  Blood Updated:  03/04/20 1708     PTT 23.4 seconds     CBC &  Differential [[id number]] Collected:  03/04/20 1641    Specimen:  Blood Updated:  03/04/20 1700    Narrative:       The following orders were created for panel order CBC & Differential.  Procedure                               Abnormality         Status                     ---------                               -----------         ------                     CBC Auto Differential[[id number]]        Abnormal            Final result                 Please view results for these tests on the individual orders.    CBC Auto Differential [[id number]]  (Abnormal) Collected:  03/04/20 1641    Specimen:  Blood Updated:  03/04/20 1700     WBC 10.10 10*3/mm3      RBC 4.47 10*6/mm3      Hemoglobin 14.9 g/dL      Hematocrit 42.8 %      MCV 95.7 fL      MCH 33.4 pg      MCHC 34.8 g/dL      RDW 13.1 %      RDW-SD 43.8 fl      MPV 6.4 fL      Platelets 210 10*3/mm3      Neutrophil % 93.4 %      Lymphocyte % 4.3 %      Monocyte % 2.0 %      Eosinophil % 0.0 %      Basophil % 0.3 %      Neutrophils, Absolute 9.40 10*3/mm3      Lymphocytes, Absolute 0.40 10*3/mm3      Monocytes, Absolute 0.20 10*3/mm3      Eosinophils, Absolute 0.00 10*3/mm3      Basophils, Absolute 0.00 10*3/mm3      nRBC 0.0 /100 WBC     Blood Gas, Arterial [[id number]]  (Abnormal) Collected:  03/04/20 1400    Specimen:  Arterial Blood Updated:  03/04/20 1404     Site Arterial Line     Naveed's Test N/A     pH, Arterial 7.312 pH units      pCO2, Arterial 47.9 mm Hg      pO2, Arterial 58.9 mm Hg      HCO3, Arterial 24.2 mmol/L      Base Excess, Arterial -2.4 mmol/L      Comment: Serial Number: [id number]:  [id number]        O2 Saturation, Arterial 87.4 %      CO2 Content 25.7 mmol/L      Barometric Pressure for Blood Gas --     Comment: N/A        Modality Oxy Mask     Hemodilution No    POCT Electrolytes +HGB +HCT [[id number]]  (Abnormal) Collected:  03/04/20 1400    Specimen:  Blood Updated:  03/04/20 1404     Sodium 137 mmol/L      POC Potassium 4.3 mmol/L      Ionized  Calcium 1.11 mmol/L      Comment: Serial Number: [id number]:  [id number]        Glucose 157 mg/dL      Hematocrit 42 %      Hemoglobin 14.2 g/dL     POC Glucose Once [[id number]]  (Abnormal) Collected:  03/04/20 1354    Specimen:  Blood Updated:  03/04/20 1356     Glucose 139 mg/dL      Comment: Serial Number: [id number]:  [id number]             Imaging & Other Studies    Imaging Results (Last 72 Hours)     Procedure Component Value Units Date/Time    XR Chest 1 View [[id number]] Collected:  03/05/20 0726     Updated:  03/05/20 0731    Narrative:       XR CHEST 1 VW-     Date of Exam: 3/5/2020 6:01 AM     Indication: Post-Op Heart Surgery; I50.9-Heart failure, unspecified.     Comparison Exams: 03/04/2020     Technique: Single AP chest radiograph     FINDINGS:  A left subclavian pacemaker is in place. A right internal jugular sheath  is in place. The lungs are clear. The heart and mediastinal contours  appear stable. The pulmonary vasculature appears normal. The osseous  structures appear intact.       Impression:       No acute cardiopulmonary process identified.     Electronically Signed By-DR. Quan Sung MD On:3/5/2020 7:29 AM  This report was finalized on [id number] by DR. Quan Sung MD.    XR Chest 1 View [[id number]] Collected:  03/04/20 1326     Updated:  03/04/20 1329    Narrative:       DATE OF EXAM:  3/4/2020 12:38 PM     PROCEDURE:  XR CHEST 1 VW-     INDICATIONS:  Central Line placement RIJ, left thoracotomy/chest tube; I50.9-Heart  failure, unspecified     COMPARISON:  No Comparisons Available     TECHNIQUE:   Single radiographic AP view of the chest was obtained.     FINDINGS:  Cardiac size is enlarged. There are postoperative changes of pacemaker  placement. There is a right IJ sheath in place with the tip in the SVC  and no pneumothorax.        Impression:       Cardiomegaly with postoperative changes of transvenous pacing device  placement. New right IJ sheath with the tip in the  SVC and no  pneumothorax     Electronically Signed By-Adriel Gamino On:3/4/2020 1:27 PM  This report was finalized on [id number] by  Adriel Gamino, .          Assessment      Chronic heart failure (CMS/HCC)    Acute hypoxia   -likely multi-factorial to anesthesia, possible some atelectasis, and pain  -cont with supplemental oxygen as needed for sats 92%.  Has improved from 10 L to 5 L  -CXR reviewed  -encourage IS   -OOB when ok with cv sx   -check a BNP today    SHELTON  -recommend a repeat sleep study as an outpatient to re-establish use of CPAP   -Typical bedtime is 10 pm and awakens at 7 am.  He does snore and takes occasional naps.    SSS s/p PPM  -s/p LV lead placement 3/4/20  -Chest tube per CV sx - possible D/C today  -Cardiology Dr. Greenberg, following for possible RV lead replacement    HTN  -resume home med when appropriate     HLD  -statin    DVT ppy:  -SCDs  -lovenox 3/6/20    Diet per sx  PT eval and treat   D/C krishna MENDOZA in [facility]

## 2020-03-05 NOTE — PROGRESS NOTES
Discharge Planning Assessment  [facility]     Patient Name: Mode Duffy  MRN: [id number]  Today's Date: 3/5/2020    Admit Date: 3/4/2020    Discharge Needs Assessment     Row Name 03/05/20 0910       Living Environment    Lives With  spouse    Current Living Arrangements  home/apartment/condo    Primary Care Provided by  spouse/significant other;self    Provides Primary Care For  no one    Family Caregiver if Needed  none    Able to Return to Prior Arrangements  yes       Resource/Environmental Concerns    Resource/Environmental Concerns  none    Transportation Concerns  car, none       Transition Planning    Patient/Family Anticipates Transition to  home with family    Patient/Family Anticipated Services at Transition  none       Discharge Needs Assessment    Readmission Within the Last 30 Days  no previous admission in last 30 days    Concerns to be Addressed  no discharge needs identified    Equipment Currently Used at Home  none    Anticipated Changes Related to Illness  none    Equipment Needed After Discharge  -- Pending    Provided Post Acute Provider List?  N/A    Discharge Coordination/Progress  Anticipate routine discharge home with spouse at this time. Denies any needs.         Discharge Plan     Row Name 03/05/20 0911       Plan    Plan  Anticipate routine discharge home with spouse at this time. Denies any needs.     Patient/Family in Agreement with Plan  yes    Plan Comments  Barriers to discharge: Potential surgery         Demographic Summary     Row Name 03/05/20 0908       General Information    Admission Type  inpatient    Arrived From  home    Reason for Consult  discharge planning    Preferred Language  English     Used During This Interaction  no       Contact Information    Permission Granted to Share Info With          Functional Status     Row Name 03/05/20 0909       Functional Status    Usual Activity Tolerance  excellent    Current Activity Tolerance  good        Functional Status, IADL    Medications  independent    Meal Preparation  independent    Housekeeping  independent    Laundry  independent    Shopping  independent       Mental Status    General Appearance WDL  WDL       Mental Status Summary    Recent Changes in Mental Status/Cognitive Functioning  no changes              Anna Naegele RN Case Manager  [address]  [address] [phone]  office  [phone]  fax  [email or web address]  [facility]

## 2020-03-05 NOTE — ANESTHESIA PREPROCEDURE EVALUATION
Anesthesia Evaluation                  Airway   Dental      Pulmonary    (+) a smoker Former, sleep apnea,   Cardiovascular     ECG reviewed    (+) pacemaker pacemaker, hypertension, CAD, dysrhythmias Bradycardia, CHF Systolic <55%, hyperlipidemia,       Neuro/Psych  (+) numbness,     GI/Hepatic/Renal/Endo    (+)  GERD,      Musculoskeletal     Abdominal    Substance History      OB/GYN          Other   arthritis,      ROS/Med Hx Other: Sick sinus syndrome, cardiomyopathy, sciatica, BPH                Anesthesia Plan    ASA 4 - emergent     MAC   (Called for assistance during case, pt assessed on table, sedated, chart quickly reviewed.  )  intravenous induction

## 2020-03-05 NOTE — PROGRESS NOTES
S/P POD# 1 LV Lead Placement--Andrani  EF 36-40% (echo)    Subjective:  Patient up to chair. Complains of mild incisional pain.    Drips:  None      Intake/Output Summary (Last 24 hours) at 3/5/2020 1143  Last data filed at 3/5/2020 0700  Gross per 24 hour   Intake 1112 ml   Output 1115 ml   Net -3 ml     Temp:  [95.9 °F (35.5 °C)-98 °F (36.7 °C)] 98 °F (36.7 °C)  Heart Rate:  [60-84] 72  Resp:  [18-27] 20  BP: (125-149)/(54-82) 133/58  Arterial Line BP: (126-162)/(50-70) 149/57  CT 70/8    Results from last 7 days   Lab Units 03/05/20  0452 03/04/20  1641  03/02/20  0840   WBC 10*3/mm3 9.00 10.10  --  4.90   HEMOGLOBIN g/dL 14.0 14.9  --  15.0   HEMOGLOBIN, POC   --   --    < >  --    HEMATOCRIT % 39.5 42.8  --  42.6   HEMATOCRIT POC   --   --    < >  --    PLATELETS 10*3/mm3 208 210  --  225   INR  1.04 0.94  --  0.98    < > = values in this interval not displayed.     Results from last 7 days   Lab Units 03/05/20  0452   CREATININE mg/dL 0.82   POTASSIUM mmol/L 4.4   SODIUM mmol/L 134*   MAGNESIUM mg/dL 2.3   PHOSPHORUS mg/dL 3.3     ica 1.17    Physical Exam:  Neuro intact, nad, up to chair  Tele:  PPM 60  Diminished bases, 98% 5L NC  dsg c/d/i, No drainage  Benign abd, No BM  No edema    Assessment/Plan:  Principal Problem:    Chronic heart failure (CMS/HCC)  Active Problems:    Sick sinus syndrome (CMS/HCC)    Complete heart block s/p LV epicardial lead placment  HFrEF (EF 36-40%)-- maximize meds as able  CAD s/p PCI (2014)-- aspirin, statin  HTN-- stable  HLD-- statin    Routine care  De-escalate  Mobilize  Replete calcium  Plans for RV Lead exchange by Dr. Greenberg this afternoon.    YVAN DIEHL, APRN  3/5/2020  11:43 AM

## 2020-03-06 ENCOUNTER — APPOINTMENT (OUTPATIENT)
Dept: GENERAL RADIOLOGY | Facility: HOSPITAL | Age: 75
End: 2020-03-06

## 2020-03-06 LAB
ANION GAP SERPL CALCULATED.3IONS-SCNC: 10 MMOL/L (ref 5–15)
BUN BLD-MCNC: 17 MG/DL (ref 8–23)
BUN/CREAT SERPL: 19.8 (ref 7–25)
CALCIUM SPEC-SCNC: 8.7 MG/DL (ref 8.6–10.5)
CHLORIDE SERPL-SCNC: 101 MMOL/L (ref 98–107)
CO2 SERPL-SCNC: 24 MMOL/L (ref 22–29)
CREAT BLD-MCNC: 0.86 MG/DL (ref 0.76–1.27)
DEPRECATED RDW RBC AUTO: 44.6 FL (ref 37–54)
ERYTHROCYTE [DISTWIDTH] IN BLOOD BY AUTOMATED COUNT: 13.3 % (ref 12.3–15.4)
GFR SERPL CREATININE-BSD FRML MDRD: 87 ML/MIN/1.73
GLUCOSE BLD-MCNC: 149 MG/DL (ref 65–99)
HCT VFR BLD AUTO: 41 % (ref 37.5–51)
HGB BLD-MCNC: 14.2 G/DL (ref 13–17.7)
MCH RBC QN AUTO: 33.3 PG (ref 26.6–33)
MCHC RBC AUTO-ENTMCNC: 34.7 G/DL (ref 31.5–35.7)
MCV RBC AUTO: 96 FL (ref 79–97)
PLATELET # BLD AUTO: 216 10*3/MM3 (ref 140–450)
PMV BLD AUTO: 6.7 FL (ref 6–12)
POTASSIUM BLD-SCNC: 4.5 MMOL/L (ref 3.5–5.2)
RBC # BLD AUTO: 4.28 10*6/MM3 (ref 4.14–5.8)
SODIUM BLD-SCNC: 135 MMOL/L (ref 136–145)
WBC NRBC COR # BLD: 7.9 10*3/MM3 (ref 3.4–10.8)

## 2020-03-06 PROCEDURE — 97116 GAIT TRAINING THERAPY: CPT

## 2020-03-06 PROCEDURE — 25010000002 VANCOMYCIN 1 G RECONSTITUTED SOLUTION 1 EACH VIAL: Performed by: INTERNAL MEDICINE

## 2020-03-06 PROCEDURE — 71045 X-RAY EXAM CHEST 1 VIEW: CPT

## 2020-03-06 PROCEDURE — 85027 COMPLETE CBC AUTOMATED: CPT | Performed by: INTERNAL MEDICINE

## 2020-03-06 PROCEDURE — 80048 BASIC METABOLIC PNL TOTAL CA: CPT | Performed by: INTERNAL MEDICINE

## 2020-03-06 PROCEDURE — 97162 PT EVAL MOD COMPLEX 30 MIN: CPT

## 2020-03-06 PROCEDURE — 99024 POSTOP FOLLOW-UP VISIT: CPT | Performed by: NURSE PRACTITIONER

## 2020-03-06 PROCEDURE — 25010000002 HYDRALAZINE PER 20 MG: Performed by: INTERNAL MEDICINE

## 2020-03-06 PROCEDURE — 93005 ELECTROCARDIOGRAM TRACING: CPT | Performed by: NURSE PRACTITIONER

## 2020-03-06 PROCEDURE — 25010000002 MORPHINE PER 10 MG: Performed by: INTERNAL MEDICINE

## 2020-03-06 PROCEDURE — 25010000002 FUROSEMIDE PER 20 MG: Performed by: NURSE PRACTITIONER

## 2020-03-06 PROCEDURE — 94799 UNLISTED PULMONARY SVC/PX: CPT

## 2020-03-06 RX ORDER — FUROSEMIDE 10 MG/ML
20 INJECTION INTRAMUSCULAR; INTRAVENOUS ONCE
Status: COMPLETED | OUTPATIENT
Start: 2020-03-06 | End: 2020-03-06

## 2020-03-06 RX ADMIN — POLYETHYLENE GLYCOL 3350 17 G: 17 POWDER, FOR SOLUTION ORAL at 08:02

## 2020-03-06 RX ADMIN — CHLORHEXIDINE GLUCONATE 0.12% ORAL RINSE 15 ML: 1.2 LIQUID ORAL at 20:54

## 2020-03-06 RX ADMIN — CETIRIZINE HYDROCHLORIDE 10 MG: 10 TABLET, FILM COATED ORAL at 08:03

## 2020-03-06 RX ADMIN — POLYETHYLENE GLYCOL 3350 17 G: 17 POWDER, FOR SOLUTION ORAL at 20:54

## 2020-03-06 RX ADMIN — OXCARBAZEPINE 300 MG: 150 TABLET, FILM COATED ORAL at 10:11

## 2020-03-06 RX ADMIN — AMLODIPINE BESYLATE 10 MG: 5 TABLET ORAL at 20:54

## 2020-03-06 RX ADMIN — MUPIROCIN 1 APPLICATION: 20 OINTMENT TOPICAL at 20:55

## 2020-03-06 RX ADMIN — STANDARDIZED SENNA CONCENTRATE 1 TABLET: 8.6 TABLET ORAL at 08:03

## 2020-03-06 RX ADMIN — STANDARDIZED SENNA CONCENTRATE 1 TABLET: 8.6 TABLET ORAL at 20:54

## 2020-03-06 RX ADMIN — SODIUM CHLORIDE 1000 MG: 900 INJECTION, SOLUTION INTRAVENOUS at 05:05

## 2020-03-06 RX ADMIN — OXCARBAZEPINE 300 MG: 150 TABLET, FILM COATED ORAL at 20:54

## 2020-03-06 RX ADMIN — CHLORHEXIDINE GLUCONATE 0.12% ORAL RINSE 15 ML: 1.2 LIQUID ORAL at 08:03

## 2020-03-06 RX ADMIN — OXCARBAZEPINE 300 MG: 150 TABLET, FILM COATED ORAL at 00:16

## 2020-03-06 RX ADMIN — ASPIRIN 81 MG: 81 TABLET, DELAYED RELEASE ORAL at 08:03

## 2020-03-06 RX ADMIN — HYDROCODONE BITARTRATE AND ACETAMINOPHEN 1 TABLET: 5; 325 TABLET ORAL at 09:53

## 2020-03-06 RX ADMIN — FUROSEMIDE 20 MG: 10 INJECTION, SOLUTION INTRAMUSCULAR; INTRAVENOUS at 11:06

## 2020-03-06 RX ADMIN — AMITRIPTYLINE HYDROCHLORIDE 20 MG: 10 TABLET, FILM COATED ORAL at 00:16

## 2020-03-06 RX ADMIN — HYDROCODONE BITARTRATE AND ACETAMINOPHEN 1 TABLET: 5; 325 TABLET ORAL at 05:06

## 2020-03-06 RX ADMIN — HYDROCODONE BITARTRATE AND ACETAMINOPHEN 1 TABLET: 5; 325 TABLET ORAL at 18:48

## 2020-03-06 RX ADMIN — ATORVASTATIN CALCIUM 20 MG: 20 TABLET, FILM COATED ORAL at 20:54

## 2020-03-06 RX ADMIN — PANTOPRAZOLE SODIUM 40 MG: 40 TABLET, DELAYED RELEASE ORAL at 05:06

## 2020-03-06 RX ADMIN — MUPIROCIN 1 APPLICATION: 20 OINTMENT TOPICAL at 08:03

## 2020-03-06 RX ADMIN — MORPHINE SULFATE 2 MG: 4 INJECTION INTRAVENOUS at 00:05

## 2020-03-06 RX ADMIN — HYDRALAZINE HYDROCHLORIDE 10 MG: 20 INJECTION INTRAMUSCULAR; INTRAVENOUS at 00:05

## 2020-03-06 NOTE — PLAN OF CARE
Problem: Patient Care Overview  Goal: Plan of Care Review  Outcome: Ongoing (interventions implemented as appropriate)  Flowsheets (Taken 3/6/2020 0235)  Progress: improving  Plan of Care Reviewed With: patient  Note:   Pt came from Cath lab via nurse and anesthesiologist.  Patient is on 4L nonrebreathing mask. 2 sheaths were left in place from cath lab. Orders to D/C krishna and remove sheaths. Patient tolerated procedure well and removal of lines. Patient requested for dinner. Educated about limbNo complaints or signs of distress at this time . Will continue to monitor.      Problem: Patient Care Overview  Goal: Plan of Care Review  Outcome: Ongoing (interventions implemented as appropriate)  Flowsheets (Taken 3/6/2020 0235)  Progress: improving  Plan of Care Reviewed With: patient  Note:   Pt came from Cath lab via nurse and anesthesiologist.  Patient is on 4L nonrebreathing mask. 2 sheaths were left in place from cath lab. Orders to D/C krishna and remove sheaths. Patient tolerated procedure well and removal of lines. Patient requested for dinner. Educated about limbNo complaints or signs of distress at this time . Will continue to monitor.   Goal: Individualization and Mutuality  Outcome: Ongoing (interventions implemented as appropriate)  Goal: Discharge Needs Assessment  Outcome: Ongoing (interventions implemented as appropriate)  Goal: Interprofessional Rounds/Family Conf  Outcome: Ongoing (interventions implemented as appropriate)     Problem: Skin Injury Risk (Adult)  Goal: Identify Related Risk Factors and Signs and Symptoms  Outcome: Ongoing (interventions implemented as appropriate)  Goal: Skin Health and Integrity  Outcome: Ongoing (interventions implemented as appropriate)     Problem: Fall Risk (Adult)  Goal: Identify Related Risk Factors and Signs and Symptoms  Outcome: Ongoing (interventions implemented as appropriate)  Goal: Absence of Fall  Outcome: Ongoing (interventions implemented as  appropriate)

## 2020-03-06 NOTE — PROGRESS NOTES
S/P POD# 2 LV Lead Placement--Jeff  EF 36-40% (echo)    Subjective:  Patient up to chair. Complains of mild incisional pain but states it is much improved from yesterday.    Drips:  None      Intake/Output Summary (Last 24 hours) at 3/6/2020 1050  Last data filed at 3/6/2020 0849  Gross per 24 hour   Intake 1190 ml   Output 775 ml   Net 415 ml     Temp:  [97.4 °F (36.3 °C)-98.7 °F (37.1 °C)] 98.1 °F (36.7 °C)  Heart Rate:  [60-99] 93  BP: (138-186)/() 150/68  Arterial Line BP: (134-169)/(50-73) 169/73  CT 20/8    Results from last 7 days   Lab Units 03/06/20  0532 03/05/20  0452 03/04/20  1641  03/02/20  0840   WBC 10*3/mm3 7.90 9.00 10.10  --  4.90   HEMOGLOBIN g/dL 14.2 14.0 14.9  --  15.0   HEMOGLOBIN, POC   --   --   --    < >  --    HEMATOCRIT % 41.0 39.5 42.8  --  42.6   HEMATOCRIT POC   --   --   --    < >  --    PLATELETS 10*3/mm3 216 208 210  --  225   INR   --  1.04 0.94  --  0.98    < > = values in this interval not displayed.     Results from last 7 days   Lab Units 03/06/20  0532 03/05/20  0452   CREATININE mg/dL 0.86 0.82   POTASSIUM mmol/L 4.5 4.4   SODIUM mmol/L 135* 134*   MAGNESIUM mg/dL  --  2.3   PHOSPHORUS mg/dL  --  3.3       Physical Exam:  Neuro intact, nad, up to chair  Tele:  PPM 80  Diminished bases, 92% room air  dsg c/d/i, No drainage  Benign abd, No BM  No edema    Assessment/Plan:  Principal Problem:    Chronic heart failure (CMS/HCC)  Active Problems:    Sick sinus syndrome (CMS/HCC)    Complete heart block (CMS/HCC)    Pacemaker lead malfunction    Complete heart block s/p LV epicardial lead placement  RV Lead insulation break s/p RV Lead replacement  HFrEF (EF 36-40%)-- maximize meds as able  CAD s/p PCI (2014)-- aspirin, statin  HTN-- stable  HLD-- statin    Routine care  Mobilize  DC central line and chest tube  Gentle diuresis    YVAN DIEHL, APRN  3/6/2020  10:50 AM

## 2020-03-06 NOTE — PROGRESS NOTES
Continued Stay Note  [facility]     Patient Name: Mode Duffy  MRN: [id number]  Today's Date: 3/6/2020    Admit Date: 3/4/2020    Discharge Plan     Row Name 03/06/20 1258       Plan    Plan  Patient preferring to go home at this time. Denies any needs at this time     Plan Comments  Barriers to discharge: Complete heart block s/p LV epicardial lead placement RV Lead insulation break s/p RV Lead replacement.             Anna [place] [address]   [phone]  office  [phone]  fax  [email or web address]

## 2020-03-06 NOTE — PLAN OF CARE
74 year old male presented to the hospital and underwent a LV lead placement on 3/04 and s/p RV Lead replacement 3/06. Pt doing well post-op and demo's CGA with sit<>stand transfers. Pt educated on bilateral UE precautions this session and able to verbalize understanding. Pt requires cues to scoot to edge of chair prior to stand. Pt ambulates without AD x150' with CGA. Pt does have underlying balance deficits and would recommend OP PT to decreased risk of falls.

## 2020-03-06 NOTE — PROGRESS NOTES
Pulmonary/ Critical Care/ sleep medicine PROGRESS Note        Patient Name:  Mode Duffy    :  1945    Medical Record:  [id number]    Requesting Physician    Justin Aguilar MD    Primary Care Physician     Temo Vidal MD    Reason for consultation    Mode Duffy is a 74 y.o. male who was referred for consultation for pulmonary management.  74 year old male presented to the hospital and underwent a LV lead placement with Dr. Aguilar today 3/4/20.  Post-operatively the patient is requiring 6 liters per nasal cannula.  He denies use of oxygen at home, no past history of thrombus, and no COPD.  He is a past smoker in his early 20's.  He does have a SHELTON diagnosis with a past sleep study evaluation at [facility] approximately 10 years ago.  He is not using a CPAP.     Typical bedtime is 10 pm and awakens at 7 am.  He does snore and takes occasional naps.    3:  On 5 liters per nasal cannula.  No issues overnight. OOB to chair  3/6: tolerating room air, denies SOA. +post procedure pain.  Tolerated breakfast    Review of Systems    As above     Medical History    Past Medical History:   Diagnosis Date   • Arthritis    • BPH (benign prostatic hyperplasia)    • Coronary artery disease     dr. Carrizales   • Deviated septum    • GERD (gastroesophageal reflux disease)    • History of trigeminal neuralgia    • Hyperlipidemia    • Hypertension    • Renal cyst    • Sciatica    • Sleep apnea     unable to tolerate machine        Surgical History    Past Surgical History:   Procedure Laterality Date   • APPENDECTOMY     • CARDIAC CATHETERIZATION     • CARDIAC ELECTROPHYSIOLOGY PROCEDURE N/A 2020    Procedure: Biventricular Device Upgrade;  Surgeon: Dominick Jackson MD;  Location: [facility] INVASIVE LOCATION;  Service: Cardiovascular   • CORONARY STENT PLACEMENT  2014    x 2   • PROSTATE SURGERY     • REFRACTIVE SURGERY     • TOTAL HIP ARTHROPLASTY Bilateral     different times        Family History     Family History   Problem Relation Age of Onset   • Heart disease Father        Social History    Social History     Tobacco Use   • Smoking status: Former Smoker     Last attempt to quit:      Years since quittin.2   • Smokeless tobacco: Never Used   Substance Use Topics   • Alcohol use: Yes     Alcohol/week: 7.0 standard drinks     Types: 7 Glasses of wine per week     Comment: RED WINE BEFORE DINNER        Allergies    No Known Allergies    Medications    Scheduled Meds:    amitriptyline 20 mg Oral Nightly   amLODIPine 10 mg Oral Nightly   aspirin 81 mg Oral Daily   atorvastatin 20 mg Oral Nightly   calcium gluconate 2 g Intravenous Once   cetirizine 10 mg Oral Daily   chlorhexidine 15 mL Mouth/Throat Q12H   enoxaparin 40 mg Subcutaneous Daily   mupirocin 1 application Each Nare BID   naloxone 0.2 mg Intravenous Once   OXcarbazepine 300 mg Oral BID   pantoprazole 40 mg Oral Q AM   polyethylene glycol 17 g Oral BID   senna 1 tablet Oral BID     Continuous Infusions:     PRN Meds:.hydrALAZINE  •  HYDROcodone-acetaminophen  •  Morphine **AND** [DISCONTINUED] naloxone  •  MOUTH KOTE  •  naloxone  •  ondansetron  •  ondansetron  •  potassium chloride **OR** potassium chloride  •  potassium chloride **OR** potassium chloride      Physical Exam    tMax 24 hrs:  Temp (24hrs), Av °F (36.7 °C), Min:97.4 °F (36.3 °C), Max:98.7 °F (37.1 °C)    Vitals Ranges:  Temp:  [97.4 °F (36.3 °C)-98.7 °F (37.1 °C)] 98.1 °F (36.7 °C)  Heart Rate:  [60-99] 99  BP: (133-186)/() 145/82  Arterial Line BP: (134-169)/(50-73) 169/73  Intake and Output Last 3 Shifts:  I/O last 3 completed shifts:  In:  [P.O.:1680; I.V.:302]  Out: 1500 [Urine:1200; Chest Tube:300]    Constitutional:  no acute distress, non-toxic appearance   Eyes:  PERRL, conjunctiva normal   HENT:  Atraumatic, external ears normal, nose normal. Neck supple. RIJ cordis  Respiratory:  No respiratory distress, normal breath sounds, no rales, no wheezing,  chest tube in place   Cardiovascular:  paced rhythm, no murmurs, no gallops, no rubs   GI:  Soft, nondistended, normal bowel sounds, nontender, no rebound, no guarding   :  deferred   Musculoskeletal:  No edema, no tenderness, no deformities.  Integument:  Well hydrated, no rash. Bilateral upper chest dressings intact    Neurologic:  Alert & oriented x 3, CN 2-12 normal, normal motor function, normal sensory function, no focal deficits noted   Psychiatric:  Speech and behavior appropriate     labs    Lab Results (last 24 hours)     Procedure Component Value Units Date/Time    Basic Metabolic Panel [[id number]]  (Abnormal) Collected:  03/06/20 0532    Specimen:  Blood Updated:  03/06/20 0607     Glucose 149 mg/dL      BUN 17 mg/dL      Creatinine 0.86 mg/dL      Sodium 135 mmol/L      Potassium 4.5 mmol/L      Chloride 101 mmol/L      CO2 24.0 mmol/L      Calcium 8.7 mg/dL      eGFR Non African Amer 87 mL/min/1.73      BUN/Creatinine Ratio 19.8     Anion Gap 10.0 mmol/L     Narrative:       GFR Normal >60  Chronic Kidney Disease <60  Kidney Failure <15      CBC (No Diff) [[id number]]  (Abnormal) Collected:  03/06/20 0532    Specimen:  Blood Updated:  03/06/20 0551     WBC 7.90 10*3/mm3      RBC 4.28 10*6/mm3      Hemoglobin 14.2 g/dL      Hematocrit 41.0 %      MCV 96.0 fL      MCH 33.3 pg      MCHC 34.7 g/dL      RDW 13.3 %      RDW-SD 44.6 fl      MPV 6.7 fL      Platelets 216 10*3/mm3     BNP [[id number]]  (Normal) Collected:  03/05/20 0452    Specimen:  Blood Updated:  03/05/20 0917     proBNP 770.0 pg/mL     Narrative:       Among patients with dyspnea, NT-proBNP is highly sensitive for the detection of acute congestive heart failure. In addition NT-proBNP of <300 pg/ml effectively rules out acute congestive heart failure with 99% negative predictive value.    Results may be falsely decreased if patient taking Biotin.            Imaging & Other Studies    Imaging Results (Last 72 Hours)     Procedure Component  Value Units Date/Time    XR Chest 1 View [[id number]] Collected:  03/06/20 0726     Updated:  03/06/20 0730    Narrative:       XR CHEST 1 VW-     Date of Exam: 3/6/2020 5:56 AM     Indication: Post-Op Heart Surgery; I49.5-Sick sinus syndrome;  I50.9-Heart failure, unspecified.     Comparison: 03/05/2020     Technique: A single view of the chest was obtained.     FINDINGS:      Right internal jugular central venous catheter is unchanged.  Left  subclavian transvenous pacemaker is also unchanged.  Left-sided  thoracostomy tube remains in place.  No evidence of pneumothorax.  There  is mild cardiomegaly.  Pulmonary vessels are within normal limits.   There is chronic elevation of the right hemidiaphragm.  There is minimal  left basilar atelectasis.  No new airspace consolidation or pleural  effusion identified.             Impression:             1.  No interval tube or line change.  No evidence pneumothorax.  2.  Minimal left basilar atelectasis.  3.  Chronic elevation right hemidiaphragm.        Electronically Signed By-Kali Santana On:3/6/2020 7:28 AM  This report was finalized on [id number] by  Kali Santana, .    XR Chest 1 View [[id number]] Collected:  03/05/20 2101     Updated:  03/05/20 2108    Narrative:       EXAMINATION: XR CHEST 1 VW-     DATE OF EXAM: 3/5/2020 8:32 PM     INDICATION: Post ICD / Pacer Implant; I49.5-Sick sinus syndrome;  I50.9-Heart failure, unspecified.     COMPARISON: Chest radiograph dated 03/05/2020 at 5:55     TECHNIQUE: Portable AP view of the chest was obtained.     FINDINGS:  There is a left chest wall pacemaker with leads terminating at the right  atrium and right ventricle. There is a new lead which courses towards  the right subclavian region and terminates over the right ventricle.  There is a left-sided chest tube present. There is no visible  pneumothorax on either side. There is linear atelectasis within the  right lung base. No pleural effusion or pneumothorax.        Impression:       1. Interval revision of the left-sided pacemaker without evidence of  pneumothorax.  2. Left-sided chest tube in unchanged position.  3. Bandlike atelectasis within the right lung base.     Electronically Signed By-Silvino Alva On:3/5/2020 9:05 PM  This report was finalized on [id number] by  Silvino Alva, .    XR Chest 1 View [[id number]] Collected:  03/05/20 0726     Updated:  03/05/20 0731    Narrative:       XR CHEST 1 VW-     Date of Exam: 3/5/2020 6:01 AM     Indication: Post-Op Heart Surgery; I50.9-Heart failure, unspecified.     Comparison Exams: 03/04/2020     Technique: Single AP chest radiograph     FINDINGS:  A left subclavian pacemaker is in place. A right internal jugular sheath  is in place. The lungs are clear. The heart and mediastinal contours  appear stable. The pulmonary vasculature appears normal. The osseous  structures appear intact.       Impression:       No acute cardiopulmonary process identified.     Electronically Signed By-DR. Quan Sung MD On:3/5/2020 7:29 AM  This report was finalized on [id number] by DR. Quan Sung MD.    XR Chest 1 View [[id number]] Collected:  03/04/20 1326     Updated:  03/04/20 1329    Narrative:       DATE OF EXAM:  3/4/2020 12:38 PM     PROCEDURE:  XR CHEST 1 VW-     INDICATIONS:  Central Line placement RIJ, left thoracotomy/chest tube; I50.9-Heart  failure, unspecified     COMPARISON:  No Comparisons Available     TECHNIQUE:   Single radiographic AP view of the chest was obtained.     FINDINGS:  Cardiac size is enlarged. There are postoperative changes of pacemaker  placement. There is a right IJ sheath in place with the tip in the SVC  and no pneumothorax.        Impression:       Cardiomegaly with postoperative changes of transvenous pacing device  placement. New right IJ sheath with the tip in the SVC and no  pneumothorax     Electronically Signed By-Adriel Gamino On:3/4/2020 1:27 PM  This report was finalized on  [id number] by  Adriel Gamino, .          Assessment      Chronic heart failure (CMS/HCC)    Sick sinus syndrome (CMS/HCC)    Complete heart block (CMS/HCC)    Pacemaker lead malfunction    Acute hypoxia   -likely multi-factorial to anesthesia, possible some atelectasis, and pain  -supplemental oxygen if needed for sats 92%.  Now tolerating room air  -CXR reviewed  -encourage IS   -Increase mobility    SHELTON  -recommend a repeat sleep study as an outpatient to re-establish use of CPAP   -Typical bedtime is 10 pm and awakens at 7 am.  He does snore and takes occasional naps.    SSS s/p PPM  -s/p LV lead placement 3/4/20 via left mini anterior lateral thoracotomy  -Chest tube per CV sx  -Cardiology Dr. Greenberg, RV lead placement through right subclavian vein approach    HTN  -resume home med when appropriate     HLD  -statin    DVT ppy:  -SCDs when in bed/increase mobility  -lovenox held for procedures    Diet per sx  PT eval and treat   D/C krishna MENDOZA in [facility]

## 2020-03-06 NOTE — CONSULTS
Cardiac rehab evaluation, CHF. Brochure and 'Cardiac Rehabilitation for Congestive Heart Failure' given. Patient ef 35-40% and Stage II not criteria for insurance coverage. Patient states he stays active, walks 2.5 miles a day when able. Appears energetic. Okay to call following discharge. MAYNOR Rosa

## 2020-03-06 NOTE — PLAN OF CARE
Problem: Patient Care Overview  Goal: Plan of Care Review  Outcome: Ongoing (interventions implemented as appropriate)  Flowsheets (Taken 3/6/2020 1606)  Progress: improving  Plan of Care Reviewed With: patient; spouse  Outcome Summary: Chest tube and central line discontinued this AM. VSS throughout shift. No complaints throughout shift. All drsgs changed. Pt and spouse in agreeance with plan of care. Will continue to monitor.

## 2020-03-06 NOTE — ANESTHESIA POSTPROCEDURE EVALUATION
Patient: Mode Duffy    Procedure Summary     Date:  03/05/20 Room / Location:  [place] [place] CATH LAB 3 / [facility] CATH INVASIVE LOCATION    Anesthesia Start:  1846 Anesthesia Stop:  1950    Procedures:       Percutaneous Subclavian Intervention (N/A )      Lead Revision (N/A ) Diagnosis:       Sick sinus syndrome (CMS/HCC)      (complete heart block)    Provider:  Nick Greenberg MD Provider:  Manan Kuhn MD    Anesthesia Type:  MAC ASA Status:  4 - Emergent          Anesthesia Type: MAC    Vitals  No vitals data found for the desired time range.          Post Anesthesia Care and Evaluation    Patient location during evaluation: ICU  Patient participation: complete - patient participated  Level of consciousness: awake  Pain scale: See nurse's notes for pain score.  Pain management: adequate  Airway patency: patent  Anesthetic complications: No anesthetic complications  PONV Status: none  Cardiovascular status: acceptable  Respiratory status: acceptable  Hydration status: acceptable    Comments: Patient seen and examined postoperatively; vital signs stable; SpO2 greater than or equal to 90%; cardiopulmonary status stable; nausea/vomiting adequately controlled; pain adequately controlled; no apparent anesthesia complications; patient discharged from anesthesia care when discharge criteria were met

## 2020-03-06 NOTE — THERAPY EVALUATION
Patient Name: Mode Duffy  : 1945    MRN: [id number]                              Today's Date: 3/6/2020       Admit Date: 3/4/2020    Visit Dx:     ICD-10-CM ICD-9-CM   1. Sick sinus syndrome (CMS/HCC) I49.5 427.81   2. Chronic heart failure, unspecified heart failure type (CMS/HCC) I50.9 428.9     Patient Active Problem List   Diagnosis   • Fothergill's neuralgia   • Hyponatremia   • Cardiac pacemaker in situ   • Bradycardia   • Cardiovascular disease   • Hypertension   • Coronary artery disease   • Hyperlipidemia   • Sick sinus syndrome (CMS/HCC)   • Chronic systolic congestive heart failure (CMS/HCC)   • Cardiomyopathy (CMS/HCC)   • Chronic heart failure (CMS/HCC)   • Complete heart block (CMS/HCC)   • Pacemaker lead malfunction     Past Medical History:   Diagnosis Date   • Arthritis    • BPH (benign prostatic hyperplasia)    • Coronary artery disease     dr. Carrizales   • Deviated septum    • GERD (gastroesophageal reflux disease)    • History of trigeminal neuralgia    • Hyperlipidemia    • Hypertension    • Renal cyst    • Sciatica    • Sleep apnea     unable to tolerate machine     Past Surgical History:   Procedure Laterality Date   • APPENDECTOMY     • CARDIAC CATHETERIZATION     • CARDIAC CATHETERIZATION N/A 3/5/2020    Procedure: Percutaneous Subclavian Intervention;  Surgeon: Nick Greenberg MD;  Location: [facility] CATH INVASIVE LOCATION;  Service: Cardiovascular;  Laterality: N/A;   • CARDIAC ELECTROPHYSIOLOGY PROCEDURE N/A 2020    Procedure: Biventricular Device Upgrade;  Surgeon: Dominick Jackson MD;  Location: [facility] CATH INVASIVE LOCATION;  Service: Cardiovascular   • CARDIAC ELECTROPHYSIOLOGY PROCEDURE N/A 3/5/2020    Procedure: Lead Revision;  Surgeon: Nick Greenberg MD;  Location: [facility] CATH INVASIVE LOCATION;  Service: Cardiovascular;  Laterality: N/A;   • CORONARY STENT PLACEMENT  2014    x 2   • PROSTATE SURGERY     • REFRACTIVE SURGERY     • TOTAL HIP  ARTHROPLASTY Bilateral     different times     General Information     Row Name 03/06/20 1219          PT Evaluation Time/Intention    Document Type  evaluation  -KB     Mode of Treatment  physical therapy  -KB     Row Name 03/06/20 1219          General Information    Patient Profile Reviewed?  yes  -KB     Prior Level of Function  independent:  -KB     Existing Precautions/Restrictions  pacemaker;fall  -KB     Row Name 03/06/20 1219          Relationship/Environment    Lives With  spouse  -KB     Row Name 03/06/20 1219          Resource/Environmental Concerns    Current Living Arrangements  home/apartment/condo  -KB     Row Name 03/06/20 1219          Home Main Entrance    Number of Stairs, Main Entrance  none  -KB     Row Name 03/06/20 1219          Cognitive Assessment/Intervention- PT/OT    Orientation Status (Cognition)  oriented x 4  -KB     Row Name 03/06/20 1219          Safety Issues, Functional Mobility    Safety Issues Affecting Function (Mobility)  safety precautions follow-through/compliance  -KB     Impairments Affecting Function (Mobility)  endurance/activity tolerance;strength  -KB       User Key  (r) = Recorded By, (t) = Taken By, (c) = Cosigned By    Initials Name Provider Type    KB Thelma Forrest, PT Physical Therapist        Mobility     Row Name 03/06/20 1220          Bed Mobility Assessment/Treatment    Comment (Bed Mobility)  sitting in chair   -KB     Row Name 03/06/20 1220          Sit-Stand Transfer    Sit-Stand [place] (Transfers)  contact guard  -KB     Row Name 03/06/20 1220          Gait/Stairs Assessment/Training    Gait/Stairs Assessment/Training  gait/ambulation independence  -KB     [place] Level (Gait)  contact guard  -KB     Distance in Feet (Gait)  150  -KB     Deviations/Abnormal Patterns (Gait)  dino decreased;gait speed decreased;base of support, narrow  -KB     Comment (Gait/Stairs)  pt with decreased balance reactions during turns, offsteady but able to  self-correct   -KB       User Key  (r) = Recorded By, (t) = Taken By, (c) = Cosigned By    Initials Name Provider Type    Thelma Wilson, PT Physical Therapist        Obj/Interventions     Row Name 03/06/20 1221          General ROM    GENERAL ROM COMMENTS  BLE WFL  -KB     Row Name 03/06/20 1221          MMT (Manual Muscle Testing)    General MMT Comments  BLE grossly 4/5   -KB     Row Name 03/06/20 1221          Static Sitting Balance    Level of [place] (Unsupported Sitting, Static Balance)  independent  -KB     Sitting Position (Unsupported Sitting, Static Balance)  sitting in chair  -KB     Time Able to Maintain Position (Unsupported Sitting, Static Balance)  more than 5 minutes  -KB     Row Name 03/06/20 1221          Dynamic Sitting Balance    Level of [place], Reaches Outside Midline (Sitting, Dynamic Balance)  supervision  -KB     Sitting Position, Reaches Outside Midline (Sitting, Dynamic Balance)  sitting in chair  -KB     Row Name 03/06/20 1221          Static Standing Balance    Level of [place] (Supported Standing, Static Balance)  contact guard assist  -KB     Time Able to Maintain Position (Supported Standing, Static Balance)  45 to 60 seconds  -KB       User Key  (r) = Recorded By, (t) = Taken By, (c) = Cosigned By    Initials Name Provider Type    Thelma Wilson, PT Physical Therapist        Goals/Plan     Row Name 03/06/20 1227          Bed Mobility Goal 1 (PT)    Activity/Assistive Device (Bed Mobility Goal 1, PT)  bed mobility activities, all  -KB     [place] Level/Cues Needed (Bed Mobility Goal 1, PT)  independent  -KB     Time Frame (Bed Mobility Goal 1, PT)  long term goal (LTG);2 weeks  -KB     Row Name 03/06/20 1227          Transfer Goal 1 (PT)    Activity/Assistive Device (Transfer Goal 1, PT)  sit-to-stand/stand-to-sit  -KB     [place] Level/Cues Needed (Transfer Goal 1, PT)  independent  -KB     Time Frame (Transfer Goal 1, PT)  long term goal (LTG);2 weeks   -     Row Name 03/06/20 1227          Gait Training Goal 1 (PT)    Activity/Assistive Device (Gait Training Goal 1, PT)  gait (walking locomotion);increase endurance/gait distance;decrease asymmetrical patterns;decrease fall risk  -KB     [place] Level (Gait Training Goal 1, PT)  conditional independence  -KB     Distance (Gait Goal 1, PT)  300  -KB       User Key  (r) = Recorded By, (t) = Taken By, (c) = Cosigned By    Initials Name Provider Type    Thelma Wilson, PT Physical Therapist        Clinical Impression     Row Name 03/06/20 1222          Pain Assessment    Additional Documentation  Pain Scale: FACES Pre/Post-Treatment (Group)  -     Row Name 03/06/20 1222          Pain Scale: Numbers Pre/Post-Treatment    Pain Scale: Numbers, Pretreatment  1/10  -KB     Pain Scale: Numbers, Post-Treatment  1/10  -KB     Pain Location - Orientation  incisional  -     Row Name 03/06/20 1222          Plan of Care Review    Plan of Care Reviewed With  patient;spouse  -KB     Outcome Summary  74 year old male presented to the hospital and underwent a LV lead placement on 3/04 and s/p RV Lead replacement 3/06. Pt doing well post-op and demo's CGA with sit<>stand transfers. Pt educated on bilateral UE precautions this session and able to verbalize understanding. Pt requires cues to scoot to edge of chair prior to stand. Pt ambulates without AD x150' with CGA. Pt does have underlying balance deficits and would recommend OP PT to decreased risk of falls.   -Chirp[employer]ision     Row Name 03/06/20 1222          Physical Therapy Clinical Impression    Criteria for Skilled Interventions Met (PT Clinical Impression)  yes;treatment indicated  -KB     Rehab Potential (PT Clinical Summary)  good, to achieve stated therapy goals  -     Row Name 03/06/20 1222          Vital Signs    Pre Systolic BP Rehab  151  -KB     Pre Treatment Diastolic BP  92  -KB     Post Systolic BP Rehab  168  -KB     Post Treatment Diastolic BP  77  -KB     O2  Delivery Pre Treatment  room air  -KB     O2 Delivery Intra Treatment  room air  -KB     Row Name 03/06/20 1222          Positioning and Restraints    Pre-Treatment Position  sitting in chair/recliner  -KB     Post Treatment Position  chair  -KB     In Chair  sitting;call light within reach;encouraged to call for assist  -KB       User Key  (r) = Recorded By, (t) = Taken By, (c) = Cosigned By    Initials Name Provider Type    Thelma Wilson, PT Physical Therapist        Outcome Measures     Row Name 03/06/20 1227          How much help from another person do you currently need...    Turning from your back to your side while in flat bed without using bedrails?  4  -KB     Moving from lying on back to sitting on the side of a flat bed without bedrails?  4  -KB     Moving to and from a bed to a chair (including a wheelchair)?  4  -KB     Standing up from a chair using your arms (e.g., wheelchair, bedside chair)?  4  -KB     Climbing 3-5 steps with a railing?  3  -KB     To walk in hospital room?  4  -KB     AM-PAC 6 Clicks Score (PT)  23  -KB       User Key  (r) = Recorded By, (t) = Taken By, (c) = Cosigned By    Initials Name Provider Type    Thelma Wilson, PT Physical Therapist          PT Recommendation and Plan  Planned Therapy Interventions (PT Eval): balance training, postural re-education, transfer training, bed mobility training, gait training, patient/family education, strengthening  Outcome Summary/Treatment Plan (PT)  Anticipated Discharge Disposition (PT): home with OP services  Plan of Care Reviewed With: patient, spouse  Outcome Summary: 74 year old male presented to the hospital and underwent a LV lead placement on 3/04 and s/p RV Lead replacement 3/06. Pt doing well post-op and demo's CGA with sit<>stand transfers. Pt educated on bilateral UE precautions this session and able to verbalize understanding. Pt requires cues to scoot to edge of chair prior to stand. Pt ambulates without AD x150' with  CGA. Pt does have underlying balance deficits and would recommend OP PT to decreased risk of falls.      Time Calculation:   PT Charges     Row Name 03/06/20 1230             Time Calculation    Start Time  1112  -KB      Stop Time  1141  -KB      Time Calculation (min)  29 min  -KB      PT Received On  03/06/20  -KB      PT - Next Appointment  03/08/20  -KB      PT Goal Re-Cert Due Date  03/20/20  -KB         Time Calculation- PT    Total Timed Code Minutes- PT  10 minute(s)  -KB        User Key  (r) = Recorded By, (t) = Taken By, (c) = Cosigned By    Initials Name Provider Type    Thelma Wilson, PT Physical Therapist        Therapy Charges for Today     Code Description Service Date Service Provider Modifiers Qty    61228520610 HC GAIT TRAINING EA 15 MIN 3/6/2020 Thelma Forrest, PT GP 1    96099285725 HC PT EVAL MOD COMPLEXITY 3 3/6/2020 Thelma Forrest, PT GP 1          PT G-Codes  Outcome Measure Options: AM-PAC 6 Clicks Basic Mobility (PT)  AM-PAC 6 Clicks Score (PT): 23    Thelma Forrest PT  3/6/2020

## 2020-03-07 VITALS
WEIGHT: 220.46 LBS | TEMPERATURE: 97.8 F | OXYGEN SATURATION: 92 % | HEART RATE: 76 BPM | SYSTOLIC BLOOD PRESSURE: 158 MMHG | BODY MASS INDEX: 29.22 KG/M2 | HEIGHT: 73 IN | RESPIRATION RATE: 20 BRPM | DIASTOLIC BLOOD PRESSURE: 82 MMHG

## 2020-03-07 LAB
ANION GAP SERPL CALCULATED.3IONS-SCNC: 12 MMOL/L (ref 5–15)
BUN BLD-MCNC: 16 MG/DL (ref 8–23)
BUN/CREAT SERPL: 21.3 (ref 7–25)
CALCIUM SPEC-SCNC: 8.7 MG/DL (ref 8.6–10.5)
CHLORIDE SERPL-SCNC: 100 MMOL/L (ref 98–107)
CO2 SERPL-SCNC: 22 MMOL/L (ref 22–29)
CREAT BLD-MCNC: 0.75 MG/DL (ref 0.76–1.27)
DEPRECATED RDW RBC AUTO: 44.2 FL (ref 37–54)
ERYTHROCYTE [DISTWIDTH] IN BLOOD BY AUTOMATED COUNT: 13.2 % (ref 12.3–15.4)
GFR SERPL CREATININE-BSD FRML MDRD: 102 ML/MIN/1.73
GLUCOSE BLD-MCNC: 115 MG/DL (ref 65–99)
HCT VFR BLD AUTO: 40.1 % (ref 37.5–51)
HGB BLD-MCNC: 14.7 G/DL (ref 13–17.7)
MCH RBC QN AUTO: 35 PG (ref 26.6–33)
MCHC RBC AUTO-ENTMCNC: 36.5 G/DL (ref 31.5–35.7)
MCV RBC AUTO: 95.8 FL (ref 79–97)
PLATELET # BLD AUTO: 172 10*3/MM3 (ref 140–450)
PMV BLD AUTO: 6.8 FL (ref 6–12)
POTASSIUM BLD-SCNC: 3.8 MMOL/L (ref 3.5–5.2)
RBC # BLD AUTO: 4.19 10*6/MM3 (ref 4.14–5.8)
SODIUM BLD-SCNC: 134 MMOL/L (ref 136–145)
WBC NRBC COR # BLD: 7 10*3/MM3 (ref 3.4–10.8)

## 2020-03-07 PROCEDURE — 99024 POSTOP FOLLOW-UP VISIT: CPT | Performed by: INTERNAL MEDICINE

## 2020-03-07 PROCEDURE — 85027 COMPLETE CBC AUTOMATED: CPT | Performed by: INTERNAL MEDICINE

## 2020-03-07 PROCEDURE — 99024 POSTOP FOLLOW-UP VISIT: CPT | Performed by: THORACIC SURGERY (CARDIOTHORACIC VASCULAR SURGERY)

## 2020-03-07 PROCEDURE — 80048 BASIC METABOLIC PNL TOTAL CA: CPT | Performed by: INTERNAL MEDICINE

## 2020-03-07 RX ORDER — HYDROCODONE BITARTRATE AND ACETAMINOPHEN 5; 325 MG/1; MG/1
1 TABLET ORAL EVERY 6 HOURS PRN
Qty: 30 TABLET | Refills: 0 | Status: SHIPPED | OUTPATIENT
Start: 2020-03-07 | End: 2020-03-14

## 2020-03-07 RX ADMIN — POLYETHYLENE GLYCOL 3350 17 G: 17 POWDER, FOR SOLUTION ORAL at 09:27

## 2020-03-07 RX ADMIN — STANDARDIZED SENNA CONCENTRATE 1 TABLET: 8.6 TABLET ORAL at 09:28

## 2020-03-07 RX ADMIN — PANTOPRAZOLE SODIUM 40 MG: 40 TABLET, DELAYED RELEASE ORAL at 06:21

## 2020-03-07 RX ADMIN — OXCARBAZEPINE 300 MG: 150 TABLET, FILM COATED ORAL at 09:28

## 2020-03-07 RX ADMIN — CETIRIZINE HYDROCHLORIDE 10 MG: 10 TABLET, FILM COATED ORAL at 09:28

## 2020-03-07 RX ADMIN — HYDROCODONE BITARTRATE AND ACETAMINOPHEN 1 TABLET: 5; 325 TABLET ORAL at 06:21

## 2020-03-07 RX ADMIN — ASPIRIN 81 MG: 81 TABLET, DELAYED RELEASE ORAL at 09:28

## 2020-03-07 RX ADMIN — MUPIROCIN 1 APPLICATION: 20 OINTMENT TOPICAL at 09:31

## 2020-03-07 NOTE — PROGRESS NOTES
Status post left thoracotomy and LV lead placement, postop day #3.    CV appears to be stable.  Left pacemaker pocket incision and left thoracotomy incision appear to be clean, dry, and intact.  Right supraclavicular incision with staples.  Clear to auscultation bilaterally.  GCS 15.    Overall, he appears to be doing well.  Discharge home if okay with cardiology.

## 2020-03-07 NOTE — PROGRESS NOTES
Pulmonary/ Critical Care/ sleep medicine PROGRESS Note        Patient Name:  Mode Duffy    :  1945    Medical Record:  [id number]    Requesting Physician    Justin Aguilar MD    Primary Care Physician     Temo Vidal MD    Reason for consultation    Mode Duffy is a 74 y.o. male who was referred for consultation for pulmonary management.  74 year old male presented to the hospital and underwent a LV lead placement with Dr. Aguilar today 3/4/20.  Post-operatively the patient is requiring 6 liters per nasal cannula.  He denies use of oxygen at home, no past history of thrombus, and no COPD.  He is a past smoker in his early 20's.  He does have a SHELTON diagnosis with a past sleep study evaluation at [place] approximately 10 years ago.  He is not using a CPAP.     Typical bedtime is 10 pm and awakens at 7 am.  He does snore and takes occasional naps.    3/5:  On 5 liters per nasal cannula.  No issues overnight. OOB to chair  3/6: tolerating room air, denies SOA. +post procedure pain.  Tolerated breakfast  3/7: Tolerating room air.  Denies shortness of air.  Left-sided postprocedural pain is improving.  Tolerating p.o. intake.    Review of Systems    As above     Medical History    Past Medical History:   Diagnosis Date   • Arthritis    • BPH (benign prostatic hyperplasia)    • Coronary artery disease     dr. Carrizales   • Deviated septum    • GERD (gastroesophageal reflux disease)    • History of trigeminal neuralgia    • Hyperlipidemia    • Hypertension    • Renal cyst    • Sciatica    • Sleep apnea     unable to tolerate machine        Surgical History    Past Surgical History:   Procedure Laterality Date   • APPENDECTOMY     • CARDIAC CATHETERIZATION     • CARDIAC CATHETERIZATION N/A 3/5/2020    Procedure: Percutaneous Subclavian Intervention;  Surgeon: Nick Greenberg MD;  Location: [facility] CATH INVASIVE LOCATION;  Service: Cardiovascular;  Laterality: N/A;   • CARDIAC  ELECTROPHYSIOLOGY PROCEDURE N/A 2020    Procedure: Biventricular Device Upgrade;  Surgeon: Dominick Jackson MD;  Location:  [facility] CATH INVASIVE LOCATION;  Service: Cardiovascular   • CARDIAC ELECTROPHYSIOLOGY PROCEDURE N/A 3/5/2020    Procedure: Lead Revision;  Surgeon: Nick Greenberg MD;  Location: [facility] CATH INVASIVE LOCATION;  Service: Cardiovascular;  Laterality: N/A;   • CORONARY STENT PLACEMENT  2014    x 2   • PROSTATE SURGERY     • REFRACTIVE SURGERY     • TOTAL HIP ARTHROPLASTY Bilateral     different times        Family History    Family History   Problem Relation Age of Onset   • Heart disease Father        Social History    Social History     Tobacco Use   • Smoking status: Former Smoker     Last attempt to quit: 1968     Years since quittin.2   • Smokeless tobacco: Never Used   Substance Use Topics   • Alcohol use: Yes     Alcohol/week: 7.0 standard drinks     Types: 7 Glasses of wine per week     Comment: RED WINE BEFORE DINNER        Allergies    No Known Allergies    Medications    Scheduled Meds:    amitriptyline 20 mg Oral Nightly   amLODIPine 10 mg Oral Nightly   aspirin 81 mg Oral Daily   atorvastatin 20 mg Oral Nightly   calcium gluconate 2 g Intravenous Once   cetirizine 10 mg Oral Daily   chlorhexidine 15 mL Mouth/Throat Q12H   mupirocin 1 application Each Nare BID   naloxone 0.2 mg Intravenous Once   OXcarbazepine 300 mg Oral BID   pantoprazole 40 mg Oral Q AM   polyethylene glycol 17 g Oral BID   senna 1 tablet Oral BID     Continuous Infusions:     PRN Meds:.hydrALAZINE  •  HYDROcodone-acetaminophen  •  Morphine **AND** [DISCONTINUED] naloxone  •  MOUTH KOTE  •  naloxone  •  ondansetron  •  ondansetron  •  potassium chloride **OR** potassium chloride  •  potassium chloride **OR** potassium chloride      Physical Exam    tMax 24 hrs:  Temp (24hrs), Av °F (36.7 °C), Min:97.6 °F (36.4 °C), Max:98.4 °F (36.9 °C)    Vitals Ranges:  Temp:  [97.6 °F (36.4 °C)-98.4 °F  (36.9 °C)] 98 °F (36.7 °C)  Heart Rate:  [] 76  BP: (122-168)/(61-92) 158/82  Intake and Output Last 3 Shifts:  I/O last 3 completed shifts:  In: 1790 [P.O.:1640; I.V.:150]  Out: 1840 [Urine:1820; Chest Tube:20]    Constitutional:  no acute distress, non-toxic appearance   Eyes:  PERRL, conjunctiva normal   HENT:  Atraumatic, external ears normal, nose normal. Neck supple. RIJ cordis  Respiratory:  No respiratory distress, normal breath sounds, no rales, no wheezing   Cardiovascular:  paced rhythm, no murmurs, no gallops, no rubs   GI:  Soft, nondistended, normal bowel sounds, nontender, no rebound, no guarding   :  deferred   Musculoskeletal:  No edema, no tenderness, no deformities.  Integument:  Well hydrated, no rash. Bilateral upper chest dressings intact    Neurologic:  Alert & oriented x 3, CN 2-12 normal, normal motor function, normal sensory function, no focal deficits noted   Psychiatric:  Speech and behavior appropriate     labs    Lab Results (last 24 hours)     Procedure Component Value Units Date/Time    Basic Metabolic Panel [[id number]]  (Abnormal) Collected:  03/07/20 0822    Specimen:  Blood Updated:  03/07/20 0906     Glucose 115 mg/dL      BUN 16 mg/dL      Creatinine 0.75 mg/dL      Sodium 134 mmol/L      Potassium 3.8 mmol/L      Chloride 100 mmol/L      CO2 22.0 mmol/L      Calcium 8.7 mg/dL      eGFR Non African Amer 102 mL/min/1.73      BUN/Creatinine Ratio 21.3     Anion Gap 12.0 mmol/L     Narrative:       GFR Normal >60  Chronic Kidney Disease <60  Kidney Failure <15      CBC (No Diff) [[id number]]  (Abnormal) Collected:  03/07/20 0822    Specimen:  Blood Updated:  03/07/20 0839     WBC 7.00 10*3/mm3      RBC 4.19 10*6/mm3      Hemoglobin 14.7 g/dL      Hematocrit 40.1 %      MCV 95.8 fL      MCH 35.0 pg      MCHC 36.5 g/dL      RDW 13.2 %      RDW-SD 44.2 fl      MPV 6.8 fL      Platelets 172 10*3/mm3           Imaging & Other Studies    Imaging Results (Last 72 Hours)      Procedure Component Value Units Date/Time    XR Chest 1 View [[id number]] Collected:  03/06/20 1335     Updated:  03/06/20 1338    Narrative:          DATE OF EXAM:   3/6/2020 1:16 PM     PROCEDURE:   XR CHEST 1 VW-     INDICATIONS:   Post chest tube removal; I49.5-Sick sinus syndrome; I50.9-Heart failure,  unspecified     COMPARISON:  03/06/2020 at 5:50 AM     TECHNIQUE:   Portable chest radiograph.     FINDINGS:    There is stable elevation of the right hemidiaphragm. Left-sided AICD  noted. Heart size normal. No pneumothorax. No focal consolidation or  large effusion. Osseous structures intact. Interval removal of right IJ  vascular sheath.       Impression:       1. No acute cardiopulmonary process.  2. Interval removal of right IJ vascular sheath.  3. No pneumothorax.  4. Clear lungs.     Electronically Signed By-Otis Parada On:3/6/2020 1:36 PM  This report was finalized on [id number] by  Otis Parada, .    XR Chest 1 View [[id number]] Collected:  03/06/20 0726     Updated:  03/06/20 0730    Narrative:       XR CHEST 1 VW-     Date of Exam: 3/6/2020 5:56 AM     Indication: Post-Op Heart Surgery; I49.5-Sick sinus syndrome;  I50.9-Heart failure, unspecified.     Comparison: 03/05/2020     Technique: A single view of the chest was obtained.     FINDINGS:      Right internal jugular central venous catheter is unchanged.  Left  subclavian transvenous pacemaker is also unchanged.  Left-sided  thoracostomy tube remains in place.  No evidence of pneumothorax.  There  is mild cardiomegaly.  Pulmonary vessels are within normal limits.   There is chronic elevation of the right hemidiaphragm.  There is minimal  left basilar atelectasis.  No new airspace consolidation or pleural  effusion identified.             Impression:             1.  No interval tube or line change.  No evidence pneumothorax.  2.  Minimal left basilar atelectasis.  3.  Chronic elevation right hemidiaphragm.        Electronically Signed By-Kali  Max On:3/6/2020 7:28 AM  This report was finalized on [id number] by  Kali Santana, .    XR Chest 1 View [[id number]] Collected:  03/05/20 2101     Updated:  03/05/20 2108    Narrative:       EXAMINATION: XR CHEST 1 VW-     DATE OF EXAM: 3/5/2020 8:32 PM     INDICATION: Post ICD / Pacer Implant; I49.5-Sick sinus syndrome;  I50.9-Heart failure, unspecified.     COMPARISON: Chest radiograph dated 03/05/2020 at 5:55     TECHNIQUE: Portable AP view of the chest was obtained.     FINDINGS:  There is a left chest wall pacemaker with leads terminating at the right  atrium and right ventricle. There is a new lead which courses towards  the right subclavian region and terminates over the right ventricle.  There is a left-sided chest tube present. There is no visible  pneumothorax on either side. There is linear atelectasis within the  right lung base. No pleural effusion or pneumothorax.       Impression:       1. Interval revision of the left-sided pacemaker without evidence of  pneumothorax.  2. Left-sided chest tube in unchanged position.  3. Bandlike atelectasis within the right lung base.     Electronically Signed By-Silvino Alva On:3/5/2020 9:05 PM  This report was finalized on [id number] by  Silvino Alva, .    XR Chest 1 View [[id number]] Collected:  03/05/20 0726     Updated:  03/05/20 0731    Narrative:       XR CHEST 1 VW-     Date of Exam: 3/5/2020 6:01 AM     Indication: Post-Op Heart Surgery; I50.9-Heart failure, unspecified.     Comparison Exams: 03/04/2020     Technique: Single AP chest radiograph     FINDINGS:  A left subclavian pacemaker is in place. A right internal jugular sheath  is in place. The lungs are clear. The heart and mediastinal contours  appear stable. The pulmonary vasculature appears normal. The osseous  structures appear intact.       Impression:       No acute cardiopulmonary process identified.     Electronically Signed By-DR. Quan Sung MD On:3/5/2020 7:29 AM  This  report was finalized on [id number] by DR. Quan Sung MD.    XR Chest 1 View [[id number]] Collected:  03/04/20 1326     Updated:  03/04/20 1329    Narrative:       DATE OF EXAM:  3/4/2020 12:38 PM     PROCEDURE:  XR CHEST 1 VW-     INDICATIONS:  Central Line placement RIJ, left thoracotomy/chest tube; I50.9-Heart  failure, unspecified     COMPARISON:  No Comparisons Available     TECHNIQUE:   Single radiographic AP view of the chest was obtained.     FINDINGS:  Cardiac size is enlarged. There are postoperative changes of pacemaker  placement. There is a right IJ sheath in place with the tip in the SVC  and no pneumothorax.        Impression:       Cardiomegaly with postoperative changes of transvenous pacing device  placement. New right IJ sheath with the tip in the SVC and no  pneumothorax     Electronically Signed By-Adriel Gamino On:3/4/2020 1:27 PM  This report was finalized on [id number] by  Adriel Gamino, .          Assessment      Chronic heart failure (CMS/HCC)    Sick sinus syndrome (CMS/HCC)    Complete heart block (CMS/HCC)    Pacemaker lead malfunction    Acute hypoxia   -likely multi-factorial to anesthesia, possible some atelectasis, and pain  -supplemental oxygen if needed for sats 92%.  Now tolerating room air  -CXR reviewed  -encourage IS   -Increase mobility    SHELTON  -recommend a repeat sleep study as an outpatient to re-establish use of CPAP   -Typical bedtime is 10 pm and awakens at 7 am.  He does snore and takes occasional naps.  -Follow-up in the outpatient pulmonary/sleep clinic in 3 to 4 weeks    SSS s/p PPM  -s/p LV lead placement 3/4/20 via left mini anterior lateral thoracotomy  -Chest tube discontinued per CV sx  -Cardiology Dr. Greenberg, RV lead placement through right subclavian vein approach    HTN  -resume home med Norvasc    HLD  -statin    DVT ppy:  -SCDs when in bed/increase mobility  -lovenox held for procedures    Diet per sx  PT eval and treat   Discontinued Oly MENDOZA  discontinued    Likely home today

## 2020-03-07 NOTE — PLAN OF CARE
Problem: Patient Care Overview  Goal: Plan of Care Review  Outcome: Outcome(s) achieved  Flowsheets (Taken 3/7/2020 1126)  Progress: improving  Outcome Summary: Patient being discharged home.

## 2020-03-07 NOTE — PROGRESS NOTES
" LOS: 3 days   Patient Care Team:  Temo Vidal MD as PCP - General  Temo Vidal MD as PCP - Family Medicine  Simone Shepherd MD as PCP - Claims Attributed    Chief Complaint: post RV revision    Subjective:  Symptoms:  No shortness of breath or chest pain.    Diet:  Adequate intake.    Activity level: Normal.    Pain:  Pain is well controlled.          Vital Signs  Temp:  [97.6 °F (36.4 °C)-98.1 °F (36.7 °C)] 97.8 °F (36.6 °C)  Heart Rate:  [] 76  BP: (122-158)/(61-84) 158/82  Body mass index is 29.09 kg/m².    Intake/Output Summary (Last 24 hours) at 3/7/2020 1314  Last data filed at 3/7/2020 0900  Gross per 24 hour   Intake 900 ml   Output 1000 ml   Net -100 ml     I/O this shift:  In: 300 [P.O.:300]  Out: -           03/06/20  0533 03/07/20  0400 03/07/20  0600   Weight: 101 kg (221 lb 12.5 oz) 101 kg (222 lb 3.6 oz) 100 kg (220 lb 7.4 oz)         Objective:  General Appearance:  Comfortable.    Vital signs: (most recent): Blood pressure 158/82, pulse 76, temperature 97.8 °F (36.6 °C), resp. rate 20, height 185.4 cm (73\"), weight 100 kg (220 lb 7.4 oz), SpO2 92 %.    Lungs:  Normal effort and normal respiratory rate.    Heart: Normal rate.  Regular rhythm.  (100% BiV paced)  Skin:  Warm and dry.  (Left chest dressing intact without hematoma or drainage)            Results Review:        WBC WBC   Date Value Ref Range Status   03/07/2020 7.00 3.40 - 10.80 10*3/mm3 Final   03/06/2020 7.90 3.40 - 10.80 10*3/mm3 Final   03/05/2020 9.00 3.40 - 10.80 10*3/mm3 Final   03/04/2020 10.10 3.40 - 10.80 10*3/mm3 Final      HGB Hemoglobin   Date Value Ref Range Status   03/07/2020 14.7 13.0 - 17.7 g/dL Final   03/06/2020 14.2 13.0 - 17.7 g/dL Final   03/05/2020 14.0 13.0 - 17.7 g/dL Final   03/04/2020 14.9 13.0 - 17.7 g/dL Final   03/04/2020 14.2 12.0 - 17.0 g/dL Final      HCT Hematocrit   Date Value Ref Range Status   03/07/2020 40.1 37.5 - 51.0 % Final   03/06/2020 41.0 37.5 - 51.0 % Final "   03/05/2020 39.5 37.5 - 51.0 % Final   03/04/2020 42.8 37.5 - 51.0 % Final   03/04/2020 42 38 - 51 % Final      Platelets Platelets   Date Value Ref Range Status   03/07/2020 172 140 - 450 10*3/mm3 Final   03/06/2020 216 140 - 450 10*3/mm3 Final   03/05/2020 208 140 - 450 10*3/mm3 Final   03/04/2020 210 140 - 450 10*3/mm3 Final        PT/INR:    Protime   Date Value Ref Range Status   03/05/2020 10.9 9.6 - 11.7 Seconds Final   03/04/2020 10.0 9.6 - 11.7 Seconds Final   /  INR   Date Value Ref Range Status   03/05/2020 1.04 0.90 - 1.10 Final   03/04/2020 0.94 0.90 - 1.10 Final       Sodium Sodium   Date Value Ref Range Status   03/07/2020 134 (L) 136 - 145 mmol/L Final   03/06/2020 135 (L) 136 - 145 mmol/L Final   03/05/2020 134 (L) 136 - 145 mmol/L Final   03/04/2020 136 136 - 145 mmol/L Final      Potassium Potassium   Date Value Ref Range Status   03/07/2020 3.8 3.5 - 5.2 mmol/L Final   03/06/2020 4.5 3.5 - 5.2 mmol/L Final   03/05/2020 4.4 3.5 - 5.2 mmol/L Final   03/04/2020 4.4 3.5 - 5.2 mmol/L Final      Chloride Chloride   Date Value Ref Range Status   03/07/2020 100 98 - 107 mmol/L Final   03/06/2020 101 98 - 107 mmol/L Final   03/05/2020 101 98 - 107 mmol/L Final   03/04/2020 104 98 - 107 mmol/L Final      Bicarbonate CO2   Date Value Ref Range Status   03/07/2020 22.0 22.0 - 29.0 mmol/L Final   03/06/2020 24.0 22.0 - 29.0 mmol/L Final   03/05/2020 24.0 22.0 - 29.0 mmol/L Final   03/04/2020 20.0 (L) 22.0 - 29.0 mmol/L Final      BUN BUN   Date Value Ref Range Status   03/07/2020 16 8 - 23 mg/dL Final   03/06/2020 17 8 - 23 mg/dL Final   03/05/2020 14 8 - 23 mg/dL Final   03/04/2020 18 8 - 23 mg/dL Final      Creatinine Creatinine   Date Value Ref Range Status   03/07/2020 0.75 (L) 0.76 - 1.27 mg/dL Final   03/06/2020 0.86 0.76 - 1.27 mg/dL Final   03/05/2020 0.82 0.76 - 1.27 mg/dL Final   03/04/2020 0.74 (L) 0.76 - 1.27 mg/dL Final      Calcium Calcium   Date Value Ref Range Status   03/07/2020 8.7 8.6 -  10.5 mg/dL Final   03/06/2020 8.7 8.6 - 10.5 mg/dL Final   03/05/2020 8.5 (L) 8.6 - 10.5 mg/dL Final   03/04/2020 8.5 (L) 8.6 - 10.5 mg/dL Final      Magnesium Magnesium   Date Value Ref Range Status   03/05/2020 2.3 1.6 - 2.4 mg/dL Final        Troponin No components found for: TROPONIN*   BNP No results found for: BNP    ECHOCARDIOGRAM:     STRESS MYOVIEW:    CARDIAC CATHETERIZATION:    OTHER:           amitriptyline 20 mg Oral Nightly   amLODIPine 10 mg Oral Nightly   aspirin 81 mg Oral Daily   atorvastatin 20 mg Oral Nightly   calcium gluconate 2 g Intravenous Once   cetirizine 10 mg Oral Daily   chlorhexidine 15 mL Mouth/Throat Q12H   mupirocin 1 application Each Nare BID   naloxone 0.2 mg Intravenous Once   OXcarbazepine 300 mg Oral BID   pantoprazole 40 mg Oral Q AM   polyethylene glycol 17 g Oral BID   senna 1 tablet Oral BID            Patient Active Problem List   Diagnosis Code   • Fothergill's neuralgia G50.0   • Hyponatremia E87.1   • Cardiac pacemaker in situ Z95.0   • Bradycardia R00.1   • Cardiovascular disease I25.10   • Hypertension I10   • Coronary artery disease I25.10   • Hyperlipidemia E78.5   • Sick sinus syndrome (CMS/HCC) I49.5   • Chronic systolic congestive heart failure (CMS/HCC) I50.22   • Cardiomyopathy (CMS/HCC) I42.9   • Chronic heart failure (CMS/HCC) I50.9   • Complete heart block (CMS/HCC) I44.2   • Pacemaker lead malfunction T82.110A       Assessment & Plan    -Complete heart block, iatrogenic LBBB with continuous RV pacing----s/p surgical LV epicardial lead 3/4/2020  -RV lead malfunction post op, insulation break (RV sensing driven device)----s/p right ventricular lead placement through right subclavian vein approach, tunneled into left infraclavicular area, open/closed pocket after connected to generator 3/6/2020 (Dr. Greenberg)  -Chronic class II systolic heart failure  -LV dysfunction, EF 35-40%  -CAD, remote PCI----follows with Dr. Carrizales  -HTN  -HL     Watch for infection  with multiple pocket manipulations, vancomycin given lena-procedure.  NO Lovenox with recent pocket re-opening.  Follow up appt with 's RN for staple removal next week (appt made and in discharge orders).       Need for close monitoring and follow-up discussed with the patient  Follow-up in office next week    Please call for any questions or concerns.  Radha Pemberton MD

## 2020-03-08 ENCOUNTER — READMISSION MANAGEMENT (OUTPATIENT)
Dept: CALL CENTER | Facility: HOSPITAL | Age: 75
End: 2020-03-08

## 2020-03-08 PROCEDURE — 93010 ELECTROCARDIOGRAM REPORT: CPT | Performed by: INTERNAL MEDICINE

## 2020-03-08 NOTE — OUTREACH NOTE
Prep Survey      Responses   [facility] facility patient discharged from?  [facility]   Is LACE score < 7 ?  No   Eligibility  Readm Mgmt   Discharge diagnosis  Left mini anterolateral thoracotomy with placement of LV pacing lead,    Revision of left infraclavicular pacemaker pocket,     Placement of a dual-chamber pacemaker generator   Does the patient have one of the following disease processes/diagnoses(primary or secondary)?  Cardiothoracic surgery   Does the patient have Home health ordered?  No   Is there a DME ordered?  No   Prep survey completed?  Yes          Berkley Terrell RN

## 2020-03-09 NOTE — PROGRESS NOTES
Case Management Discharge Note      Final Note:  Patient preferring to go home at this time. Denies any needs at this time     Provided Post Acute Provider List?: N/A              Final Discharge Disposition Code: 01 - home or self-care

## 2020-03-09 NOTE — DISCHARGE SUMMARY
Date of Admission: 3/4/2020  Date of Discharge: 3/8/2020    Discharge Diagnosis:   Complete heart block s/p LV epicardial lead placement  RV Lead insulation break s/p RV Lead replacement  HFrEF (EF 36-40%)  CAD s/p PCI (2014)  HTN  HLD    Presenting Problem/History of Present Illness  Chronic heart failure, unspecified heart failure type (CMS/[facility]) [I50.9]  Chronic heart failure, unspecified heart failure type (CMS/[facility]) [I50.9]     Hospital Course  Patient is a 74 y.o. male presented for scheduled LV lead placement with Dr. Aguilar. During the operation a decrease in the RV lead impedence was noted prompting suspicion for insulation problem. Patient was transferred to recovery unit in stable condition. The following day the patient underwent RV lead revision with Dr. Greenberg. He recovered well with no complications and was discharged to home the following day.    Procedures Performed  Procedure(s):  3/4/2020-- 1.  Left mini anterolateral thoracotomy with placement of LV pacing lead. 2.  Revision of left infraclavicular pacemaker pocket.  3.  Placement of a dual-chamber pacemaker generator  3/5/2020-- right ventricular lead placement through right subclavian vein approach, tunneled into left infraclavicular area, open/closed pocket after connected to generator       Consults:   Consults     Date and Time Order Name Status Description    3/4/2020 1419 Inpatient Pulmonology Consult Completed           Pertinent Test Results:    Lab Results   Component Value Date    WBC 7.00 03/07/2020    HGB 14.7 03/07/2020    HCT 40.1 03/07/2020    MCV 95.8 03/07/2020     03/07/2020      Lab Results   Component Value Date    GLUCOSE 115 (H) 03/07/2020    CALCIUM 8.7 03/07/2020     (L) 03/07/2020    K 3.8 03/07/2020    CO2 22.0 03/07/2020     03/07/2020    BUN 16 03/07/2020    CREATININE 0.75 (L) 03/07/2020    EGFRIFNONA 102 03/07/2020    BCR 21.3 03/07/2020    ANIONGAP 12.0 03/07/2020     Lab Results   Component Value  Date    INR 1.04 03/05/2020    PROTIME 10.9 03/05/2020         Condition on Discharge: Stable for discharge to home with family     Vital Signs         Discharge Disposition  Home or Self Care    Discharge Medications     Discharge Medications      Changes to Medications      Instructions Start Date   atorvastatin 20 MG tablet  Commonly known as:  LIPITOR  What changed:  when to take this   20 mg, Oral, Daily, 200001      HYDROcodone-acetaminophen 5-325 MG per tablet  Commonly known as:  NORCO  What changed:  when to take this   1 tablet, Oral, Every 6 Hours PRN         Continue These Medications      Instructions Start Date   acebutolol 200 MG capsule  Commonly known as:  SECTRAL   200 mg, Oral, Daily      amitriptyline 10 MG tablet  Commonly known as:  ELAVIL   20 mg, Oral, Nightly      amLODIPine 10 MG tablet  Commonly known as:  NORVASC   10 mg, Oral, Every Night at Bedtime      aspirin 81 MG tablet   81 mg, Oral, Daily, Do not take day of surgery      b complex-C-folic acid 1 MG capsule   1 capsule, Oral, Daily, Stop on 2/26 for surgery      CALCIUM 500+D 500-400 MG-UNIT tablet tablet  Generic drug:  calcium carbonate-cholecalciferol   1 tablet, Oral, Daily, Stop on 2/26 for surgery       cephalexin 500 MG capsule  Commonly known as:  KEFLEX   500 mg, Oral, 3 Times Daily      cetirizine 10 MG tablet  Commonly known as:  zyrTEC   10 mg, Oral, Daily      Cinnamon 500 MG capsule   500 mg, Oral, 2 Times Daily, Stop on 2/26 for surgery      enalapril 5 MG tablet  Commonly known as:  VASOTEC   20 mg, Oral, Daily, Stop now for surgery      fish oil 1000 MG capsule capsule   1,000 mg, Oral, 2 Times Daily With Meals, Stop on 2/26 for surgery      glucosamine-chondroitin 500-400 MG capsule capsule   1 capsule, Oral, 2 Times Daily With Meals, Stop on 2/26 for surgery      isosorbide dinitrate 30 MG tablet  Commonly known as:  ISORDIL   60 mg, Oral, Daily      magnesium oxide 250 MG tablet   250 mg, Oral, Daily, Stop on  2/26 for surgery      nexIUM 40 MG capsule  Generic drug:  esomeprazole   40 mg, Oral, Daily      OXcarbazepine 300 MG tablet  Commonly known as:  TRILEPTAL   300 mg, Oral, 2 Times Daily      PROBIOTIC ADVANCED PO   1 capsule, Oral, Daily, Stop on 2/26 for surgery      TURMERIC PO   1 capsule, Oral, Daily, Stop on 2/26 for surgery             Discharge Diet:   Healthy Heart Diet    Activity at Discharge:   As tolerated, no lifting > 10 pounds x 6 weeks    Follow-up Appointments  Future Appointments   Date Time Provider Department Center   3/12/2020  3:15 PM [facility] None   4/6/2020 11:15 AM Brit Nice APRN [facility] CTS [facility] None   4/28/2020 10:45 AM Yonathan Carrizales DO [facility] None     Additional Instructions for the Follow-ups that You Need to Schedule     Discharge Follow-up with Specified Provider: Dr. Jackson's RN for staple removal and incision check, March 12th at 3:15 pm; 1 Week   As directed      To:  Dr. Jackson's RN for staple removal and incision check, March 12th at 3:15 pm    Follow Up:  1 Week               Test Results Pending at Discharge:  none         Tabitha L. Satterly-Wood, APRN  03/08/20  11:11 PM

## 2020-03-10 ENCOUNTER — READMISSION MANAGEMENT (OUTPATIENT)
Dept: CALL CENTER | Facility: HOSPITAL | Age: 75
End: 2020-03-10

## 2020-03-10 NOTE — OUTREACH NOTE
CT Surgery Week 1 Survey      Responses   [facility] patient discharged from?  [facility]   Does the patient have one of the following disease processes/diagnoses(primary or secondary)?  Cardiothoracic surgery   Is there a successful TCM telephone encounter documented?  No   Week 1 attempt successful?  Yes   Call start time  1810   Call end time  1818   Discharge diagnosis  Left mini anterolateral thoracotomy with placement of LV pacing lead,    Revision of left infraclavicular pacemaker pocket,     Placement of a dual-chamber pacemaker generator   Meds reviewed with patient/caregiver?  Yes   Is the patient having any side effects they believe may be caused by any medication additions or changes?  No   Does the patient have all medications related to this admission filled (includes all antibiotics, pain medications, cardiac medications, etc.)  Yes   Is the patient taking all medications as directed (includes completed medication regime)?  Yes   Does the patient have a primary care provider?   Yes   Does the patient have an appointment scheduled with their C/T surgeon?  Yes   Has the patient kept scheduled appointments due by today?  N/A   Comments  Patient has appt with Dr. Jackson to check site.  Has appt with Brit Tapia on April 6 @ 11:15 and Dr. Carrizales.  Advised to follow up with PCP.  States he will call and get an appt.   Has home health visited the patient within 72 hours of discharge?  N/A   Did the patient receive a copy of their discharge instructions?  Yes   What is the patient's perception of their health status since discharge?  Improving   Nursing interventions  Nurse provided patient education   Is the patient/caregiver able to teach back normal signs of recovery?  Depression or irritability, Pain or discomfort at incisional site   Nursing interventions  Reassured on normal signs of recovery   Is the patient /caregiver able to teach back basic post-op care?  Practice 'cough and deep  breath', Drive as instructed by MD in discharge instructions, Take showers only when approved by MD-sponge bathe until then, No tub bath, swimming, or hot tub until instructed by MD, Keep incision areas clean, dry and protected, Do not remove steri-strips, Lifting as instructed by MD in discharge instructions   Is the patient/caregiver able to teach back signs and symptoms of incisional infection?  Increased redness, swelling or pain at the incisonal site, Increased drainage or bleeding, Incisional warmth, Pus or odor from incision, Fever   Is the patient/caregiver able to teach back steps to recovery at home?  Set small, achievable goals for return to baseline health, Rest and rebuild strength, gradually increase activity, Weigh daily, Practice good oral hygiene, Eat a well-balance diet, Make a list of questions for surgeon's appointment   Is the patient /caregiver able to teach back the importance of cardiac rehab?  Yes   Is the patient/caregiver able to teach back the hierarchy of who to call/visit for symptoms/problems? PCP, Specialist, Home health nurse, Urgent Care, ED, 911  Yes   Additional teach back comments  Patient states he is still sore but doing well. Started walking today a little over a mile.  One site is red under his nipple but no hardness or drainage.  Has not been running a fever.  Is going to follow up with Dr. Jackson in [place].     Week 1 call completed?  Yes   Wrap up additional comments  No needs at this time.            Monica Barnes LPN

## 2020-03-11 ENCOUNTER — TELEPHONE (OUTPATIENT)
Dept: CARDIAC REHAB | Facility: HOSPITAL | Age: 75
End: 2020-03-11

## 2020-03-11 ENCOUNTER — READMISSION MANAGEMENT (OUTPATIENT)
Dept: CALL CENTER | Facility: HOSPITAL | Age: 75
End: 2020-03-11

## 2020-03-11 NOTE — OUTREACH NOTE
CT Surgery Week 2 Survey      Responses   [facility], [place], operated by [facility] patient discharged from?  [facility]   Does the patient have one of the following disease processes/diagnoses(primary or secondary)?  Cardiothoracic surgery   Week 2 attempt successful?  No   Unsuccessful attempts  Attempt 1          Monica Barnes LPN

## 2020-03-12 ENCOUNTER — READMISSION MANAGEMENT (OUTPATIENT)
Dept: CALL CENTER | Facility: HOSPITAL | Age: 75
End: 2020-03-12

## 2020-03-12 NOTE — OUTREACH NOTE
CT Surgery Week 2 Survey      Responses   [facility] patient discharged from?  [facility]   Does the patient have one of the following disease processes/diagnoses(primary or secondary)?  Cardiothoracic surgery   Week 2 attempt successful?  Yes   Call start time  0958   Call end time  1015   Meds reviewed with patient/caregiver?  Yes   Does the patient have all medications related to this admission filled (includes all antibiotics, pain medications, cardiac medications, etc.)  N/A   Is the patient taking all medications as directed (includes completed medication regime)?  Yes   Comments regarding appointments  PATIENT STATES HE HAS AN APPOINTMENT WITH DR. JOHN TOMORROW, AND THAT HE IS GOING TO ASK SOME QUESTIONS. PATIENT STATES HE IS CONFUSED ABOUT   Does the patient have a primary care provider?   Yes   Does the patient have an appointment scheduled with their C/T surgeon?  Yes   Has the patient kept scheduled appointments due by today?  N/A   Has home health visited the patient within 72 hours of discharge?  N/A   Did the patient receive a copy of their discharge instructions?  Yes   Nursing interventions  Reviewed instructions with patient   What is the patient's perception of their health status since discharge?  Improving   Nursing interventions  Nurse provided patient education   Is the patient/caregiver able to teach back normal signs of recovery?  Nausea and lack of appetite, Constipation, Depression or irritability, Pain or discomfort at incisional site [PATIENT HAD QUESTIONS ON CONSTIPATION AND OTC LAXATIVES/STOOL SOFTENERS. ALL QUESTIONS ANSWERED AND PATIENT ENCOURAGED TO REPORT CONSTIPATION AT HIS APPOINTMENT TOMORROW. ]   Nursing interventions  Reassured on normal signs of recovery   Is the patient /caregiver able to teach back basic post-op care?  Drive as instructed by MD in discharge instructions, Take showers only when approved by MD-sponge bathe until then, No tub bath, swimming, or hot tub until  instructed by MD, Keep incision areas clean, dry and protected, Do not remove steri-strips, Lifting as instructed by MD in discharge instructions   Is the patient/caregiver able to teach back signs and symptoms of incisional infection?  Increased redness, swelling or pain at the incisonal site, Increased drainage or bleeding, Incisional warmth, Pus or odor from incision, Fever   Is the patient/caregiver able to teach back steps to recovery at home?  Rest and rebuild strength, gradually increase activity, Set small, achievable goals for return to baseline health, Make a list of questions for surgeon's appointment   Is the patient /caregiver able to teach back the importance of cardiac rehab?  Yes   Nursing interventions  Provided education on importance of cardiac rehab   Is the patient/caregiver able to teach back the hierarchy of who to call/visit for symptoms/problems? PCP, Specialist, Home health nurse, Urgent Care, ED, 911  Yes   Week 2 call completed?  Yes   Revoked  No further contact(revokes)-requires comment          Denisa Patel LPN

## 2020-03-13 ENCOUNTER — CLINICAL SUPPORT NO REQUIREMENTS (OUTPATIENT)
Dept: CARDIOLOGY | Facility: CLINIC | Age: 75
End: 2020-03-13

## 2020-03-13 ENCOUNTER — OFFICE VISIT (OUTPATIENT)
Dept: CARDIOLOGY | Facility: CLINIC | Age: 75
End: 2020-03-13

## 2020-03-13 VITALS
WEIGHT: 218 LBS | SYSTOLIC BLOOD PRESSURE: 161 MMHG | HEART RATE: 59 BPM | DIASTOLIC BLOOD PRESSURE: 75 MMHG | OXYGEN SATURATION: 94 % | BODY MASS INDEX: 28.76 KG/M2

## 2020-03-13 DIAGNOSIS — R00.1 BRADYCARDIA: ICD-10-CM

## 2020-03-13 DIAGNOSIS — I50.22 CHRONIC SYSTOLIC CONGESTIVE HEART FAILURE (HCC): Primary | ICD-10-CM

## 2020-03-13 DIAGNOSIS — I10 ESSENTIAL HYPERTENSION: ICD-10-CM

## 2020-03-13 DIAGNOSIS — Z95.0 CARDIAC PACEMAKER IN SITU: ICD-10-CM

## 2020-03-13 DIAGNOSIS — I49.5 SICK SINUS SYNDROME (HCC): Primary | ICD-10-CM

## 2020-03-13 PROCEDURE — 99024 POSTOP FOLLOW-UP VISIT: CPT | Performed by: INTERNAL MEDICINE

## 2020-03-13 PROCEDURE — 93281 PM DEVICE PROGR EVAL MULTI: CPT | Performed by: INTERNAL MEDICINE

## 2020-03-13 NOTE — PROGRESS NOTES
Patient had a dual-chamber pacemaker with progressive LV dysfunction heart failure symptoms and an occluded left subclavian system--underwent LV epicardial lead placement and post LV epicardial lead placement the old right ventricular lead malfunction was noted and underwent a right-sided new right ventricular pacing lead which was tunneled to the pocket and comes in for postop evaluation  Complains of tenderness at the chest tube site and denies any fever  Dressing was changed  Instructions given for incision care  Staples removed  Pacemaker interrogated and interrogation attached to chart  LV threshold went up to 3.5 V  Follow-up in 4 weeks    Electronically signed by Dominick Jackson MD, 03/13/20, 4:04 PM.

## 2020-03-27 ENCOUNTER — CLINICAL SUPPORT NO REQUIREMENTS (OUTPATIENT)
Dept: CARDIOLOGY | Facility: CLINIC | Age: 75
End: 2020-03-27

## 2020-03-27 ENCOUNTER — OFFICE VISIT (OUTPATIENT)
Dept: CARDIOLOGY | Facility: CLINIC | Age: 75
End: 2020-03-27

## 2020-03-27 VITALS
WEIGHT: 212 LBS | HEART RATE: 61 BPM | BODY MASS INDEX: 27.97 KG/M2 | DIASTOLIC BLOOD PRESSURE: 81 MMHG | OXYGEN SATURATION: 98 % | SYSTOLIC BLOOD PRESSURE: 163 MMHG

## 2020-03-27 DIAGNOSIS — R00.1 BRADYCARDIA: Primary | ICD-10-CM

## 2020-03-27 DIAGNOSIS — I49.5 SICK SINUS SYNDROME (HCC): ICD-10-CM

## 2020-03-27 DIAGNOSIS — I50.9 CHRONIC HEART FAILURE, UNSPECIFIED HEART FAILURE TYPE (HCC): Primary | ICD-10-CM

## 2020-03-27 DIAGNOSIS — Z95.0 CARDIAC PACEMAKER IN SITU: ICD-10-CM

## 2020-03-27 DIAGNOSIS — I44.2 COMPLETE HEART BLOCK (HCC): ICD-10-CM

## 2020-03-27 DIAGNOSIS — I42.9 CARDIOMYOPATHY, UNSPECIFIED TYPE (HCC): ICD-10-CM

## 2020-03-27 DIAGNOSIS — I44.2 AV BLOCK, COMPLETE (HCC): ICD-10-CM

## 2020-03-27 PROCEDURE — 99024 POSTOP FOLLOW-UP VISIT: CPT | Performed by: INTERNAL MEDICINE

## 2020-03-27 PROCEDURE — 93281 PM DEVICE PROGR EVAL MULTI: CPT | Performed by: INTERNAL MEDICINE

## 2020-03-27 NOTE — PROGRESS NOTES
In clinic device check. See scanned report. Reprogramming per Dr. Jackson. No new episodes. Charges per Dr. Jackson

## 2020-04-06 ENCOUNTER — OFFICE VISIT (OUTPATIENT)
Dept: CARDIAC SURGERY | Facility: CLINIC | Age: 75
End: 2020-04-06

## 2020-04-06 VITALS
SYSTOLIC BLOOD PRESSURE: 128 MMHG | BODY MASS INDEX: 29.96 KG/M2 | HEIGHT: 71 IN | RESPIRATION RATE: 20 BRPM | TEMPERATURE: 97.9 F | HEART RATE: 60 BPM | DIASTOLIC BLOOD PRESSURE: 78 MMHG | WEIGHT: 214 LBS

## 2020-04-06 DIAGNOSIS — T82.110D PACEMAKER LEAD MALFUNCTION, SUBSEQUENT ENCOUNTER: ICD-10-CM

## 2020-04-06 PROCEDURE — 99024 POSTOP FOLLOW-UP VISIT: CPT | Performed by: NURSE PRACTITIONER

## 2020-04-06 NOTE — PROGRESS NOTES
"CARDIOVASCULAR SURGERY FOLLOW-UP PROGRESS NOTE  Chief Complaint: Post-op Follow Up        HPI:   Dear Dr. Vidal, Temo Hadley MD and colleagues:    It was nice to see Mode Duffy in follow up today after cardiac surgery.  As you know, he is a 74 y.o. male with a hx of CMO, CAD, SSS s/p dual chamber PPM, and chronic class II HF.  Dr. Jackson was unable to place LV lead and referred pt to Dr. Aguilar.  He underwent LV lead placement via left thoracotomy at [facility] by Dr. Aguilar on 3/4/2020.  He subsequently underwent RV lead placement by Dr. Greenberg.  He did well postoperatively and continues to do well.  He has been seen by Dr. Jackson on 2 prior occasions and had staples removed.  Per Dr. Jackson's notes, the surgical wounds are healing well.  He reports he is able to walk 2.5 miles with no SOA.  [facility] and reports his glucose was 121--so he is watching his dietary intake and increasing his exercise.  He has no c/o's today.  His activity level has been good.  His markel is an OT and helps keep him active.  He does report that the steristrips are still in place but starting to pull off.  We did telephone visit d/t him not having smart phone/tablet.    Physical Exam:         /78 (BP Location: Right arm, Patient Position: Sitting, Cuff Size: Adult)   Pulse 60   Temp 97.9 °F (36.6 °C) (Oral)   Resp 20   Ht 180.3 cm (71\")   Wt 97.1 kg (214 lb)   BMI 29.85 kg/m²   Heart:  Unable to assess  Lungs:  Unable to assess, no noted SOA/dyspnea with conversation  Extremities:  Unable to assess  Incision(s):  As above    Assessment/Plan:     S/P LV lead placement via left thoracotomy. Overall, he is doing well.  His activity has increased and symptoms of HF greatly improved.  He reports Dr. Jackson is still \"adjusting\" his pacer.    No significant post-op complications.    OK to drive if not taking narcotic pain medicine.    Follow-up as scheduled with " cardiology/EPS.    No restrictions of activity.    Follow up PRN.    This patient has consented to a telehealth visit via telephone visit. The visit was scheduled as a phone visit to comply with patient safety concerns in accordance with CDC recommendations.  All vitals recorded within this visit are reported by the patient.  I spent 20 minutes in total including but not limited to the 15 minutes spent in direct conversation with this patient.  He reports he does not have smart phone/tablet to do video visit.      Thank you for allowing me to participate in the care of your patient.    Regards,  Brit Nice, APRN

## 2020-04-09 PROCEDURE — 93010 ELECTROCARDIOGRAM REPORT: CPT | Performed by: INTERNAL MEDICINE

## 2020-04-28 ENCOUNTER — OFFICE VISIT (OUTPATIENT)
Dept: CARDIOLOGY | Facility: CLINIC | Age: 75
End: 2020-04-28

## 2020-04-28 ENCOUNTER — CLINICAL SUPPORT NO REQUIREMENTS (OUTPATIENT)
Dept: CARDIOLOGY | Facility: CLINIC | Age: 75
End: 2020-04-28

## 2020-04-28 VITALS — WEIGHT: 212 LBS | BODY MASS INDEX: 28.1 KG/M2 | HEIGHT: 73 IN

## 2020-04-28 VITALS
DIASTOLIC BLOOD PRESSURE: 82 MMHG | OXYGEN SATURATION: 97 % | SYSTOLIC BLOOD PRESSURE: 162 MMHG | HEIGHT: 71 IN | WEIGHT: 212 LBS | HEART RATE: 59 BPM | BODY MASS INDEX: 29.68 KG/M2

## 2020-04-28 DIAGNOSIS — I25.10 CARDIOVASCULAR DISEASE: ICD-10-CM

## 2020-04-28 DIAGNOSIS — I44.2 AV BLOCK, COMPLETE (HCC): Primary | ICD-10-CM

## 2020-04-28 DIAGNOSIS — I42.9 CARDIOMYOPATHY, UNSPECIFIED TYPE (HCC): ICD-10-CM

## 2020-04-28 DIAGNOSIS — I50.9 CHRONIC HEART FAILURE, UNSPECIFIED HEART FAILURE TYPE (HCC): ICD-10-CM

## 2020-04-28 DIAGNOSIS — Z95.0 PRESENCE OF BIVENTRICULAR CARDIAC PACEMAKER: ICD-10-CM

## 2020-04-28 DIAGNOSIS — I25.10 CORONARY ARTERY DISEASE INVOLVING NATIVE CORONARY ARTERY OF NATIVE HEART WITHOUT ANGINA PECTORIS: Primary | ICD-10-CM

## 2020-04-28 DIAGNOSIS — R00.1 BRADYCARDIA: Primary | ICD-10-CM

## 2020-04-28 DIAGNOSIS — I44.2 COMPLETE HEART BLOCK (HCC): ICD-10-CM

## 2020-04-28 DIAGNOSIS — I49.5 SICK SINUS SYNDROME (HCC): ICD-10-CM

## 2020-04-28 DIAGNOSIS — E78.2 MIXED HYPERLIPIDEMIA: ICD-10-CM

## 2020-04-28 DIAGNOSIS — I50.22 CHRONIC SYSTOLIC CONGESTIVE HEART FAILURE (HCC): ICD-10-CM

## 2020-04-28 PROCEDURE — 93281 PM DEVICE PROGR EVAL MULTI: CPT | Performed by: INTERNAL MEDICINE

## 2020-04-28 PROCEDURE — 99024 POSTOP FOLLOW-UP VISIT: CPT | Performed by: INTERNAL MEDICINE

## 2020-04-28 PROCEDURE — 99214 OFFICE O/P EST MOD 30 MIN: CPT | Performed by: INTERNAL MEDICINE

## 2020-04-28 RX ORDER — MULTIVIT WITH MINERALS/LUTEIN
1 TABLET ORAL DAILY
COMMUNITY

## 2020-04-28 NOTE — PROGRESS NOTES
Patient had a dual-chamber pacemaker with progressive LV dysfunction heart failure symptoms and an occluded left subclavian system--underwent LV epicardial lead placement and post LV epicardial lead placement the old right ventricular lead malfunction was noted and underwent a right-sided new right ventricular pacing lead which was tunneled to the pocket and programming  of the device was done at last visit and feels remarkably well with ongoing biventricular pacing and is able to walk 2-1/2 miles without any symptoms of heart failure or fatigue or shortness of breath  Pacemaker interrogated and interrogation attached to chart  LV threshold at 2.6  at 0.4  ms and programming was performed and LV threshold auto  programming feature was programmed on    Follow-up in 8 weeks  Incision is completely healed and looks very healthy  Medications reviewed     Electronically signed by Dominick Jackson MD, 04/28/20, 10:47 AM..

## 2020-04-28 NOTE — PROGRESS NOTES
In clinic device check with Dr. Jackson. Patient's incisions are all healed, intact, with no signs or symptoms of infections. Patient's device was interrogated and some changes were made by Dr. Jackson to conserve battery life. See scanned report. Charges per Dr. Jackson's clinic.

## 2020-04-28 NOTE — PROGRESS NOTES
Cardiology Office Visit      Encounter Date:  04/28/2020    Patient ID:   Mode Duffy is a 74 y.o. male.    Reason For Followup:  Coronary artery disease  Cardiomyopathy    Brief Clinical History:  Dear Dr. Vidal, Temo Hadley MD    I had the pleasure of seeing Mode Duffy today. As you are well aware, this is a 74 y.o. male with a known history of ischemic heart disease. He underwent cardiac catheterization with resultant PCI of an obtuse marginal branch and diagonal branch in November 2014. He has additional history of hypertension with hypertensive cardiovascular disease, dyslipidemia, antiplatelet therapy as well as sick sinus syndrome status post biventricular permanent pacemaker placement. He presents today for followup on the above conditions.     Interval History:  He denies any chest pain pressure heaviness or tightness.    He denies any shortness of breath out of character.  He denies any PND orthopnea.  He denies any syncope or near syncope.  He reports feeling better today than he has in the past.    He underwent an upgrade to a biventricular pacing system.  He reports improvement in his stamina from this.  He is able to walk 2 to 2-1/2 miles without any symptoms of heart failure.  His blood pressure was elevated today however it was 138/70 at home this morning.       Assessment & Plan     Impressions:  Coronary artery disease status post PCI and stenting most recently in November of 2014.        This was cutting Balloon angioplasty of a circumflex branch and a diagonal branch.   Dyslipidemia.  Hypertension with hypertensive cardiovascular disease with a white coat component  Cardiomyopathy most recent EF of 35 to 40% in October 2019 echocardiogram  Sick sinus syndrome status post biventricular permanent pacemaker placement.  Lumbago  Hyponatremia secondary to Trileptal  Renal cysts  History of trigeminal neuralgia presently treated with Trileptal.  Hip pain currently undergoing preoperative  "cardiovascular risk assessment for right hip surgery.     Recommendations:  Continuation of his current cardiovascular regimen at the present time.  Follow-up in 6 months, sooner should there be difficulties       Objective:    Vitals:  Vitals:    04/28/20 1108   BP: 162/82   Pulse: 59   SpO2: 97%   Weight: 96.2 kg (212 lb)   Height: 180.3 cm (71\")       Physical Exam:    General: Alert, cooperative, no distress, appears stated age  Head:  Normocephalic, atraumatic, mucous membranes moist  Eyes:  Conjunctiva/corneas clear, EOM's intact     Neck:  Supple,  no bruit  Lungs: Clear to auscultation bilaterally, no wheezes rhonchi rales are noted  Chest wall: No tenderness.  Pacer site clean dry and intact  Heart::  Regular rate and rhythm, S1 and S2 normal, 1/6 holosystolic murmur.  No rub or gallop  Abdomen: Soft, non-tender, nondistended bowel sounds active  Extremities: No cyanosis, clubbing, or edema  Pulses: 2+ and symmetric all extremities  Skin:  No rashes or lesions  Neuro/psych: A&O x3. CN II through XII are grossly intact with appropriate affect      Allergies:  No Known Allergies    Medication Review:     Current Outpatient Medications:   •  acebutolol (SECTRAL) 200 MG capsule, Take 200 mg by mouth Daily., Disp: , Rfl:   •  amitriptyline (ELAVIL) 10 MG tablet, Take 20 mg by mouth Every Night., Disp: , Rfl:   •  amLODIPine (NORVASC) 10 MG tablet, Take 10 mg by mouth every night at bedtime., Disp: , Rfl:   •  aspirin 81 MG tablet, Take 81 mg by mouth Daily. Do not take day of surgery, Disp: , Rfl:   •  atorvastatin (LIPITOR) 20 MG tablet, Take 1 tablet by mouth Daily. 200001 (Patient taking differently: Take 20 mg by mouth every night at bedtime. 200001), Disp: 90 tablet, Rfl: 0  •  b complex-C-folic acid 1 MG capsule, Take 1 capsule by mouth Daily. Stop on 2/26 for surgery, Disp: , Rfl:   •  calcium carbonate-cholecalciferol (CALCIUM 500+D) 500-400 MG-UNIT tablet tablet, Take 1 tablet by mouth Daily. Stop on " 2/26 for surgery, Disp: , Rfl:   •  cetirizine (zyrTEC) 10 MG tablet, Take 10 mg by mouth Daily., Disp: , Rfl:   •  Cinnamon 500 MG capsule, Take 500 mg by mouth 2 (Two) Times a Day. Stop on 2/26 for surgery, Disp: , Rfl:   •  enalapril (VASOTEC) 5 MG tablet, Take 20 mg by mouth Daily. Stop now for surgery, Disp: , Rfl:   •  esomeprazole (NEXIUM) 40 MG capsule, Take 40 mg by mouth Daily., Disp: , Rfl:   •  glucosamine-chondroitin 500-400 MG capsule capsule, Take 1 capsule by mouth 2 (Two) Times a Day With Meals. Stop on 2/26 for surgery, Disp: , Rfl:   •  Inulin (METAMUCIL CLEAR & NATURAL PO), Take  by mouth., Disp: , Rfl:   •  isosorbide dinitrate (ISORDIL) 30 MG tablet, Take 60 mg by mouth Daily., Disp: , Rfl:   •  magnesium oxide 250 MG tablet, Take 250 mg by mouth Daily. Stop on 2/26 for surgery, Disp: , Rfl: 0  •  Omega-3 Fatty Acids (FISH OIL) 1000 MG capsule capsule, Take 1,000 mg by mouth 2 (Two) Times a Day With Meals. Stop on 2/26 for surgery, Disp: , Rfl:   •  OXcarbazepine (TRILEPTAL) 300 MG tablet, Take 1 tablet by mouth 2 (Two) Times a Day., Disp: 180 tablet, Rfl: 2  •  Probiotic Product (PROBIOTIC ADVANCED PO), Take 1 capsule by mouth Daily. Stop on 2/26 for surgery, Disp: , Rfl:   •  TURMERIC PO, Take 1 capsule by mouth Daily. Stop on 2/26 for surgery, Disp: , Rfl:   •  vitamin C (ASCORBIC ACID) 250 MG tablet, Take 250 mg by mouth Daily., Disp: , Rfl:     Family History:  Family History   Problem Relation Age of Onset   • Heart disease Father        Past Medical History:  Past Medical History:   Diagnosis Date   • Arthritis    • BPH (benign prostatic hyperplasia)    • Coronary artery disease     dr. Carrizales   • Deviated septum    • GERD (gastroesophageal reflux disease)    • History of trigeminal neuralgia    • Hyperlipidemia    • Hypertension    • Renal cyst    • Sciatica    • Sleep apnea     unable to tolerate machine       Past surgical History:  Past Surgical History:   Procedure Laterality Date    • APPENDECTOMY     • CARDIAC CATHETERIZATION     • CARDIAC CATHETERIZATION N/A 3/5/2020    Procedure: Percutaneous Subclavian Intervention;  Surgeon: Nick Greenberg MD;  Location: [facility] CATH INVASIVE LOCATION;  Service: Cardiovascular;  Laterality: N/A;   • CARDIAC ELECTROPHYSIOLOGY PROCEDURE N/A 2020    Procedure: Biventricular Device Upgrade;  Surgeon: Dominick Jackson MD;  Location: [facility] CATH INVASIVE LOCATION;  Service: Cardiovascular   • CARDIAC ELECTROPHYSIOLOGY PROCEDURE N/A 3/5/2020    Procedure: Lead Revision;  Surgeon: Nick Greenberg MD;  Location: [facility] CATH INVASIVE LOCATION;  Service: Cardiovascular;  Laterality: N/A;   • CORONARY STENT PLACEMENT  2014    x 2   • PROSTATE SURGERY     • REFRACTIVE SURGERY     • THORACOTOMY Left 3/4/2020    Procedure: THORACOTOMY MINI WITH LEAD PLACEMENT;  Surgeon: Justin Aguilar MD;  Location: [facility];  Service: Cardiothoracic;  Laterality: Left;   • TOTAL HIP ARTHROPLASTY Bilateral     different times       Social History:  Social History     Socioeconomic History   • Marital status:      Spouse name: Not on file   • Number of children: Not on file   • Years of education: Not on file   • Highest education level: Not on file   Tobacco Use   • Smoking status: Former Smoker     Last attempt to quit: 1968     Years since quittin.3   • Smokeless tobacco: Never Used   Substance and Sexual Activity   • Alcohol use: Yes     Alcohol/week: 7.0 standard drinks     Types: 7 Glasses of wine per week     Comment: RED WINE BEFORE DINNER   • Drug use: No   • Sexual activity: Defer       Review of Systems:  The following systems were reviewed as they relate to the cardiovascular system: Constitutional, Eyes, ENT, Cardiovascular, Respiratory, Gastrointestinal, Integumentary, Neurological, Psychiatric, Hematologic, Endocrine, Musculoskeletal, and Genitourinary. The pertinent cardiovascular findings are reported above with all other  cardiovascular points within those systems being negative.    Diagnostic Study Review:     Current Electrocardiogram:  Procedures no new EKG.  EKG dated 6 March 2020 demonstrates normal sinus rhythm with a ventricular rate of 91 bpm.  Old anterior wall MI with left bundle branch block morphology noted.      NOTE: The following portions of the patient's history were reviewed and updated this visit as appropriate: allergies, current medications, past family history, past medical history, past social history, past surgical history and problem list.

## 2020-05-18 RX ORDER — ATORVASTATIN CALCIUM 20 MG/1
TABLET, FILM COATED ORAL
Qty: 90 TABLET | Refills: 0 | Status: SHIPPED | OUTPATIENT
Start: 2020-05-18 | End: 2020-07-31

## 2020-05-21 ENCOUNTER — TELEPHONE (OUTPATIENT)
Dept: CARDIOLOGY | Facility: CLINIC | Age: 75
End: 2020-05-21

## 2020-05-22 ENCOUNTER — CLINICAL SUPPORT NO REQUIREMENTS (OUTPATIENT)
Dept: CARDIOLOGY | Facility: CLINIC | Age: 75
End: 2020-05-22

## 2020-05-22 DIAGNOSIS — R00.1 BRADYCARDIA: ICD-10-CM

## 2020-05-22 DIAGNOSIS — I44.2 COMPLETE HEART BLOCK (HCC): ICD-10-CM

## 2020-05-22 DIAGNOSIS — I49.5 SICK SINUS SYNDROME (HCC): Primary | ICD-10-CM

## 2020-05-22 PROCEDURE — 93294 REM INTERROG EVL PM/LDLS PM: CPT | Performed by: INTERNAL MEDICINE

## 2020-05-22 PROCEDURE — 93296 REM INTERROG EVL PM/IDS: CPT | Performed by: INTERNAL MEDICINE

## 2020-05-22 NOTE — PROGRESS NOTES
Remote report received and reviewed. See scanned report. New episode of atrial tachycardia on May 10th for 2 minutes. To be reviewed by Dr. Jackson. Battery status ok.

## 2020-06-26 ENCOUNTER — CLINICAL SUPPORT NO REQUIREMENTS (OUTPATIENT)
Dept: CARDIOLOGY | Facility: CLINIC | Age: 75
End: 2020-06-26

## 2020-06-26 ENCOUNTER — OFFICE VISIT (OUTPATIENT)
Dept: CARDIOLOGY | Facility: CLINIC | Age: 75
End: 2020-06-26

## 2020-06-26 VITALS
BODY MASS INDEX: 29.99 KG/M2 | DIASTOLIC BLOOD PRESSURE: 102 MMHG | OXYGEN SATURATION: 97 % | SYSTOLIC BLOOD PRESSURE: 180 MMHG | HEART RATE: 63 BPM | WEIGHT: 215 LBS

## 2020-06-26 DIAGNOSIS — I42.9 CARDIOMYOPATHY, UNSPECIFIED TYPE (HCC): Primary | ICD-10-CM

## 2020-06-26 DIAGNOSIS — E78.2 MIXED HYPERLIPIDEMIA: ICD-10-CM

## 2020-06-26 DIAGNOSIS — I44.2 AV BLOCK, COMPLETE (HCC): ICD-10-CM

## 2020-06-26 DIAGNOSIS — R00.1 BRADYCARDIA: Primary | ICD-10-CM

## 2020-06-26 DIAGNOSIS — Z95.0 PRESENCE OF BIVENTRICULAR CARDIAC PACEMAKER: ICD-10-CM

## 2020-06-26 DIAGNOSIS — I49.5 SICK SINUS SYNDROME (HCC): ICD-10-CM

## 2020-06-26 DIAGNOSIS — I50.22 CHRONIC SYSTOLIC CONGESTIVE HEART FAILURE (HCC): ICD-10-CM

## 2020-06-26 PROCEDURE — 93281 PM DEVICE PROGR EVAL MULTI: CPT | Performed by: INTERNAL MEDICINE

## 2020-06-26 PROCEDURE — 99214 OFFICE O/P EST MOD 30 MIN: CPT | Performed by: INTERNAL MEDICINE

## 2020-06-26 PROCEDURE — 93000 ELECTROCARDIOGRAM COMPLETE: CPT | Performed by: INTERNAL MEDICINE

## 2020-06-26 NOTE — PROGRESS NOTES
CC--complete heart block, coronary artery disease    Sub--74-year-old male patient has underlying complete heart block with a dual-chamber pacemaker which is implanted several years ago with multiple battery changes--started developing progressive dyspnea and fatigue with continuous RV pacing and underwent LV epicardial lead placement because of left subclavian occlusion and after LV lead epicardial placement patient was noted to have RV lead malfunction and a new RV pacing lead was placed from right subclavian and tunneled to the battery on the left side few months ago  Remarkable improvement is noted in patient symptoms with biventricular pacing and patient in functional class I able to walk more than 3 miles every day without any symptoms  He comes in for a follow-up  His EF in the past with RV pacing was between 35 to 40%  A prior history of coronary artery disease with remote stenting of his marginal branch  He also has history of hypertension and hyperlipidemia  Denies any TIA or stroke  Denies any bleeding diathesis  Chronic medical problems include complete heart block with pacemaker in situ, iatrogenic left bundle branch block with RV pacing, chronic class II heart failure symptoms with LV dysfunction and coronary artery disease    Past history is reviewed below and verified and changes made in the HPI    Past Medical History:   Diagnosis Date   • Arthritis    • BPH (benign prostatic hyperplasia)    • Coronary artery disease     dr. Carrizales   • Deviated septum    • GERD (gastroesophageal reflux disease)    • History of trigeminal neuralgia    • Hyperlipidemia    • Hypertension    • Renal cyst    • Sciatica    • Sleep apnea     unable to tolerate machine     Past Surgical History:   Procedure Laterality Date   • APPENDECTOMY     • CARDIAC CATHETERIZATION     • CARDIAC CATHETERIZATION N/A 3/5/2020    Procedure: Percutaneous Subclavian Intervention;  Surgeon: Nick Greenberg MD;  Location: [facility]  CATH INVASIVE LOCATION;  Service: Cardiovascular;  Laterality: N/A;   • CARDIAC ELECTROPHYSIOLOGY PROCEDURE N/A 1/29/2020    Procedure: Biventricular Device Upgrade;  Surgeon: Dominick Jackson MD;  Location: [facility] CATH INVASIVE LOCATION;  Service: Cardiovascular   • CARDIAC ELECTROPHYSIOLOGY PROCEDURE N/A 3/5/2020    Procedure: Lead Revision;  Surgeon: Nick Greenberg MD;  Location: [facility] CATH INVASIVE LOCATION;  Service: Cardiovascular;  Laterality: N/A;   • CORONARY STENT PLACEMENT  2014    x 2   • PROSTATE SURGERY     • REFRACTIVE SURGERY     • THORACOTOMY Left 3/4/2020    Procedure: THORACOTOMY MINI WITH LEAD PLACEMENT;  Surgeon: Justin Aguilar MD;  Location: [facility] CVOR;  Service: Cardiothoracic;  Laterality: Left;   • TOTAL HIP ARTHROPLASTY Bilateral     different times       Review of Systems  General: Negative  for fatigue and tiredness  Eyes: No redness  Cardiovascular: No chest pain,  palpitations  Respiratory:   No  shortness of breath  Gastrointestinal: No nausea or vomiting, bleeding  Genitourinary: no hematuria or dysuria  Musculoskeletal: No arthralgia or myalgia  Skin: No rash  Neurologic: No numbness, tingling, syncope  Hematologic/Lymphatic: No abnormal bleeding    Physical Exam  VITALS REVIEWED--blood pressure 180/102 pulse rate is 63 patient is afebrile respiration 12 times a minute  EKG shows sinus rhythm with excellent by V pacing with a QRS of 140 and heart rate of 60 [place] interval of 63 and compared to prior EKG biventricular pacing is noted instead of RV pacing and indication for EKG includes complete heart block and prior cardiomyopathy and heart failure    General:      well developed, well nourished, in no acute distress.    Head:      normocephalic and atraumatic.    Eyes:      PERRL/EOM intact, conjunctiva and sclera clear with out nystagmus.    Neck:      no masses, thyromegaly,  trachea central with normal respiratory effort and PMI displaced laterally  Lungs:       clear bilaterally to auscultation.    Heart:       Underlying paced rhythm without any murmurs gallops or rubs  Msk:      no deformity or scoliosis noted of thoracic or lumbar spine.    Pulses:      pulses normal in all 4 extremities.    Extremities:       no cyanosis or clubbing--trace left pedal edema and trace right pedal edema.    Neurologic:      no focal deficits.   alert oriented x3  Skin:      intact without lesions or rashes.    Psych:      alert and cooperative; normal mood and affect; normal attention span and concentration.    All incisions have healed up very well        Assessment and plan    Dual-chamber pacemaker upgraded to biventricular pacing system with placement of LV epicardial lead and the new right ventricular pacing lead from right subclavian approach because of prior right ventricular pacing lead malfunction post LV epicardial lead placement --kindly note patient has left subclavian occlusion--remarkable improvement with biventricular pacing and back to functional class I without any symptoms of heart failure and biventricular pacemaker interrogated in the office with appropriate function and interrogation attached to the chart and patient is on home monitoring  LV dysfunction with EF between 35 to 40%  Coronary artery disease  Chronic combined class II heart failure symptoms--resolved and back to functional class I with by V pacing  [place] hypertension as per the patient since multiple blood pressure recordings at home within normal limits  Follow-up in 6 months    Electronically signed by Dominick Jackson MD, 06/26/20, 10:57 AM.

## 2020-07-30 RX ORDER — ACEBUTOLOL HYDROCHLORIDE 200 MG/1
CAPSULE ORAL
Qty: 90 CAPSULE | Refills: 1 | Status: SHIPPED | OUTPATIENT
Start: 2020-07-30 | End: 2020-11-12

## 2020-07-31 RX ORDER — ATORVASTATIN CALCIUM 20 MG/1
TABLET, FILM COATED ORAL
Qty: 90 TABLET | Refills: 0 | Status: SHIPPED | OUTPATIENT
Start: 2020-07-31 | End: 2020-11-11

## 2020-10-19 PROBLEM — J30.9 ALLERGIC RHINITIS: Status: ACTIVE | Noted: 2019-05-18

## 2020-10-19 PROBLEM — J20.9 ACUTE BRONCHITIS: Status: ACTIVE | Noted: 2019-05-18

## 2020-10-26 ENCOUNTER — TELEPHONE (OUTPATIENT)
Dept: NEUROLOGY | Facility: CLINIC | Age: 75
End: 2020-10-26

## 2020-10-26 DIAGNOSIS — G50.0 FOTHERGILL'S NEURALGIA: Primary | ICD-10-CM

## 2020-10-26 RX ORDER — OXCARBAZEPINE 300 MG/1
300 TABLET, FILM COATED ORAL 2 TIMES DAILY
Qty: 180 TABLET | Refills: 2 | OUTPATIENT
Start: 2020-10-26

## 2020-10-26 NOTE — TELEPHONE ENCOUNTER
PT CALLED IN TODAY REQUESTING REFILL. REFILL REQUEST SUBMITTED. PT ALSO IS A PREV [facility] PT AND IS REQUESTING TO CONTINUE CARE WITH DR. SEIPEL IN [place]. PLEASE REVIEW AND ADVISE PT      CALL BACK- [phone]

## 2020-10-27 NOTE — TELEPHONE ENCOUNTER
PT IS CURRENTLY AWAITING APPT W DR SEIPEL PER REQUEST AND HAS FOUR DAYS LEFT OF MEDICATION. APPT HAS NOT BEEN MADE YET BUT MESSAGE HAS BEEN SENT OVER REQUESTING NEW PROVIDER PER DR [facility]OWD'S [facility]. PLEASE REVIEW AND ADVISE PATIENT ON WHETHER MEDICATION CAN BE REFILLED.

## 2020-10-27 NOTE — PROGRESS NOTES
Cardiology Office Visit      Encounter Date:  10/28/2020    Patient ID:   Mode Duffy is a 74 y.o. male.    Reason For Followup:  Coronary artery disease  Cardiomyopathy    Brief Clinical History:  Dear Dr. Vidal, Temo Hadley MD    I had the pleasure of seeing Mode Duffy today. As you are well aware, this is a 74 y.o. male with a known history of ischemic heart disease. He underwent cardiac catheterization with resultant PCI of an obtuse marginal branch and diagonal branch in November 2014. He has additional history of hypertension with hypertensive cardiovascular disease, dyslipidemia, antiplatelet therapy as well as sick sinus syndrome status post biventricular permanent pacemaker placement. He presents today for followup on the above conditions.     Interval History:  He denies any chest pain pressure heaviness or tightness.    He denies any shortness of breath out of character.  He denies any PND orthopnea.  He denies any syncope or near syncope.  He reports feeling in his usual state of cardiac health.    He underwent an upgrade to a biventricular pacing system.  He reports this greatly improved his stamina.  He he continues walking 2 to 2-1/2 miles without any symptoms of heart failure.  His blood pressure was elevated today however it was 134/78 at home this morning.    His neurologist has retired suddenly and he is running low on oxcarbazepine.  He has severe trigeminal neuralgia when not taking this medication.  We will refill this for him today.  He has an appointment to see Dr. Palacios pending.  [facility]  He will forward these to us.     Assessment & Plan     Impressions:  Coronary artery disease status post PCI and stenting most recently in November of 2014.        This was cutting Balloon angioplasty of a circumflex branch and a diagonal branch.   Dyslipidemia.  Hypertension with hypertensive cardiovascular disease with a white coat  "component  Cardiomyopathy most recent EF of 35 to 40% in October 2019 echocardiogram  Sick sinus syndrome status post biventricular permanent pacemaker placement.  Lumbago  Hyponatremia secondary to Trileptal  Renal cysts  History of trigeminal neuralgia presently treated with Trileptal.  Hip pain currently undergoing preoperative cardiovascular risk assessment for right hip surgery.  Trigeminal neuralgia     Recommendations:  Continuation of his current cardiovascular regimen at the present time.  Refill medications  Follow-up in 6 months, sooner should there be difficulties       Objective:    Vitals:  Vitals:    10/28/20 0956   Resp: 18   SpO2: 96%   Weight: 98.4 kg (217 lb)   Height: 185.4 cm (73\")       Physical Exam:    General: Alert, cooperative, no distress, appears stated age  Head:  Normocephalic, atraumatic, mucous membranes moist  Eyes:  Conjunctiva/corneas clear, EOM's intact     Neck:  Supple,  no bruit  Lungs: Clear to auscultation bilaterally, no wheezes rhonchi rales are noted  Chest wall: No tenderness.  Pacer site clean dry and intact  Heart::  Regular rate and rhythm, S1 and S2 normal, 1/6 holosystolic murmur.  No rub or gallop  Abdomen: Soft, non-tender, nondistended bowel sounds active  Extremities: No cyanosis, clubbing, or edema  Pulses: 2+ and symmetric all extremities  Skin:  No rashes or lesions  Neuro/psych: A&O x3. CN II through XII are grossly intact with appropriate affect      Allergies:  No Known Allergies    Medication Review:     Current Outpatient Medications:   •  acebutolol (SECTRAL) 200 MG capsule, TAKE 1 CAPSULE EVERY DAY, Disp: 90 capsule, Rfl: 1  •  amitriptyline (ELAVIL) 10 MG tablet, Take 20 mg by mouth Every Night., Disp: , Rfl:   •  amLODIPine (NORVASC) 10 MG tablet, Take 10 mg by mouth every night at bedtime., Disp: , Rfl:   •  APPLE CIDER VINEGAR PO, Take 10 mL by mouth., Disp: , Rfl:   •  aspirin 81 MG tablet, Take 81 mg by mouth Daily. Do not take day of surgery, " Disp: , Rfl:   •  atorvastatin (LIPITOR) 20 MG tablet, TAKE 1 TABLET EVERY DAY, Disp: 90 tablet, Rfl: 0  •  b complex-C-folic acid 1 MG capsule, Take 1 capsule by mouth Daily. Stop on 2/26 for surgery, Disp: , Rfl:   •  calcium carbonate-cholecalciferol (CALCIUM 500+D) 500-400 MG-UNIT tablet tablet, Take 1 tablet by mouth Daily. Stop on 2/26 for surgery, Disp: , Rfl:   •  cetirizine (zyrTEC) 10 MG tablet, Take 10 mg by mouth Daily., Disp: , Rfl:   •  CHONDROITIN SULFATE OP, Apply  to eye(s) as directed by provider., Disp: , Rfl:   •  Cinnamon 500 MG capsule, Take 500 mg by mouth 2 (Two) Times a Day. Stop on 2/26 for surgery, Disp: , Rfl:   •  enalapril (VASOTEC) 5 MG tablet, Take 20 mg by mouth Daily. Stop now for surgery, Disp: , Rfl:   •  esomeprazole (NEXIUM) 40 MG capsule, Take 40 mg by mouth Daily., Disp: , Rfl:   •  glucosamine-chondroitin 500-400 MG capsule capsule, Take 1 capsule by mouth 2 (Two) Times a Day With Meals. Stop on 2/26 for surgery, Disp: , Rfl:   •  HONEY PO, Take 10 mL by mouth., Disp: , Rfl:   •  Inulin (METAMUCIL CLEAR & NATURAL PO), Take  by mouth., Disp: , Rfl:   •  magnesium oxide 250 MG tablet, Take 250 mg by mouth Daily. Stop on 2/26 for surgery, Disp: , Rfl: 0  •  Omega-3 Fatty Acids (FISH OIL) 1000 MG capsule capsule, Take 1,000 mg by mouth 2 (Two) Times a Day With Meals. Stop on 2/26 for surgery, Disp: , Rfl:   •  OXcarbazepine (TRILEPTAL) 300 MG tablet, Take 1 tablet by mouth 2 (Two) Times a Day., Disp: 180 tablet, Rfl: 0  •  Probiotic Product (PROBIOTIC ADVANCED PO), Take 1 capsule by mouth Daily. Stop on 2/26 for surgery, Disp: , Rfl:   •  Psyllium (METAMUCIL FIBER PO), Take  by mouth., Disp: , Rfl:   •  TURMERIC PO, Take 1 capsule by mouth Daily. Stop on 2/26 for surgery, Disp: , Rfl:   •  vitamin C (ASCORBIC ACID) 250 MG tablet, Take 250 mg by mouth Daily., Disp: , Rfl:     Family History:  Family History   Problem Relation Age of Onset   • Heart disease Father        Past  Medical History:  Past Medical History:   Diagnosis Date   • Arthritis    • BPH (benign prostatic hyperplasia)    • Coronary artery disease     dr. Carrizales   • Deviated septum    • GERD (gastroesophageal reflux disease)    • History of trigeminal neuralgia    • Hyperlipidemia    • Hypertension    • Renal cyst    • Sciatica    • Sleep apnea     unable to tolerate machine       Past surgical History:  Past Surgical History:   Procedure Laterality Date   • APPENDECTOMY     • CARDIAC CATHETERIZATION     • CARDIAC CATHETERIZATION N/A 3/5/2020    Procedure: Percutaneous Subclavian Intervention;  Surgeon: Nick Greenberg MD;  Location: [facility] CATH INVASIVE LOCATION;  Service: Cardiovascular;  Laterality: N/A;   • CARDIAC ELECTROPHYSIOLOGY PROCEDURE N/A 2020    Procedure: Biventricular Device Upgrade;  Surgeon: Dominick Jackson MD;  Location: [facility] CATH INVASIVE LOCATION;  Service: Cardiovascular   • CARDIAC ELECTROPHYSIOLOGY PROCEDURE N/A 3/5/2020    Procedure: Lead Revision;  Surgeon: Nick Greenberg MD;  Location: [facility] CATH INVASIVE LOCATION;  Service: Cardiovascular;  Laterality: N/A;   • CORONARY STENT PLACEMENT  2014    x 2   • PROSTATE SURGERY     • REFRACTIVE SURGERY     • THORACOTOMY Left 3/4/2020    Procedure: THORACOTOMY MINI WITH LEAD PLACEMENT;  Surgeon: Justin Aguilar MD;  Location: [facility];  Service: Cardiothoracic;  Laterality: Left;   • TOTAL HIP ARTHROPLASTY Bilateral     different times       Social History:  Social History     Socioeconomic History   • Marital status:      Spouse name: Not on file   • Number of children: Not on file   • Years of education: Not on file   • Highest education level: Not on file   Tobacco Use   • Smoking status: Former Smoker     Quit date:      Years since quittin.8   • Smokeless tobacco: Never Used   Substance and Sexual Activity   • Alcohol use: Yes     Alcohol/week: 7.0 standard drinks     Types: 7 Glasses of wine  per week     Comment: RED WINE BEFORE DINNER   • Drug use: No   • Sexual activity: Defer       Review of Systems:  The following systems were reviewed as they relate to the cardiovascular system: Constitutional, Eyes, ENT, Cardiovascular, Respiratory, Gastrointestinal, Integumentary, Neurological, Psychiatric, Hematologic, Endocrine, Musculoskeletal, and Genitourinary. The pertinent cardiovascular findings are reported above with all other cardiovascular points within those systems being negative.    Diagnostic Study Review:     Current Electrocardiogram:    ECG 12 Lead    Date/Time: 10/28/2020 6:02 PM  Performed by: Yonathan Carrizales DO  Authorized by: Yonathan Carrizales DO   Comparison: not compared with previous ECG   Previous ECG: no previous ECG available  Comments: Ventricular paced rhythm with a rate of 60 bpm.               NOTE: The following portions of the patient's history were reviewed and updated this visit as appropriate: allergies, current medications, past family history, past medical history, past social history, past surgical history and problem list.

## 2020-10-27 NOTE — TELEPHONE ENCOUNTER
PT(DR. DOWD PT) CALLING BACK TODAY STATING THAT HE ONLY HAS A COUPLE DAYS LEFT OF THE OXcarbazepine (TRILEPTAL) 300 MG tablet. THIS MEDICATION IS WORKING WONDERFUL CAUSE HE HAS HAD NO PAIN IN HIS FACE. HE WOULD LIKE TO BE CALLED BACK ABOUT THE MEDICATION OXcarbazepine (TRILEPTAL) 300 MG tablet IF IT IS GOING TO BE SENT TO THE [facility]      PLEASE CALL HIM BACK [phone]

## 2020-10-27 NOTE — TELEPHONE ENCOUNTER
PT(DR. DOWD PT) CALLING WAITING ON SOMEONE TO CALL HIM BACK ABOUT GETTING AN APPT WITH DR. SEIPEL. PLEASE CALL HIM BACK [phone]

## 2020-10-28 ENCOUNTER — OFFICE VISIT (OUTPATIENT)
Dept: CARDIOLOGY | Facility: CLINIC | Age: 75
End: 2020-10-28

## 2020-10-28 VITALS — OXYGEN SATURATION: 96 % | HEIGHT: 73 IN | BODY MASS INDEX: 28.76 KG/M2 | RESPIRATION RATE: 18 BRPM | WEIGHT: 217 LBS

## 2020-10-28 DIAGNOSIS — I25.10 CARDIOVASCULAR DISEASE: ICD-10-CM

## 2020-10-28 DIAGNOSIS — I50.22 CHRONIC SYSTOLIC CONGESTIVE HEART FAILURE (HCC): ICD-10-CM

## 2020-10-28 DIAGNOSIS — I25.5 ISCHEMIC CARDIOMYOPATHY: ICD-10-CM

## 2020-10-28 DIAGNOSIS — I10 ESSENTIAL HYPERTENSION: ICD-10-CM

## 2020-10-28 DIAGNOSIS — G50.0 TRIGEMINAL NEURALGIA: ICD-10-CM

## 2020-10-28 DIAGNOSIS — Z95.0 CARDIAC PACEMAKER IN SITU: Primary | ICD-10-CM

## 2020-10-28 DIAGNOSIS — R00.1 BRADYCARDIA: ICD-10-CM

## 2020-10-28 DIAGNOSIS — I25.10 CORONARY ARTERY DISEASE INVOLVING NATIVE CORONARY ARTERY OF NATIVE HEART WITHOUT ANGINA PECTORIS: ICD-10-CM

## 2020-10-28 PROCEDURE — 93000 ELECTROCARDIOGRAM COMPLETE: CPT | Performed by: INTERNAL MEDICINE

## 2020-10-28 PROCEDURE — 99214 OFFICE O/P EST MOD 30 MIN: CPT | Performed by: INTERNAL MEDICINE

## 2020-10-28 RX ORDER — OXCARBAZEPINE 300 MG/1
300 TABLET, FILM COATED ORAL 2 TIMES DAILY
Qty: 180 TABLET | Refills: 0 | Status: SHIPPED | OUTPATIENT
Start: 2020-10-28 | End: 2020-12-30 | Stop reason: SDUPTHER

## 2020-10-28 RX ORDER — OXCARBAZEPINE 300 MG/1
300 TABLET, FILM COATED ORAL 2 TIMES DAILY
Qty: 180 TABLET | Refills: 0 | Status: CANCELLED | OUTPATIENT
Start: 2020-10-28

## 2020-10-28 NOTE — PATIENT INSTRUCTIONS
Contact Dr Alan office to find out when appointment is  Call Marcy if unable to get in to neurology before Rx expires  F/U 6 months

## 2020-10-28 NOTE — TELEPHONE ENCOUNTER
Would you sign for Dr Lucas patient 1 refill? I put a referral in for Dr Seipel's office.  Thank you

## 2020-11-12 RX ORDER — ACEBUTOLOL HYDROCHLORIDE 200 MG/1
CAPSULE ORAL
Qty: 90 CAPSULE | Refills: 1 | Status: SHIPPED | OUTPATIENT
Start: 2020-11-12 | End: 2021-06-27 | Stop reason: SDUPTHER

## 2020-11-12 RX ORDER — ATORVASTATIN CALCIUM 20 MG/1
TABLET, FILM COATED ORAL
Qty: 90 TABLET | Refills: 3 | Status: SHIPPED | OUTPATIENT
Start: 2020-11-12 | End: 2021-10-21

## 2020-12-28 ENCOUNTER — OFFICE (AMBULATORY)
Dept: URBAN - METROPOLITAN AREA CLINIC 64 | Facility: CLINIC | Age: 75
End: 2020-12-28

## 2020-12-28 VITALS
HEART RATE: 60 BPM | SYSTOLIC BLOOD PRESSURE: 147 MMHG | HEIGHT: 73 IN | WEIGHT: 222 LBS | DIASTOLIC BLOOD PRESSURE: 83 MMHG

## 2020-12-28 DIAGNOSIS — K21.9 GASTRO-ESOPHAGEAL REFLUX DISEASE WITHOUT ESOPHAGITIS: ICD-10-CM

## 2020-12-28 PROCEDURE — 99213 OFFICE O/P EST LOW 20 MIN: CPT | Performed by: INTERNAL MEDICINE

## 2020-12-28 RX ORDER — ESOMEPRAZOLE MAGNESIUM 40 MG/1
CAPSULE, DELAYED RELEASE ORAL
Qty: 90 | Refills: 0 | Status: ACTIVE

## 2020-12-29 NOTE — PROGRESS NOTES
Subjective: trigeminal neuralgia      Patient ID: Mode Duffy is a 75 y.o. male.    CHIEF COMPLAINT: Trigeminal Neuralgia        History of Present Illness   Mr. Duffy is a 75 year old   Male who has been followed by Dr Moody  5/2012 . Currently he is  taking Oxcarbazepine 300 mg bid, and amitriptyline 20 mg at night.   He denied any side effects other that occassionally running with low sodium.  medication.  States medication wears off. He has had Cyber knife per Dr Shepherd at the [facility] and his pain initially stopped for 2 years. He had weaned off Oxcarbazepine.  He reports that he can have a repeat Cyberknife after he is 5 years out from the previous dose.   The pain recurred at a much lower level recently and is managed with Oxcarbazepine at 300 mg bid with Elavil 20 mg at hs.   Prior Triggers: brushing teeth, talking and eating.   He does have SHELTON, and is treating with a mask type device that is not connected to CPAP.  He reports that he cannot use CPAP.     Complaint: Trigeminal Neuralgia  Onset: 2010  Location: Left cheek, left upper jaw and left lower jaw  Quality: hard pain   Severity: 6-10  Duration: Now absent x 1 month   Frequency: daily   Timing: Unrelated   Context: brushing teeth, chewing, talking, eating.  Modifying factors: Cyber Knife and Oxcarbazepine  Associated Signs and symptoms:  Trouble sleeping, eating, Low sodium from Ox, [facility] who [facility] 3 months,   Current meds: Oxcarbazepine 300 mg bid + Elavil 20 mg qhs      The following portions of the patient's history were reviewed and updated as appropriate: allergies, current medications, past family history, past medical history, past social history, past surgical history and problem list.      Family History   Problem Relation Age of Onset   • Heart disease Father    • Arthritis Father    • Diabetes Father    • Hypertension Father    • Hyperlipidemia Father    • Kidney disease Father    • Cancer  Mother        Past Medical History:   Diagnosis Date   • Arthritis    • Arthritis    • BPH (benign prostatic hyperplasia)    • Coronary artery disease     dr. Carrizales   • Deviated septum    • FH: cataracts    • Gait abnormality    • GERD (gastroesophageal reflux disease)    • History of trigeminal neuralgia    • Hyperlipidemia    • Hypertension    • Renal cyst    • Sciatica    • Sleep apnea     unable to tolerate machine       Social History     Socioeconomic History   • Marital status:      Spouse name: Not on file   • Number of children: Not on file   • Years of education: Not on file   • Highest education level: Not on file   Tobacco Use   • Smoking status: Former Smoker     Quit date:      Years since quittin.0   • Smokeless tobacco: Never Used   Substance and Sexual Activity   • Alcohol use: Yes     Alcohol/week: 7.0 standard drinks     Types: 7 Glasses of wine per week     Comment: RED WINE BEFORE DINNER   • Drug use: No   • Sexual activity: Defer         Current Outpatient Medications:   •  acebutolol (SECTRAL) 200 MG capsule, TAKE 1 CAPSULE EVERY DAY, Disp: 90 capsule, Rfl: 1  •  amitriptyline (ELAVIL) 10 MG tablet, Take 2 tablets by mouth Every Night., Disp: 180 tablet, Rfl: 3  •  amLODIPine (NORVASC) 10 MG tablet, Take 10 mg by mouth every night at bedtime., Disp: , Rfl:   •  APPLE CIDER VINEGAR PO, Take 10 mL by mouth., Disp: , Rfl:   •  aspirin 81 MG tablet, Take 81 mg by mouth Daily. Do not take day of surgery, Disp: , Rfl:   •  atorvastatin (LIPITOR) 20 MG tablet, TAKE 1 TABLET EVERY DAY, Disp: 90 tablet, Rfl: 3  •  b complex-C-folic acid 1 MG capsule, Take 1 capsule by mouth Daily. Stop on  for surgery, Disp: , Rfl:   •  calcium carbonate-cholecalciferol (CALCIUM 500+D) 500-400 MG-UNIT tablet tablet, Take 1 tablet by mouth Daily. Stop on  for surgery, Disp: , Rfl:   •  cetirizine (zyrTEC) 10 MG tablet, Take 10 mg by mouth Daily., Disp: , Rfl:   •  CHONDROITIN SULFATE OP, Apply   to eye(s) as directed by provider., Disp: , Rfl:   •  Cinnamon 500 MG capsule, Take 500 mg by mouth 2 (Two) Times a Day. Stop on 2/26 for surgery, Disp: , Rfl:   •  enalapril (VASOTEC) 5 MG tablet, Take 20 mg by mouth Daily. Stop now for surgery, Disp: , Rfl:   •  esomeprazole (NEXIUM) 40 MG capsule, Take 40 mg by mouth Daily., Disp: , Rfl:   •  glucosamine-chondroitin 500-400 MG capsule capsule, Take 1 capsule by mouth 2 (Two) Times a Day With Meals. Stop on 2/26 for surgery, Disp: , Rfl:   •  HONEY PO, Take 10 mL by mouth., Disp: , Rfl:   •  Inulin (METAMUCIL CLEAR & NATURAL PO), Take  by mouth., Disp: , Rfl:   •  magnesium oxide 250 MG tablet, Take 250 mg by mouth Daily. Stop on 2/26 for surgery, Disp: , Rfl: 0  •  Omega-3 Fatty Acids (FISH OIL) 1000 MG capsule capsule, Take 1,000 mg by mouth 2 (Two) Times a Day With Meals. Stop on 2/26 for surgery, Disp: , Rfl:   •  OXcarbazepine (TRILEPTAL) 300 MG tablet, Take 1 tablet by mouth 2 (Two) Times a Day., Disp: 180 tablet, Rfl: 3  •  Probiotic Product (PROBIOTIC ADVANCED PO), Take 1 capsule by mouth Daily. Stop on 2/26 for surgery, Disp: , Rfl:   •  Psyllium (METAMUCIL FIBER PO), Take  by mouth., Disp: , Rfl:   •  TURMERIC PO, Take 1 capsule by mouth Daily. Stop on 2/26 for surgery, Disp: , Rfl:   •  vitamin C (ASCORBIC ACID) 250 MG tablet, Take 250 mg by mouth Daily., Disp: , Rfl:     Review of Systems   Constitutional: Negative for fatigue and fever.   HENT: Negative for ear discharge, ear pain and facial swelling.    Eyes: Positive for visual disturbance. Negative for discharge and itching.   Respiratory: Positive for apnea. Negative for cough and shortness of breath.    Cardiovascular: Negative for chest pain.   Gastrointestinal: Negative for abdominal pain and nausea.   Endocrine: Negative for cold intolerance and heat intolerance.   Genitourinary: Positive for difficulty urinating.   Musculoskeletal: Positive for back pain.   Neurological: Negative for  dizziness and headaches.   Psychiatric/Behavioral: Negative for agitation, behavioral problems and confusion.        I have reviewed ROS completed by medical assistant.     Objective:    Neurologic Exam     Mental Status   Oriented to person, place, and time.   Attention: normal.   Speech: speech is normal   Level of consciousness: alert  Knowledge: good.   Normal comprehension.     Cranial Nerves   Cranial nerves II through XII intact.     Motor Exam   Muscle bulk: normal  Overall muscle tone: normal    Strength   Strength 5/5 throughout.     Sensory Exam   Light touch normal.   Vibration normal.   Proprioception normal.   Pinprick normal.     Gait, Coordination, and Reflexes     Gait  Gait: normal    Coordination   Romberg: negative  Heel to shin coordination: normal  Tandem walking coordination: normal    Tremor   Resting tremor: absent  Intention tremor: absent  Action tremor: absent    Reflexes   Right brachioradialis: 2+  Left brachioradialis: 2+  Right biceps: 2+  Left biceps: 2+  Right triceps: 2+  Left triceps: 2+  Right patellar: 2+  Left patellar: 2+  Right achilles: 2+  Left achilles: 2+  Right : 2+  Left : 2+      Physical Exam  Neurological:      Mental Status: He is oriented to person, place, and time.      Coordination: Heel to Shin Test and Romberg Test normal.      Gait: Gait is intact. Tandem walk normal.      Deep Tendon Reflexes: Strength normal.      Reflex Scores:       Tricep reflexes are 2+ on the right side and 2+ on the left side.       Bicep reflexes are 2+ on the right side and 2+ on the left side.       Brachioradialis reflexes are 2+ on the right side and 2+ on the left side.       Patellar reflexes are 2+ on the right side and 2+ on the left side.       Achilles reflexes are 2+ on the right side and 2+ on the left side.  Psychiatric:         Speech: Speech normal.       Testing 7/1/2012 CT of Brain at [facility] read by Dr Acacia DELGADO other than sinus disese                               Scanned moe medical record    Assessment/Plan:    Diagnoses and all orders for this visit:    1. Trigeminal neuralgia (Primary)  -     OXcarbazepine (TRILEPTAL) 300 MG tablet; Take 1 tablet by mouth 2 (Two) Times a Day.  Dispense: 180 tablet; Refill: 3  -     amitriptyline (ELAVIL) 10 MG tablet; Take 2 tablets by mouth Every Night.  Dispense: 180 tablet; Refill: 3    2. Hyponatremia  Comments:  [facility]  Continue Drinking V8 daily        Return in about 6 months (around 6/30/2021), or Seipel.      This document has been electronically signed by Sarita DEGROOT on December 30, 2020 14:48 EST

## 2020-12-30 ENCOUNTER — OFFICE VISIT (OUTPATIENT)
Dept: NEUROLOGY | Facility: CLINIC | Age: 75
End: 2020-12-30

## 2020-12-30 VITALS
WEIGHT: 222 LBS | HEART RATE: 66 BPM | HEIGHT: 73 IN | BODY MASS INDEX: 29.42 KG/M2 | SYSTOLIC BLOOD PRESSURE: 162 MMHG | TEMPERATURE: 97.1 F | DIASTOLIC BLOOD PRESSURE: 83 MMHG

## 2020-12-30 DIAGNOSIS — E87.1 HYPONATREMIA: Chronic | ICD-10-CM

## 2020-12-30 DIAGNOSIS — G50.0 TRIGEMINAL NEURALGIA: Primary | ICD-10-CM

## 2020-12-30 PROCEDURE — 99214 OFFICE O/P EST MOD 30 MIN: CPT | Performed by: NURSE PRACTITIONER

## 2020-12-30 RX ORDER — AMITRIPTYLINE HYDROCHLORIDE 10 MG/1
20 TABLET, FILM COATED ORAL NIGHTLY
Qty: 180 TABLET | Refills: 3 | Status: SHIPPED | OUTPATIENT
Start: 2020-12-30 | End: 2021-04-28 | Stop reason: SDUPTHER

## 2020-12-30 RX ORDER — OXCARBAZEPINE 300 MG/1
300 TABLET, FILM COATED ORAL 2 TIMES DAILY
Qty: 180 TABLET | Refills: 3 | Status: SHIPPED | OUTPATIENT
Start: 2020-12-30 | End: 2021-07-07

## 2021-01-04 ENCOUNTER — OFFICE VISIT (OUTPATIENT)
Dept: CARDIOLOGY | Facility: CLINIC | Age: 76
End: 2021-01-04

## 2021-01-04 ENCOUNTER — CLINICAL SUPPORT NO REQUIREMENTS (OUTPATIENT)
Dept: CARDIOLOGY | Facility: CLINIC | Age: 76
End: 2021-01-04

## 2021-01-04 VITALS
WEIGHT: 221 LBS | DIASTOLIC BLOOD PRESSURE: 88 MMHG | SYSTOLIC BLOOD PRESSURE: 168 MMHG | HEART RATE: 60 BPM | BODY MASS INDEX: 29.16 KG/M2 | OXYGEN SATURATION: 99 %

## 2021-01-04 DIAGNOSIS — I25.5 ISCHEMIC CARDIOMYOPATHY: Primary | ICD-10-CM

## 2021-01-04 DIAGNOSIS — I44.2 AV BLOCK, COMPLETE (HCC): ICD-10-CM

## 2021-01-04 DIAGNOSIS — R00.1 BRADYCARDIA: Primary | ICD-10-CM

## 2021-01-04 DIAGNOSIS — I44.2 COMPLETE HEART BLOCK (HCC): ICD-10-CM

## 2021-01-04 DIAGNOSIS — R00.1 BRADYCARDIA: ICD-10-CM

## 2021-01-04 DIAGNOSIS — I10 ESSENTIAL HYPERTENSION: ICD-10-CM

## 2021-01-04 DIAGNOSIS — G50.0 TRIGEMINAL NEURALGIA: ICD-10-CM

## 2021-01-04 DIAGNOSIS — Z95.0 CARDIAC PACEMAKER IN SITU: ICD-10-CM

## 2021-01-04 DIAGNOSIS — I49.5 SICK SINUS SYNDROME (HCC): ICD-10-CM

## 2021-01-04 PROCEDURE — 93281 PM DEVICE PROGR EVAL MULTI: CPT | Performed by: INTERNAL MEDICINE

## 2021-01-04 PROCEDURE — 99214 OFFICE O/P EST MOD 30 MIN: CPT | Performed by: INTERNAL MEDICINE

## 2021-01-04 PROCEDURE — 93000 ELECTROCARDIOGRAM COMPLETE: CPT | Performed by: INTERNAL MEDICINE

## 2021-01-04 NOTE — PROGRESS NOTES
CC--complete heart block, coronary artery disease    Sub--75 -year-old male patient has underlying complete heart block with a dual-chamber pacemaker which is implanted several years ago with multiple battery changes--started developing progressive dyspnea and fatigue with continuous RV pacing and underwent LV epicardial lead placement because of left subclavian occlusion and after LV lead epicardial placement patient was noted to have RV lead malfunction and a new RV pacing lead was placed from right subclavian and tunneled to the battery on the left side months ago.Remarkable improvement is noted in patient symptoms with biventricular pacing and patient in functional class I able to walk more than 3 miles every day without any symptoms.His EF in the past with RV pacing was between 35 to 40%  A prior history of coronary artery disease with remote stenting of his marginal branch  He also has history of hypertension and hyperlipidemia  Chronic medical problems include complete heart block with pacemaker in situ, iatrogenic left bundle branch block with RV pacing, chronic class II heart failure symptoms with LV dysfunction and coronary artery disease        Physical Exam  VITALS REVIEWED  General:      well developed, well nourished, in no acute distress.    Head:      normocephalic and atraumatic.    Eyes:      PERRL/EOM intact, conjunctiva and sclera clear with out nystagmus.    Neck:      no masses, thyromegaly,  trachea central with normal respiratory effort and PMI displaced laterally  Lungs:      clear bilaterally to auscultation.    Heart:       Underlying paced rhythm without any murmurs gallops or rubs        Assessment and plan    Dual-chamber pacemaker upgraded to biventricular pacing system with placement of LV epicardial lead and the new right ventricular pacing lead from right subclavian approach because of prior right ventricular pacing lead malfunction post LV epicardial lead placement --kindly note  patient has left subclavian occlusion--remarkable improvement with biventricular pacing and back to functional class I without any symptoms of heart failure and biventricular pacemaker interrogated in the office with appropriate function and interrogation attached to the chart and patient is on home monitoring  LV dysfunction with EF between 35 to 40%  Coronary artery disease  Chronic combined class II heart failure symptoms--resolved and back to functional class I with by V pacing  [place] hypertension as per the patient since multiple blood pressure recordings at home within normal limits  Nonsustained atrial arrhythmia lasting for 2 minutes noted and patient is asymptomatic and was educated and for now continue aspirin unless he has arrhythmias lasting more than 6 minutes  Medications reviewed  Labs from October 2020 reviewed showing a creatinine which was normal potassium of 4.9 and normal LFTs        ECG 12 Lead    Date/Time: 1/4/2021 9:44 AM  Performed by: Dominick Jackson MD  Authorized by: Dominick Jackson MD   Comparison: compared with previous ECG   Similar to previous ECG  Rhythm: sinus rhythm and paced  Rate: normal  Other findings: non-specific ST-T wave changes  Pacing: ventricular paced rhythm

## 2021-01-24 DIAGNOSIS — G50.0 TRIGEMINAL NEURALGIA: ICD-10-CM

## 2021-01-25 RX ORDER — OXCARBAZEPINE 300 MG/1
TABLET, FILM COATED ORAL
Qty: 180 TABLET | Refills: 3 | OUTPATIENT
Start: 2021-01-25

## 2021-03-26 ENCOUNTER — TELEPHONE (OUTPATIENT)
Dept: CARDIOLOGY | Facility: CLINIC | Age: 76
End: 2021-03-26

## 2021-03-26 NOTE — TELEPHONE ENCOUNTER
Remote device interrogation received  Noted to have atrial flutter on 3/25/2021    1 hr 30 min    Appears new for pt.     Not on a/c

## 2021-03-29 ENCOUNTER — TELEPHONE (OUTPATIENT)
Dept: CARDIOLOGY | Facility: CLINIC | Age: 76
End: 2021-03-29

## 2021-03-29 NOTE — TELEPHONE ENCOUNTER
----- Message from Radha Pemberton MD sent at 3/26/2021  5:40 PM EDT -----  Recommend patient to be seen in the office next week to discuss with the physician in person about this new diagnosis and further treatment options  ----- Message -----  From: Hailey Cordova MA  Sent: 3/26/2021   3:59 PM EDT  To: Radha Pemberton MD    Per So  Remote device interrogation received  Noted to have atrial flutter on 3/25/2021     1 hr 30 min     Appears new for pt.      Not on a/c  What should I do, since Dr Jackson is out?

## 2021-04-05 ENCOUNTER — OFFICE VISIT (OUTPATIENT)
Dept: CARDIOLOGY | Facility: CLINIC | Age: 76
End: 2021-04-05

## 2021-04-05 VITALS
SYSTOLIC BLOOD PRESSURE: 157 MMHG | BODY MASS INDEX: 29.16 KG/M2 | OXYGEN SATURATION: 97 % | HEART RATE: 60 BPM | DIASTOLIC BLOOD PRESSURE: 79 MMHG | WEIGHT: 221 LBS

## 2021-04-05 DIAGNOSIS — Z95.0 CARDIAC PACEMAKER IN SITU: ICD-10-CM

## 2021-04-05 DIAGNOSIS — I25.10 CORONARY ARTERY DISEASE INVOLVING NATIVE CORONARY ARTERY OF NATIVE HEART WITHOUT ANGINA PECTORIS: ICD-10-CM

## 2021-04-05 DIAGNOSIS — I48.0 PAROXYSMAL ATRIAL FIBRILLATION (HCC): Primary | ICD-10-CM

## 2021-04-05 DIAGNOSIS — I25.5 ISCHEMIC CARDIOMYOPATHY: ICD-10-CM

## 2021-04-05 DIAGNOSIS — I10 ESSENTIAL HYPERTENSION: ICD-10-CM

## 2021-04-05 DIAGNOSIS — I44.2 COMPLETE HEART BLOCK (HCC): ICD-10-CM

## 2021-04-05 PROCEDURE — 99214 OFFICE O/P EST MOD 30 MIN: CPT | Performed by: INTERNAL MEDICINE

## 2021-04-05 PROCEDURE — 93000 ELECTROCARDIOGRAM COMPLETE: CPT | Performed by: INTERNAL MEDICINE

## 2021-04-05 PROCEDURE — 93281 PM DEVICE PROGR EVAL MULTI: CPT | Performed by: INTERNAL MEDICINE

## 2021-04-05 RX ORDER — ZINC 25 MG
25 TABLET ORAL DAILY
COMMUNITY
Start: 2021-04-05

## 2021-04-05 NOTE — PROGRESS NOTES
CC--complete heart block, coronary artery disease    Sub--75 -year-old male patient has underlying complete heart block with a dual-chamber pacemaker which is implanted several years ago with multiple battery changes--started developing progressive dyspnea and fatigue with continuous RV pacing and underwent LV epicardial lead placement because of left subclavian occlusion and after LV lead epicardial placement patient was noted to have RV lead malfunction and a new RV pacing lead was placed from right subclavian and tunneled to the battery on the left side months ago.Remarkable improvement is noted in patient symptoms with biventricular pacing and patient in functional class I able to walk more than 3 miles every day without any symptoms.His EF in the past with RV pacing was between 35 to 40%  A prior history of coronary artery disease with remote stenting of his marginal branch  He also has history of hypertension and hyperlipidemia  Chronic medical problems include complete heart block with pacemaker in situ, iatrogenic left bundle branch block with RV pacing, chronic class II heart failure symptoms with LV dysfunction and coronary artery disease  Recent home monitoring of his pacemaker revealed increasing burden of atrial fibrillation and patient was called to the office to initiate anticoagulation  Denies any symptoms of any rapid palpitations or arrhythmia      Physical Exam  VITALS REVIEWED  General:      well developed, well nourished, in no acute distress.    Head:      normocephalic and atraumatic.    Eyes:      PERRL/EOM intact, conjunctiva and sclera clear with out nystagmus.    Neck:      no masses, thyromegaly,  trachea central with normal respiratory effort and PMI displaced laterally  Lungs:      clear bilaterally to auscultation.    Heart:       Underlying paced rhythm without any murmurs gallops or rubs        Assessment and plan    Dual-chamber pacemaker upgraded to biventricular pacing system with  placement of LV epicardial lead and the new right ventricular pacing lead from right subclavian approach because of prior right ventricular pacing lead malfunction post LV epicardial lead placement --kindly note patient has left subclavian occlusion--remarkable improvement with biventricular pacing and back to functional class I without any symptoms of heart failure and biventricular pacemaker interrogated in the office with appropriate function and interrogation attached to the chart and patient is on home monitoring  LV dysfunction with EF between 35 to 40%  Coronary artery disease  Chronic combined class II heart failure symptoms--resolved and back to functional class I with biv pacing  [place] hypertension as per the patient since multiple blood pressure recordings at home within normal limits  Sustained atrial arrhythmias in the form of atrial fibrillation/atypical flutter noted and patient to be initiated on Eliquis 5 mg twice daily and prescription sent   Role of anticoagulants educated  Other medications reviewed  Follow-up in 3 months      ECG 12 Lead    Date/Time: 4/5/2021 3:26 PM  Performed by: Dominick Jackson MD  Authorized by: Dominick Jackson MD   Comparison: compared with previous ECG   Similar to previous ECG  Rhythm: sinus rhythm and paced  Rate: normal          Electronically signed by Dominick Jackson MD, 04/05/21, 3:25 PM EDT.

## 2021-04-23 ENCOUNTER — OFFICE (AMBULATORY)
Dept: URBAN - METROPOLITAN AREA PATHOLOGY 4 | Facility: PATHOLOGY | Age: 76
End: 2021-04-23
Payer: MEDICARE

## 2021-04-23 ENCOUNTER — OFFICE (AMBULATORY)
Dept: URBAN - METROPOLITAN AREA PATHOLOGY 4 | Facility: PATHOLOGY | Age: 76
End: 2021-04-23
Payer: COMMERCIAL

## 2021-04-23 ENCOUNTER — ON CAMPUS - OUTPATIENT (AMBULATORY)
Dept: URBAN - METROPOLITAN AREA HOSPITAL 2 | Facility: HOSPITAL | Age: 76
End: 2021-04-23
Payer: MEDICARE

## 2021-04-23 VITALS
DIASTOLIC BLOOD PRESSURE: 72 MMHG | HEART RATE: 70 BPM | HEART RATE: 60 BPM | DIASTOLIC BLOOD PRESSURE: 69 MMHG | DIASTOLIC BLOOD PRESSURE: 74 MMHG | OXYGEN SATURATION: 100 % | DIASTOLIC BLOOD PRESSURE: 86 MMHG | OXYGEN SATURATION: 97 % | SYSTOLIC BLOOD PRESSURE: 104 MMHG | DIASTOLIC BLOOD PRESSURE: 64 MMHG | SYSTOLIC BLOOD PRESSURE: 123 MMHG | DIASTOLIC BLOOD PRESSURE: 62 MMHG | DIASTOLIC BLOOD PRESSURE: 76 MMHG | SYSTOLIC BLOOD PRESSURE: 124 MMHG | TEMPERATURE: 97.1 F | SYSTOLIC BLOOD PRESSURE: 99 MMHG | SYSTOLIC BLOOD PRESSURE: 131 MMHG | OXYGEN SATURATION: 95 % | RESPIRATION RATE: 18 BRPM | OXYGEN SATURATION: 98 % | DIASTOLIC BLOOD PRESSURE: 83 MMHG | OXYGEN SATURATION: 91 % | HEIGHT: 73 IN | RESPIRATION RATE: 16 BRPM | SYSTOLIC BLOOD PRESSURE: 122 MMHG | SYSTOLIC BLOOD PRESSURE: 117 MMHG | OXYGEN SATURATION: 96 % | DIASTOLIC BLOOD PRESSURE: 75 MMHG | HEART RATE: 75 BPM | DIASTOLIC BLOOD PRESSURE: 60 MMHG | SYSTOLIC BLOOD PRESSURE: 134 MMHG | WEIGHT: 219 LBS | OXYGEN SATURATION: 99 % | SYSTOLIC BLOOD PRESSURE: 115 MMHG | SYSTOLIC BLOOD PRESSURE: 106 MMHG

## 2021-04-23 DIAGNOSIS — Z86.010 PERSONAL HISTORY OF COLONIC POLYPS: ICD-10-CM

## 2021-04-23 DIAGNOSIS — D12.3 BENIGN NEOPLASM OF TRANSVERSE COLON: ICD-10-CM

## 2021-04-23 DIAGNOSIS — D12.5 BENIGN NEOPLASM OF SIGMOID COLON: ICD-10-CM

## 2021-04-23 DIAGNOSIS — K31.7 POLYP OF STOMACH AND DUODENUM: ICD-10-CM

## 2021-04-23 DIAGNOSIS — K57.30 DIVERTICULOSIS OF LARGE INTESTINE WITHOUT PERFORATION OR ABS: ICD-10-CM

## 2021-04-23 DIAGNOSIS — R13.10 DYSPHAGIA, UNSPECIFIED: ICD-10-CM

## 2021-04-23 PROBLEM — K63.5 POLYP OF COLON: Status: ACTIVE | Noted: 2021-04-23

## 2021-04-23 LAB
GI HISTOLOGY: A. SELECT: (no result)
GI HISTOLOGY: B. UNSPECIFIED: (no result)
GI HISTOLOGY: C. UNSPECIFIED: (no result)
GI HISTOLOGY: PDF REPORT: (no result)

## 2021-04-23 PROCEDURE — 43450 DILATE ESOPHAGUS 1/MULT PASS: CPT | Performed by: INTERNAL MEDICINE

## 2021-04-23 PROCEDURE — 45385 COLONOSCOPY W/LESION REMOVAL: CPT | Mod: PT | Performed by: INTERNAL MEDICINE

## 2021-04-23 PROCEDURE — 88305 TISSUE EXAM BY PATHOLOGIST: CPT | Mod: 26 | Performed by: INTERNAL MEDICINE

## 2021-04-23 PROCEDURE — 43239 EGD BIOPSY SINGLE/MULTIPLE: CPT | Performed by: INTERNAL MEDICINE

## 2021-04-27 NOTE — PROGRESS NOTES
Cardiology Office Visit      Encounter Date:  04/28/2021    Patient ID:   Mode Duffy is a 75 y.o. male.    Reason For Followup:  Coronary artery disease  Cardiomyopathy    Brief Clinical History:  Dear Dr. Vidal, Temo Hadley MD    I had the pleasure of seeing Mode Duffy today. As you are well aware, this is a 75 y.o. male with a known history of ischemic heart disease. He underwent cardiac catheterization with resultant PCI of an obtuse marginal branch and diagonal branch in November 2014. He has additional history of hypertension with hypertensive cardiovascular disease, dyslipidemia, antiplatelet therapy as well as sick sinus syndrome status post biventricular permanent pacemaker placement. He presents today for followup on the above conditions.     Interval History:  He denies any chest pain pressure heaviness or tightness.    He denies any shortness of breath out of character.  He denies any PND orthopnea.  He denies any syncope or near syncope.  He reports feeling in his usual state of cardiac health.    He recently saw cardiac electrophysiology.  On his device interrogation it was noted that he had episodic atrial fibrillation.  It was recommended that he start Eliquis.  He had a colonoscopy performed and wanted to wait until that was completed until he started Eliquis.  He will start Eliquis later this week.    He reports that [facility]  He will have those sent over.  His most recent labs from September 2020 demonstrate normal renal function as well as hepatic function.  Total cholesterol 143 triglycerides 57 HDL 46 and LDL 85.    His blood pressure is elevated today however he reports prior to coming to the office his reading at home was 134/78.     Assessment & Plan     Impressions:  Coronary artery disease status post PCI and stenting most recently in November of 2014.        This was cutting Balloon angioplasty of a circumflex branch and a  "diagonal branch.   Dyslipidemia.  Hypertension with hypertensive cardiovascular disease with a white coat component  Cardiomyopathy most recent EF of 35 to 40% in October 2019 echocardiogram  Sick sinus syndrome status post biventricular permanent pacemaker placement.  Paroxysmal atrial fibrillation  Coagulopathy secondary to Eliquis  Lumbago  Hyponatremia secondary to Trileptal  Renal cysts  History of trigeminal neuralgia presently treated with Trileptal.  Hip pain currently undergoing preoperative cardiovascular risk assessment for right hip surgery.  Trigeminal neuralgia     Recommendations:  Continuation of his current cardiovascular regimen at the present time.  [facility]  Follow-up in 6 months, sooner should there be difficulties       Objective:    Vitals:  Vitals:    04/28/21 1057   BP: 162/88   BP Location: Left arm   Patient Position: Sitting   Cuff Size: Large Adult   Pulse: 78   Resp: 18   SpO2: 96%   Weight: 100 kg (221 lb)   Height: 185.4 cm (73\")       Physical Exam:    General: Alert, cooperative, no distress, appears stated age  Head:  Normocephalic, atraumatic, mucous membranes moist  Eyes:  Conjunctiva/corneas clear, EOM's intact     Neck:  Supple,  no bruit  Lungs: Clear to auscultation bilaterally, no wheezes rhonchi rales are noted  Chest wall: No tenderness.  Pacer site clean dry and intact  Heart::  Regular rate and rhythm, S1 and S2 normal, 1/6 holosystolic murmur.  No rub or gallop  Abdomen: Soft, non-tender, nondistended bowel sounds active  Extremities: No cyanosis, clubbing, or edema  Pulses: 2+ and symmetric all extremities  Skin:  No rashes or lesions  Neuro/psych: A&O x3. CN II through XII are grossly intact with appropriate affect      Allergies:  No Known Allergies    Medication Review:     Current Outpatient Medications:   •  acebutolol (SECTRAL) 200 MG capsule, TAKE 1 CAPSULE EVERY DAY, Disp: 90 capsule, Rfl: 1  •  amitriptyline (ELAVIL) 10 MG tablet, Take 2 tablets by " mouth Every Night., Disp: 180 tablet, Rfl: 3  •  amLODIPine (NORVASC) 10 MG tablet, Take 10 mg by mouth every night at bedtime., Disp: , Rfl:   •  apixaban (ELIQUIS) 5 MG tablet tablet, Take 1 tablet by mouth Every 12 (Twelve) Hours., Disp: 60 tablet, Rfl: 3  •  APPLE CIDER VINEGAR PO, Take 10 mL by mouth., Disp: , Rfl:   •  aspirin 81 MG tablet, Take 81 mg by mouth Daily. Do not take day of surgery, Disp: , Rfl:   •  atorvastatin (LIPITOR) 20 MG tablet, TAKE 1 TABLET EVERY DAY, Disp: 90 tablet, Rfl: 3  •  b complex-C-folic acid 1 MG capsule, Take 1 capsule by mouth Daily. Stop on 2/26 for surgery, Disp: , Rfl:   •  calcium carbonate-cholecalciferol (CALCIUM 500+D) 500-400 MG-UNIT tablet tablet, Take 1 tablet by mouth Daily. Stop on 2/26 for surgery, Disp: , Rfl:   •  cetirizine (zyrTEC) 10 MG tablet, Take 10 mg by mouth Daily., Disp: , Rfl:   •  Cinnamon 500 MG capsule, Take 500 mg by mouth 2 (Two) Times a Day. Stop on 2/26 for surgery, Disp: , Rfl:   •  enalapril (VASOTEC) 5 MG tablet, Take 20 mg by mouth Daily. Stop now for surgery, Disp: , Rfl:   •  esomeprazole (NEXIUM) 40 MG capsule, Take 40 mg by mouth Daily., Disp: , Rfl:   •  glucosamine-chondroitin 500-400 MG capsule capsule, Take 1 capsule by mouth 2 (Two) Times a Day With Meals. Stop on 2/26 for surgery, Disp: , Rfl:   •  HONEY PO, Take 10 mL by mouth., Disp: , Rfl:   •  Inulin (METAMUCIL CLEAR & NATURAL PO), Take  by mouth., Disp: , Rfl:   •  Misc Natural Products (POMEGRANATE-ACAI ORTIZ PO), Take  by mouth. Beet Chew, Disp: , Rfl:   •  Omega-3 Fatty Acids (FISH OIL) 1000 MG capsule capsule, Take 1,000 mg by mouth 2 (Two) Times a Day With Meals. Stop on 2/26 for surgery, Disp: , Rfl:   •  OXcarbazepine (TRILEPTAL) 300 MG tablet, Take 1 tablet by mouth 2 (Two) Times a Day., Disp: 180 tablet, Rfl: 3  •  Probiotic Product (PROBIOTIC ADVANCED PO), Take 1 capsule by mouth Daily. Stop on 2/26 for surgery, Disp: , Rfl:   •  Psyllium (METAMUCIL FIBER PO), Take   by mouth., Disp: , Rfl:   •  TURMERIC PO, Take 1 capsule by mouth Daily. Stop on 2/26 for surgery, Disp: , Rfl:   •  vitamin C (ASCORBIC ACID) 250 MG tablet, Take 250 mg by mouth Daily., Disp: , Rfl:   •  Zinc 25 MG tablet, Take  by mouth., Disp: , Rfl:     Family History:  Family History   Problem Relation Age of Onset   • Heart disease Father    • Arthritis Father    • Diabetes Father    • Hypertension Father    • Hyperlipidemia Father    • Kidney disease Father    • Cancer Mother        Past Medical History:  Past Medical History:   Diagnosis Date   • Arthritis    • Arthritis    • BPH (benign prostatic hyperplasia)    • Coronary artery disease     dr. Carrizales   • Deviated septum    • FH: cataracts    • Gait abnormality    • GERD (gastroesophageal reflux disease)    • History of trigeminal neuralgia    • Hyperlipidemia    • Hypertension    • Renal cyst    • Sciatica    • Sleep apnea     unable to tolerate machine       Past surgical History:  Past Surgical History:   Procedure Laterality Date   • APPENDECTOMY     • CARDIAC CATHETERIZATION     • CARDIAC CATHETERIZATION N/A 3/5/2020    Procedure: Percutaneous Subclavian Intervention;  Surgeon: Nick Greenberg MD;  Location: [facility] CATH INVASIVE LOCATION;  Service: Cardiovascular;  Laterality: N/A;   • CARDIAC ELECTROPHYSIOLOGY PROCEDURE N/A 1/29/2020    Procedure: Biventricular Device Upgrade;  Surgeon: Dominick Jackson MD;  Location: [facility] CATH INVASIVE LOCATION;  Service: Cardiovascular   • CARDIAC ELECTROPHYSIOLOGY PROCEDURE N/A 3/5/2020    Procedure: Lead Revision;  Surgeon: Nick Greenberg MD;  Location: [facility] CATH INVASIVE LOCATION;  Service: Cardiovascular;  Laterality: N/A;   • CORONARY STENT PLACEMENT  2014    x 2   • PROSTATE SURGERY     • REFRACTIVE SURGERY     • THORACOTOMY Left 3/4/2020    Procedure: THORACOTOMY MINI WITH LEAD PLACEMENT;  Surgeon: Justin Aguilar MD;  Location: [facility] CVOR;  Service: Cardiothoracic;   Laterality: Left;   • TOTAL HIP ARTHROPLASTY Bilateral     different times       Social History:  Social History     Socioeconomic History   • Marital status:      Spouse name: Not on file   • Number of children: Not on file   • Years of education: Not on file   • Highest education level: Not on file   Tobacco Use   • Smoking status: Former Smoker     Quit date:      Years since quittin.3   • Smokeless tobacco: Never Used   Vaping Use   • Vaping Use: Never used   Substance and Sexual Activity   • Alcohol use: Yes     Alcohol/week: 7.0 standard drinks     Types: 7 Glasses of wine per week     Comment: RED WINE BEFORE DINNER   • Drug use: No   • Sexual activity: Defer       Review of Systems:  The following systems were reviewed as they relate to the cardiovascular system: Constitutional, Eyes, ENT, Cardiovascular, Respiratory, Gastrointestinal, Integumentary, Neurological, Psychiatric, Hematologic, Endocrine, Musculoskeletal, and Genitourinary. The pertinent cardiovascular findings are reported above with all other cardiovascular points within those systems being negative.    Diagnostic Study Review:     Current Electrocardiogram:  Procedures no new EKG.  EKG dated 2021 demonstrates AV dual paced rhythm with a rate of 60 bpm.      NOTE: The following portions of the patient's note were reviewed, confirmed and/or updated this visit as appropriate: History of present illness/Interval history, physical examination, assessment & plan, allergies, current medications, past family history, past medical history, past social history, past surgical history and problem list.

## 2021-04-28 ENCOUNTER — OFFICE VISIT (OUTPATIENT)
Dept: CARDIOLOGY | Facility: CLINIC | Age: 76
End: 2021-04-28

## 2021-04-28 VITALS
HEIGHT: 73 IN | RESPIRATION RATE: 18 BRPM | OXYGEN SATURATION: 96 % | HEART RATE: 78 BPM | WEIGHT: 221 LBS | SYSTOLIC BLOOD PRESSURE: 162 MMHG | DIASTOLIC BLOOD PRESSURE: 88 MMHG | BODY MASS INDEX: 29.29 KG/M2

## 2021-04-28 DIAGNOSIS — I10 ESSENTIAL HYPERTENSION: ICD-10-CM

## 2021-04-28 DIAGNOSIS — E78.2 MIXED HYPERLIPIDEMIA: ICD-10-CM

## 2021-04-28 DIAGNOSIS — I25.10 CARDIOVASCULAR DISEASE: ICD-10-CM

## 2021-04-28 DIAGNOSIS — I49.5 SICK SINUS SYNDROME (HCC): ICD-10-CM

## 2021-04-28 DIAGNOSIS — G50.0 TRIGEMINAL NEURALGIA: ICD-10-CM

## 2021-04-28 DIAGNOSIS — I50.22 CHRONIC SYSTOLIC CONGESTIVE HEART FAILURE (HCC): Primary | ICD-10-CM

## 2021-04-28 DIAGNOSIS — Z95.0 CARDIAC PACEMAKER IN SITU: ICD-10-CM

## 2021-04-28 PROCEDURE — 99214 OFFICE O/P EST MOD 30 MIN: CPT | Performed by: INTERNAL MEDICINE

## 2021-04-28 RX ORDER — AMITRIPTYLINE HYDROCHLORIDE 10 MG/1
20 TABLET, FILM COATED ORAL NIGHTLY
Qty: 180 TABLET | Refills: 3 | Status: SHIPPED | OUTPATIENT
Start: 2021-04-28 | End: 2022-04-27

## 2021-06-01 PROCEDURE — 93296 REM INTERROG EVL PM/IDS: CPT | Performed by: INTERNAL MEDICINE

## 2021-06-01 PROCEDURE — 93294 REM INTERROG EVL PM/LDLS PM: CPT | Performed by: INTERNAL MEDICINE

## 2021-06-27 RX ORDER — ACEBUTOLOL HYDROCHLORIDE 200 MG/1
CAPSULE ORAL
Qty: 90 CAPSULE | Refills: 1 | Status: SHIPPED | OUTPATIENT
Start: 2021-06-27 | End: 2022-02-02

## 2021-07-07 ENCOUNTER — TELEPHONE (OUTPATIENT)
Dept: NEUROLOGY | Facility: CLINIC | Age: 76
End: 2021-07-07

## 2021-07-07 DIAGNOSIS — G50.0 TRIGEMINAL NEURALGIA: Primary | ICD-10-CM

## 2021-07-07 RX ORDER — OXCARBAZEPINE 150 MG/1
300 TABLET, FILM COATED ORAL 3 TIMES DAILY
Qty: 180 TABLET | Refills: 2 | Status: ON HOLD | OUTPATIENT
Start: 2021-07-07 | End: 2022-10-17

## 2021-07-07 NOTE — TELEPHONE ENCOUNTER
The patient was reporting an increase in TGN pain and would like an extra Oxcarbamazepine to be used PRN.   He is concerned about lowering his sodium so he does not want to add it on a daily basis.  I did discuss a change to Gabapentin in the future if needed.  This would not affect his sodium.   He has follow up in December 2021. Will change Script to tid.

## 2021-07-07 NOTE — TELEPHONE ENCOUNTER
Caller: MARTHA    Relationship: SELF    Best call back number:[phone]    What is the best time to reach you: ANY         Do you require a callback:YES     MARTHA SAID HE WAS TOLD TO CALL THERESA FISHMAN BACK AND LET HER KNOW HOW HIS RX WAS HOLDING UP . HE STATES THAT IT IS NO LONGER WORKING . THERESA  SAID SHE WILL CHANGE  RX WHEN THE TIME COMES , JUST CALL AND SHE WILL CALL HIM AND DISCUSS.      PLEASE CALL AND ADVISE

## 2021-07-12 ENCOUNTER — OFFICE VISIT (OUTPATIENT)
Dept: CARDIOLOGY | Facility: CLINIC | Age: 76
End: 2021-07-12

## 2021-07-12 VITALS
HEIGHT: 73 IN | BODY MASS INDEX: 29.16 KG/M2 | SYSTOLIC BLOOD PRESSURE: 164 MMHG | HEART RATE: 66 BPM | OXYGEN SATURATION: 96 % | DIASTOLIC BLOOD PRESSURE: 80 MMHG | WEIGHT: 220 LBS

## 2021-07-12 DIAGNOSIS — I44.2 COMPLETE HEART BLOCK (HCC): ICD-10-CM

## 2021-07-12 DIAGNOSIS — I25.5 ISCHEMIC CARDIOMYOPATHY: ICD-10-CM

## 2021-07-12 DIAGNOSIS — I48.0 PAROXYSMAL ATRIAL FIBRILLATION (HCC): Primary | ICD-10-CM

## 2021-07-12 DIAGNOSIS — I10 ESSENTIAL HYPERTENSION: ICD-10-CM

## 2021-07-12 PROCEDURE — 93000 ELECTROCARDIOGRAM COMPLETE: CPT | Performed by: INTERNAL MEDICINE

## 2021-07-12 PROCEDURE — 99214 OFFICE O/P EST MOD 30 MIN: CPT | Performed by: INTERNAL MEDICINE

## 2021-07-12 NOTE — PROGRESS NOTES
CC--complete heart block, coronary artery disease    Sub--75 -year-old male patient has underlying complete heart block with a dual-chamber pacemaker which is implanted several years ago with multiple battery changes--started developing progressive dyspnea and fatigue with continuous RV pacing and underwent LV epicardial lead placement because of left subclavian occlusion and after LV lead epicardial placement patient was noted to have RV lead malfunction and a new RV pacing lead was placed from right subclavian and tunneled to the battery on the left side months ago.Remarkable improvement is noted in patient symptoms with biventricular pacing and patient in functional class I able to walk more than 3 miles every day without any symptoms.His EF in the past with RV pacing was between 35 to 40%. He has prior history of coronary artery disease with remote stenting of his marginal branch  He also has history of hypertension and hyperlipidemia  Chronic medical problems include complete heart block with pacemaker in situ, iatrogenic left bundle branch block with RV pacing, chronic class II heart failure symptoms with LV dysfunction and coronary artery disease  Recent home monitoring of his pacemaker revealed increasing burden of atrial fibrillation and patient was started on  anticoagulation  He does complain of intermittent  palpitations         Physical Exam  VITALS REVIEWED  General:      well developed, well nourished, in no acute distress.    Head:      normocephalic and atraumatic.    Eyes:      PERRL/EOM intact, conjunctiva and sclera clear with out nystagmus.    Neck:      no masses, thyromegaly,  trachea central with normal respiratory effort and PMI displaced laterally  Lungs:      clear bilaterally to auscultation.    Heart:       Underlying paced rhythm without any murmurs gallops or rubs        Assessment and plan    Dual-chamber pacemaker upgraded to biventricular pacing system with placement of LV  epicardial lead and the new right ventricular pacing lead from right subclavian approach because of prior right ventricular pacing lead malfunction post LV epicardial lead placement --kindly note patient has left subclavian occlusion--remarkable improvement with biventricular pacing and back to functional class I without any symptoms of heart failure and biventricular pacemaker interrogated in the office with appropriate function and interrogation attached to the chart and patient is on home monitoring  LV dysfunction with EF between 35 to 40%  Coronary artery disease  Chronic combined class II heart failure symptoms--resolved and back to functional class I with biv pacing  [place] hypertension as per the patient since multiple blood pressure recordings at home within normal limits  Sustained atrial arrhythmias in the form of atrial fibrillation/atypical flutter noted and patient  initiated on Eliquis 5 mg twice daily and increasing burden of atrial arrhythmias noted in the last 3 months --patient develops symptoms of palpitations === options like Tikosyn versus ablation discussed with the patient of the arrhythmia incidence increases --- follow closely in 3 months   Other medications reviewed  Follow-up in 3 months  Recent labs reviewed potassium of 4.6  Normal creatinine  Chads vasc score---5        ECG 12 Lead    Date/Time: 7/12/2021 9:31 AM  Performed by: Dominick Jackson MD  Authorized by: Dominick Jackson MD   Comparison: compared with previous ECG   Similar to previous ECG  Rhythm: sinus rhythm and paced  Rate: normal  Pacing: dual chamber pacing        Electronically signed by Dominick Jackson MD, 07/12/21, 9:30 AM EDT.

## 2021-08-16 RX ORDER — APIXABAN 5 MG/1
TABLET, FILM COATED ORAL
Qty: 60 TABLET | Refills: 3 | Status: SHIPPED | OUTPATIENT
Start: 2021-08-16 | End: 2021-12-28

## 2021-08-31 PROCEDURE — 93294 REM INTERROG EVL PM/LDLS PM: CPT | Performed by: INTERNAL MEDICINE

## 2021-08-31 PROCEDURE — 93296 REM INTERROG EVL PM/IDS: CPT | Performed by: INTERNAL MEDICINE

## 2021-10-21 DIAGNOSIS — E78.2 MIXED HYPERLIPIDEMIA: Primary | ICD-10-CM

## 2021-10-21 RX ORDER — ATORVASTATIN CALCIUM 20 MG/1
TABLET, FILM COATED ORAL
Qty: 90 TABLET | Refills: 3 | Status: SHIPPED | OUTPATIENT
Start: 2021-10-21 | End: 2022-09-28

## 2021-11-30 PROCEDURE — 93296 REM INTERROG EVL PM/IDS: CPT | Performed by: INTERNAL MEDICINE

## 2021-11-30 PROCEDURE — 93294 REM INTERROG EVL PM/LDLS PM: CPT | Performed by: INTERNAL MEDICINE

## 2021-12-01 ENCOUNTER — OFFICE VISIT (OUTPATIENT)
Dept: NEUROLOGY | Facility: CLINIC | Age: 76
End: 2021-12-01

## 2021-12-01 VITALS
HEART RATE: 67 BPM | SYSTOLIC BLOOD PRESSURE: 165 MMHG | WEIGHT: 220 LBS | HEIGHT: 73 IN | DIASTOLIC BLOOD PRESSURE: 83 MMHG | BODY MASS INDEX: 29.16 KG/M2 | TEMPERATURE: 97.1 F

## 2021-12-01 DIAGNOSIS — G50.0 TRIGEMINAL NEURALGIA: Primary | ICD-10-CM

## 2021-12-01 DIAGNOSIS — R55 NEAR SYNCOPE: ICD-10-CM

## 2021-12-01 PROCEDURE — 99215 OFFICE O/P EST HI 40 MIN: CPT | Performed by: NURSE PRACTITIONER

## 2021-12-01 RX ORDER — BENZONATATE 100 MG/1
100 CAPSULE ORAL 3 TIMES DAILY PRN
COMMUNITY
Start: 2021-11-29 | End: 2022-03-03 | Stop reason: ALTCHOICE

## 2021-12-01 RX ORDER — PREDNISONE 20 MG/1
TABLET ORAL
COMMUNITY
Start: 2021-11-29 | End: 2022-03-03 | Stop reason: ALTCHOICE

## 2021-12-01 RX ORDER — AMOXICILLIN 500 MG/1
TABLET, FILM COATED ORAL
COMMUNITY
Start: 2021-10-02 | End: 2021-12-20

## 2021-12-03 ENCOUNTER — OFFICE (AMBULATORY)
Dept: URBAN - METROPOLITAN AREA CLINIC 64 | Facility: CLINIC | Age: 76
End: 2021-12-03

## 2021-12-03 VITALS
HEART RATE: 59 BPM | DIASTOLIC BLOOD PRESSURE: 77 MMHG | HEIGHT: 73 IN | SYSTOLIC BLOOD PRESSURE: 146 MMHG | WEIGHT: 217 LBS

## 2021-12-03 DIAGNOSIS — K21.9 GASTRO-ESOPHAGEAL REFLUX DISEASE WITHOUT ESOPHAGITIS: ICD-10-CM

## 2021-12-03 DIAGNOSIS — K59.00 CONSTIPATION, UNSPECIFIED: ICD-10-CM

## 2021-12-03 PROCEDURE — 99213 OFFICE O/P EST LOW 20 MIN: CPT | Performed by: INTERNAL MEDICINE

## 2021-12-03 RX ORDER — ESOMEPRAZOLE MAGNESIUM 40 MG/1
CAPSULE, DELAYED RELEASE ORAL
Qty: 90 | Refills: 0 | Status: ACTIVE

## 2021-12-20 ENCOUNTER — OFFICE VISIT (OUTPATIENT)
Dept: CARDIOLOGY | Facility: CLINIC | Age: 76
End: 2021-12-20

## 2021-12-20 VITALS
SYSTOLIC BLOOD PRESSURE: 172 MMHG | HEIGHT: 73 IN | DIASTOLIC BLOOD PRESSURE: 94 MMHG | OXYGEN SATURATION: 97 % | BODY MASS INDEX: 28.76 KG/M2 | WEIGHT: 217 LBS | HEART RATE: 60 BPM

## 2021-12-20 DIAGNOSIS — I44.2 COMPLETE HEART BLOCK (HCC): ICD-10-CM

## 2021-12-20 DIAGNOSIS — I25.5 ISCHEMIC CARDIOMYOPATHY: ICD-10-CM

## 2021-12-20 DIAGNOSIS — Z95.0 CARDIAC PACEMAKER IN SITU: ICD-10-CM

## 2021-12-20 DIAGNOSIS — I48.0 PAROXYSMAL ATRIAL FIBRILLATION (HCC): Primary | ICD-10-CM

## 2021-12-20 DIAGNOSIS — I10 ESSENTIAL HYPERTENSION: ICD-10-CM

## 2021-12-20 PROCEDURE — 99214 OFFICE O/P EST MOD 30 MIN: CPT | Performed by: INTERNAL MEDICINE

## 2021-12-20 PROCEDURE — 93281 PM DEVICE PROGR EVAL MULTI: CPT | Performed by: INTERNAL MEDICINE

## 2021-12-20 PROCEDURE — 93000 ELECTROCARDIOGRAM COMPLETE: CPT | Performed by: INTERNAL MEDICINE

## 2021-12-20 NOTE — PROGRESS NOTES
CC--complete heart block, coronary artery disease    Sub--76 -year-old male patient has underlying complete heart block with a dual-chamber pacemaker which is implanted several years ago with multiple battery changes--started developing progressive dyspnea and fatigue with continuous RV pacing and underwent LV epicardial lead placement because of left subclavian occlusion and after LV lead epicardial placement patient was noted to have RV lead malfunction and a new RV pacing lead was placed from right subclavian and tunneled to the battery on the left side months ago.Remarkable improvement is noted in patient symptoms with biventricular pacing and patient in functional class I able to walk more than 3 miles every day without any symptoms.His EF in the past with RV pacing was between 35 to 40%. He has prior history of coronary artery disease with remote stenting of his marginal branch  He also has history of hypertension and hyperlipidemia  Chronic medical problems include complete heart block with pacemaker in situ, iatrogenic left bundle branch block with RV pacing, chronic class II heart failure symptoms with LV dysfunction and coronary artery disease  Recent home monitoring of his pacemaker revealed increasing burden of atrial fibrillation and patient was started on  anticoagulation  Denies any palpitations and feels well and denies any new symptoms        Physical Exam  VITALS REVIEWED  General:      well developed, well nourished, in no acute distress.    Head:      normocephalic and atraumatic.    Eyes:      PERRL/EOM intact, conjunctiva and sclera clear with out nystagmus.    Neck:      no masses, thyromegaly,  trachea central with normal respiratory effort and PMI displaced laterally  Lungs:      clear bilaterally to auscultation.    Heart:       Underlying paced rhythm without any murmurs gallops or rubs        Assessment and plan    Dual-chamber pacemaker upgraded to biventricular pacing system with  placement of LV epicardial lead and the new right ventricular pacing lead from right subclavian approach because of prior right ventricular pacing lead malfunction post LV epicardial lead placement --kindly note patient has left subclavian occlusion--remarkable improvement with biventricular pacing and back to functional class I without any symptoms of heart failure and biventricular pacemaker interrogated in the office with appropriate function and interrogation attached to the chart and patient is on home monitoring  LV dysfunction with EF between 35 to 40%  Coronary artery disease  Chronic combined class II heart failure symptoms--resolved and back to functional class I with biv pacing  [place] hypertension as per the patient since multiple blood pressure recordings at home within normal limits  Sustained atrial arrhythmias in the form of atrial fibrillation/atypical flutter noted and patient  initiated on Eliquis 5 mg twice daily and increasing burden of atrial arrhythmias noted in the last 3 months --patient develops symptoms of palpitations === options like Tikosyn versus ablation discussed with the patient of the arrhythmia incidence increases --- currently in sinus rhythm without significant burden of arrhythmia in the last few months and no further treatment is indicated at this point    Other medications reviewed  Follow-up in 3 months  Chads vasc score---5      ECG 12 Lead    Date/Time: 12/20/2021 1:22 PM  Performed by: Dominick Jackson MD  Authorized by: Dominick Jackson MD   Comparison: compared with previous ECG   Similar to previous ECG  Rhythm: sinus rhythm and paced  Rate: normal  Pacing: ventricular paced rhythm        Electronically signed by Dominick Jackson MD, 12/20/21, 1:22 PM EST.

## 2021-12-28 RX ORDER — APIXABAN 5 MG/1
TABLET, FILM COATED ORAL
Qty: 60 TABLET | Refills: 3 | Status: SHIPPED | OUTPATIENT
Start: 2021-12-28

## 2022-01-24 DIAGNOSIS — G50.0 TRIGEMINAL NEURALGIA: ICD-10-CM

## 2022-01-25 RX ORDER — OXCARBAZEPINE 300 MG/1
TABLET, FILM COATED ORAL
Qty: 180 TABLET | Refills: 3 | Status: SHIPPED | OUTPATIENT
Start: 2022-01-25 | End: 2023-01-24

## 2022-02-02 RX ORDER — ACEBUTOLOL HYDROCHLORIDE 200 MG/1
CAPSULE ORAL
Qty: 90 CAPSULE | Refills: 1 | Status: SHIPPED | OUTPATIENT
Start: 2022-02-02 | End: 2022-07-06

## 2022-03-01 PROCEDURE — 93296 REM INTERROG EVL PM/IDS: CPT | Performed by: INTERNAL MEDICINE

## 2022-03-01 PROCEDURE — 93294 REM INTERROG EVL PM/LDLS PM: CPT | Performed by: INTERNAL MEDICINE

## 2022-03-03 ENCOUNTER — OFFICE VISIT (OUTPATIENT)
Dept: CARDIOLOGY | Facility: CLINIC | Age: 77
End: 2022-03-03

## 2022-03-03 VITALS
HEART RATE: 61 BPM | BODY MASS INDEX: 29.69 KG/M2 | HEIGHT: 73 IN | OXYGEN SATURATION: 97 % | SYSTOLIC BLOOD PRESSURE: 172 MMHG | WEIGHT: 224 LBS | DIASTOLIC BLOOD PRESSURE: 84 MMHG

## 2022-03-03 DIAGNOSIS — Z95.0 CARDIAC PACEMAKER IN SITU: ICD-10-CM

## 2022-03-03 DIAGNOSIS — I49.5 SICK SINUS SYNDROME: ICD-10-CM

## 2022-03-03 DIAGNOSIS — E78.2 MIXED HYPERLIPIDEMIA: ICD-10-CM

## 2022-03-03 DIAGNOSIS — I25.5 ISCHEMIC CARDIOMYOPATHY: ICD-10-CM

## 2022-03-03 DIAGNOSIS — I10 PRIMARY HYPERTENSION: ICD-10-CM

## 2022-03-03 DIAGNOSIS — I25.10 CORONARY ARTERY DISEASE INVOLVING NATIVE CORONARY ARTERY OF NATIVE HEART WITHOUT ANGINA PECTORIS: Primary | ICD-10-CM

## 2022-03-03 PROCEDURE — 93000 ELECTROCARDIOGRAM COMPLETE: CPT | Performed by: INTERNAL MEDICINE

## 2022-03-03 PROCEDURE — 99214 OFFICE O/P EST MOD 30 MIN: CPT | Performed by: INTERNAL MEDICINE

## 2022-03-03 NOTE — PROGRESS NOTES
Cardiology Office Visit      Encounter Date:  03/03/2022    Patient ID:   Mode Duffy is a 76 y.o. male.    Reason For Followup:  Coronary artery disease  Cardiomyopathy    Brief Clinical History:  Dear Dr. Vidal, Temo Hadley MD    I had the pleasure of seeing Mode Duffy today. As you are well aware, this is a 76 y.o. male with a known history of ischemic heart disease. He underwent cardiac catheterization with resultant PCI of an obtuse marginal branch and diagonal branch in November 2014. He has additional history of hypertension with hypertensive cardiovascular disease, dyslipidemia, paroxysmal atrial fibrillation, antiplatelet therapy as well as sick sinus syndrome status post biventricular permanent pacemaker placement. He presents today for followup on the above conditions.     Interval History:  He denies any chest pain pressure heaviness or tightness.    He denies any shortness of breath out of character.  He denies any PND orthopnea.  He denies any syncope or near syncope.  He reports feeling in his usual state of cardiac health.    Although his blood pressure is elevated today, he reports his blood pressure at home is quite well controlled.     Assessment & Plan     Impressions:  Coronary artery disease status post PCI and stenting most recently in November of 2014.        This was cutting Balloon angioplasty of a circumflex branch and a diagonal branch.   Dyslipidemia.  Hypertension with hypertensive cardiovascular disease with a white coat component  Cardiomyopathy most recent EF of 35 to 40% in October 2019 echocardiogram  Sick sinus syndrome status post biventricular permanent pacemaker placement.  Paroxysmal atrial fibrillation  Coagulopathy secondary to Eliquis  Lumbago  Hyponatremia secondary to Trileptal  Renal cysts  History of trigeminal neuralgia presently treated with Trileptal.  Hip pain currently undergoing preoperative cardiovascular risk assessment for right hip  "surgery.  Trigeminal neuralgia     Recommendations:  Continuation of his current cardiovascular regimen at the present time.     This includes antihypertensives, anticoagulants, and antiplatelets.  [facility]  Follow-up with EP as scheduled  Follow-up in 6 months, sooner should there be difficulties    Diagnoses and all orders for this visit:    1. Coronary artery disease involving native coronary artery of native heart without angina pectoris (Primary)  -     ECG 12 Lead    2. Cardiac pacemaker in situ  -     ECG 12 Lead    3. Ischemic cardiomyopathy  Overview:  Added automatically from request for surgery [id number]    Orders:  -     ECG 12 Lead    4. Mixed hyperlipidemia  -     ECG 12 Lead    5. Primary hypertension  -     ECG 12 Lead    6. Sick sinus syndrome (HCC)  -     ECG 12 Lead        Objective:    Vitals:  Vitals:    03/03/22 0951   BP: 172/84   Pulse: 61   SpO2: 97%   Weight: 102 kg (224 lb)   Height: 185.4 cm (73\")     Body mass index is 29.55 kg/m².      Physical Exam:    General: Alert, cooperative, no distress, appears stated age  Head:  Normocephalic, atraumatic, mucous membranes moist  Eyes:  Conjunctiva/corneas clear, EOM's intact     Neck:  Supple,  no bruit    Lungs: Clear to auscultation bilaterally, no wheezes rhonchi rales are noted  Chest wall: No tenderness  Heart::  Regular rate and rhythm, S1 and S2 normal, 1/6 holosystolic murmur.  No rub or gallop  Abdomen: Soft, non-tender, nondistended bowel sounds active  Extremities: No cyanosis, clubbing, or edema  Pulses: 2+ and symmetric all extremities  Skin:  No rashes or lesions  Neuro/psych: A&O x3. CN II through XII are grossly intact with appropriate affect      Allergies:  No Known Allergies    Medication Review:     Current Outpatient Medications:   •  acebutolol (SECTRAL) 200 MG capsule, TAKE 1 CAPSULE EVERY DAY, Disp: 90 capsule, Rfl: 1  •  amitriptyline (ELAVIL) 10 MG tablet, Take 2 tablets by mouth Every Night., Disp: 180 tablet, " Rfl: 3  •  amLODIPine (NORVASC) 10 MG tablet, Take 10 mg by mouth every night at bedtime., Disp: , Rfl:   •  APPLE CIDER VINEGAR PO, Take 10 mL by mouth., Disp: , Rfl:   •  aspirin 81 MG tablet, Take 81 mg by mouth Daily. Do not take day of surgery, Disp: , Rfl:   •  atorvastatin (LIPITOR) 20 MG tablet, TAKE 1 TABLET EVERY DAY, Disp: 90 tablet, Rfl: 3  •  b complex-C-folic acid 1 MG capsule, Take 1 capsule by mouth Daily. Stop on 2/26 for surgery, Disp: , Rfl:   •  calcium carbonate-cholecalciferol (CALCIUM 500+D) 500-400 MG-UNIT tablet tablet, Take 1 tablet by mouth Daily. Stop on 2/26 for surgery, Disp: , Rfl:   •  cetirizine (zyrTEC) 10 MG tablet, Take 10 mg by mouth Daily., Disp: , Rfl:   •  Cinnamon 500 MG capsule, Take 500 mg by mouth 2 (Two) Times a Day. Stop on 2/26 for surgery, Disp: , Rfl:   •  Eliquis 5 MG tablet tablet, TAKE 1 TABLET BY MOUTH EVERY 12 HOURS, Disp: 60 tablet, Rfl: 3  •  enalapril (VASOTEC) 5 MG tablet, Take 20 mg by mouth Daily., Disp: , Rfl:   •  esomeprazole (NEXIUM) 40 MG capsule, Take 40 mg by mouth Daily., Disp: , Rfl:   •  glucosamine-chondroitin 500-400 MG capsule capsule, Take 1 capsule by mouth 2 (Two) Times a Day With Meals. Stop on 2/26 for surgery, Disp: , Rfl:   •  HONEY PO, Take 10 mL by mouth., Disp: , Rfl:   •  Inulin (METAMUCIL CLEAR & NATURAL PO), Take  by mouth., Disp: , Rfl:   •  Misc Natural Products (POMEGRANATE-ACAI ORTIZ PO), Take  by mouth. Beet Chew, Disp: , Rfl:   •  Omega-3 Fatty Acids (FISH OIL) 1000 MG capsule capsule, Take 1,000 mg by mouth 2 (Two) Times a Day With Meals. Stop on 2/26 for surgery, Disp: , Rfl:   •  OXcarbazepine (TRILEPTAL) 300 MG tablet, TAKE 1 TABLET BY MOUTH TWICE DAILY, Disp: 180 tablet, Rfl: 3  •  Probiotic Product (PROBIOTIC ADVANCED PO), Take 1 capsule by mouth Daily. Stop on 2/26 for surgery, Disp: , Rfl:   •  Psyllium (METAMUCIL FIBER PO), Take  by mouth., Disp: , Rfl:   •  TURMERIC PO, Take 1 capsule by mouth Daily. Stop on 2/26  for surgery, Disp: , Rfl:   •  vitamin C (ASCORBIC ACID) 250 MG tablet, Take 250 mg by mouth Daily., Disp: , Rfl:   •  Zinc 25 MG tablet, Take  by mouth., Disp: , Rfl:   •  OXcarbazepine (TRILEPTAL) 150 MG tablet, Take 2 tablets by mouth 3 (Three) Times a Day for 90 days. Take 300 mg twice a day and an extra 150-300 mg as needed (Increase sodium intake), Disp: 180 tablet, Rfl: 2    Family History:  Family History   Problem Relation Age of Onset   • Heart disease Father    • Arthritis Father    • Diabetes Father    • Hypertension Father    • Hyperlipidemia Father    • Kidney disease Father    • Cancer Mother        Past Medical History:  Past Medical History:   Diagnosis Date   • Arthritis    • Arthritis    • BPH (benign prostatic hyperplasia)    • CHF (congestive heart failure) (HCC)    • Coronary artery disease     dr. Carrizales   • Deviated septum    • FH: cataracts    • Gait abnormality    • GERD (gastroesophageal reflux disease)    • History of trigeminal neuralgia    • Hyperlipidemia    • Hypertension    • Renal cyst    • Sciatica    • Sleep apnea     unable to tolerate machine       Past Surgical History:  Past Surgical History:   Procedure Laterality Date   • APPENDECTOMY     • CARDIAC CATHETERIZATION     • CARDIAC CATHETERIZATION N/A 3/5/2020    Procedure: Percutaneous Subclavian Intervention;  Surgeon: Nick Greenberg MD;  Location: [facility] CATH INVASIVE LOCATION;  Service: Cardiovascular;  Laterality: N/A;   • CARDIAC ELECTROPHYSIOLOGY PROCEDURE N/A 1/29/2020    Procedure: Biventricular Device Upgrade;  Surgeon: Dominick Jackson MD;  Location: [facility] CATH INVASIVE LOCATION;  Service: Cardiovascular   • CARDIAC ELECTROPHYSIOLOGY PROCEDURE N/A 3/5/2020    Procedure: Lead Revision;  Surgeon: Nick Greenberg MD;  Location: [facility] CATH INVASIVE LOCATION;  Service: Cardiovascular;  Laterality: N/A;   • CORONARY STENT PLACEMENT  2014    x 2   • PROSTATE SURGERY     • REFRACTIVE SURGERY     •  THORACOTOMY Left 3/4/2020    Procedure: THORACOTOMY MINI WITH LEAD PLACEMENT;  Surgeon: Justin Aguilar MD;  Location: [facility];  Service: Cardiothoracic;  Laterality: Left;   • TOTAL HIP ARTHROPLASTY Bilateral     different times       Social History:  Social History     Socioeconomic History   • Marital status:    Tobacco Use   • Smoking status: Former Smoker     Quit date:      Years since quittin.2   • Smokeless tobacco: Never Used   Vaping Use   • Vaping Use: Never used   Substance and Sexual Activity   • Alcohol use: Yes     Alcohol/week: 7.0 standard drinks     Types: 7 Glasses of wine per week     Comment: RED WINE BEFORE DINNER   • Drug use: No   • Sexual activity: Defer       Review of Systems:  The following systems were reviewed as they relate to the cardiovascular system: Constitutional, Eyes, ENT, Cardiovascular, Respiratory, Gastrointestinal, Integumentary, Neurological, Psychiatric, Hematologic, Endocrine, Musculoskeletal, and Genitourinary. The pertinent cardiovascular findings are reported above with all other cardiovascular points within those systems being negative.    Diagnostic Study Review:     Current Electrocardiogram:    ECG 12 Lead    Date/Time: 3/3/2022 12:47 PM  Performed by: Yonathan Carrizales DO  Authorized by: Yonathan Carrizales DO   Comparison: not compared with previous ECG   Previous ECG: no previous ECG available  Comments: Ventricular paced rhythm with a rate of 60 bpm.  Normal QT and QTc intervals.            Laboratory Data:  Lab Results   Component Value Date    GLUCOSE 115 (H) 2020    BUN 16 2020    CREATININE 0.75 (L) 2020    EGFRIFNONA 102 2020    BCR 21.3 2020    K 3.8 2020    CO2 22.0 2020    CALCIUM 8.7 2020    ALBUMIN 3.80 2020    LABIL2 1.6 2017    AST 20 2020    ALT 30 2020     Lab Results   Component Value Date    GLUCOSE 115 (H) 2020    CALCIUM 8.7  03/07/2020     (L) 03/07/2020    K 3.8 03/07/2020    CO2 22.0 03/07/2020     03/07/2020    BUN 16 03/07/2020    CREATININE 0.75 (L) 03/07/2020    EGFRIFNONA 102 03/07/2020    BCR 21.3 03/07/2020    ANIONGAP 12.0 03/07/2020     Lab Results   Component Value Date    WBC 7.00 03/07/2020    HGB 14.7 03/07/2020    HCT 40.1 03/07/2020    MCV 95.8 03/07/2020     03/07/2020     Lab Results   Component Value Date    CHOL 137 03/02/2020    TRIG 115 03/02/2020    HDL 55 03/02/2020    LDL 59 03/02/2020     Lab Results   Component Value Date    HGBA1C 5.9 (H) 03/02/2020     Lab Results   Component Value Date    INR 1.04 03/05/2020    INR 0.94 03/04/2020    INR 0.98 03/02/2020    PROTIME 10.9 03/05/2020    PROTIME 10.0 03/04/2020    PROTIME 10.3 03/02/2020       Most Recent Echo:  Results for orders placed during the hospital encounter of 10/29/19    Adult Transthoracic Echo Complete W/ Cont if Necessary Per Protocol    Interpretation Summary  · Left ventricular systolic function is moderately decreased.  · Estimated EF appears to be in the range of 36 - 40%  · Mild to moderate aortic valve regurgitation is present.  · Left atrial cavity size is moderate-to-severely dilated.  · Mild tricuspid valve regurgitation is present.  · Mild mitral valve regurgitation is present  · Left ventricular diastolic dysfunction (grade I a) consistent with impaired relaxation.       Most Recent Stress Test:       Most Recent Cardiac Catheterization:   Results for orders placed during the hospital encounter of 03/04/20    Cardiac Catheterization/Vascular Study    Narrative  Lead revision procedure note.    Procedure:  Failed PTA attempt to occluded left subclavian vein.  Placement of RV  lead through right subclavian vein approach and tunneled into left subclavian pocket and open pocket and connected RV lead to the generator and capped the existing malfunctioning RV lead.  Temporary pacemaker placement through right femoral venous  approach    Indication: This is a 74-year-old with complete heart block and previous history of dual-chamber pacemaker over 21 years ago and LV dysfunction EF of 35 to 40% probably due to 100% pacer dependent status and CHF with lower extremity edema and tiredness and history of CAD and PCI, hypertension, dyslipidemia here for procedure.  Patient was initially attempted to have left subclavian approach LV lead placement but was found to have a occluded left subclavian vein therefore was referred to surgery and underwent thoracotomy for LV lead placement and was noticed to have RV lead malfunction, postoperatively, therefore is here for procedure.      Hardware:  Right ventricular   lead is Medtronic [id number] serial number [id number].      Thresholds:  Right ventricular threshold revealed a R wave amplitude of over 6 mV at 1.2V and impedance of 700 ohms        Description of procedure:  Under conscious sedation (initially was given by me and monitored.  Then subsequently anesthesiologist came in gave and monitored anesthesia ) and local anesthesia , existing left subclavian pocket was opened and subclavian vein was accessed and a 035 Glidewire was advanced into the occlusion and with the help of a glide catheter attempts were done to cross the 100% occluded subclavian vein but was unsuccessful.  Then the right subclavian vein was accessed and Medtronic 65 cm lead was placed in the RV adequate thresholds obtained then it was secured on the right side then tunneled to the left side with the help of electrophysiologist Dr. Jackson, then the generator was explanted from the pocket pocket cleaned with antibiotic solution existing RV lead was disconnected and capped and the new RV lead from the right subclavian which was tunneled was connected to the generator placed in the pocket and closed pocket with sutures. patient tolerated procedure well complications were none.    Blood loss :blood loss was 5  cc.        Conclusion: Successful placement of right ventricular lead through right subclavian vein approach which was tunneled to the left infraclavicular area and connected to the generator      Plan:  Per primary electrophysiologist Dr. Jackson  We will monitor overnight.  Routine post pacemaker device implant care.  Recheck device in a.m.  Pacer care and wound care education  Follow-up in 1 week in the office for staple removal  Follow-up in pacemaker clinic       NOTE: The following portions of the patient's note were reviewed, confirmed and/or updated this visit as appropriate: History of present illness/Interval history, physical examination, assessment & plan, allergies, current medications, past family history, past medical history, past social history, past surgical history and problem list.

## 2022-04-26 DIAGNOSIS — G50.0 TRIGEMINAL NEURALGIA: ICD-10-CM

## 2022-04-27 RX ORDER — AMITRIPTYLINE HYDROCHLORIDE 10 MG/1
TABLET, FILM COATED ORAL
Qty: 180 TABLET | Refills: 3 | Status: SHIPPED | OUTPATIENT
Start: 2022-04-27 | End: 2023-01-24

## 2022-05-05 ENCOUNTER — OFFICE VISIT (OUTPATIENT)
Dept: CARDIOLOGY | Facility: CLINIC | Age: 77
End: 2022-05-05

## 2022-05-05 VITALS
HEART RATE: 60 BPM | OXYGEN SATURATION: 97 % | SYSTOLIC BLOOD PRESSURE: 160 MMHG | HEIGHT: 73 IN | DIASTOLIC BLOOD PRESSURE: 86 MMHG | BODY MASS INDEX: 29.16 KG/M2 | WEIGHT: 220 LBS

## 2022-05-05 DIAGNOSIS — I10 PRIMARY HYPERTENSION: ICD-10-CM

## 2022-05-05 DIAGNOSIS — I44.2 COMPLETE HEART BLOCK: ICD-10-CM

## 2022-05-05 DIAGNOSIS — Z95.0 CARDIAC PACEMAKER IN SITU: Primary | ICD-10-CM

## 2022-05-05 DIAGNOSIS — I25.5 ISCHEMIC CARDIOMYOPATHY: ICD-10-CM

## 2022-05-05 DIAGNOSIS — I48.0 PAROXYSMAL ATRIAL FIBRILLATION: ICD-10-CM

## 2022-05-05 PROCEDURE — 99214 OFFICE O/P EST MOD 30 MIN: CPT | Performed by: INTERNAL MEDICINE

## 2022-05-05 NOTE — PROGRESS NOTES
CC--complete heart block, coronary artery disease    Sub--76 -year-old male patient has underlying complete heart block with a dual-chamber pacemaker which is implanted several years ago with multiple battery changes--started developing progressive dyspnea and fatigue with continuous RV pacing and underwent LV epicardial lead placement because of left subclavian occlusion and after LV lead epicardial placement patient was noted to have RV lead malfunction and a new RV pacing lead was placed from right subclavian and tunneled to the battery on the left side months ago.Remarkable improvement is noted in patient symptoms with biventricular pacing and patient in functional class I able to walk more than 3 miles every day without any symptoms.His EF in the past with RV pacing was between 35 to 40%. He has prior history of coronary artery disease with remote stenting of his marginal branch  He also has history of hypertension and hyperlipidemia  Chronic medical problems include complete heart block with pacemaker in situ, iatrogenic left bundle branch block with RV pacing, chronic class II heart failure symptoms with LV dysfunction and coronary artery disease  Recent home monitoring of his pacemaker revealed increasing burden of atrial fibrillation and patient was started on  anticoagulation  Denies any palpitations and feels well and denies any new symptoms        Physical Exam  VITALS REVIEWED  General:      well developed, well nourished, in no acute distress.    Head:      normocephalic and atraumatic.    Eyes:      PERRL/EOM intact, conjunctiva and sclera clear with out nystagmus.    Neck:      no masses, thyromegaly,  trachea central with normal respiratory effort and PMI displaced laterally  Lungs:      clear bilaterally to auscultation.    Heart:       Underlying paced rhythm without any murmurs gallops or rubs        Assessment and plan    Dual-chamber pacemaker upgraded to biventricular pacing system with  placement of LV epicardial lead and the new right ventricular pacing lead from right subclavian approach because of prior right ventricular pacing lead malfunction post LV epicardial lead placement --kindly note patient has left subclavian occlusion--remarkable improvement with biventricular pacing and back to functional class I without any symptoms of heart failure and biventricular pacemaker interrogated in the office with appropriate function and interrogation attached to the chart and patient is on home monitoring  LV dysfunction with EF between 35 to 40%  Coronary artery disease  Chronic combined class II heart failure symptoms--resolved and back to functional class I with biv pacing  [place] hypertension as per the patient since multiple blood pressure recordings at home within normal limits  Sustained atrial arrhythmias in the form of atrial fibrillation/atypical flutter noted and patient  initiated on Eliquis 5 mg twice daily and increasing burden of atrial arrhythmias noted in the last 3 months --patient develops symptoms of palpitations === options like Tikosyn versus ablation discussed with the patient of the arrhythmia incidence increases --- currently in sinus rhythm without significant burden of arrhythmia in the last few months and no further treatment is indicated at this point    Other medications reviewed  Follow-up in 3 months  Chads vasc score---5    FOLLOW UP IN 6 MONTHS    Hypertension well controlled and multiple home recordings and blood pressure this afternoon at home was 137/80    Electronically signed by Dominick Jackson MD, 05/05/22, 3:49 PM EDT.

## 2022-05-22 ENCOUNTER — HOSPITAL ENCOUNTER (EMERGENCY)
Facility: HOSPITAL | Age: 77
Discharge: HOME | End: 2022-05-22
Attending: EMERGENCY MEDICINE | Admitting: EMERGENCY MEDICINE

## 2022-05-22 VITALS
SYSTOLIC BLOOD PRESSURE: 141 MMHG | HEIGHT: 73 IN | BODY MASS INDEX: 29.45 KG/M2 | RESPIRATION RATE: 17 BRPM | DIASTOLIC BLOOD PRESSURE: 80 MMHG | OXYGEN SATURATION: 99 % | WEIGHT: 222.22 LBS | TEMPERATURE: 97.6 F | HEART RATE: 62 BPM

## 2022-05-22 DIAGNOSIS — K92.2 GASTROINTESTINAL HEMORRHAGE, UNSPECIFIED GASTROINTESTINAL HEMORRHAGE TYPE: Primary | ICD-10-CM

## 2022-05-22 LAB
ALBUMIN SERPL-MCNC: 4.1 G/DL (ref 3.5–5.2)
ALBUMIN/GLOB SERPL: 1.9 G/DL
ALP SERPL-CCNC: 75 U/L (ref 39–117)
ALT SERPL W P-5'-P-CCNC: 31 U/L (ref 1–41)
ANION GAP SERPL CALCULATED.3IONS-SCNC: 11 MMOL/L (ref 5–15)
APTT PPP: 30.7 SECONDS (ref 61–76.5)
AST SERPL-CCNC: 24 U/L (ref 1–40)
BASOPHILS # BLD AUTO: 0.1 10*3/MM3 (ref 0–0.2)
BASOPHILS NFR BLD AUTO: 2.1 % (ref 0–1.5)
BILIRUB SERPL-MCNC: 0.6 MG/DL (ref 0–1.2)
BUN SERPL-MCNC: 17 MG/DL (ref 8–23)
BUN/CREAT SERPL: 21.3 (ref 7–25)
CALCIUM SPEC-SCNC: 9.1 MG/DL (ref 8.6–10.5)
CHLORIDE SERPL-SCNC: 103 MMOL/L (ref 98–107)
CO2 SERPL-SCNC: 23 MMOL/L (ref 22–29)
CREAT SERPL-MCNC: 0.8 MG/DL (ref 0.76–1.27)
DEPRECATED RDW RBC AUTO: 44.2 FL (ref 37–54)
EGFRCR SERPLBLD CKD-EPI 2021: 91.7 ML/MIN/1.73
EOSINOPHIL # BLD AUTO: 0.1 10*3/MM3 (ref 0–0.4)
EOSINOPHIL NFR BLD AUTO: 2.7 % (ref 0.3–6.2)
ERYTHROCYTE [DISTWIDTH] IN BLOOD BY AUTOMATED COUNT: 13.3 % (ref 12.3–15.4)
GLOBULIN UR ELPH-MCNC: 2.2 GM/DL
GLUCOSE SERPL-MCNC: 123 MG/DL (ref 65–99)
HCT VFR BLD AUTO: 40.4 % (ref 37.5–51)
HGB BLD-MCNC: 14 G/DL (ref 13–17.7)
INR PPP: 1.05 (ref 0.93–1.1)
LYMPHOCYTES # BLD AUTO: 1 10*3/MM3 (ref 0.7–3.1)
LYMPHOCYTES NFR BLD AUTO: 19.6 % (ref 19.6–45.3)
MCH RBC QN AUTO: 33.2 PG (ref 26.6–33)
MCHC RBC AUTO-ENTMCNC: 34.7 G/DL (ref 31.5–35.7)
MCV RBC AUTO: 95.7 FL (ref 79–97)
MONOCYTES # BLD AUTO: 0.4 10*3/MM3 (ref 0.1–0.9)
MONOCYTES NFR BLD AUTO: 7 % (ref 5–12)
NEUTROPHILS NFR BLD AUTO: 3.6 10*3/MM3 (ref 1.7–7)
NEUTROPHILS NFR BLD AUTO: 68.6 % (ref 42.7–76)
NRBC BLD AUTO-RTO: 0.1 /100 WBC (ref 0–0.2)
PLATELET # BLD AUTO: 220 10*3/MM3 (ref 140–450)
PMV BLD AUTO: 6.6 FL (ref 6–12)
POTASSIUM SERPL-SCNC: 4.4 MMOL/L (ref 3.5–5.2)
PROT SERPL-MCNC: 6.3 G/DL (ref 6–8.5)
PROTHROMBIN TIME: 10.8 SECONDS (ref 9.6–11.7)
RBC # BLD AUTO: 4.22 10*6/MM3 (ref 4.14–5.8)
SODIUM SERPL-SCNC: 137 MMOL/L (ref 136–145)
WBC NRBC COR # BLD: 5.2 10*3/MM3 (ref 3.4–10.8)

## 2022-05-22 PROCEDURE — 99283 EMERGENCY DEPT VISIT LOW MDM: CPT

## 2022-05-22 PROCEDURE — 85610 PROTHROMBIN TIME: CPT | Performed by: EMERGENCY MEDICINE

## 2022-05-22 PROCEDURE — 36415 COLL VENOUS BLD VENIPUNCTURE: CPT

## 2022-05-22 PROCEDURE — 85730 THROMBOPLASTIN TIME PARTIAL: CPT | Performed by: EMERGENCY MEDICINE

## 2022-05-22 PROCEDURE — 85025 COMPLETE CBC W/AUTO DIFF WBC: CPT | Performed by: EMERGENCY MEDICINE

## 2022-05-22 PROCEDURE — 80053 COMPREHEN METABOLIC PANEL: CPT | Performed by: EMERGENCY MEDICINE

## 2022-05-22 RX ORDER — SODIUM CHLORIDE 0.9 % (FLUSH) 0.9 %
10 SYRINGE (ML) INJECTION AS NEEDED
Status: DISCONTINUED | OUTPATIENT
Start: 2022-05-22 | End: 2022-05-22 | Stop reason: HOSPADM

## 2022-05-22 NOTE — ED PROVIDER NOTES
Subjective   Chief complaint rectal bleeding    History of present illness 76-year-old male who is on Eliquis for history of atrial fibrillation and pacemaker who states that he had some rectal bleeding this morning states it was mild he had a little bit in the water and when he wiped.  This happened twice.  No clots.  He is having no pain no dizziness or syncope.  He says it was red in nature there is no black stool.  Symptoms ongoing since this morning mild no pain patient has been at home he is on Eliquis.  He has no other complaints or associated symptoms at this time.  Patient states he did have a colonoscopy about a year ago which showed some polyps but otherwise unremarkable.          Review of Systems   Constitutional: Negative for chills and fever.   Respiratory: Negative for chest tightness and shortness of breath.    Gastrointestinal: Positive for blood in stool. Negative for abdominal pain and vomiting.   Genitourinary: Negative for difficulty urinating and dysuria.   Skin: Negative for color change and rash.   Neurological: Negative for dizziness and light-headedness.   Psychiatric/Behavioral: Negative for agitation and behavioral problems.       Past Medical History:   Diagnosis Date   • Arthritis    • Arthritis    • BPH (benign prostatic hyperplasia)    • CHF (congestive heart failure) ([facility])    • Coronary artery disease     dr. Carrizales   • Deviated septum    • FH: cataracts    • Gait abnormality    • GERD (gastroesophageal reflux disease)    • History of trigeminal neuralgia    • Hyperlipidemia    • Hypertension    • Renal cyst    • Sciatica    • Sleep apnea     unable to tolerate machine       No Known Allergies    Past Surgical History:   Procedure Laterality Date   • APPENDECTOMY     • CARDIAC CATHETERIZATION     • CARDIAC CATHETERIZATION N/A 3/5/2020    Procedure: Percutaneous Subclavian Intervention;  Surgeon: Nick Greenberg MD;  Location: [facility] INVASIVE LOCATION;  Service:  Cardiovascular;  Laterality: N/A;   • CARDIAC ELECTROPHYSIOLOGY PROCEDURE N/A 2020    Procedure: Biventricular Device Upgrade;  Surgeon: Dominick Jackson MD;  Location: [facility] CATH INVASIVE LOCATION;  Service: Cardiovascular   • CARDIAC ELECTROPHYSIOLOGY PROCEDURE N/A 3/5/2020    Procedure: Lead Revision;  Surgeon: Nick Greenberg MD;  Location: [facility] CATH INVASIVE LOCATION;  Service: Cardiovascular;  Laterality: N/A;   • CORONARY STENT PLACEMENT  2014    x 2   • PROSTATE SURGERY     • REFRACTIVE SURGERY     • THORACOTOMY Left 3/4/2020    Procedure: THORACOTOMY MINI WITH LEAD PLACEMENT;  Surgeon: Justin Aguilar MD;  Location: [facility] CVOR;  Service: Cardiothoracic;  Laterality: Left;   • TOTAL HIP ARTHROPLASTY Bilateral     different times       Family History   Problem Relation Age of Onset   • Heart disease Father    • Arthritis Father    • Diabetes Father    • Hypertension Father    • Hyperlipidemia Father    • Kidney disease Father    • Cancer Mother        Social History     Socioeconomic History   • Marital status:    Tobacco Use   • Smoking status: Former Smoker     Quit date:      Years since quittin.4   • Smokeless tobacco: Never Used   Vaping Use   • Vaping Use: Never used   Substance and Sexual Activity   • Alcohol use: Yes     Alcohol/week: 7.0 standard drinks     Types: 7 Glasses of wine per week     Comment: RED WINE BEFORE DINNER   • Drug use: No   • Sexual activity: Defer     Prior to Admission medications    Medication Sig Start Date End Date Taking? Authorizing Provider   acebutolol (SECTRAL) 200 MG capsule TAKE 1 CAPSULE EVERY DAY 22   Yonathan Carrizales DO   amitriptyline (ELAVIL) 10 MG tablet TAKE 2 TABLETS AT BEDTIME 22   Seipel, Joseph F, MD   amLODIPine (NORVASC) 10 MG tablet Take 10 mg by mouth every night at bedtime. 10/15/14   Provider, MD Eric   APPLE CIDER VINEGAR PO Take 10 mL by mouth.    Provider, MD Eric   aspirin  81 MG tablet Take 81 mg by mouth Daily. Do not take day of surgery 10/15/14   Eric Fragoso MD   atorvastatin (LIPITOR) 20 MG tablet TAKE 1 TABLET EVERY DAY 10/21/21   Yonathan Carrizales DO b complex-C-folic acid 1 MG capsule Take 1 capsule by mouth Daily. Stop on 2/26 for surgery    Eric Fragoso MD   calcium carbonate-cholecalciferol 500-400 MG-UNIT tablet tablet Take 1 tablet by mouth Daily. Stop on 2/26 for surgery    Eric Fragoso MD   cetirizine (zyrTEC) 10 MG tablet Take 10 mg by mouth Daily.    Eric Fragoso MD   Cinnamon 500 MG capsule Take 500 mg by mouth 2 (Two) Times a Day. Stop on 2/26 for surgery    Eric Fragoso MD   Eliquis 5 MG tablet tablet TAKE 1 TABLET BY MOUTH EVERY 12 HOURS 12/28/21   Dominick Jackson MD   enalapril (VASOTEC) 5 MG tablet Take 20 mg by mouth Daily. 10/15/14   Eric Fragoso MD   esomeprazole (nexIUM) 40 MG capsule Take 40 mg by mouth Daily. 10/15/14   Eric Fragoso MD   glucosamine-chondroitin 500-400 MG capsule capsule Take 1 capsule by mouth 2 (Two) Times a Day With Meals. Stop on 2/26 for surgery    Eric Fragoso MD   HONEY PO Take 10 mL by mouth.    Eric Fragoso MD   Inulin (METAMUCIL CLEAR & NATURAL PO) Take  by mouth.    Eric Fragoso MD   Misc Natural Products (POMEGRANATE-ACAI ORTIZ PO) Take  by mouth. Beet Chew    Eric Fragoso MD   Omega-3 Fatty Acids (FISH OIL) 1000 MG capsule capsule Take 1,000 mg by mouth 2 (Two) Times a Day With Meals. Stop on 2/26 for surgery 10/15/14   Eric Fragoso MD   OXcarbazepine (TRILEPTAL) 150 MG tablet Take 2 tablets by mouth 3 (Three) Times a Day for 90 days. Take 300 mg twice a day and an extra 150-300 mg as needed (Increase sodium intake) 7/7/21 10/5/21  Sarita Davalos APRN   OXcarbazepine (TRILEPTAL) 300 MG tablet TAKE 1 TABLET BY MOUTH TWICE DAILY 1/25/22   Sarita Davalos APRN   Probiotic Product (PROBIOTIC  ADVANCED PO) Take 1 capsule by mouth Daily. Stop on 2/26 for surgery    Eric Fragoso MD   Psyllium (METAMUCIL FIBER PO) Take  by mouth.    Eric Fragoso MD   TURMERIC PO Take 1 capsule by mouth Daily. Stop on 2/26 for surgery    Eric Fragoso MD   vitamin C (ASCORBIC ACID) 250 MG tablet Take 250 mg by mouth Daily.    Eric Fragoso MD   Zinc 25 MG tablet Take  by mouth.    Eric Fragoso MD           Objective   Physical Exam  Constitutional 76-year-old male awake alert no distress blood pressure 141/80 temperature 97 heart rate 62.  Nontoxic-appearing HEENT extraocular muscles intact sclera clear.  Neck supple no adenopathy.  Lungs clear no retractions.  Heart regular without murmur.  Abdomen soft nontender good bowel sounds no peritoneal findings or pulsatile masses rectal exam no masses tenderness noted brown stool mildly heme positive.  No gross bleeding or gross blood.  No external hemorrhoids.  No tenderness on exam.  Extremities no edema cords or Homans' sign skin warm and dry without rash neurologic awake alert follows commands without focal weakness  Procedures           ED Course      Results for orders placed or performed during the hospital encounter of 05/22/22   Comprehensive Metabolic Panel    Specimen: Blood   Result Value Ref Range    Glucose 123 (H) 65 - 99 mg/dL    BUN 17 8 - 23 mg/dL    Creatinine 0.80 0.76 - 1.27 mg/dL    Sodium 137 136 - 145 mmol/L    Potassium 4.4 3.5 - 5.2 mmol/L    Chloride 103 98 - 107 mmol/L    CO2 23.0 22.0 - 29.0 mmol/L    Calcium 9.1 8.6 - 10.5 mg/dL    Total Protein 6.3 6.0 - 8.5 g/dL    Albumin 4.10 3.50 - 5.20 g/dL    ALT (SGPT) 31 1 - 41 U/L    AST (SGOT) 24 1 - 40 U/L    Alkaline Phosphatase 75 39 - 117 U/L    Total Bilirubin 0.6 0.0 - 1.2 mg/dL    Globulin 2.2 gm/dL    A/G Ratio 1.9 g/dL    BUN/Creatinine Ratio 21.3 7.0 - 25.0    Anion Gap 11.0 5.0 - 15.0 mmol/L    eGFR 91.7 >60.0 mL/min/1.73   Protime-INR    Specimen: Blood    Result Value Ref Range    Protime 10.8 9.6 - 11.7 Seconds    INR 1.05 0.93 - 1.10   aPTT    Specimen: Blood   Result Value Ref Range    PTT 30.7 (L) 61.0 - 76.5 seconds   CBC Auto Differential    Specimen: Blood   Result Value Ref Range    WBC 5.20 3.40 - 10.80 10*3/mm3    RBC 4.22 4.14 - 5.80 10*6/mm3    Hemoglobin 14.0 13.0 - 17.7 g/dL    Hematocrit 40.4 37.5 - 51.0 %    MCV 95.7 79.0 - 97.0 fL    MCH 33.2 (H) 26.6 - 33.0 pg    MCHC 34.7 31.5 - 35.7 g/dL    RDW 13.3 12.3 - 15.4 %    RDW-SD 44.2 37.0 - 54.0 fl    MPV 6.6 6.0 - 12.0 fL    Platelets 220 140 - 450 10*3/mm3    Neutrophil % 68.6 42.7 - 76.0 %    Lymphocyte % 19.6 19.6 - 45.3 %    Monocyte % 7.0 5.0 - 12.0 %    Eosinophil % 2.7 0.3 - 6.2 %    Basophil % 2.1 (H) 0.0 - 1.5 %    Neutrophils, Absolute 3.60 1.70 - 7.00 10*3/mm3    Lymphocytes, Absolute 1.00 0.70 - 3.10 10*3/mm3    Monocytes, Absolute 0.40 0.10 - 0.90 10*3/mm3    Eosinophils, Absolute 0.10 0.00 - 0.40 10*3/mm3    Basophils, Absolute 0.10 0.00 - 0.20 10*3/mm3    nRBC 0.1 0.0 - 0.2 /100 WBC     No radiology results for the last day  Medications   sodium chloride 0.9 % flush 10 mL (has no administration in time range)                                                  MDM  Number of Diagnoses or Management Options  Gastrointestinal hemorrhage, unspecified gastrointestinal hemorrhage type: new and requires workup  Diagnosis management comments: Medical decision making.  Patient had the above exam evaluation IV established.  Labs obtained reviewed by me CBC unremarkable count normal hemoglobin of 14 patient's INR was 1.05 demonstrates unremarkable.  Patient had no further bleeding in the ER.  He remained hemodynamically stable exam was soft without tenderness no peritoneal findings he is on Eliquis.  Recent colonoscopy.  We talked about the findings and outpatient management and follow-up.  I talked to patient on-call cardiologist for patient's cardiologist Dr. Carrizales.  We will hold [place]  tonight and in the morning and he will call his cardiologist and see if he wants him to resume.  We also talked about the bleeding and the patient will call his gastroenterologist in the morning the patient will return immediately to the ER if he has increasing bleeding dizziness clots or anything that becomes worse associated with this otherwise if he is doing well and the bleeding stops he will follow-up with his gastroenterologist for further evaluation.  Otherwise unremarkable ER course.       Amount and/or Complexity of Data Reviewed  Clinical lab tests: reviewed    Risk of Complications, Morbidity, and/or Mortality  Presenting problems: moderate  Diagnostic procedures: moderate  Management options: moderate    Patient Progress  Patient progress: stable      Final diagnoses:   Gastrointestinal hemorrhage, unspecified gastrointestinal hemorrhage type       ED Disposition  ED Disposition     ED Disposition   Discharge    Condition   Stable    Comment   --             Temo Vidal MD  [address] [place] [address]  [phone]    In 1 day           Medication List      No changes were made to your prescriptions during this visit.          Adriel Rivera MD  05/22/22 [id number]

## 2022-05-22 NOTE — DISCHARGE INSTRUCTIONS
Hold your Eliquis tonight and in the morning.  Call your cardiologist in the morning and check in about when to resume this.  Follow-up with your GI doctor call tomorrow.  Return immediately to the hospital for increasing bleeding clots tissue dizziness severe pain worsening symptoms fevers or any other new or worse problems or concerns return immediately to the ER.

## 2022-05-23 ENCOUNTER — TELEPHONE (OUTPATIENT)
Dept: CARDIOLOGY | Facility: CLINIC | Age: 77
End: 2022-05-23

## 2022-05-23 NOTE — TELEPHONE ENCOUNTER
Pt called stating he had blood in his stool over the weekend and he went to the er. The er advised he hold his Eliquis, until he discuss with Dr Jackson. I let the pt know per Dr Jackson he is to continue holding Eliquis for another 48 hours and a gi referral has been made. Pt also scheduled for a follow up in 3 weeks with Dr Jackson. Pt verbalized understanding and will call with any other concerns.

## 2022-05-26 ENCOUNTER — OFFICE (AMBULATORY)
Dept: URBAN - METROPOLITAN AREA CLINIC 64 | Facility: CLINIC | Age: 77
End: 2022-05-26

## 2022-05-26 VITALS
DIASTOLIC BLOOD PRESSURE: 99 MMHG | HEIGHT: 73 IN | WEIGHT: 221 LBS | HEART RATE: 61 BPM | SYSTOLIC BLOOD PRESSURE: 145 MMHG

## 2022-05-26 DIAGNOSIS — K62.5 HEMORRHAGE OF ANUS AND RECTUM: ICD-10-CM

## 2022-05-26 PROCEDURE — 99213 OFFICE O/P EST LOW 20 MIN: CPT | Performed by: NURSE PRACTITIONER

## 2022-05-26 RX ORDER — HYDROCORTISONE ACETATE AND PRAMOXINE HYDROCHLORIDE 25; 10 MG/G; MG/G
CREAM TOPICAL
Qty: 1 | Refills: 0 | Status: COMPLETED
Start: 2022-05-26 | End: 2023-03-27

## 2022-05-31 PROCEDURE — 93294 REM INTERROG EVL PM/LDLS PM: CPT | Performed by: INTERNAL MEDICINE

## 2022-05-31 PROCEDURE — 93296 REM INTERROG EVL PM/IDS: CPT | Performed by: INTERNAL MEDICINE

## 2022-06-16 ENCOUNTER — OFFICE VISIT (OUTPATIENT)
Dept: CARDIOLOGY | Facility: CLINIC | Age: 77
End: 2022-06-16

## 2022-06-16 VITALS
BODY MASS INDEX: 28.76 KG/M2 | OXYGEN SATURATION: 97 % | WEIGHT: 217 LBS | HEART RATE: 69 BPM | HEIGHT: 73 IN | SYSTOLIC BLOOD PRESSURE: 137 MMHG | DIASTOLIC BLOOD PRESSURE: 81 MMHG

## 2022-06-16 DIAGNOSIS — I25.5 ISCHEMIC CARDIOMYOPATHY: ICD-10-CM

## 2022-06-16 DIAGNOSIS — I10 PRIMARY HYPERTENSION: ICD-10-CM

## 2022-06-16 DIAGNOSIS — I44.2 COMPLETE HEART BLOCK: ICD-10-CM

## 2022-06-16 DIAGNOSIS — I48.0 PAROXYSMAL ATRIAL FIBRILLATION: ICD-10-CM

## 2022-06-16 DIAGNOSIS — Z95.0 CARDIAC PACEMAKER IN SITU: Primary | ICD-10-CM

## 2022-06-16 PROCEDURE — 99214 OFFICE O/P EST MOD 30 MIN: CPT | Performed by: INTERNAL MEDICINE

## 2022-06-16 NOTE — PROGRESS NOTES
CC--complete heart block, coronary artery disease    Sub--76 -year-old male patient has underlying complete heart block with a dual-chamber pacemaker which is implanted several years ago with multiple battery changes--started developing progressive dyspnea and fatigue with continuous RV pacing and underwent LV epicardial lead placement because of left subclavian occlusion and after LV lead epicardial placement patient was noted to have RV lead malfunction and a new RV pacing lead was placed from right subclavian and tunneled to the battery on the left side months ago.Remarkable improvement is noted in patient symptoms with biventricular pacing and patient in functional class I able to walk more than 3 miles every day without any symptoms.His EF in the past with RV pacing was between 35 to 40%. He has prior history of coronary artery disease with remote stenting of his marginal branch  He also has history of hypertension and hyperlipidemia  Chronic medical problems include complete heart block with pacemaker in situ, iatrogenic left bundle branch block with RV pacing, chronic class II heart failure symptoms with LV dysfunction and coronary artery disease  Recent home monitoring of his pacemaker revealed increasing burden of atrial fibrillation and patient was started on  anticoagulation  Denies any palpitations and feels well and denies any new symptoms  Had rectal bleed 2 weeks ago--seen by GI        Physical Exam  VITALS REVIEWED  General:      well developed, well nourished, in no acute distress.    Head:      normocephalic and atraumatic.    Eyes:      PERRL/EOM intact, conjunctiva and sclera clear with out nystagmus.    Neck:      no masses, thyromegaly,  trachea central with normal respiratory effort and PMI displaced laterally  Lungs:      clear bilaterally to auscultation.    Heart:       Underlying paced rhythm without any murmurs gallops or rubs        Assessment and plan    Dual-chamber pacemaker upgraded  to biventricular pacing system with placement of LV epicardial lead and the new right ventricular pacing lead from right subclavian approach because of prior right ventricular pacing lead malfunction post LV epicardial lead placement --kindly note patient has left subclavian occlusion--remarkable improvement with biventricular pacing and back to functional class I without any symptoms of heart failure and biventricular pacemaker interrogated in the office with appropriate function and interrogation attached to the chart and patient is on home monitoring  LV dysfunction with EF between 35 to 40%  Coronary artery disease  Chronic combined class II heart failure symptoms--resolved and back to functional class I with biv pacing  [place] hypertension as per the patient since multiple blood pressure recordings at home within normal limits  Sustained atrial arrhythmias in the form of atrial fibrillation/atypical flutter noted and patient  initiated on Eliquis 5 mg twice daily and increasing burden of atrial arrhythmias noted in the last 3 months --patient develops symptoms of palpitations === options like Tikosyn versus ablation discussed with the patient of the arrhythmia incidence increases --- currently in sinus rhythm without significant burden of arrhythmia in the last few months and no further treatment is indicated at this point    Other medications reviewed  Follow-up in 3 months  Chads vasc score---5    FOLLOW UP IN 6 MONTHS  HB--14, PLT--220  Options like watchman educated    Electronically signed by Dominick Jackson MD, 06/16/22, 3:02 PM EDT.

## 2022-06-24 ENCOUNTER — TELEPHONE (OUTPATIENT)
Dept: CARDIOLOGY | Facility: CLINIC | Age: 77
End: 2022-06-24

## 2022-06-24 NOTE — TELEPHONE ENCOUNTER
----- Message from Radha Pemberton MD sent at 6/24/2022 12:26 PM EDT -----  Looking at the chart I do not see any indication for antibiotic prophylaxis  ----- Message -----  From: Hailey Cordova MA  Sent: 6/24/2022  10:37 AM EDT  To: Radha Pemberton MD    Pt is scheduled for a dental appointment on 6/27 and would like to know if he still needs antibiotics before he goes?

## 2022-07-06 RX ORDER — ACEBUTOLOL HYDROCHLORIDE 200 MG/1
CAPSULE ORAL
Qty: 90 CAPSULE | Refills: 1 | Status: SHIPPED | OUTPATIENT
Start: 2022-07-06 | End: 2023-03-01

## 2022-08-08 ENCOUNTER — LAB (OUTPATIENT)
Dept: LAB | Facility: HOSPITAL | Age: 77
End: 2022-08-08

## 2022-08-08 ENCOUNTER — OFFICE VISIT (OUTPATIENT)
Dept: CARDIOLOGY | Facility: CLINIC | Age: 77
End: 2022-08-08

## 2022-08-08 VITALS
HEART RATE: 61 BPM | DIASTOLIC BLOOD PRESSURE: 85 MMHG | SYSTOLIC BLOOD PRESSURE: 167 MMHG | HEIGHT: 73 IN | OXYGEN SATURATION: 97 % | BODY MASS INDEX: 29.03 KG/M2 | WEIGHT: 219 LBS

## 2022-08-08 DIAGNOSIS — I44.2 COMPLETE HEART BLOCK: ICD-10-CM

## 2022-08-08 DIAGNOSIS — I25.5 ISCHEMIC CARDIOMYOPATHY: ICD-10-CM

## 2022-08-08 DIAGNOSIS — T81.31XA DEHISCENCE OF OPERATIVE WOUND, INITIAL ENCOUNTER: ICD-10-CM

## 2022-08-08 DIAGNOSIS — I25.10 CORONARY ARTERY DISEASE INVOLVING NATIVE CORONARY ARTERY OF NATIVE HEART WITHOUT ANGINA PECTORIS: ICD-10-CM

## 2022-08-08 DIAGNOSIS — Z95.0 CARDIAC PACEMAKER IN SITU: Primary | ICD-10-CM

## 2022-08-08 DIAGNOSIS — Z95.0 CARDIAC PACEMAKER IN SITU: ICD-10-CM

## 2022-08-08 DIAGNOSIS — I10 PRIMARY HYPERTENSION: ICD-10-CM

## 2022-08-08 LAB
BASOPHILS # BLD AUTO: 0.04 10*3/MM3 (ref 0–0.2)
BASOPHILS NFR BLD AUTO: 0.8 % (ref 0–1.5)
DEPRECATED RDW RBC AUTO: 43.2 FL (ref 37–54)
EOSINOPHIL # BLD AUTO: 0.23 10*3/MM3 (ref 0–0.4)
EOSINOPHIL NFR BLD AUTO: 4.9 % (ref 0.3–6.2)
ERYTHROCYTE [DISTWIDTH] IN BLOOD BY AUTOMATED COUNT: 12.4 % (ref 12.3–15.4)
ERYTHROCYTE [SEDIMENTATION RATE] IN BLOOD: 4 MM/HR (ref 0–20)
HCT VFR BLD AUTO: 41.1 % (ref 37.5–51)
HGB BLD-MCNC: 14.1 G/DL (ref 13–17.7)
IMM GRANULOCYTES # BLD AUTO: 0.01 10*3/MM3 (ref 0–0.05)
IMM GRANULOCYTES NFR BLD AUTO: 0.2 % (ref 0–0.5)
LYMPHOCYTES # BLD AUTO: 1.17 10*3/MM3 (ref 0.7–3.1)
LYMPHOCYTES NFR BLD AUTO: 24.8 % (ref 19.6–45.3)
MCH RBC QN AUTO: 33 PG (ref 26.6–33)
MCHC RBC AUTO-ENTMCNC: 34.3 G/DL (ref 31.5–35.7)
MCV RBC AUTO: 96.3 FL (ref 79–97)
MONOCYTES # BLD AUTO: 0.33 10*3/MM3 (ref 0.1–0.9)
MONOCYTES NFR BLD AUTO: 7 % (ref 5–12)
NEUTROPHILS NFR BLD AUTO: 2.93 10*3/MM3 (ref 1.7–7)
NEUTROPHILS NFR BLD AUTO: 62.3 % (ref 42.7–76)
NRBC BLD AUTO-RTO: 0 /100 WBC (ref 0–0.2)
PLATELET # BLD AUTO: 250 10*3/MM3 (ref 140–450)
PMV BLD AUTO: 9.2 FL (ref 6–12)
RBC # BLD AUTO: 4.27 10*6/MM3 (ref 4.14–5.8)
WBC NRBC COR # BLD: 4.71 10*3/MM3 (ref 3.4–10.8)

## 2022-08-08 PROCEDURE — 99214 OFFICE O/P EST MOD 30 MIN: CPT | Performed by: INTERNAL MEDICINE

## 2022-08-08 PROCEDURE — 85025 COMPLETE CBC W/AUTO DIFF WBC: CPT

## 2022-08-08 PROCEDURE — 85652 RBC SED RATE AUTOMATED: CPT

## 2022-08-08 PROCEDURE — 36415 COLL VENOUS BLD VENIPUNCTURE: CPT

## 2022-08-08 RX ORDER — DOXYCYCLINE HYCLATE 100 MG
100 TABLET ORAL 2 TIMES DAILY
Qty: 60 TABLET | Refills: 0 | Status: SHIPPED | OUTPATIENT
Start: 2022-08-08 | End: 2022-09-15

## 2022-08-08 NOTE — PROGRESS NOTES
CC--complete heart block, coronary artery disease    Sub--76-year-old male patient well-known to me was seen in the office of his primary care where there was a wound dehiscence on the lateral margin of his right sided incision and was immediately sent to our office.  Patient has noted wound dehiscence a month ago and denied any fever or chills.  Denied any drainage.  Patient has a complex pacing system with a dual-chamber pacemaker and complete heart block which was implanted several years ago with multiple battery changes and started having significant symptoms of heart failure with continuous RV pacing and an LV epicardial lead placement was done because of left subclavian occlusion.  Unfortunately the patient started having RV lead malfunction and henceforth a new RV lead was implanted from right subclavian which was tunneled to the left side which was done in March 2020 with significant improvement of symptoms.    He has known history of coronary artery disease apart from hypertension and hyperlipidemia in the past.          Past Medical History:   Diagnosis Date   • Arthritis    • Arthritis    • BPH (benign prostatic hyperplasia)    • CHF (congestive heart failure) ([facility])    • Coronary artery disease     dr. Carrizales   • Deviated septum    • FH: cataracts    • Gait abnormality    • GERD (gastroesophageal reflux disease)    • History of trigeminal neuralgia    • Hyperlipidemia    • Hypertension    • Renal cyst    • Sciatica    • Sleep apnea     unable to tolerate machine     Past Surgical History:   Procedure Laterality Date   • APPENDECTOMY     • CARDIAC CATHETERIZATION     • CARDIAC CATHETERIZATION N/A 3/5/2020    Procedure: Percutaneous Subclavian Intervention;  Surgeon: Nick Greenberg MD;  Location: [facility] INVASIVE LOCATION;  Service: Cardiovascular;  Laterality: N/A;   • CARDIAC ELECTROPHYSIOLOGY PROCEDURE N/A 1/29/2020    Procedure: Biventricular Device Upgrade;  Surgeon: Dominick Jackson  MD JACOBO;  Location: [facility] CATH INVASIVE LOCATION;  Service: Cardiovascular   • CARDIAC ELECTROPHYSIOLOGY PROCEDURE N/A 3/5/2020    Procedure: Lead Revision;  Surgeon: Nick Greenberg MD;  Location: [facility] CATH INVASIVE LOCATION;  Service: Cardiovascular;  Laterality: N/A;   • CORONARY STENT PLACEMENT  2014    x 2   • PROSTATE SURGERY     • REFRACTIVE SURGERY     • THORACOTOMY Left 3/4/2020    Procedure: THORACOTOMY MINI WITH LEAD PLACEMENT;  Surgeon: Justin Aguilar MD;  Location: [facility] CVOR;  Service: Cardiothoracic;  Laterality: Left;   • TOTAL HIP ARTHROPLASTY Bilateral     different times         Physical Exam    General:      well developed, well nourished, in no acute distress.    Head:      normocephalic and atraumatic.    Eyes:      PERRL/EOM intact, conjunctivae and sclerae clear without nystagmus.    Neck:      no masses, thyromegaly, trachea central with normal respiratory effort  Lungs:      clear bilaterally to auscultation.    Heart:      regular rate and rhythm, S1, S2 without murmurs, rubs, or gallops    Pacer incision description below        A/P    Patient has pacemaker device in the left infraclavicular area which is completely healed  RV pacing lead placed to right subclavian back in March 2020  At the lateral edge of his incision dehiscence was noted--no obvious lead exposure and the dehiscence is just lateral to the lead which could be palpated and no drainage  Kindly note patient is completely paced dependent with underlying complete AV block  Bacitracin was applied after sterilely cleaning the dehiscence part of the wound  Samples of bacitracin were given for 2 weeks  Prescription for doxycycline was given for 1 month  Check ESR and CBC  Follow closely in 2 weeks  Underlying complete heart block with biventricular pacemaker in situ as described above  Coronary artery disease stable  Hypertension controlled with current regimen on home recordings  Hyperlipidemia treated  History of  atrial fibrillation on Eliquis with no recent recurrence        Electronically signed by Dominick Jackson MD, 08/08/22, 12:26 PM EDT.

## 2022-08-22 ENCOUNTER — OFFICE VISIT (OUTPATIENT)
Dept: CARDIOLOGY | Facility: CLINIC | Age: 77
End: 2022-08-22

## 2022-08-22 VITALS
HEIGHT: 73 IN | DIASTOLIC BLOOD PRESSURE: 86 MMHG | SYSTOLIC BLOOD PRESSURE: 164 MMHG | OXYGEN SATURATION: 98 % | BODY MASS INDEX: 28.76 KG/M2 | HEART RATE: 60 BPM | WEIGHT: 217 LBS

## 2022-08-22 DIAGNOSIS — I25.10 CORONARY ARTERY DISEASE INVOLVING NATIVE CORONARY ARTERY OF NATIVE HEART WITHOUT ANGINA PECTORIS: ICD-10-CM

## 2022-08-22 DIAGNOSIS — I25.5 ISCHEMIC CARDIOMYOPATHY: ICD-10-CM

## 2022-08-22 DIAGNOSIS — T81.31XA DEHISCENCE OF OPERATIVE WOUND, INITIAL ENCOUNTER: ICD-10-CM

## 2022-08-22 DIAGNOSIS — I44.2 COMPLETE HEART BLOCK: Primary | ICD-10-CM

## 2022-08-22 DIAGNOSIS — E78.2 MIXED HYPERLIPIDEMIA: ICD-10-CM

## 2022-08-22 DIAGNOSIS — I48.0 PAROXYSMAL ATRIAL FIBRILLATION: ICD-10-CM

## 2022-08-22 DIAGNOSIS — Z95.0 CARDIAC PACEMAKER IN SITU: ICD-10-CM

## 2022-08-22 PROCEDURE — 99214 OFFICE O/P EST MOD 30 MIN: CPT | Performed by: INTERNAL MEDICINE

## 2022-08-22 PROCEDURE — 93000 ELECTROCARDIOGRAM COMPLETE: CPT | Performed by: INTERNAL MEDICINE

## 2022-08-22 NOTE — PROGRESS NOTES
CC--complete heart block, coronary artery disease  Mild dehiscence of the incision on the right infraclavicular area      Sub-- 76-year-old male patient was seen 2 weeks ago because of referral for dehiscence of the incision on the right infraclavicular area where the right ventricular pacing lead was placed few years ago.  Patient was given bacitracin and antibiotics and denied any fever chills or discharge.  Denies any pain.  He is placing bacitracin and a Band-Aid and is taking antibiotics and feels well and denies any new symptoms.  Patient has complete AV block with pacemaker in situ with a dual-chamber pacemaker implanted several years ago with his left subclavian occlusion and the RV lead malfunction needing placement of a right ventricular pacing lead from right subclavian approach and tunneling the lead to the pacemaker can on the left side for the CRT system   system  Patient has a left ventricular epicardial lead for CRT system  He does have history of coronary disease hypertension hyperlipidemia.        Past Medical History:   Diagnosis Date   • Arthritis    • Arthritis    • BPH (benign prostatic hyperplasia)    • CHF (congestive heart failure) ([facility])    • Coronary artery disease     dr. Carrizales   • Deviated septum    • FH: cataracts    • Gait abnormality    • GERD (gastroesophageal reflux disease)    • History of trigeminal neuralgia    • Hyperlipidemia    • Hypertension    • Renal cyst    • Sciatica    • Sleep apnea     unable to tolerate machine     Past Surgical History:   Procedure Laterality Date   • APPENDECTOMY     • CARDIAC CATHETERIZATION     • CARDIAC CATHETERIZATION N/A 3/5/2020    Procedure: Percutaneous Subclavian Intervention;  Surgeon: Nick Greenberg MD;  Location: [facility] INVASIVE LOCATION;  Service: Cardiovascular;  Laterality: N/A;   • CARDIAC ELECTROPHYSIOLOGY PROCEDURE N/A 1/29/2020    Procedure: Biventricular Device Upgrade;  Surgeon: Dominick Jackson MD;   Location: [facility] CATH INVASIVE LOCATION;  Service: Cardiovascular   • CARDIAC ELECTROPHYSIOLOGY PROCEDURE N/A 3/5/2020    Procedure: Lead Revision;  Surgeon: Nick Greenberg MD;  Location: [facility] CATH INVASIVE LOCATION;  Service: Cardiovascular;  Laterality: N/A;   • CORONARY STENT PLACEMENT  2014    x 2   • PROSTATE SURGERY     • REFRACTIVE SURGERY     • THORACOTOMY Left 3/4/2020    Procedure: THORACOTOMY MINI WITH LEAD PLACEMENT;  Surgeon: Justin Aguilar MD;  Location: [facility] CVOR;  Service: Cardiothoracic;  Laterality: Left;   • TOTAL HIP ARTHROPLASTY Bilateral     different times       Physical Exam    General:      well developed, well nourished, in no acute distress.    Head:      normocephalic and atraumatic.    Eyes:      PERRL/EOM intact, conjunctivae and sclerae clear without nystagmus.    Neck:      no masses, thyromegaly, trachea central with normal respiratory effort  Lungs:      clear bilaterally to auscultation.    Heart:       regular rate and rhythm, S1, S2 without murmurs, rubs, or gallops  Skin:      intact without lesions or rashes.     Patient's left infraclavicular area with the pacemaker can is located is completely normal.  Small area of dehiscence is noted on the lateral edge of the right infraclavicular incision where a right ventricular pacing lead was placed which was tunneled to the left side.  No drainage from that area and no redness and the area was probed without any communication to the deep pocket.  Sterile cleansing of the area was done with Betadine and alcohol swab and bacitracin was reapplied.        A/P    Patient's left infraclavicular area with the pacemaker can is located is completely normal.  Small area of dehiscence is noted on the lateral edge of the right infraclavicular incision where a right ventricular pacing lead was placed which was tunneled to the left side.  No drainage from that area and no redness and the area was probed without any communication  to the deep pocket.  Sterile cleansing of the area was done with Betadine and alcohol swab and bacitracin was reapplied.  ESR and CBC are normal.  Continue bacitracin application and antibiotics.  Reevaluate this patient closely in 3 weeks.  Clinically patient is stable and appears to be healing slowly.  Underlying complete heart block  Biventricular pacing system in situ with LV epicardial lead, right ventricular pacing lead from right subclavian approach tunneled to the left side and an old right atrial pacing lead.  Coronary artery disease stable  Hypertension hyperlipidemia treated  History of atrial fibrillation without any recurrence        ECG 12 Lead    Date/Time: 8/22/2022 11:34 AM  Performed by: Dominick Jackson MD  Authorized by: Dominick Jackson MD   Comparison: compared with previous ECG   Similar to previous ECG  Rhythm: sinus rhythm and paced  Rate: normal          Electronically signed by Dominick Jackson MD, 08/22/22, 11:34 AM EDT.

## 2022-08-27 NOTE — ADDENDUM NOTE
Addendum  created 01/29/20 1105 by Dede Alvarado MD    Review and Sign - Ready for Procedure      
Rhonchi

## 2022-08-30 PROCEDURE — 93296 REM INTERROG EVL PM/IDS: CPT | Performed by: INTERNAL MEDICINE

## 2022-08-30 PROCEDURE — 93294 REM INTERROG EVL PM/LDLS PM: CPT | Performed by: INTERNAL MEDICINE

## 2022-09-12 ENCOUNTER — OFFICE VISIT (OUTPATIENT)
Dept: CARDIOLOGY | Facility: CLINIC | Age: 77
End: 2022-09-12

## 2022-09-12 VITALS
RESPIRATION RATE: 18 BRPM | HEIGHT: 73 IN | BODY MASS INDEX: 28.63 KG/M2 | WEIGHT: 216 LBS | HEART RATE: 79 BPM | DIASTOLIC BLOOD PRESSURE: 99 MMHG | OXYGEN SATURATION: 96 % | SYSTOLIC BLOOD PRESSURE: 173 MMHG

## 2022-09-12 DIAGNOSIS — I10 PRIMARY HYPERTENSION: ICD-10-CM

## 2022-09-12 DIAGNOSIS — T81.31XA DEHISCENCE OF OPERATIVE WOUND, INITIAL ENCOUNTER: Primary | ICD-10-CM

## 2022-09-12 DIAGNOSIS — I44.2 COMPLETE HEART BLOCK: ICD-10-CM

## 2022-09-12 DIAGNOSIS — I25.5 ISCHEMIC CARDIOMYOPATHY: ICD-10-CM

## 2022-09-12 PROCEDURE — 99214 OFFICE O/P EST MOD 30 MIN: CPT | Performed by: INTERNAL MEDICINE

## 2022-09-12 NOTE — PROGRESS NOTES
CC--complete heart block, coronary artery disease  Mild dehiscence of the incision on the right infraclavicular area      Sub--76-year-old male patient had original pacemaker placed back in 1999 for complete heart block.  Patient had at least 3 battery changes since then according to him.  Patient was having symptoms of worsening heart failure and was found to have left subclavian occlusion and a left ventricular epicardial lead was placed for CRT pacing with significant improvement in symptoms.  Unfortunately patient was noted to have RV pacing lead malfunction and henceforth a new RV pacing lead was placed from right subclavian approach by Dr. Greenberg in March 2020 and it was tunneled to the can on the left side.  Patient felt remarkably well with resumption of CRT pacing.  In the last few weeks patient has noticed dehiscence with the wound opening on the right infraclavicular area right in the midportion of the incision and ESR and CBC were done within normal limits and patient being conservatively treated with antibiotics and Bactroban.  He continues to have nonhealing wound located to right about the lead on the right subclavian area.  Nuys any fever or chills and came in for a follow-up.  Patient does have coronary artery disease hypertension hyperlipidemia also.        Past Medical History:   Diagnosis Date   • Arthritis    • Arthritis    • BPH (benign prostatic hyperplasia)    • CHF (congestive heart failure) (HCC)    • Coronary artery disease     dr. Carrizales   • Deviated septum    • FH: cataracts    • Gait abnormality    • GERD (gastroesophageal reflux disease)    • History of trigeminal neuralgia    • Hyperlipidemia    • Hypertension    • Renal cyst    • Sciatica    • Sleep apnea     unable to tolerate machine     Past Surgical History:   Procedure Laterality Date   • APPENDECTOMY     • CARDIAC CATHETERIZATION     • CARDIAC CATHETERIZATION N/A 3/5/2020    Procedure: Percutaneous Subclavian Intervention;   Surgeon: Nick Greenberg MD;  Location: [facility] CATH INVASIVE LOCATION;  Service: Cardiovascular;  Laterality: N/A;   • CARDIAC ELECTROPHYSIOLOGY PROCEDURE N/A 1/29/2020    Procedure: Biventricular Device Upgrade;  Surgeon: Dominick Jackson MD;  Location: [facility] CATH INVASIVE LOCATION;  Service: Cardiovascular   • CARDIAC ELECTROPHYSIOLOGY PROCEDURE N/A 3/5/2020    Procedure: Lead Revision;  Surgeon: Nick Greenberg MD;  Location: [facility] CATH INVASIVE LOCATION;  Service: Cardiovascular;  Laterality: N/A;   • CORONARY STENT PLACEMENT  2014    x 2   • PROSTATE SURGERY     • REFRACTIVE SURGERY     • THORACOTOMY Left 3/4/2020    Procedure: THORACOTOMY MINI WITH LEAD PLACEMENT;  Surgeon: Justin Aguilar MD;  Location: [facility];  Service: Cardiothoracic;  Laterality: Left;   • TOTAL HIP ARTHROPLASTY Bilateral     different times       Physical Exam    General:      well developed, well nourished, in no acute distress.    Head:      normocephalic and atraumatic.    Eyes:      PERRL/EOM intact, conjunctivae and sclerae clear without nystagmus.    Neck:      no  thyromegaly, trachea central with normal respiratory effort  Lungs:      clear bilaterally to auscultation.    Heart:       regular rate and rhythm, S1, S2 without murmurs, rubs, or gallops  Skin:      intact without lesions or rashes.    Psych:      alert and cooperative; normal mood and affect; normal attention span and concentration.      Patient's left infraclavicular area with the pacemaker can is located is completely normal.  Area of dehiscence on the lateral edge of the right infraclavicular incision right about the presence of pacing lead and it is nonhealing with mild redness around the edge of the skin           A/P    Patient's left infraclavicular area with the pacemaker can is located is completely normal.  Small area of dehiscence is noted on the lateral edge of the right infraclavicular incision where a right ventricular  pacing lead was placed which was tunneled to the left side.  Area of dehiscence is nonhealing and patient to be referred to Dr. Rios--I personally discussed with him and likely this patient needs removal of these pacing lead which will be a complex procedure because patient is completely dependent and may end up needing backup pacing and either leaving the patient only with CRT pacing versus placing a surgical right ventricular lead on this patient.  Patient educated and referral made   Underlying complete heart block  Biventricular pacing system in situ with LV epicardial lead, right ventricular pacing lead from right subclavian approach tunneled to the left side and an old right atrial pacing lead.  Coronary artery disease stable  Hypertension hyperlipidemia treated  History of atrial fibrillation without any recurrence        Electronically signed by Dominick Jackson MD, 09/12/22, 12:29 PM EDT.

## 2022-09-15 ENCOUNTER — OFFICE VISIT (OUTPATIENT)
Dept: CARDIOLOGY | Facility: CLINIC | Age: 77
End: 2022-09-15

## 2022-09-15 VITALS
HEART RATE: 71 BPM | WEIGHT: 219 LBS | RESPIRATION RATE: 18 BRPM | BODY MASS INDEX: 29.03 KG/M2 | HEIGHT: 73 IN | SYSTOLIC BLOOD PRESSURE: 140 MMHG | DIASTOLIC BLOOD PRESSURE: 88 MMHG

## 2022-09-15 DIAGNOSIS — Z95.0 CARDIAC PACEMAKER IN SITU: ICD-10-CM

## 2022-09-15 DIAGNOSIS — I42.8 OTHER CARDIOMYOPATHY: ICD-10-CM

## 2022-09-15 DIAGNOSIS — I49.5 SICK SINUS SYNDROME: ICD-10-CM

## 2022-09-15 DIAGNOSIS — I44.2 COMPLETE HEART BLOCK: Primary | ICD-10-CM

## 2022-09-15 PROCEDURE — 99214 OFFICE O/P EST MOD 30 MIN: CPT | Performed by: INTERNAL MEDICINE

## 2022-09-15 NOTE — PROGRESS NOTES
Subjective:     Encounter Date:09/15/2022      Patient ID: Mode Duffy is a 76 y.o. male.    Chief Complaint:  Chief Complaint   Patient presents with   • Pacemaker Problem       HPI:  Mr Duffy is a 77 yo patient referred by Dr. Jackson for evaluation of a pacemaker pocket skin erosion.  His past medical history is significant for HTN, HLD, CAD and SHELTON.  He had a pacemaker initially implanted in 1999 due to complete heart block. In 1/2020 the patient was noted to have an EF of 35-40% and was having symptoms of dyspnea and lower extremity.  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 An epicardial LV lead was placed 3/2020. The chronic RV lead was also found to be malfunctioning and so a new RV pacing lead was placed via the right subclavian vein and tunneled into the left pectoral pocket. About a month ago the patient developed a skin erosion of the right pectoral pocket.  He was treated with doxycycline and Bactroban but the wound has not healed.  He has not had any systemic symptoms and he had a normal ESR and WBC.        The following portions of the patient's history were reviewed and updated as appropriate: current medications, past family history, past medical history, past social history, past surgical history and problem list.    Problem List:  Patient Active Problem List   Diagnosis   • Fothergill's neuralgia   • Hyponatremia   • Cardiac pacemaker in situ   • Bradycardia   • Cardiovascular disease   • Hypertension   • Coronary artery disease involving native coronary artery of native heart without angina pectoris   • Hyperlipidemia   • Sick sinus syndrome (HCC)   • Chronic systolic congestive heart failure (HCC)   • Cardiomyopathy (HCC)   • Chronic heart failure (HCC)   • Complete heart block (HCC)   • Pacemaker lead malfunction   • Acute bronchitis   • Allergic rhinitis   • Dyspnea   • Palpitations   • Renal cyst   • Trigeminal  neuralgia       Active Med List:    Current Outpatient Medications:   •  acebutolol (SECTRAL) 200 MG capsule, TAKE 1 CAPSULE EVERY DAY, Disp: 90 capsule, Rfl: 1  •  amitriptyline (ELAVIL) 10 MG tablet, TAKE 2 TABLETS AT BEDTIME, Disp: 180 tablet, Rfl: 3  •  amLODIPine (NORVASC) 10 MG tablet, Take 10 mg by mouth every night at bedtime., Disp: , Rfl:   •  APPLE CIDER VINEGAR PO, Take 10 mL by mouth., Disp: , Rfl:   •  aspirin 81 MG tablet, Take 81 mg by mouth Daily. Do not take day of surgery, Disp: , Rfl:   •  atorvastatin (LIPITOR) 20 MG tablet, TAKE 1 TABLET EVERY DAY, Disp: 90 tablet, Rfl: 3  •  b complex-C-folic acid 1 MG capsule, Take 1 capsule by mouth Daily. Stop on 2/26 for surgery, Disp: , Rfl:   •  calcium carbonate-cholecalciferol 500-400 MG-UNIT tablet tablet, Take 1 tablet by mouth Daily. Stop on 2/26 for surgery, Disp: , Rfl:   •  cetirizine (zyrTEC) 10 MG tablet, Take 10 mg by mouth Daily., Disp: , Rfl:   •  Cinnamon 500 MG capsule, Take 500 mg by mouth 2 (Two) Times a Day. Stop on 2/26 for surgery, Disp: , Rfl:   •  Eliquis 5 MG tablet tablet, TAKE 1 TABLET BY MOUTH EVERY 12 HOURS, Disp: 60 tablet, Rfl: 3  •  enalapril (VASOTEC) 20 MG tablet, Take 20 mg by mouth Daily., Disp: , Rfl:   •  esomeprazole (nexIUM) 40 MG capsule, Take 40 mg by mouth Daily., Disp: , Rfl:   •  glucosamine-chondroitin 500-400 MG capsule capsule, Take 1 capsule by mouth 2 (Two) Times a Day With Meals. Stop on 2/26 for surgery, Disp: , Rfl:   •  HONEY PO, Take 10 mL by mouth., Disp: , Rfl:   •  Inulin (METAMUCIL CLEAR & NATURAL PO), Take  by mouth., Disp: , Rfl:   •  OXcarbazepine (TRILEPTAL) 150 MG tablet, Take 2 tablets by mouth 3 (Three) Times a Day for 90 days. Take 300 mg twice a day and an extra 150-300 mg as needed (Increase sodium intake), Disp: 180 tablet, Rfl: 2  •  OXcarbazepine (TRILEPTAL) 300 MG tablet, TAKE 1 TABLET BY MOUTH TWICE DAILY, Disp: 180 tablet, Rfl: 3  •  Probiotic Product (PROBIOTIC ADVANCED PO), Take 1  capsule by mouth Daily. Stop on 2/26 for surgery, Disp: , Rfl:   •  Psyllium (METAMUCIL FIBER PO), Take  by mouth., Disp: , Rfl:   •  TURMERIC PO, Take 1 capsule by mouth Daily. Stop on 2/26 for surgery, Disp: , Rfl:   •  vitamin C (ASCORBIC ACID) 250 MG tablet, Take 250 mg by mouth Daily., Disp: , Rfl:   •  Zinc 25 MG tablet, Take  by mouth., Disp: , Rfl:      Past Medical History:  Past Medical History:   Diagnosis Date   • Arthritis    • Arthritis    • BPH (benign prostatic hyperplasia)    • CHF (congestive heart failure) (HCC)    • Coronary artery disease     dr. Carrizales   • Deviated septum    • FH: cataracts    • Gait abnormality    • GERD (gastroesophageal reflux disease)    • History of trigeminal neuralgia    • Hyperlipidemia    • Hypertension    • Renal cyst    • Sciatica    • Sleep apnea     unable to tolerate machine       Past Surgical History:  Past Surgical History:   Procedure Laterality Date   • APPENDECTOMY     • CARDIAC CATHETERIZATION     • CARDIAC CATHETERIZATION N/A 3/5/2020    Procedure: Percutaneous Subclavian Intervention;  Surgeon: Nick Greenberg MD;  Location: [facility] CATH INVASIVE LOCATION;  Service: Cardiovascular;  Laterality: N/A;   • CARDIAC ELECTROPHYSIOLOGY PROCEDURE N/A 1/29/2020    Procedure: Biventricular Device Upgrade;  Surgeon: Dominick Jackson MD;  Location: [facility] CATH INVASIVE LOCATION;  Service: Cardiovascular   • CARDIAC ELECTROPHYSIOLOGY PROCEDURE N/A 3/5/2020    Procedure: Lead Revision;  Surgeon: Nick Greenberg MD;  Location: [facility] CATH INVASIVE LOCATION;  Service: Cardiovascular;  Laterality: N/A;   • CORONARY STENT PLACEMENT  2014    x 2   • PROSTATE SURGERY     • REFRACTIVE SURGERY     • THORACOTOMY Left 3/4/2020    Procedure: THORACOTOMY MINI WITH LEAD PLACEMENT;  Surgeon: Justin Aguilar MD;  Location: [facility] CVOR;  Service: Cardiothoracic;  Laterality: Left;   • TOTAL HIP ARTHROPLASTY Bilateral     different times       Social  "History:  Social History     Socioeconomic History   • Marital status:    Tobacco Use   • Smoking status: Former Smoker     Quit date: 1968     Years since quittin.7   • Smokeless tobacco: Never Used   Vaping Use   • Vaping Use: Never used   Substance and Sexual Activity   • Alcohol use: Yes     Alcohol/week: 7.0 standard drinks     Types: 7 Glasses of wine per week     Comment: RED WINE BEFORE DINNER   • Drug use: No   • Sexual activity: Defer       Allergies:  No Known Allergies    Immunizations:    There is no immunization history on file for this patient.       Objective:         Review of Systems   Constitutional: Negative for fatigue and fever.   Respiratory: Negative for shortness of breath.    Cardiovascular: Negative for chest pain, palpitations and leg swelling.   All other systems reviewed and are negative.       /88   Pulse 71   Resp 18   Ht 185.4 cm (73\")   Wt 99.3 kg (219 lb)   BMI 28.89 kg/m²     Constitutional:       General: Not in acute distress.     Appearance: Well-developed.   Eyes:      General: No scleral icterus.     Conjunctiva/sclera: Conjunctivae normal.      Pupils: Pupils are equal, round, and reactive to light.   HENT:      Head: Normocephalic and atraumatic.   Neck:      Thyroid: No thyromegaly.   Pulmonary:      Effort: Pulmonary effort is normal.      Breath sounds: Normal breath sounds.   Cardiovascular:      Normal rate. Regular rhythm.   Abdominal:      General: Bowel sounds are normal.      Palpations: Abdomen is soft.   Musculoskeletal: Normal range of motion.      Cervical back: Normal range of motion. Skin:     General: Skin is warm and dry.      Comments: Right pectoral wound with small erosion, pacing lead visible, no erythema or pus noted.   Neurological:      Mental Status: Alert and oriented to person, place, and time.         In-Office Procedure(s):  Procedures  No orders to display        ASCVD RIsk Score::  The 10-year ASCVD risk score (Yamilex DC " Jr. et al., 2013) is: 30.3%    Values used to calculate the score:      Age: 76 years      Sex: Male      Is Non- : No      Diabetic: No      Tobacco smoker: No      Systolic Blood Pressure: 140 mmHg      Is BP treated: Yes      HDL Cholesterol: 55 mg/dL      Total Cholesterol: 137 mg/dL    Recent Radiology:          Lab Review:       Recent labs reviewed and interpreted for clinical significance and application          Assessment:          Diagnosis Plan   1. Complete heart block (HCC)     2. Sick sinus syndrome (HCC)     3. Cardiac pacemaker in situ     4. Other cardiomyopathy (HCC)            Plan:      1. Pacemaker pocket skin erosion - pacing lead visible, has failed to heal with conservative measures.  The pocket is certainly infected.  Discussed the indications, risks and benefits of pacemaker lead extraction.  This is a difficult situation with him being pacemaker dependent.  The left pectoral pocket looks ok.  At this point in time, I would remove the right sided RV pacemaker lead and hopefully not cross contaminate the left pectoral pocket.  I would also not reimplant a new RV pacing lead at this time and would simply let him LV pace.  The LV lead functions well with a 7mv R wave and 1.6V threshold.          Level of Care:                 Naveed Rios MD  09/15/22

## 2022-09-19 DIAGNOSIS — T82.7XXA INFECTION OF PACEMAKER POCKET, INITIAL ENCOUNTER: Primary | ICD-10-CM

## 2022-09-20 DIAGNOSIS — Z01.818 PRE-OP TESTING: Primary | ICD-10-CM

## 2022-09-20 DIAGNOSIS — I48.0 PAROXYSMAL ATRIAL FIBRILLATION: ICD-10-CM

## 2022-09-20 DIAGNOSIS — T82.7XXA INFECTION OF PACEMAKER POCKET, INITIAL ENCOUNTER: ICD-10-CM

## 2022-09-28 DIAGNOSIS — E78.2 MIXED HYPERLIPIDEMIA: ICD-10-CM

## 2022-09-28 RX ORDER — ATORVASTATIN CALCIUM 20 MG/1
TABLET, FILM COATED ORAL
Qty: 90 TABLET | Refills: 3 | Status: SHIPPED | OUTPATIENT
Start: 2022-09-28

## 2022-10-05 ENCOUNTER — LAB (OUTPATIENT)
Dept: LAB | Facility: HOSPITAL | Age: 77
End: 2022-10-05

## 2022-10-05 ENCOUNTER — APPOINTMENT (OUTPATIENT)
Dept: PREADMISSION TESTING | Facility: HOSPITAL | Age: 77
End: 2022-10-05

## 2022-10-05 DIAGNOSIS — Z01.818 PRE-OP TESTING: ICD-10-CM

## 2022-10-05 DIAGNOSIS — I48.0 PAROXYSMAL ATRIAL FIBRILLATION: ICD-10-CM

## 2022-10-05 LAB
ALBUMIN SERPL-MCNC: 4.6 G/DL (ref 3.5–5.2)
ALBUMIN/GLOB SERPL: 2.7 G/DL
ALP SERPL-CCNC: 75 U/L (ref 39–117)
ALT SERPL W P-5'-P-CCNC: 33 U/L (ref 1–41)
ANION GAP SERPL CALCULATED.3IONS-SCNC: 10 MMOL/L (ref 5–15)
AST SERPL-CCNC: 24 U/L (ref 1–40)
BASOPHILS # BLD AUTO: 0.05 10*3/MM3 (ref 0–0.2)
BASOPHILS NFR BLD AUTO: 1.1 % (ref 0–1.5)
BILIRUB SERPL-MCNC: 0.7 MG/DL (ref 0–1.2)
BUN SERPL-MCNC: 14 MG/DL (ref 8–23)
BUN/CREAT SERPL: 16.9 (ref 7–25)
CALCIUM SPEC-SCNC: 9.2 MG/DL (ref 8.6–10.5)
CHLORIDE SERPL-SCNC: 100 MMOL/L (ref 98–107)
CO2 SERPL-SCNC: 26 MMOL/L (ref 22–29)
CREAT SERPL-MCNC: 0.83 MG/DL (ref 0.76–1.27)
DEPRECATED RDW RBC AUTO: 44 FL (ref 37–54)
EGFRCR SERPLBLD CKD-EPI 2021: 90.7 ML/MIN/1.73
EOSINOPHIL # BLD AUTO: 0.15 10*3/MM3 (ref 0–0.4)
EOSINOPHIL NFR BLD AUTO: 3.4 % (ref 0.3–6.2)
ERYTHROCYTE [DISTWIDTH] IN BLOOD BY AUTOMATED COUNT: 12.5 % (ref 12.3–15.4)
GLOBULIN UR ELPH-MCNC: 1.7 GM/DL
GLUCOSE SERPL-MCNC: 117 MG/DL (ref 65–99)
HCT VFR BLD AUTO: 42.4 % (ref 37.5–51)
HGB BLD-MCNC: 14.8 G/DL (ref 13–17.7)
IMM GRANULOCYTES # BLD AUTO: 0.01 10*3/MM3 (ref 0–0.05)
IMM GRANULOCYTES NFR BLD AUTO: 0.2 % (ref 0–0.5)
INR PPP: 0.99 (ref 0.93–1.1)
LYMPHOCYTES # BLD AUTO: 1.39 10*3/MM3 (ref 0.7–3.1)
LYMPHOCYTES NFR BLD AUTO: 31.9 % (ref 19.6–45.3)
MCH RBC QN AUTO: 33.5 PG (ref 26.6–33)
MCHC RBC AUTO-ENTMCNC: 34.9 G/DL (ref 31.5–35.7)
MCV RBC AUTO: 95.9 FL (ref 79–97)
MONOCYTES # BLD AUTO: 0.4 10*3/MM3 (ref 0.1–0.9)
MONOCYTES NFR BLD AUTO: 9.2 % (ref 5–12)
NEUTROPHILS NFR BLD AUTO: 2.36 10*3/MM3 (ref 1.7–7)
NEUTROPHILS NFR BLD AUTO: 54.2 % (ref 42.7–76)
NRBC BLD AUTO-RTO: 0 /100 WBC (ref 0–0.2)
PLATELET # BLD AUTO: 223 10*3/MM3 (ref 140–450)
PMV BLD AUTO: 9.1 FL (ref 6–12)
POTASSIUM SERPL-SCNC: 4.3 MMOL/L (ref 3.5–5.2)
PROT SERPL-MCNC: 6.3 G/DL (ref 6–8.5)
PROTHROMBIN TIME: 10.2 SECONDS (ref 9.6–11.7)
RBC # BLD AUTO: 4.42 10*6/MM3 (ref 4.14–5.8)
SODIUM SERPL-SCNC: 136 MMOL/L (ref 136–145)
WBC NRBC COR # BLD: 4.36 10*3/MM3 (ref 3.4–10.8)

## 2022-10-05 PROCEDURE — 36415 COLL VENOUS BLD VENIPUNCTURE: CPT

## 2022-10-05 PROCEDURE — 85610 PROTHROMBIN TIME: CPT

## 2022-10-05 PROCEDURE — 85025 COMPLETE CBC W/AUTO DIFF WBC: CPT

## 2022-10-05 PROCEDURE — 80053 COMPREHEN METABOLIC PANEL: CPT

## 2022-10-09 DIAGNOSIS — G50.0 TRIGEMINAL NEURALGIA: ICD-10-CM

## 2022-10-10 RX ORDER — OXCARBAZEPINE 150 MG/1
TABLET, FILM COATED ORAL
Qty: 180 TABLET | Refills: 0 | OUTPATIENT
Start: 2022-10-10

## 2022-10-11 RX ORDER — LIDOCAINE HYDROCHLORIDE 10 MG/ML
0.1 INJECTION, SOLUTION EPIDURAL; INFILTRATION; INTRACAUDAL; PERINEURAL ONCE AS NEEDED
Status: CANCELLED | OUTPATIENT
Start: 2022-10-12

## 2022-10-11 RX ORDER — CHLORHEXIDINE GLUCONATE 0.12 MG/ML
15 RINSE ORAL EVERY 12 HOURS SCHEDULED
Status: CANCELLED | OUTPATIENT
Start: 2022-10-12

## 2022-10-11 RX ORDER — SODIUM CHLORIDE 0.9 % (FLUSH) 0.9 %
3 SYRINGE (ML) INJECTION EVERY 12 HOURS SCHEDULED
Status: CANCELLED | OUTPATIENT
Start: 2022-10-12

## 2022-10-11 RX ORDER — SODIUM CHLORIDE 0.9 % (FLUSH) 0.9 %
3-10 SYRINGE (ML) INJECTION AS NEEDED
Status: CANCELLED | OUTPATIENT
Start: 2022-10-12

## 2022-10-11 NOTE — H&P
Patient Care Team:  Temo Vidal MD as PCP - General  Temo Vidal MD as PCP - Family Medicine    Chief complaint Presents for lead extraction due to skin erosion    Subjective     History of Present Illness  Mr Duffy is a 75 yo patient referred by Dr. Jackson for evaluation of a pacemaker pocket skin erosion.  His past medical history is significant for HTN, HLD, CAD and SHELTON.  He had a pacemaker initially implanted in 1999 due to complete heart block. In 1/2020 the patient was noted to have an EF of 35-40% and was having symptoms of dyspnea and lower extremity.  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 An epicardial LV lead was placed 3/2020. The chronic RV lead was also found to be malfunctioning and so a new RV pacing lead was placed via the right subclavian vein and tunneled into the left pectoral pocket. About a month ago the patient developed a skin erosion of the right pectoral pocket.  He was treated with doxycycline and Bactroban but the wound has not healed.  He has not had any systemic symptoms and he had a normal ESR and WBC.        Review of Systems   Constitutional: Negative for fever.   Respiratory: Negative for shortness of breath.    Cardiovascular: Negative for chest pain, palpitations and leg swelling.   All other systems reviewed and are negative.         Past Medical History:   Diagnosis Date   • Arthritis    • Arthritis    • BPH (benign prostatic hyperplasia)    • CHF (congestive heart failure) (HCC)    • Coronary artery disease     dr. Carrizales   • Deviated septum    • FH: cataracts    • Gait abnormality    • GERD (gastroesophageal reflux disease)    • History of trigeminal neuralgia    • Hyperlipidemia    • Hypertension    • Renal cyst    • Sciatica    • Sleep apnea     unable to tolerate machine       Objective      Vital Signs       Physical Exam  Constitutional:       Appearance: Normal appearance.    HENT:      Head: Normocephalic and atraumatic.      Nose: Nose normal.      Mouth/Throat:      Mouth: Mucous membranes are moist.   Eyes:      Extraocular Movements: Extraocular movements intact.      Pupils: Pupils are equal, round, and reactive to light.   Cardiovascular:      Rate and Rhythm: Regular rhythm.      Heart sounds: Normal heart sounds.   Pulmonary:      Breath sounds: Normal breath sounds.   Abdominal:      Palpations: Abdomen is soft.   Musculoskeletal:         General: Normal range of motion.      Cervical back: Neck supple.   Skin:     General: Skin is warm and dry.   Neurological:      General: No focal deficit present.      Mental Status: He is alert and oriented to person, place, and time.   Psychiatric:         Mood and Affect: Mood normal.         Results Review:    I reviewed the patient's new clinical results.      Assessment & Plan       Infection of pacemaker pocket (HCC)      Assessment & Plan  1. Pacemaker pocket skin erosion - pacing lead visible, has failed to heal with conservative measures.  The pocket is certainly infected.  Discussed the indications, risks and benefits of pacemaker lead extraction.  This is a difficult situation with him being pacemaker dependent.  The left pectoral pocket looks ok.  At this point in time, I would remove the right sided RV pacemaker lead and hopefully not cross contaminate the left pectoral pocket.  I would also not reimplant a new RV pacing lead at this time and would simply let him LV pace.  The LV lead functions well with a 7mv R wave and 1.6V threshold.     I discussed the patients findings and my recommendations with patient    Naveed Rios MD  10/11/22  15:17 EDT

## 2022-10-12 ENCOUNTER — HOSPITAL ENCOUNTER (OUTPATIENT)
Facility: HOSPITAL | Age: 77
Setting detail: HOSPITAL OUTPATIENT SURGERY
Discharge: HOME | End: 2022-10-12
Attending: INTERNAL MEDICINE | Admitting: INTERNAL MEDICINE

## 2022-10-12 DIAGNOSIS — T82.7XXA INFECTION OF PACEMAKER POCKET, INITIAL ENCOUNTER: ICD-10-CM

## 2022-10-12 DIAGNOSIS — T82.7XXS INFECTION OF PACEMAKER POCKET, SEQUELA: Primary | ICD-10-CM

## 2022-10-12 DIAGNOSIS — T82.7XXA INFECTION OF PACEMAKER POCKET, INITIAL ENCOUNTER: Primary | ICD-10-CM

## 2022-10-14 ENCOUNTER — TELEPHONE (OUTPATIENT)
Dept: CARDIOLOGY | Facility: CLINIC | Age: 77
End: 2022-10-14

## 2022-10-14 DIAGNOSIS — T82.7XXA INFECTION OF PACEMAKER POCKET, INITIAL ENCOUNTER: Primary | ICD-10-CM

## 2022-10-14 DIAGNOSIS — G50.0 TRIGEMINAL NEURALGIA: ICD-10-CM

## 2022-10-14 RX ORDER — SODIUM CHLORIDE 0.9 % (FLUSH) 0.9 %
3 SYRINGE (ML) INJECTION EVERY 12 HOURS SCHEDULED
Status: CANCELLED | OUTPATIENT
Start: 2022-10-14

## 2022-10-14 RX ORDER — CHLORHEXIDINE GLUCONATE 0.12 MG/ML
15 RINSE ORAL EVERY 12 HOURS SCHEDULED
Status: CANCELLED | OUTPATIENT
Start: 2022-10-14

## 2022-10-14 RX ORDER — LIDOCAINE HYDROCHLORIDE 10 MG/ML
0.1 INJECTION, SOLUTION EPIDURAL; INFILTRATION; INTRACAUDAL; PERINEURAL ONCE AS NEEDED
Status: CANCELLED | OUTPATIENT
Start: 2022-10-14

## 2022-10-14 RX ORDER — SODIUM CHLORIDE 0.9 % (FLUSH) 0.9 %
3-10 SYRINGE (ML) INJECTION AS NEEDED
Status: CANCELLED | OUTPATIENT
Start: 2022-10-14

## 2022-10-17 ENCOUNTER — HOSPITAL ENCOUNTER (OUTPATIENT)
Facility: HOSPITAL | Age: 77
Setting detail: SURGERY ADMIT
End: 2022-10-17
Attending: INTERNAL MEDICINE | Admitting: INTERNAL MEDICINE

## 2022-10-17 DIAGNOSIS — T82.7XXA INFECTION OF PACEMAKER POCKET, INITIAL ENCOUNTER: ICD-10-CM

## 2022-10-17 RX ORDER — OXCARBAZEPINE 150 MG/1
TABLET, FILM COATED ORAL
Qty: 180 TABLET | Refills: 0 | Status: SHIPPED | OUTPATIENT
Start: 2022-10-17 | End: 2023-03-22 | Stop reason: DRUGHIGH

## 2022-10-19 ENCOUNTER — ANCILLARY PROCEDURE (OUTPATIENT)
Dept: PERIOP | Facility: HOSPITAL | Age: 77
End: 2022-10-19
Payer: MEDICARE

## 2022-10-19 ENCOUNTER — APPOINTMENT (OUTPATIENT)
Dept: GENERAL RADIOLOGY | Facility: HOSPITAL | Age: 77
End: 2022-10-19
Payer: MEDICARE

## 2022-10-19 ENCOUNTER — ANESTHESIA EVENT (OUTPATIENT)
Dept: PERIOP | Facility: HOSPITAL | Age: 77
End: 2022-10-19

## 2022-10-19 ENCOUNTER — ANESTHESIA (OUTPATIENT)
Dept: PERIOP | Facility: HOSPITAL | Age: 77
End: 2022-10-19

## 2022-10-19 ENCOUNTER — HOSPITAL ENCOUNTER (OUTPATIENT)
Facility: HOSPITAL | Age: 77
Discharge: HOME HEALTH | End: 2022-10-21
Attending: INTERNAL MEDICINE | Admitting: INTERNAL MEDICINE
Payer: MEDICARE

## 2022-10-19 DIAGNOSIS — R58 BLOOD LOSS: ICD-10-CM

## 2022-10-19 DIAGNOSIS — T82.7XXA INFECTION OF PACEMAKER POCKET, INITIAL ENCOUNTER: ICD-10-CM

## 2022-10-19 DIAGNOSIS — B99.9 INFECTION: ICD-10-CM

## 2022-10-19 DIAGNOSIS — T82.7XXS INFECTION OF PACEMAKER POCKET, SEQUELA: Primary | ICD-10-CM

## 2022-10-19 LAB
ABO GROUP BLD: NORMAL
BLD GP AB SCN SERPL QL: NEGATIVE
RH BLD: POSITIVE
T&S EXPIRATION DATE: NORMAL

## 2022-10-19 PROCEDURE — 71045 X-RAY EXAM CHEST 1 VIEW: CPT

## 2022-10-19 PROCEDURE — 87040 BLOOD CULTURE FOR BACTERIA: CPT | Performed by: INTERNAL MEDICINE

## 2022-10-19 PROCEDURE — A9270 NON-COVERED ITEM OR SERVICE: HCPCS | Performed by: INTERNAL MEDICINE

## 2022-10-19 PROCEDURE — 82803 BLOOD GASES ANY COMBINATION: CPT

## 2022-10-19 PROCEDURE — 25010000002 PROPOFOL 10 MG/ML EMULSION: Performed by: STUDENT IN AN ORGANIZED HEALTH CARE EDUCATION/TRAINING PROGRAM

## 2022-10-19 PROCEDURE — 63710000001 ATORVASTATIN 20 MG TABLET: Performed by: INTERNAL MEDICINE

## 2022-10-19 PROCEDURE — 25010000002 ONDANSETRON PER 1 MG

## 2022-10-19 PROCEDURE — 63710000001 PANTOPRAZOLE 40 MG TABLET DELAYED-RELEASE: Performed by: INTERNAL MEDICINE

## 2022-10-19 PROCEDURE — C1785 PMKR, DUAL, RATE-RESP: HCPCS | Performed by: INTERNAL MEDICINE

## 2022-10-19 PROCEDURE — 25010000002 PROPOFOL 10 MG/ML EMULSION

## 2022-10-19 PROCEDURE — 93318 ECHO TRANSESOPHAGEAL INTRAOP: CPT | Performed by: STUDENT IN AN ORGANIZED HEALTH CARE EDUCATION/TRAINING PROGRAM

## 2022-10-19 PROCEDURE — C1769 GUIDE WIRE: HCPCS | Performed by: INTERNAL MEDICINE

## 2022-10-19 PROCEDURE — G0378 HOSPITAL OBSERVATION PER HR: HCPCS

## 2022-10-19 PROCEDURE — 25010000002 MIDAZOLAM PER 1 MG: Performed by: STUDENT IN AN ORGANIZED HEALTH CARE EDUCATION/TRAINING PROGRAM

## 2022-10-19 PROCEDURE — C1894 INTRO/SHEATH, NON-LASER: HCPCS | Performed by: INTERNAL MEDICINE

## 2022-10-19 PROCEDURE — 85018 HEMOGLOBIN: CPT

## 2022-10-19 PROCEDURE — 85014 HEMATOCRIT: CPT

## 2022-10-19 PROCEDURE — 87186 SC STD MICRODIL/AGAR DIL: CPT | Performed by: INTERNAL MEDICINE

## 2022-10-19 PROCEDURE — 25010000002 NEOSTIGMINE 5 MG/10ML SOLUTION

## 2022-10-19 PROCEDURE — 87077 CULTURE AEROBIC IDENTIFY: CPT | Performed by: INTERNAL MEDICINE

## 2022-10-19 PROCEDURE — 63710000001 OXCARBAZEPINE 150 MG TABLET: Performed by: INTERNAL MEDICINE

## 2022-10-19 PROCEDURE — 86850 RBC ANTIBODY SCREEN: CPT | Performed by: INTERNAL MEDICINE

## 2022-10-19 PROCEDURE — 25010000002 ONDANSETRON PER 1 MG: Performed by: STUDENT IN AN ORGANIZED HEALTH CARE EDUCATION/TRAINING PROGRAM

## 2022-10-19 PROCEDURE — C1760 CLOSURE DEV, VASC: HCPCS | Performed by: INTERNAL MEDICINE

## 2022-10-19 PROCEDURE — 25010000002 CEFAZOLIN IN DEXTROSE 2-4 GM/100ML-% SOLUTION: Performed by: STUDENT IN AN ORGANIZED HEALTH CARE EDUCATION/TRAINING PROGRAM

## 2022-10-19 PROCEDURE — 25010000002 DEXAMETHASONE SODIUM PHOSPHATE 20 MG/5ML SOLUTION

## 2022-10-19 PROCEDURE — 63710000001 OXCARBAZEPINE 300 MG TABLET: Performed by: INTERNAL MEDICINE

## 2022-10-19 PROCEDURE — 63710000001 AMLODIPINE 10 MG TABLET: Performed by: INTERNAL MEDICINE

## 2022-10-19 PROCEDURE — 99205 OFFICE O/P NEW HI 60 MIN: CPT | Performed by: INTERNAL MEDICINE

## 2022-10-19 PROCEDURE — 63710000001 AMITRIPTYLINE 25 MG TABLET: Performed by: INTERNAL MEDICINE

## 2022-10-19 PROCEDURE — 87070 CULTURE OTHR SPECIMN AEROBIC: CPT | Performed by: INTERNAL MEDICINE

## 2022-10-19 PROCEDURE — 87205 SMEAR GRAM STAIN: CPT | Performed by: INTERNAL MEDICINE

## 2022-10-19 PROCEDURE — 63710000001 CETIRIZINE 10 MG TABLET: Performed by: INTERNAL MEDICINE

## 2022-10-19 PROCEDURE — 86901 BLOOD TYPING SEROLOGIC RH(D): CPT | Performed by: INTERNAL MEDICINE

## 2022-10-19 PROCEDURE — 25010000002 NEOSTIGMINE 5 MG/10ML SOLUTION: Performed by: STUDENT IN AN ORGANIZED HEALTH CARE EDUCATION/TRAINING PROGRAM

## 2022-10-19 PROCEDURE — 25010000002 DEXAMETHASONE SODIUM PHOSPHATE 20 MG/5ML SOLUTION: Performed by: STUDENT IN AN ORGANIZED HEALTH CARE EDUCATION/TRAINING PROGRAM

## 2022-10-19 PROCEDURE — 63710000001 ASPIRIN 81 MG TABLET DELAYED-RELEASE: Performed by: INTERNAL MEDICINE

## 2022-10-19 PROCEDURE — 85347 COAGULATION TIME ACTIVATED: CPT

## 2022-10-19 PROCEDURE — 25010000002 CEFAZOLIN IN DEXTROSE 2-4 GM/100ML-% SOLUTION: Performed by: INTERNAL MEDICINE

## 2022-10-19 PROCEDURE — 87075 CULTR BACTERIA EXCEPT BLOOD: CPT | Performed by: INTERNAL MEDICINE

## 2022-10-19 PROCEDURE — 86900 BLOOD TYPING SEROLOGIC ABO: CPT | Performed by: INTERNAL MEDICINE

## 2022-10-19 PROCEDURE — 87102 FUNGUS ISOLATION CULTURE: CPT | Performed by: INTERNAL MEDICINE

## 2022-10-19 PROCEDURE — 82947 ASSAY GLUCOSE BLOOD QUANT: CPT

## 2022-10-19 DEVICE — GEN PM ASSURITY MRI DR RF PM2272: Type: IMPLANTABLE DEVICE | Site: CHEST | Status: FUNCTIONAL

## 2022-10-19 DEVICE — FLOSEAL HEMOSTATIC MATRIX, 10ML
Type: IMPLANTABLE DEVICE | Site: CHEST | Status: FUNCTIONAL
Brand: FLOSEAL HEMOSTATIC MATRIX

## 2022-10-19 RX ORDER — ATORVASTATIN CALCIUM 20 MG/1
20 TABLET, FILM COATED ORAL DAILY
Status: DISCONTINUED | OUTPATIENT
Start: 2022-10-19 | End: 2022-10-21 | Stop reason: HOSPADM

## 2022-10-19 RX ORDER — HYDRALAZINE HYDROCHLORIDE 20 MG/ML
5 INJECTION INTRAMUSCULAR; INTRAVENOUS
Status: DISCONTINUED | OUTPATIENT
Start: 2022-10-19 | End: 2022-10-19 | Stop reason: HOSPADM

## 2022-10-19 RX ORDER — CETIRIZINE HYDROCHLORIDE 10 MG/1
10 TABLET ORAL DAILY
Status: DISCONTINUED | OUTPATIENT
Start: 2022-10-19 | End: 2022-10-21 | Stop reason: HOSPADM

## 2022-10-19 RX ORDER — DIPHENHYDRAMINE HCL 25 MG
25 CAPSULE ORAL
Status: DISCONTINUED | OUTPATIENT
Start: 2022-10-19 | End: 2022-10-19 | Stop reason: HOSPADM

## 2022-10-19 RX ORDER — FLUMAZENIL 0.1 MG/ML
0.2 INJECTION INTRAVENOUS AS NEEDED
Status: DISCONTINUED | OUTPATIENT
Start: 2022-10-19 | End: 2022-10-19 | Stop reason: HOSPADM

## 2022-10-19 RX ORDER — GLYCOPYRROLATE 0.2 MG/ML
INJECTION INTRAMUSCULAR; INTRAVENOUS AS NEEDED
Status: DISCONTINUED | OUTPATIENT
Start: 2022-10-19 | End: 2022-10-19 | Stop reason: SURG

## 2022-10-19 RX ORDER — CEFAZOLIN SODIUM 2 G/100ML
2 INJECTION, SOLUTION INTRAVENOUS EVERY 8 HOURS
Status: DISCONTINUED | OUTPATIENT
Start: 2022-10-19 | End: 2022-10-21 | Stop reason: HOSPADM

## 2022-10-19 RX ORDER — ASPIRIN 81 MG/1
81 TABLET ORAL DAILY
Status: DISCONTINUED | OUTPATIENT
Start: 2022-10-19 | End: 2022-10-21 | Stop reason: HOSPADM

## 2022-10-19 RX ORDER — LABETALOL HYDROCHLORIDE 5 MG/ML
5 INJECTION, SOLUTION INTRAVENOUS
Status: DISCONTINUED | OUTPATIENT
Start: 2022-10-19 | End: 2022-10-19 | Stop reason: HOSPADM

## 2022-10-19 RX ORDER — LIDOCAINE HYDROCHLORIDE 10 MG/ML
0.5 INJECTION, SOLUTION EPIDURAL; INFILTRATION; INTRACAUDAL; PERINEURAL ONCE AS NEEDED
Status: DISCONTINUED | OUTPATIENT
Start: 2022-10-19 | End: 2022-10-19 | Stop reason: HOSPADM

## 2022-10-19 RX ORDER — FAMOTIDINE 10 MG/ML
20 INJECTION, SOLUTION INTRAVENOUS ONCE
Status: COMPLETED | OUTPATIENT
Start: 2022-10-19 | End: 2022-10-19

## 2022-10-19 RX ORDER — MIDAZOLAM HYDROCHLORIDE 1 MG/ML
INJECTION INTRAMUSCULAR; INTRAVENOUS AS NEEDED
Status: DISCONTINUED | OUTPATIENT
Start: 2022-10-19 | End: 2022-10-19 | Stop reason: SURG

## 2022-10-19 RX ORDER — MIDAZOLAM HYDROCHLORIDE 1 MG/ML
0.5 INJECTION INTRAMUSCULAR; INTRAVENOUS
Status: DISCONTINUED | OUTPATIENT
Start: 2022-10-19 | End: 2022-10-19 | Stop reason: HOSPADM

## 2022-10-19 RX ORDER — LIDOCAINE HYDROCHLORIDE 20 MG/ML
INJECTION, SOLUTION EPIDURAL; INFILTRATION; INTRACAUDAL; PERINEURAL AS NEEDED
Status: DISCONTINUED | OUTPATIENT
Start: 2022-10-19 | End: 2022-10-19 | Stop reason: SURG

## 2022-10-19 RX ORDER — AMLODIPINE BESYLATE 10 MG/1
10 TABLET ORAL
Status: DISCONTINUED | OUTPATIENT
Start: 2022-10-19 | End: 2022-10-21 | Stop reason: HOSPADM

## 2022-10-19 RX ORDER — KETAMINE HCL IN NACL, ISO-OSM 100MG/10ML
SYRINGE (ML) INJECTION AS NEEDED
Status: DISCONTINUED | OUTPATIENT
Start: 2022-10-19 | End: 2022-10-19 | Stop reason: SURG

## 2022-10-19 RX ORDER — ONDANSETRON 2 MG/ML
INJECTION INTRAMUSCULAR; INTRAVENOUS AS NEEDED
Status: DISCONTINUED | OUTPATIENT
Start: 2022-10-19 | End: 2022-10-19 | Stop reason: SURG

## 2022-10-19 RX ORDER — DIPHENHYDRAMINE HYDROCHLORIDE 50 MG/ML
12.5 INJECTION INTRAMUSCULAR; INTRAVENOUS
Status: DISCONTINUED | OUTPATIENT
Start: 2022-10-19 | End: 2022-10-19 | Stop reason: HOSPADM

## 2022-10-19 RX ORDER — NOREPINEPHRINE BITARTRATE 1 MG/ML
INJECTION, SOLUTION INTRAVENOUS CONTINUOUS PRN
Status: DISCONTINUED | OUTPATIENT
Start: 2022-10-19 | End: 2022-10-19 | Stop reason: SURG

## 2022-10-19 RX ORDER — CEFAZOLIN SODIUM 2 G/100ML
INJECTION, SOLUTION INTRAVENOUS AS NEEDED
Status: DISCONTINUED | OUTPATIENT
Start: 2022-10-19 | End: 2022-10-19 | Stop reason: SURG

## 2022-10-19 RX ORDER — OXYCODONE AND ACETAMINOPHEN 7.5; 325 MG/1; MG/1
1 TABLET ORAL EVERY 4 HOURS PRN
Status: DISCONTINUED | OUTPATIENT
Start: 2022-10-19 | End: 2022-10-19 | Stop reason: HOSPADM

## 2022-10-19 RX ORDER — NITROGLYCERIN 0.4 MG/1
0.4 TABLET SUBLINGUAL
Status: DISCONTINUED | OUTPATIENT
Start: 2022-10-19 | End: 2022-10-21 | Stop reason: HOSPADM

## 2022-10-19 RX ORDER — ACEBUTOLOL HYDROCHLORIDE 200 MG/1
200 CAPSULE ORAL DAILY
Status: DISCONTINUED | OUTPATIENT
Start: 2022-10-20 | End: 2022-10-21 | Stop reason: HOSPADM

## 2022-10-19 RX ORDER — OXCARBAZEPINE 300 MG/1
300 TABLET, FILM COATED ORAL EVERY 12 HOURS SCHEDULED
Status: DISCONTINUED | OUTPATIENT
Start: 2022-10-19 | End: 2022-10-21 | Stop reason: HOSPADM

## 2022-10-19 RX ORDER — ONDANSETRON 2 MG/ML
4 INJECTION INTRAMUSCULAR; INTRAVENOUS ONCE AS NEEDED
Status: DISCONTINUED | OUTPATIENT
Start: 2022-10-19 | End: 2022-10-19 | Stop reason: HOSPADM

## 2022-10-19 RX ORDER — SODIUM HYPOCHLORITE 1.25 MG/ML
SOLUTION TOPICAL 2 TIMES DAILY
Status: DISCONTINUED | OUTPATIENT
Start: 2022-10-19 | End: 2022-10-21 | Stop reason: HOSPADM

## 2022-10-19 RX ORDER — SODIUM CHLORIDE 0.9 % (FLUSH) 0.9 %
3 SYRINGE (ML) INJECTION EVERY 12 HOURS SCHEDULED
Status: DISCONTINUED | OUTPATIENT
Start: 2022-10-19 | End: 2022-10-19 | Stop reason: HOSPADM

## 2022-10-19 RX ORDER — PROPOFOL 10 MG/ML
VIAL (ML) INTRAVENOUS AS NEEDED
Status: DISCONTINUED | OUTPATIENT
Start: 2022-10-19 | End: 2022-10-19 | Stop reason: SURG

## 2022-10-19 RX ORDER — OXYCODONE HYDROCHLORIDE AND ACETAMINOPHEN 5; 325 MG/1; MG/1
1 TABLET ORAL EVERY 4 HOURS PRN
Status: DISCONTINUED | OUTPATIENT
Start: 2022-10-19 | End: 2022-10-21 | Stop reason: HOSPADM

## 2022-10-19 RX ORDER — DEXAMETHASONE SODIUM PHOSPHATE 4 MG/ML
INJECTION, SOLUTION INTRA-ARTICULAR; INTRALESIONAL; INTRAMUSCULAR; INTRAVENOUS; SOFT TISSUE AS NEEDED
Status: DISCONTINUED | OUTPATIENT
Start: 2022-10-19 | End: 2022-10-19 | Stop reason: SURG

## 2022-10-19 RX ORDER — IBUPROFEN 600 MG/1
600 TABLET ORAL ONCE AS NEEDED
Status: DISCONTINUED | OUTPATIENT
Start: 2022-10-19 | End: 2022-10-19 | Stop reason: HOSPADM

## 2022-10-19 RX ORDER — PANTOPRAZOLE SODIUM 40 MG/1
40 TABLET, DELAYED RELEASE ORAL EVERY MORNING
Status: DISCONTINUED | OUTPATIENT
Start: 2022-10-19 | End: 2022-10-21 | Stop reason: HOSPADM

## 2022-10-19 RX ORDER — SODIUM CHLORIDE 0.9 % (FLUSH) 0.9 %
3 SYRINGE (ML) INJECTION EVERY 12 HOURS SCHEDULED
Status: DISCONTINUED | OUTPATIENT
Start: 2022-10-19 | End: 2022-10-21 | Stop reason: HOSPADM

## 2022-10-19 RX ORDER — OXCARBAZEPINE 300 MG/1
300 TABLET, FILM COATED ORAL 2 TIMES DAILY
Status: DISCONTINUED | OUTPATIENT
Start: 2022-10-19 | End: 2022-10-19

## 2022-10-19 RX ORDER — ROCURONIUM BROMIDE 10 MG/ML
INJECTION, SOLUTION INTRAVENOUS AS NEEDED
Status: DISCONTINUED | OUTPATIENT
Start: 2022-10-19 | End: 2022-10-19 | Stop reason: SURG

## 2022-10-19 RX ORDER — HYDROMORPHONE HYDROCHLORIDE 1 MG/ML
0.5 INJECTION, SOLUTION INTRAMUSCULAR; INTRAVENOUS; SUBCUTANEOUS
Status: DISCONTINUED | OUTPATIENT
Start: 2022-10-19 | End: 2022-10-19 | Stop reason: HOSPADM

## 2022-10-19 RX ORDER — FENTANYL CITRATE 50 UG/ML
50 INJECTION, SOLUTION INTRAMUSCULAR; INTRAVENOUS
Status: DISCONTINUED | OUTPATIENT
Start: 2022-10-19 | End: 2022-10-19 | Stop reason: HOSPADM

## 2022-10-19 RX ORDER — AMITRIPTYLINE HYDROCHLORIDE 25 MG/1
25 TABLET, FILM COATED ORAL NIGHTLY PRN
Status: DISCONTINUED | OUTPATIENT
Start: 2022-10-19 | End: 2022-10-21 | Stop reason: HOSPADM

## 2022-10-19 RX ORDER — SODIUM CHLORIDE 9 MG/ML
INJECTION, SOLUTION INTRAVENOUS CONTINUOUS PRN
Status: DISCONTINUED | OUTPATIENT
Start: 2022-10-19 | End: 2022-10-19 | Stop reason: SURG

## 2022-10-19 RX ORDER — PROMETHAZINE HYDROCHLORIDE 25 MG/1
25 SUPPOSITORY RECTAL ONCE AS NEEDED
Status: DISCONTINUED | OUTPATIENT
Start: 2022-10-19 | End: 2022-10-19 | Stop reason: HOSPADM

## 2022-10-19 RX ORDER — HYDROCODONE BITARTRATE AND ACETAMINOPHEN 7.5; 325 MG/1; MG/1
1 TABLET ORAL ONCE AS NEEDED
Status: DISCONTINUED | OUTPATIENT
Start: 2022-10-19 | End: 2022-10-19 | Stop reason: HOSPADM

## 2022-10-19 RX ORDER — OXCARBAZEPINE 150 MG/1
150 TABLET, FILM COATED ORAL
Status: DISCONTINUED | OUTPATIENT
Start: 2022-10-19 | End: 2022-10-21 | Stop reason: HOSPADM

## 2022-10-19 RX ORDER — EPHEDRINE SULFATE 50 MG/ML
5 INJECTION, SOLUTION INTRAVENOUS ONCE AS NEEDED
Status: DISCONTINUED | OUTPATIENT
Start: 2022-10-19 | End: 2022-10-19 | Stop reason: HOSPADM

## 2022-10-19 RX ORDER — SODIUM CHLORIDE, SODIUM LACTATE, POTASSIUM CHLORIDE, CALCIUM CHLORIDE 600; 310; 30; 20 MG/100ML; MG/100ML; MG/100ML; MG/100ML
9 INJECTION, SOLUTION INTRAVENOUS CONTINUOUS
Status: DISCONTINUED | OUTPATIENT
Start: 2022-10-19 | End: 2022-10-19

## 2022-10-19 RX ORDER — DEXMEDETOMIDINE HYDROCHLORIDE 4 UG/ML
INJECTION, SOLUTION INTRAVENOUS CONTINUOUS PRN
Status: DISCONTINUED | OUTPATIENT
Start: 2022-10-19 | End: 2022-10-19 | Stop reason: SURG

## 2022-10-19 RX ORDER — NALOXONE HCL 0.4 MG/ML
0.2 VIAL (ML) INJECTION AS NEEDED
Status: DISCONTINUED | OUTPATIENT
Start: 2022-10-19 | End: 2022-10-19 | Stop reason: HOSPADM

## 2022-10-19 RX ORDER — PROMETHAZINE HYDROCHLORIDE 25 MG/1
25 TABLET ORAL ONCE AS NEEDED
Status: DISCONTINUED | OUTPATIENT
Start: 2022-10-19 | End: 2022-10-19 | Stop reason: HOSPADM

## 2022-10-19 RX ORDER — ENALAPRIL MALEATE 20 MG/1
20 TABLET ORAL DAILY
Status: DISCONTINUED | OUTPATIENT
Start: 2022-10-20 | End: 2022-10-21 | Stop reason: HOSPADM

## 2022-10-19 RX ORDER — SODIUM CHLORIDE 0.9 % (FLUSH) 0.9 %
3-10 SYRINGE (ML) INJECTION AS NEEDED
Status: DISCONTINUED | OUTPATIENT
Start: 2022-10-19 | End: 2022-10-21 | Stop reason: HOSPADM

## 2022-10-19 RX ORDER — SODIUM CHLORIDE 0.9 % (FLUSH) 0.9 %
3-10 SYRINGE (ML) INJECTION AS NEEDED
Status: DISCONTINUED | OUTPATIENT
Start: 2022-10-19 | End: 2022-10-19 | Stop reason: HOSPADM

## 2022-10-19 RX ORDER — NEOSTIGMINE METHYLSULFATE 0.5 MG/ML
INJECTION, SOLUTION INTRAVENOUS AS NEEDED
Status: DISCONTINUED | OUTPATIENT
Start: 2022-10-19 | End: 2022-10-19 | Stop reason: SURG

## 2022-10-19 RX ADMIN — MIDAZOLAM 0.5 MG: 1 INJECTION, SOLUTION INTRAMUSCULAR; INTRAVENOUS at 07:25

## 2022-10-19 RX ADMIN — Medication 3 ML: at 20:56

## 2022-10-19 RX ADMIN — OXCARBAZEPINE 300 MG: 300 TABLET, FILM COATED ORAL at 20:56

## 2022-10-19 RX ADMIN — PROPOFOL 50 MG: 10 INJECTION, EMULSION INTRAVENOUS at 07:48

## 2022-10-19 RX ADMIN — ROCURONIUM BROMIDE 10 MG: 10 INJECTION INTRAVENOUS at 08:36

## 2022-10-19 RX ADMIN — PANTOPRAZOLE SODIUM 40 MG: 40 TABLET, DELAYED RELEASE ORAL at 14:10

## 2022-10-19 RX ADMIN — Medication 3 ML: at 12:35

## 2022-10-19 RX ADMIN — DEXAMETHASONE SODIUM PHOSPHATE 8 MG: 4 INJECTION, SOLUTION INTRAMUSCULAR; INTRAVENOUS at 08:29

## 2022-10-19 RX ADMIN — AMLODIPINE BESYLATE 10 MG: 10 TABLET ORAL at 20:56

## 2022-10-19 RX ADMIN — ASPIRIN 81 MG: 81 TABLET, COATED ORAL at 14:10

## 2022-10-19 RX ADMIN — NOREPINEPHRINE BITARTRATE 0.03 MCG/KG/MIN: 1 INJECTION, SOLUTION, CONCENTRATE INTRAVENOUS at 08:17

## 2022-10-19 RX ADMIN — Medication 100 MG: at 07:48

## 2022-10-19 RX ADMIN — CEFAZOLIN SODIUM 2 G: 2 INJECTION, SOLUTION INTRAVENOUS at 17:32

## 2022-10-19 RX ADMIN — AMITRIPTYLINE HYDROCHLORIDE 25 MG: 25 TABLET, FILM COATED ORAL at 23:17

## 2022-10-19 RX ADMIN — ROCURONIUM BROMIDE 50 MG: 10 INJECTION INTRAVENOUS at 07:48

## 2022-10-19 RX ADMIN — OXCARBAZEPINE 150 MG: 150 TABLET, FILM COATED ORAL at 16:59

## 2022-10-19 RX ADMIN — Medication 20 MG: at 07:48

## 2022-10-19 RX ADMIN — PROPOFOL 25 MCG/KG/MIN: 10 INJECTION, EMULSION INTRAVENOUS at 07:40

## 2022-10-19 RX ADMIN — DEXMEDETOMIDINE HYDROCHLORIDE 0.2 MCG/KG/HR: 4 INJECTION, SOLUTION INTRAVENOUS at 07:40

## 2022-10-19 RX ADMIN — CETIRIZINE HYDROCHLORIDE 10 MG: 10 TABLET ORAL at 14:10

## 2022-10-19 RX ADMIN — FAMOTIDINE 20 MG: 10 INJECTION INTRAVENOUS at 07:25

## 2022-10-19 RX ADMIN — PROPOFOL 50 MG: 10 INJECTION, EMULSION INTRAVENOUS at 07:49

## 2022-10-19 RX ADMIN — GLYCOPYRROLATE 0.6 MCG: 1 INJECTION INTRAMUSCULAR; INTRAVENOUS at 09:25

## 2022-10-19 RX ADMIN — ATORVASTATIN CALCIUM 20 MG: 20 TABLET, FILM COATED ORAL at 14:10

## 2022-10-19 RX ADMIN — ONDANSETRON 4 MG: 2 INJECTION INTRAMUSCULAR; INTRAVENOUS at 09:24

## 2022-10-19 RX ADMIN — SODIUM CHLORIDE: 9 INJECTION, SOLUTION INTRAVENOUS at 07:32

## 2022-10-19 RX ADMIN — NEOSTIGMINE METHYLSULFATE 3 MG: 0.5 INJECTION INTRAVENOUS at 09:25

## 2022-10-19 RX ADMIN — CEFAZOLIN SODIUM 2 G: 2 INJECTION, SOLUTION INTRAVENOUS at 08:01

## 2022-10-19 RX ADMIN — MIDAZOLAM 1 MG: 1 INJECTION, SOLUTION INTRAMUSCULAR; INTRAVENOUS at 07:40

## 2022-10-19 RX ADMIN — MIDAZOLAM 1 MG: 1 INJECTION, SOLUTION INTRAMUSCULAR; INTRAVENOUS at 07:48

## 2022-10-19 NOTE — ANESTHESIA PROCEDURE NOTES
Airway  Urgency: elective    Airway not difficult    General Information and Staff    Patient location during procedure: OR  Anesthesiologist: Yonathan Pimentel MD    Indications and Patient Condition  Indications for airway management: airway protection    Preoxygenated: yes  MILS maintained throughout  Mask difficulty assessment: 2 - vent by mask + OA or adjuvant +/- NMBA    Final Airway Details  Final airway type: endotracheal airway      Successful airway: ETT  Cuffed: yes   Successful intubation technique: direct laryngoscopy  Endotracheal tube insertion site: oral  Blade: Sin  Blade size: 4  ETT size (mm): 7.5  Cormack-Lehane Classification: grade IIa - partial view of glottis  Placement verified by: chest auscultation and capnometry   Measured from: teeth  ETT/EBT  to teeth (cm): 22  Number of attempts at approach: 1  Assessment: lips, teeth, and gum same as pre-op and atraumatic intubation

## 2022-10-19 NOTE — CONSULTS
Referring Provider: Naveed Rios MD  [address]  [address]  [place] [address]  Reason for Consultation: Pacemaker pocket infection     Subjective   History of present illness: This is a 76-year-old male with coronary artery disease, CHF, hypertension, and hyperlipidemia who was admitted on October 19 with concerns for pacemaker pocket infection.  The patient had his pacemaker initially implanted in 1999.  He underwent epicardial left ventricular lead placement in March 2020.  He states his incisions healed well he had no problem until 2 to 3 months ago when he noticed a small hole developing over his pacemaker pocket.  He denies any drainage.  He denies any erythema or swelling.  No pain.  He states the hole did not enlarge.  He denies any fevers chills or night sweats.  He was seen by cardiology and empirically started on a 1 month course of doxycycline without any improvement.  He was also using Bactroban.  He was admitted today and underwent removal of right-sided right ventricular pacemaker lead.    Past Medical History:   Diagnosis Date   • Arthritis    • BPH (benign prostatic hyperplasia)    • CHF (congestive heart failure) (HCC)    • Coronary artery disease    • GERD (gastroesophageal reflux disease)    • Hyperlipidemia    • Hypertension    • Sleep apnea        Past Surgical History:   Procedure Laterality Date   • APPENDECTOMY     • PROSTATE SURGERY     • REFRACTIVE SURGERY     • THORACOTOMY Left 3/4/2020    Procedure: THORACOTOMY MINI WITH LEAD PLACEMENT;  Surgeon: Justin Aguilar MD;  Location: [facility];  Service: Cardiothoracic;  Laterality: Left;   • TOTAL HIP ARTHROPLASTY Bilateral     different times        reports that he quit smoking about 54 years ago. His smoking use included cigarettes. He has never used smokeless tobacco. He reports current alcohol use of about 7.0 standard drinks per week. He reports that he does not use drugs.    family history includes Arthritis in his  father; Cancer in his mother; Diabetes in his father; Heart disease in his father; Hyperlipidemia in his father; Hypertension in his father; Kidney disease in his father.    No Known Allergies    Medication:  Antibiotics:  None    Please refer to the medical record for a full medication list    Review of Systems  Pertinent items are noted in HPI, all other systems reviewed and negative    Objective   Vital Signs   Temp:  [97.6 °F (36.4 °C)-98.8 °F (37.1 °C)] 98 °F (36.7 °C)  Heart Rate:  [60-72] 60  Resp:  [16-18] 16  BP: ()/() 142/91  Arterial Line BP: ()/(54-66) 132/66    Physical Exam:   General: In no acute distress  HEENT: Normocephalic, atraumatic,  no scleral icterus.   Neck: Supple, trachea is midline  Cardiovascular: Normal rate, regular rhythm, normal S1 and S2, no murmurs, rubs, or gallops   Respiratory: Lungs are clear to auscultation bilaterally, no wheezing  GI: Abdomen is soft, nontender, nondistended, positive bowel sounds bilaterally  Musculoskeletal: no edema, tenderness or deformity  Skin: No rashes dressing over the right and left chest   Extremities: No E/C/C  Neurological: Alert and oriented, moving all 4 extremities   Psychiatric: Normal mood and affect     Results Review:   I reviewed the patient's new clinical results.    Lab Results   Component Value Date    WBC 4.36 10/05/2022    HGB 14.8 10/05/2022    HCT 42.4 10/05/2022    MCV 95.9 10/05/2022     10/05/2022       Lab Results   Component Value Date    GLUCOSE 117 (H) 10/05/2022    BUN 14 10/05/2022    CREATININE 0.83 10/05/2022    EGFRIFNONA 102 03/07/2020    BCR 16.9 10/05/2022    CO2 26.0 10/05/2022    CALCIUM 9.2 10/05/2022    ALBUMIN 4.60 10/05/2022    LABIL2 1.6 11/06/2017    AST 24 10/05/2022    ALT 33 10/05/2022       Microbiology:  10/19 Right chest pocket cx NGTD  10/19 Left chect poacket cx NGTD    Radiology:  Admission CXR Transvenous pacemaker superimposes the left chest wall.  Proximal lead  superimposes projected area of the right atrium and distal lead projected area of the right ventricle. No pneumothorax or acute infiltrate    Assessment & Plan   Concern for Pacemaker pocket infection status post pacemaker removal with reimplantation of a new RV pacing lead    Follow-up operative cultures.  We will adjust antibiotics accordingly.  For now start empiric cefazolin 2 g IV every 8 hours.  Obtain morning CBC with differential and CMP.  Antibiotic choice and duration pending culture data.  We will also obtain blood cultures x2.     I discussed the patient's findings and my recommendations with patient

## 2022-10-19 NOTE — ANESTHESIA PROCEDURE NOTES
Diagnostic IntraOp Anesthesia Mal    Procedure Performed: Diagnostic IntraOp Anesthesia Mal       Start Time:        End Time:      Preanesthesia Checklist:  Patient identified, IV assessed, risks and benefits discussed, monitors and equipment assessed, procedure being performed at surgeon's request and anesthesia consent obtained.    General Procedure Information  Diagnostic Indications for Echo:  assessment of ascending aorta, defect repair evaluation and hemodynamic monitoring  Physician Requesting Echo: Naveed Rios MD  Location performed:  OR  Intubated  Bite block placed  Heart visualized  Probe Insertion:  Easy  Probe Type:  Multiplane  Modalities:  2D only, color flow mapping, continuous wave Doppler and pulse wave Doppler    Echocardiographic and Doppler Measurements    Ventricles    Right Ventricle:  Cavity size normal.  Global function normal.    Left Ventricle:  Cavity size dilated.  Global Function mildly impaired.  Ejection Fraction 45%.          Valves    Aortic Valve:  Annulus normal.  Regurgitation trace.      Mitral Valve:  Annulus normal.  Regurgitation mild.      Tricuspid Valve:  Annulus normal.  Regurgitation mild.    Pulmonic Valve:  Annulus normal.  Regurgitation trace.        Aorta    Ascending Aorta:  Size normal.  Dissection not present.    Aortic Arch:  Size normal.  Dissection not present.    Descending Aorta:  Size normal.  Dissection not present.  Plaque thickness greater than 3 mm.        Atria    Right Atrium:  Size normal.    Left Atrium:  Size normal.  Left atrial appendage normal.      Septa    Atrial Septum:  Intra-atrial septal morphology lipomatous hypertrophy.      Ventricular Septum:  Intra-ventricular septum morphology normal.      Diastolic Function Measurements:  Diastolic Dysfunction Grade= indeterminate  E=  ms  A=  ms  E/A Ratio=   DT=  ms  S/D=   IVRT=    Other Findings  Pericardium:  normal  Pleural Effusion:  none  Pulmonary Arteries:  normal  Pulmonary  Venous Flow:  normal and blunted (decreased) systolic flow    Anesthesia Information  Performed Personally  Anesthesiologist:  Yonathan Pimentel MD      Echocardiogram Comments:       76 year old male with CAD s/p JEFF, CHB s/p PM/ICD c/b pocket infection presents for lead extraction  - Mildly reduced LV function (LVEF 45%, hypokinetic lateral wall)  - Normal RV size/function  - Multiple leads within RA/RV -- no obvious vegetations  - Mild MR/TR  - Atheroma within descending aorta  - No pericardial effusion    S/p single lead removal  - No pericardial effusion post lead removal  - No changes above    Findings discussed with surgical team

## 2022-10-19 NOTE — PLAN OF CARE
Goal Outcome Evaluation:           Progress: improving  Outcome Evaluation: VSS, AOx4 upon admission to unit. HOB flat until 1415. Bedrest until 1600. Good appetite and fluid intake. Right groin site, soft, dry, old drainage on bandage. Bilateral chest sites, dressing clean, dry, intact. WCTM.

## 2022-10-19 NOTE — H&P
[facility] H&P        Subjective:     Encounter Date:10/19/2022      Patient ID: Mode Duffy is a 76 y.o. male.      CC- infected pacemaker pocket    HPI:  Mr Duffy is a 77 yo patient referred by Dr. Jackson for evaluation of a pacemaker pocket skin erosion.  His past medical history is significant for HTN, HLD, CAD and SHELTON.  He had a pacemaker initially implanted in 1999 due to complete heart block. In 1/2020 the patient was noted to have an EF of 35-40% and was having symptoms of dyspnea and lower extremity.  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 An epicardial LV lead was placed 3/2020. The chronic RV lead was also found to be malfunctioning and so a new RV pacing lead was placed via the right subclavian vein and tunneled into the left pectoral pocket. About a month ago the patient developed a skin erosion of the right pectoral pocket.  He was treated with doxycycline and Bactroban but the wound has not healed.  He has not had any systemic symptoms and he had a normal ESR and WBC.       The following portions of the patient's history were reviewed and updated as appropriate: current medications, past family history, past medical history, past social history, past surgical history and problem list.      Past Medical History:   Diagnosis Date   • Arthritis    • Arthritis    • BPH (benign prostatic hyperplasia)    • CHF (congestive heart failure) (HCC)    • Coronary artery disease     dr. Carrizales   • Deviated septum    • FH: cataracts    • Gait abnormality    • GERD (gastroesophageal reflux disease)    • History of trigeminal neuralgia    • Hyperlipidemia    • Hypertension    • Renal cyst    • Sciatica    • Sleep apnea     unable to tolerate machine         Past Surgical History:   Procedure Laterality Date   • APPENDECTOMY     • CARDIAC CATHETERIZATION     • CARDIAC CATHETERIZATION N/A 3/5/2020    Procedure: Percutaneous  Subclavian Intervention;  Surgeon: Nick Greenberg MD;  Location: [facility] CATH INVASIVE LOCATION;  Service: Cardiovascular;  Laterality: N/A;   • CARDIAC ELECTROPHYSIOLOGY PROCEDURE N/A 2020    Procedure: Biventricular Device Upgrade;  Surgeon: Dominick Jackson MD;  Location: [facility] CATH INVASIVE LOCATION;  Service: Cardiovascular   • CARDIAC ELECTROPHYSIOLOGY PROCEDURE N/A 3/5/2020    Procedure: Lead Revision;  Surgeon: Nick Greenberg MD;  Location: [facility] CATH INVASIVE LOCATION;  Service: Cardiovascular;  Laterality: N/A;   • CORONARY STENT PLACEMENT  2014    x 2   • PROSTATE SURGERY     • REFRACTIVE SURGERY     • THORACOTOMY Left 3/4/2020    Procedure: THORACOTOMY MINI WITH LEAD PLACEMENT;  Surgeon: Justin Aguilar MD;  Location: [facility];  Service: Cardiothoracic;  Laterality: Left;   • TOTAL HIP ARTHROPLASTY Bilateral     different times         Social History     Socioeconomic History   • Marital status:    Tobacco Use   • Smoking status: Former     Types: Cigarettes     Quit date: 1968     Years since quittin.8   • Smokeless tobacco: Never   Vaping Use   • Vaping Use: Never used   Substance and Sexual Activity   • Alcohol use: Yes     Alcohol/week: 7.0 standard drinks     Types: 7 Glasses of wine per week     Comment: RED WINE BEFORE DINNER   • Drug use: No   • Sexual activity: Defer         No Known Allergies    Current Medications:   Scheduled Meds:acebutolol, 200 mg, Oral, Daily  amLODIPine, 10 mg, Oral, Q24H  aspirin, 81 mg, Oral, Daily  atorvastatin, 20 mg, Oral, Daily  cetirizine, 10 mg, Oral, Daily  enalapril, 20 mg, Oral, Daily  OXcarbazepine, 300 mg, Oral, Q12H  OXcarbazepine, 300 mg, Oral, BID  pantoprazole, 40 mg, Oral, QAM  sodium chloride, 3 mL, Intravenous, Q12H  sodium hypochlorite, , Topical, BID      Continuous Infusions:     Review of Systems   Constitutional: Negative for fever.   Cardiovascular: Negative for chest pain, dyspnea on exertion and  "palpitations.   All other systems reviewed and are negative.         Objective:         /91 (BP Location: Left arm, Patient Position: Lying)   Pulse 60   Temp 98 °F (36.7 °C) (Oral)   Resp 16   Ht 185.4 cm (73\")   Wt 98.3 kg (216 lb 11.4 oz)   SpO2 98%   BMI 28.59 kg/m²     Constitutional:       General: Not in acute distress.     Appearance: Well-developed.   Eyes:      General: No scleral icterus.     Conjunctiva/sclera: Conjunctivae normal.      Pupils: Pupils are equal, round, and reactive to light.   HENT:      Head: Normocephalic and atraumatic.   Neck:      Thyroid: No thyromegaly.   Pulmonary:      Effort: Pulmonary effort is normal.      Breath sounds: Normal breath sounds.   Cardiovascular:      Normal rate. Regular rhythm.   Abdominal:      General: Bowel sounds are normal.      Palpations: Abdomen is soft.   Musculoskeletal: Normal range of motion.      Cervical back: Normal range of motion. Skin:     General: Skin is warm and dry.   Neurological:      Mental Status: Alert and oriented to person, place, and time.               ASCVD RIsk Score::  The 10-year ASCVD risk score (Donald URENA, et al., 2019) is: 31%    Values used to calculate the score:      Age: 76 years      Sex: Male      Is Non- : No      Diabetic: No      Tobacco smoker: No      Systolic Blood Pressure: 142 mmHg      Is BP treated: Yes      HDL Cholesterol: 55 mg/dL      Total Cholesterol: 137 mg/dL      Lab Review:              Invalid input(s): ALKPO4                        Invalid input(s): LDLCALC            Recent Radiology:  Imaging Results (Most Recent)     Procedure Component Value Units Date/Time    XR Chest 1 View [[id number]] Collected: 10/19/22 1038     Updated: 10/19/22 1043    Narrative:      XR CHEST 1 VW-clinical: Post pacemaker placement     FINDINGS: Transvenous pacemaker superimposes the left chest wall.  Proximal lead superimposes projected area of the right atrium and distal  lead " projected area of the right ventricle. No pneumothorax or acute  airspace disease has developed. No effusion or edema. Cardiomediastinal  silhouette within normal limits allowing for apical lordotic projection.  The remainder is unremarkable.     This report was finalized on 10/19/2022 10:40 AM by Dr. Saúl Vaughan M.D.                 Assessment:         Active Hospital Problems    Diagnosis  POA   • **Infection of pacemaker pocket ([facility]) [T82.7XXA]  Unknown     Added automatically from request for surgery [id number]     • Infection of pacemaker pocket, initial encounter ([facility]) [T82.7XXA]  Yes          Plan:       1. Pacemaker pocket skin erosion - pacing lead visible, has failed to heal with conservative measures.  The pocket is certainly infected.  Discussed the indications, risks and benefits of pacemaker lead extraction.  This is a difficult situation with him being pacemaker dependent.  The left pectoral pocket looks ok.  At this point in time, I would remove the right sided RV pacemaker lead and hopefully not cross contaminate the left pectoral pocket.  I would also not reimplant a new RV pacing lead at this time and would simply let him LV pace.  The LV lead functions well with a 7mv R wave and 1.6V threshold.                Naveed Rios MD  10/19/22  12:02 EDT         No

## 2022-10-19 NOTE — ANESTHESIA PROCEDURE NOTES
Arterial Line      Patient reassessed immediately prior to procedure    Patient location during procedure: OR  Start time: 10/19/2022 7:42 AM  Stop Time:10/19/2022 7:46 AM       Line placed for hemodynamic monitoring and ABGs/Labs/ISTAT.  Performed By   Anesthesiologist: Yonathan Pimentel MD   Preanesthetic Checklist  Completed: patient identified, IV checked, site marked, risks and benefits discussed, surgical consent, monitors and equipment checked, pre-op evaluation and timeout performed  Arterial Line Prep    Sterile Tech: gloves, mask, cap and sterile barriers  Prep: ChloraPrep  Patient monitoring: blood pressure monitoring, continuous pulse oximetry and EKG  Arterial Line Procedure   Laterality:left  Location:  radial artery  Catheter size: 20 G   Guidance: ultrasound guided  PROCEDURE NOTE/ULTRASOUND INTERPRETATION.  Using ultrasound guidance the potential vascular sites for insertion of the catheter were visualized to determine the patency of the vessel to be used for vascular access.  After selecting the appropriate site for insertion, the needle was visualized under ultrasound being inserted into the radial artery, followed by ultrasound confirmation of wire and catheter placement. There were no abnormalities seen on ultrasound; an image was taken; and the patient tolerated the procedure with no complications.   Number of attempts: 1  Successful placement: yes   Post Assessment   Dressing Type: occlusive dressing applied, secured with tape and wrist guard applied.   Complications no  Circ/Move/Sens Assessment: unchanged.   Patient Tolerance: patient tolerated the procedure well with no apparent complications

## 2022-10-19 NOTE — CONSULTS
Referral received for Phase II Cardiac Rehab.  Staff has reviewed chart and patient does not have a qualifying diagnosis for Phase II Cardiac Rehab at this time.  Staff available if further consultation is needed.

## 2022-10-19 NOTE — ANESTHESIA POSTPROCEDURE EVALUATION
"Patient: Mode Duffy    Procedure Summary     Date: 10/19/22 Room / Location: [place] [address] / [facility] CARDIOVASCULAR OPERATING ROOM    Anesthesia Start: 0732 Anesthesia Stop: 0952    Procedure: RT. TRANSVENOUS ICD LEAD EXTRACTION (Right: Chest) Diagnosis:     Surgeons: Naveed Rios MD Provider: Yonathan Pimentel MD    Anesthesia Type: general, [place], CVL ASA Status: 4          Anesthesia Type: general, [place], CVL    Vitals  Vitals Value Taken Time   /70 10/19/22 1016   Temp 36.4 °C (97.6 °F) 10/19/22 0946   Pulse 60 10/19/22 1028   Resp 16 10/19/22 1005   SpO2 96 % 10/19/22 1028   Vitals shown include unvalidated device data.        Post Anesthesia Care and Evaluation    Patient location during evaluation: bedside  Patient participation: complete - patient participated  Level of consciousness: awake and alert  Pain management: adequate    Airway patency: patent  Anesthetic complications: No anesthetic complications  PONV Status: none  Cardiovascular status: acceptable  Respiratory status: acceptable  Hydration status: acceptable    Comments: /67 (BP Location: Left arm, Patient Position: Lying)   Pulse 60   Temp 36.4 °C (97.6 °F) (Oral)   Resp 16   Ht 185.4 cm (73\")   Wt 98.3 kg (216 lb 11.4 oz)   SpO2 91%   BMI 28.59 kg/m²         " Patent

## 2022-10-19 NOTE — ANESTHESIA PREPROCEDURE EVALUATION
Anesthesia Evaluation     Patient summary reviewed and Nursing notes reviewed   no history of anesthetic complications:  NPO Solid Status: > 8 hours  NPO Liquid Status: > 2 hours           Airway   Mallampati: II  TM distance: >3 FB  Neck ROM: full  Dental - normal exam     Pulmonary    (+) sleep apnea on CPAP,   Cardiovascular   Exercise tolerance: good (4-7 METS)    ECG reviewed  PT is on anticoagulation therapy  Patient on routine beta blocker    (+) pacemaker, hypertension, CAD, cardiac stents dysrhythmias (CHB), CHF Systolic <55%, hyperlipidemia,     ROS comment: TTE:  ? Left ventricular systolic function is moderately decreased.  ? Estimated EF appears to be in the range of 36 - 40%  ? Mild to moderate aortic valve regurgitation is present.  ? Left atrial cavity size is moderate-to-severely dilated.  ? Mild tricuspid valve regurgitation is present.  ? Mild mitral valve regurgitation is present  ? Left ventricular diastolic dysfunction (grade I a) consistent with impaired relaxation.      Neuro/Psych  GI/Hepatic/Renal/Endo      Musculoskeletal     Abdominal    Substance History      OB/GYN          Other                        Anesthesia Plan    ASA 4     general, [place] and CVL     (I have reviewed the patient's history with the patient and the chart, including all pertinent laboratory results and imaging. I have explained the risks of anesthesia including but not limited to dental damage, corneal abrasion, nerve injury, MI, stroke, and death. Questions asked and answered. Anesthetic plan discussed with patient and team as indicated. Patient expressed understanding of the above.  )  intravenous induction     Anesthetic plan, risks, benefits, and alternatives have been provided, discussed and informed consent has been obtained with: patient.    Use of blood products discussed with consented to blood products.       CODE STATUS:

## 2022-10-19 NOTE — NURSING NOTE
Dr kiser anesthesia notified in regard when pt goes to sleep sats drop to 89  When stimulates automatically returns to 100 % . Pt will be going to a monitored floor. Dr kiser ok for pt to transfer at this time.

## 2022-10-20 LAB
ALBUMIN SERPL-MCNC: 3.9 G/DL (ref 3.5–5.2)
ALBUMIN/GLOB SERPL: 2 G/DL
ALP SERPL-CCNC: 70 U/L (ref 39–117)
ALT SERPL W P-5'-P-CCNC: 29 U/L (ref 1–41)
ANION GAP SERPL CALCULATED.3IONS-SCNC: 10 MMOL/L (ref 5–15)
AST SERPL-CCNC: 21 U/L (ref 1–40)
BASOPHILS # BLD AUTO: 0.02 10*3/MM3 (ref 0–0.2)
BASOPHILS NFR BLD AUTO: 0.2 % (ref 0–1.5)
BILIRUB SERPL-MCNC: 0.3 MG/DL (ref 0–1.2)
BUN SERPL-MCNC: 16 MG/DL (ref 8–23)
BUN/CREAT SERPL: 16.7 (ref 7–25)
CALCIUM SPEC-SCNC: 8.7 MG/DL (ref 8.6–10.5)
CHLORIDE SERPL-SCNC: 98 MMOL/L (ref 98–107)
CO2 SERPL-SCNC: 25 MMOL/L (ref 22–29)
CREAT SERPL-MCNC: 0.96 MG/DL (ref 0.76–1.27)
DEPRECATED RDW RBC AUTO: 43.7 FL (ref 37–54)
EGFRCR SERPLBLD CKD-EPI 2021: 81.9 ML/MIN/1.73
EOSINOPHIL # BLD AUTO: 0 10*3/MM3 (ref 0–0.4)
EOSINOPHIL NFR BLD AUTO: 0 % (ref 0.3–6.2)
ERYTHROCYTE [DISTWIDTH] IN BLOOD BY AUTOMATED COUNT: 12.7 % (ref 12.3–15.4)
GLOBULIN UR ELPH-MCNC: 2 GM/DL
GLUCOSE SERPL-MCNC: 124 MG/DL (ref 65–99)
HCT VFR BLD AUTO: 37.1 % (ref 37.5–51)
HGB BLD-MCNC: 13.3 G/DL (ref 13–17.7)
IMM GRANULOCYTES # BLD AUTO: 0.05 10*3/MM3 (ref 0–0.05)
IMM GRANULOCYTES NFR BLD AUTO: 0.5 % (ref 0–0.5)
LYMPHOCYTES # BLD AUTO: 1 10*3/MM3 (ref 0.7–3.1)
LYMPHOCYTES NFR BLD AUTO: 10.9 % (ref 19.6–45.3)
MCH RBC QN AUTO: 34 PG (ref 26.6–33)
MCHC RBC AUTO-ENTMCNC: 35.8 G/DL (ref 31.5–35.7)
MCV RBC AUTO: 94.9 FL (ref 79–97)
MONOCYTES # BLD AUTO: 0.46 10*3/MM3 (ref 0.1–0.9)
MONOCYTES NFR BLD AUTO: 5 % (ref 5–12)
NEUTROPHILS NFR BLD AUTO: 7.63 10*3/MM3 (ref 1.7–7)
NEUTROPHILS NFR BLD AUTO: 83.4 % (ref 42.7–76)
NRBC BLD AUTO-RTO: 0 /100 WBC (ref 0–0.2)
PLATELET # BLD AUTO: 229 10*3/MM3 (ref 140–450)
PMV BLD AUTO: 8.9 FL (ref 6–12)
POTASSIUM SERPL-SCNC: 4.2 MMOL/L (ref 3.5–5.2)
PROT SERPL-MCNC: 5.9 G/DL (ref 6–8.5)
RBC # BLD AUTO: 3.91 10*6/MM3 (ref 4.14–5.8)
SODIUM SERPL-SCNC: 133 MMOL/L (ref 136–145)
WBC NRBC COR # BLD: 9.16 10*3/MM3 (ref 3.4–10.8)

## 2022-10-20 PROCEDURE — A9270 NON-COVERED ITEM OR SERVICE: HCPCS | Performed by: INTERNAL MEDICINE

## 2022-10-20 PROCEDURE — 63710000001 ENALAPRIL 20 MG TABLET: Performed by: INTERNAL MEDICINE

## 2022-10-20 PROCEDURE — 99214 OFFICE O/P EST MOD 30 MIN: CPT | Performed by: INTERNAL MEDICINE

## 2022-10-20 PROCEDURE — 99024 POSTOP FOLLOW-UP VISIT: CPT | Performed by: INTERNAL MEDICINE

## 2022-10-20 PROCEDURE — G0378 HOSPITAL OBSERVATION PER HR: HCPCS

## 2022-10-20 PROCEDURE — 63710000001 ACEBUTOLOL 200 MG CAPSULE: Performed by: INTERNAL MEDICINE

## 2022-10-20 PROCEDURE — 25010000002 CEFAZOLIN IN DEXTROSE 2-4 GM/100ML-% SOLUTION: Performed by: INTERNAL MEDICINE

## 2022-10-20 PROCEDURE — 63710000001 AMLODIPINE 10 MG TABLET: Performed by: INTERNAL MEDICINE

## 2022-10-20 PROCEDURE — 63710000001 CETIRIZINE 10 MG TABLET: Performed by: INTERNAL MEDICINE

## 2022-10-20 PROCEDURE — 80053 COMPREHEN METABOLIC PANEL: CPT | Performed by: INTERNAL MEDICINE

## 2022-10-20 PROCEDURE — 93010 ELECTROCARDIOGRAM REPORT: CPT | Performed by: INTERNAL MEDICINE

## 2022-10-20 PROCEDURE — 63710000001 OXCARBAZEPINE 150 MG TABLET: Performed by: INTERNAL MEDICINE

## 2022-10-20 PROCEDURE — 63710000001 PANTOPRAZOLE 40 MG TABLET DELAYED-RELEASE: Performed by: INTERNAL MEDICINE

## 2022-10-20 PROCEDURE — 63710000001 OXCARBAZEPINE 300 MG TABLET: Performed by: INTERNAL MEDICINE

## 2022-10-20 PROCEDURE — 63710000001 ASPIRIN 81 MG TABLET DELAYED-RELEASE: Performed by: INTERNAL MEDICINE

## 2022-10-20 PROCEDURE — 85025 COMPLETE CBC W/AUTO DIFF WBC: CPT | Performed by: INTERNAL MEDICINE

## 2022-10-20 PROCEDURE — 93005 ELECTROCARDIOGRAM TRACING: CPT | Performed by: INTERNAL MEDICINE

## 2022-10-20 PROCEDURE — 63710000001 ATORVASTATIN 20 MG TABLET: Performed by: INTERNAL MEDICINE

## 2022-10-20 RX ADMIN — OXCARBAZEPINE 300 MG: 300 TABLET, FILM COATED ORAL at 08:08

## 2022-10-20 RX ADMIN — OXCARBAZEPINE 150 MG: 150 TABLET, FILM COATED ORAL at 16:39

## 2022-10-20 RX ADMIN — Medication 3 ML: at 08:41

## 2022-10-20 RX ADMIN — CEFAZOLIN SODIUM 2 G: 2 INJECTION, SOLUTION INTRAVENOUS at 02:05

## 2022-10-20 RX ADMIN — PANTOPRAZOLE SODIUM 40 MG: 40 TABLET, DELAYED RELEASE ORAL at 07:23

## 2022-10-20 RX ADMIN — ASPIRIN 81 MG: 81 TABLET, COATED ORAL at 08:08

## 2022-10-20 RX ADMIN — AMLODIPINE BESYLATE 10 MG: 10 TABLET ORAL at 21:34

## 2022-10-20 RX ADMIN — CEFAZOLIN SODIUM 2 G: 2 INJECTION, SOLUTION INTRAVENOUS at 16:05

## 2022-10-20 RX ADMIN — ACEBUTOLOL HYDROCHLORIDE 200 MG: 200 CAPSULE ORAL at 08:08

## 2022-10-20 RX ADMIN — ATORVASTATIN CALCIUM 20 MG: 20 TABLET, FILM COATED ORAL at 08:08

## 2022-10-20 RX ADMIN — CETIRIZINE HYDROCHLORIDE 10 MG: 10 TABLET ORAL at 08:08

## 2022-10-20 RX ADMIN — CEFAZOLIN SODIUM 2 G: 2 INJECTION, SOLUTION INTRAVENOUS at 08:09

## 2022-10-20 RX ADMIN — ENALAPRIL MALEATE 20 MG: 20 TABLET ORAL at 08:08

## 2022-10-20 RX ADMIN — Medication 3 ML: at 21:34

## 2022-10-20 NOTE — PROGRESS NOTES
LOS: 0 days     Chief Complaint:  Pacemaker pocket infection     Interval History: Afebrile, no new complaints or events.  Tolerating antibiotics without abdominal pain nausea vomiting or diarrhea.  No rashes    Vital Signs  Temp:  [97.4 °F (36.3 °C)-97.9 °F (36.6 °C)] 97.9 °F (36.6 °C)  Heart Rate:  [60-63] 60  Resp:  [16] 16  BP: (123-154)/(72-83) 154/80    Physical Exam:  General: In no acute distress  Cardiovascular: RRR, no LE edema   Respiratory: Breathing comfortably on room air  GI: Soft, NT/ND, + bowel sounds bilaterally  Skin: No rashes surgical incisions without erythema or purulence    Antibiotics:  Cefazolin 2 g IV every 8 hours     Results Review:    Lab Results   Component Value Date    WBC 9.16 10/20/2022    HGB 13.3 10/20/2022    HCT 37.1 (L) 10/20/2022    MCV 94.9 10/20/2022     10/20/2022     Lab Results   Component Value Date    GLUCOSE 124 (H) 10/20/2022    BUN 16 10/20/2022    CREATININE 0.96 10/20/2022    EGFRIFNONA 102 03/07/2020    BCR 16.7 10/20/2022    CO2 25.0 10/20/2022    CALCIUM 8.7 10/20/2022    ALBUMIN 3.90 10/20/2022    LABIL2 1.6 11/06/2017    AST 21 10/20/2022    ALT 29 10/20/2022       Microbiology:  10/19 Right chest pocket cx gram-negative christine  10/19 Left chect poacket cx NGTD    Assessment & Plan   Concern for Pacemaker pocket infection status post pacemaker removal with reimplantation of a new RV pacing lead    Right chest pocket wound cultures with gram-negative christine.  Continue empiric cefazolin 2 g IV every 8 hours.  Hopefully will get final sensitivities tomorrow and can discharge the patient on oral antibiotics

## 2022-10-20 NOTE — PROGRESS NOTES
[facility]      Hospital Follow Up    LOS:  LOS: 0 days   Patient Name: Mode Duffy  Age/Sex: 76 y.o. male  : 1945  MRN: [id number]    Day of Service: 10/20/22   Length of Stay: 0  Encounter Provider: Naveed Rios MD  Place of Service: [facility]  Patient Care Team:  Temo Vidal MD as PCP - General  Temo Vidal MD as PCP - Family Medicine    Subjective:     Interval History: No acute events overnight.  No complaints.    Current Medications:   Scheduled Meds:acebutolol, 200 mg, Oral, Daily  amLODIPine, 10 mg, Oral, Q24H  aspirin, 81 mg, Oral, Daily  atorvastatin, 20 mg, Oral, Daily  ceFAZolin, 2 g, Intravenous, Q8H  cetirizine, 10 mg, Oral, Daily  enalapril, 20 mg, Oral, Daily  OXcarbazepine, 150 mg, Oral, Daily With Lunch  OXcarbazepine, 300 mg, Oral, Q12H  pantoprazole, 40 mg, Oral, QAM  sodium chloride, 3 mL, Intravenous, Q12H  sodium hypochlorite, , Topical, BID      Continuous Infusions:     Allergies:  No Known Allergies    Review of Systems   Constitutional: Negative for fever.   Cardiovascular: Negative for chest pain and dyspnea on exertion.   Respiratory: Negative for shortness of breath.        Objective:     Temp:  [97.4 °F (36.3 °C)-98.8 °F (37.1 °C)] 97.6 °F (36.4 °C)  Heart Rate:  [60-65] 63  Resp:  [16] 16  BP: ()/(62-91) 141/81  Arterial Line BP: ()/(54-66) 132/66     Intake/Output Summary (Last 24 hours) at 10/20/2022 0722  Last data filed at 10/20/2022 0400  Gross per 24 hour   Intake 660 ml   Output --   Net 660 ml     Body mass index is 28.58 kg/m².      10/19/22  0657 10/20/22  0600   Weight: 98.3 kg (216 lb 11.4 oz) 98.2 kg (216 lb 9.6 oz)           Constitutional:       Appearance: Not in distress.   Eyes:      Pupils: Pupils are equal, round, and reactive to light.   Neck:      Vascular: JVD normal.   Pulmonary:      Effort: Pulmonary effort is normal.      Breath sounds:  Normal breath sounds.   Cardiovascular:      Normal rate. Regular rhythm.   Abdominal:      Palpations: Abdomen is soft.   Skin:     General: Skin is warm and dry.   Neurological:      General: No focal deficit present.      Mental Status: Alert.           Lab Review:   Results from last 7 days   Lab Units 10/20/22  0310   SODIUM mmol/L 133*   POTASSIUM mmol/L 4.2   CHLORIDE mmol/L 98   CO2 mmol/L 25.0   BUN mg/dL 16   CREATININE mg/dL 0.96   GLUCOSE mg/dL 124*   CALCIUM mg/dL 8.7   AST (SGOT) U/L 21   ALT (SGPT) U/L 29         Results from last 7 days   Lab Units 10/20/22  0310   WBC 10*3/mm3 9.16   HEMOGLOBIN g/dL 13.3   HEMATOCRIT % 37.1*   PLATELETS 10*3/mm3 229                   Invalid input(s): LDLCALC            Recent Radiology:  Imaging Results (Most Recent)     Procedure Component Value Units Date/Time    XR Chest 1 View [[id number]] Collected: 10/19/22 1038     Updated: 10/19/22 1043    Narrative:      XR CHEST 1 VW-clinical: Post pacemaker placement     FINDINGS: Transvenous pacemaker superimposes the left chest wall.  Proximal lead superimposes projected area of the right atrium and distal  lead projected area of the right ventricle. No pneumothorax or acute  airspace disease has developed. No effusion or edema. Cardiomediastinal  silhouette within normal limits allowing for apical lordotic projection.  The remainder is unremarkable.     This report was finalized on 10/19/2022 10:40 AM by Dr. Saúl Vaughan M.D.             I reviewed the patient's new clinical results.  I personally viewed and interpreted the patient's EKG      Assessment:       Infection of pacemaker pocket ([facility])    Infection of pacemaker pocket, initial encounter ([facility])        Plan:     1. Right pectoral pacemaker pocket erosion - s/p removal of chronic RV pacing lead,   blood cultures pending, right pectoral wound gram stain showed WBC's but no organisms, left chest gram stain negative.  On IV cefazolin.  Appreciate ID  recommendations.  Would care consult.  Hopefully home tomorrow.    2. Left pectoral pocket pacemaker replacement - normal device function, wound looks good    Naveed Rios MD  10/20/22  07:22 EDT

## 2022-10-20 NOTE — NURSING NOTE
Wound/Ostomy: Consult received regarding skin issue to rt pectoral area. Patient is alert, oriented, sitting in the chair, upon assessment is observed wound to rt upper chest due pacemaker replacement. Full-thickness skin and tissue loss with serosanguinolent drainage noted. Betadine wet to moist fluffed gauze and cover with optifoam is ordered. RN notified. Please re-consult for any additional needs.

## 2022-10-20 NOTE — PLAN OF CARE
Goal Outcome Evaluation:           Progress: improving  Outcome Evaluation: VSS, AOx4. IV ABX for pacemaker pocket infection. Ambulating well in room and up ad james. Good appetite and fluid intake. Right groin site, soft, dry. Wound care consulted today and perfermed dressing change. Will require one this evening as well. CARLA.

## 2022-10-21 ENCOUNTER — HOME HEALTH ADMISSION (OUTPATIENT)
Dept: HOME HEALTH SERVICES | Facility: HOME HEALTHCARE | Age: 77
End: 2022-10-21

## 2022-10-21 VITALS
DIASTOLIC BLOOD PRESSURE: 81 MMHG | BODY MASS INDEX: 28.8 KG/M2 | TEMPERATURE: 98.1 F | WEIGHT: 217.3 LBS | HEIGHT: 73 IN | OXYGEN SATURATION: 93 % | RESPIRATION RATE: 18 BRPM | HEART RATE: 60 BPM | SYSTOLIC BLOOD PRESSURE: 143 MMHG

## 2022-10-21 LAB
BACTERIA SPEC AEROBE CULT: ABNORMAL
GRAM STN SPEC: ABNORMAL

## 2022-10-21 PROCEDURE — 63710000001 OXCARBAZEPINE 150 MG TABLET: Performed by: INTERNAL MEDICINE

## 2022-10-21 PROCEDURE — 63710000001 ATORVASTATIN 20 MG TABLET: Performed by: INTERNAL MEDICINE

## 2022-10-21 PROCEDURE — 99214 OFFICE O/P EST MOD 30 MIN: CPT | Performed by: INTERNAL MEDICINE

## 2022-10-21 PROCEDURE — 63710000001 CETIRIZINE 10 MG TABLET: Performed by: INTERNAL MEDICINE

## 2022-10-21 PROCEDURE — A9270 NON-COVERED ITEM OR SERVICE: HCPCS | Performed by: INTERNAL MEDICINE

## 2022-10-21 PROCEDURE — 63710000001 ACEBUTOLOL 200 MG CAPSULE: Performed by: INTERNAL MEDICINE

## 2022-10-21 PROCEDURE — 63710000001 ENALAPRIL 20 MG TABLET: Performed by: INTERNAL MEDICINE

## 2022-10-21 PROCEDURE — 63710000001 PANTOPRAZOLE 40 MG TABLET DELAYED-RELEASE: Performed by: INTERNAL MEDICINE

## 2022-10-21 PROCEDURE — 25010000002 CEFAZOLIN IN DEXTROSE 2-4 GM/100ML-% SOLUTION: Performed by: INTERNAL MEDICINE

## 2022-10-21 PROCEDURE — 63710000001 ASPIRIN 81 MG TABLET DELAYED-RELEASE: Performed by: INTERNAL MEDICINE

## 2022-10-21 PROCEDURE — G0378 HOSPITAL OBSERVATION PER HR: HCPCS

## 2022-10-21 RX ORDER — SODIUM HYPOCHLORITE 1.25 MG/ML
0.25 SOLUTION TOPICAL 2 TIMES DAILY
Qty: 1000 ML | Refills: 1 | Status: SHIPPED | OUTPATIENT
Start: 2022-10-21 | End: 2022-10-21 | Stop reason: SDUPTHER

## 2022-10-21 RX ORDER — ISOSORBIDE MONONITRATE 60 MG/1
1 TABLET, EXTENDED RELEASE ORAL DAILY
COMMUNITY

## 2022-10-21 RX ORDER — LEVOFLOXACIN 750 MG/1
750 TABLET ORAL DAILY
Qty: 10 TABLET | Refills: 0 | Status: SHIPPED | OUTPATIENT
Start: 2022-10-21 | End: 2022-10-31

## 2022-10-21 RX ORDER — SODIUM HYPOCHLORITE 1.25 MG/ML
0.25 SOLUTION TOPICAL 2 TIMES DAILY
Qty: 1000 ML | Refills: 1 | Status: SHIPPED | OUTPATIENT
Start: 2022-10-21

## 2022-10-21 RX ADMIN — ENALAPRIL MALEATE 20 MG: 20 TABLET ORAL at 09:41

## 2022-10-21 RX ADMIN — CETIRIZINE HYDROCHLORIDE 10 MG: 10 TABLET ORAL at 09:41

## 2022-10-21 RX ADMIN — OXCARBAZEPINE 300 MG: 300 TABLET, FILM COATED ORAL at 09:40

## 2022-10-21 RX ADMIN — PANTOPRAZOLE SODIUM 40 MG: 40 TABLET, DELAYED RELEASE ORAL at 06:51

## 2022-10-21 RX ADMIN — Medication 3 ML: at 09:41

## 2022-10-21 RX ADMIN — ACEBUTOLOL HYDROCHLORIDE 200 MG: 200 CAPSULE ORAL at 09:41

## 2022-10-21 RX ADMIN — ASPIRIN 81 MG: 81 TABLET, COATED ORAL at 09:41

## 2022-10-21 RX ADMIN — CEFAZOLIN SODIUM 2 G: 2 INJECTION, SOLUTION INTRAVENOUS at 00:33

## 2022-10-21 RX ADMIN — CEFAZOLIN SODIUM 2 G: 2 INJECTION, SOLUTION INTRAVENOUS at 09:40

## 2022-10-21 RX ADMIN — ATORVASTATIN CALCIUM 20 MG: 20 TABLET, FILM COATED ORAL at 09:41

## 2022-10-21 NOTE — PLAN OF CARE
Goal Outcome Evaluation:      Pt rested well through out night, denies pain or discomfort. Drsg intact over would on left upper chest. Drsg change as ordered. VSS

## 2022-10-21 NOTE — PROGRESS NOTES
Discharge Planning Assessment  [facility]     Patient Name: Mode Duffy  MRN: [id number]  Today's Date: 10/21/2022    Admit Date: 10/19/2022    Plan: Home with family and HH   Discharge Needs Assessment     Row Name 10/21/22 1156       Living Environment    People in Home spouse    Current Living Arrangements home    Primary Care Provided by self    Provides Primary Care For no one    Family Caregiver if Needed spouse;child(rinku), adult    Quality of Family Relationships helpful;involved;supportive    Able to Return to Prior Arrangements yes       Resource/Environmental Concerns    Resource/Environmental Concerns none       Transition Planning    Patient/Family Anticipates Transition to home with family    Transportation Anticipated family or friend will provide;car, drives self       Discharge Needs Assessment    Equipment Currently Used at Home none    Concerns to be Addressed adjustment to diagnosis/illness    Equipment Needed After Discharge wound care supplies    Discharge Facility/Level of Care Needs home with home health    Provided Post Acute Provider List? N/A    Provided Post Acute Provider Quality & Resource List? N/A               Discharge Plan     Row Name 10/21/22 1201       Plan    Plan Home with family and HH    Plan Comments Spoke with patient, face sheet verified. Patient lives with his spouse, he is IADL and does not use any DME. S/w APRN, Caron Diaz, pt will dc home w/ BID dressing changes. Discussed dc options with patient, he is aprehensive about learning how to do dressing changes. He is going to ask his daughter to assist. He denies SNF needs and does not want to go to [facility] for dressings. He is agreeable with referral to [facility], [facility] notified. Referral pending with [facility].              Continued Care and Services - Admitted Since 10/19/2022     Home Medical Care     Service Provider Request Status Selected Services Address Phone Fax Patient Preferred    [facility] Pending  - Request Sent N/A [address], [place] [address] [phone] --              Expected Discharge Date and Time     Expected Discharge Date Expected Discharge Time    Oct 21, 2022          Demographic Summary    No documentation.                Functional Status    No documentation.                Psychosocial    No documentation.                Abuse/Neglect    No documentation.                Legal    No documentation.                Substance Abuse    No documentation.                Patient Forms    No documentation.                   Natalie Borja RN

## 2022-10-21 NOTE — DISCHARGE PLACEMENT REQUEST
"Mode Duffy (76 y.o. Male)     Date of Birth   1945    Social Security Number       [place]   [address] [place]    Home Phone   [phone]    MRN   [id number]       [facility]   None    Marital Status                               Admission Date   10/19/22    Admission Type   Elective    Admitting Provider   Naveed Rios MD    Attending Provider   Naveed Rios MD    [address], [address]       Discharge Date       Discharge Disposition       Discharge Destination                               Attending Provider: Naveed Rios MD    Allergies: No Known Allergies    Isolation: None   Infection: None   Code Status: CPR    Ht: 185.4 cm (73\")   Wt: 98.6 kg (217 lb 4.8 oz)    Admission Cmt: None   Principal Problem: Infection of pacemaker pocket (HCC) [T82.7XXA]                 Active Insurance as of 10/19/2022     Primary Coverage     Payor Plan Insurance Group Employer/Plan Group    HU[employer] MEDICARE REPLACEMENT HU[employer] MEDICARE REPLACEMENT [id number]     Payor Plan Address Payor Plan Phone Number Payor Plan Fax Number Effective Dates    [address] [address] [phone]  1/1/2019 - None Entered    [facility] [address]       Subscriber Name Subscriber Birth Date Member ID       MODE DUFFY 1945 [id number]                 Emergency Contacts      (Rel.) Home Phone Work Phone Mobile Phone    Radha Duffy (Spouse) [phone] -- [phone]    SnellingJovana khoury (Daughter) [phone] -- [phone]              "

## 2022-10-21 NOTE — DISCHARGE SUMMARY
[facility]    DISCHARGE SUMMARY      Patient Name: Mode Duffy  :1945  76 y.o.    Date of Admit: 10/19/2022  Date of Discharge:  10/21/2022    Discharge Diagnosis:  Problems Addressed this Visit          Other    * (Principal) Infection of pacemaker pocket (HCC) - Primary    Relevant Medications    levoFLOXacin (Levaquin) 750 MG tablet    Other Relevant Orders    EP/CRM Study    Ambulatory Referral to Home Health (Hospital)    Infection of pacemaker pocket, initial encounter ([facility])    Relevant Medications    levoFLOXacin (Levaquin) 750 MG tablet    Other Relevant Orders    EP/CRM Study     Other Visit Diagnoses       Blood loss        Relevant Orders    Type & Screen (Completed)    Anaerobic Culture - Wound, Chest, Left    Wound Culture - Wound, Chest, Left (Completed)    Infection        Relevant Medications    levoFLOXacin (Levaquin) 750 MG tablet    Other Relevant Orders    Anaerobic Culture - Wound, Chest, Left    Wound Culture - Wound, Chest, Left (Completed)    Anaerobic Culture - Hardware / Foreign Body, Chest, Right    Anaerobic Culture - Wound, Chest, Right    Fungus Culture - Hardware / Foreign Body, Chest, Right    Wound Culture - Wound, Chest, Right (Completed)    Hardware / Foreign Body Culture - Hardware / Foreign Body, Chest, Right (Completed)          Diagnoses         Codes Comments    Infection of pacemaker pocket, sequela    -  Primary ICD-10-CM: T82.7XXS  ICD-9-CM: 909.3     Infection of pacemaker pocket, initial encounter ([facility])     ICD-10-CM: T82.7XXA  ICD-9-CM: 996.61     Blood loss     ICD-10-CM: R58  ICD-9-CM: 459.0     Infection     ICD-10-CM: B99.9  ICD-9-CM: 136.9           1. Right pectoral pacemaker pocket erosion - s/p removal of chronic RV pacing lead 10/19/22  - blood cultures negative x 24 hours  - right pectoral wound gram stain showed WBC's but no organisms  - left chest gram stain negative.    - On IV cefazolin --> PO levaquin  - ID following  - Would  care following.  Dakins wet to dry bid dressing changes     2. Left pectoral pocket pacemaker replacement   - normal device function  - prior BiV ICD   - intraop CAILIN showed EF 45%, no vegetations, mild MR/TR    3. CAD s/p PCI 2014    4. PAF  - eliquis on hold perioperatively  - resume in 3 days as per Dr. Rios    4. HTN    5. HLD    6. SHELTON    Hospital Course:   Mr Duffy is a 77 yo patient referred by Dr. Jackson for evaluation of a pacemaker pocket skin erosion.  His past medical history is significant for HTN, HLD, CAD and SHELTON.  He had a pacemaker initially implanted in 1999 due to complete heart block. In 1/2020 the patient was noted to have an EF of 35-40% and was having symptoms of dyspnea and lower extremity.  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 An epicardial LV lead was placed 3/2020. The chronic RV lead was also found to be malfunctioning and so a new RV pacing lead was placed via the right subclavian vein and tunneled into the left pectoral pocket. About a month ago the patient developed a skin erosion of the right pectoral pocket.  He was treated with doxycycline and Bactroban but the wound has not healed.  He has not had any systemic symptoms and he had a normal ESR and WBC.    Pacemaker pocket skin erosion - pacing lead visible, has failed to heal with conservative measures.  The pocket is certainly infected.  Discussed the indications, risks and benefits of pacemaker lead extraction.  This is a difficult situation with him being pacemaker dependent.  The left pectoral pocket looks ok.  At this point in time, I would remove the right sided RV pacemaker lead and hopefully not cross contaminate the left pectoral pocket.  I would also not reimplant a new RV pacing lead at this time and would simply let him LV pace.  The LV lead functions well with a 7mv R wave and 1.6V threshold.     He underwent removal of chronic RV pacing lead on  "10/19/22 with reimplantation of a new [employer] dual chamber PPM in the left pectoral pocket with LV only pacing.  Patient tolerated well with complications.  Operative cultures with Klebsiella only from the right sided wound.  Left-sided chest pocket remains negative to date.  ID recommends \"Levaquin 750 mg p.o. daily for an additional 10 days to complete a 14-day course of antibiotics.  At this time I do not recommend suppressive antibiotic therapy as right-sided cultures are negative\".  Wound care recommends Dakins wet to dry bid dressing changes and we will get home health to assist with wound care needs.  As d/w Dr. Rios patient will be discharged home today.          Procedures Performed  Procedure(s):  RT. TRANSVENOUS ICD LEAD EXTRACTION    Physicians    Panel Physicians Referring Physician Case Authorizing Physician   Naveed Rios MD (Primary)           Conclusion       Successful removal of right ventricular pacing lead from right pectoral pocket  Implantation of new dual chamber pacemaker in left pectoral pocket with LV only pacing     Procedure Narrative    Pre-op Dx: Right pectoral pacemaker pocket infection    Post Op Dx: same      Procedure:  Removal of chronic biventricular pacemaker from left pectoral    pocket                      Implantation of new dual chamber pacemaker pulse generator                     Chronic right ventricular pacemaker lead removal from right    pectoral pocket                      Pacemaker pocket revision    Anesthesia:  General    Description:      After informed consent was obtained the patient was brought to the OR in the fasting state.  He was prepped and draped in the usual sterile fashion.  An incision was made 2cm below the left infraclavicular margin and the dissection carried down to the capsule which was incised.  The old device was removed and is a Biotronic  biventricular pacemaker . The chronic abandoned RV lead was evaluated and had no sensing or " pacing with an impedance of <200 ohms.  The lead was capped and abandoned.  The chronic epicardial LV lead had a pacing threshold of 1.5V.   The chronic atrial lead had a P wave of 1 mv with a threshold of 1.5V and an impedance of  500ohms.   The chronic RV lead (from the right pectoral pocket was dissected free from the fibrous tissue.  The capsule was excised and the pocket revised. The pocket was irrigated with Bacitracin.  The new pacemaker pulse generator is a SJM model# 2272.  The leads were connected and the device was placed into the pocket.  The subcutaneous tissue was closed with a 2-0 vicryl and the skin was closed with a 4-0 vicryl.      We then made an incision in the right infraclavicular region and the RV lead was dissected free from the fibrous tissue and unscrewed.  The lead was removed without difficulty.  The pocket was then packed with Betadine soaked Kerlex and a dressing applied.    The patient tolerated the procedure well.      EBL - 5cc    Complications - none Less than 20 mL of estimated blood loss during the case. A  specimen was collected during the case.            Consults       Date and Time Order Name Status Description    10/19/2022  9:39 AM Inpatient Infectious Diseases Consult Completed             Pertinent Test Results:   Results from last 7 days   Lab Units 10/20/22  0310   SODIUM mmol/L 133*   POTASSIUM mmol/L 4.2   CHLORIDE mmol/L 98   CO2 mmol/L 25.0   BUN mg/dL 16   CREATININE mg/dL 0.96   CALCIUM mg/dL 8.7   BILIRUBIN mg/dL 0.3   ALK PHOS U/L 70   ALT (SGPT) U/L 29   AST (SGOT) U/L 21   GLUCOSE mg/dL 124*         @LABRCNT(bnp)@  Results from last 7 days   Lab Units 10/20/22  0310   WBC 10*3/mm3 9.16   HEMOGLOBIN g/dL 13.3   HEMATOCRIT % 37.1*   PLATELETS 10*3/mm3 229                   ECHOCARDIOGRAM:    Results for orders placed in visit on 10/19/22    Diagnostic IntraOp Anesthesia Mal    Narrative  Diagnostic IntraOp Anesthesia Mal    Procedure Performed: Diagnostic IntraOp  Anesthesia Mal  Start Time:  End Time:    Preanesthesia Checklist:  Patient identified, IV assessed, risks and benefits discussed, monitors and equipment assessed, procedure being performed at surgeon's request and anesthesia consent obtained.    General Procedure Information  Diagnostic Indications for Echo:  assessment of ascending aorta, defect repair evaluation and hemodynamic monitoring  Physician Requesting Echo: Naveed Rios MD  Location performed:  OR  Intubated  Bite block placed  Heart visualized  Probe Insertion:  Easy  Probe Type:  Multiplane  Modalities:  2D only, color flow mapping, continuous wave Doppler and pulse wave Doppler    Echocardiographic and Doppler Measurements    Ventricles    Right Ventricle:  Cavity size normal.  Global function normal.  Left Ventricle:  Cavity size dilated.  Global Function mildly impaired.  Ejection Fraction 45%.        Valves    Aortic Valve:  Annulus normal.  Regurgitation trace.    Mitral Valve:  Annulus normal.  Regurgitation mild.    Tricuspid Valve:  Annulus normal.  Regurgitation mild.  Pulmonic Valve:  Annulus normal.  Regurgitation trace.      Aorta    Ascending Aorta:  Size normal.  Dissection not present.  Aortic Arch:  Size normal.  Dissection not present.  Descending Aorta:  Size normal.  Dissection not present.  Plaque thickness greater than 3 mm.      Atria    Right Atrium:  Size normal.  Left Atrium:  Size normal.  Left atrial appendage normal.      Septa    Atrial Septum:  Intra-atrial septal morphology lipomatous hypertrophy.    Ventricular Septum:  Intra-ventricular septum morphology normal.    Diastolic Function Measurements:  Diastolic Dysfunction Grade= indeterminate  E=  ms  A=  ms  E/A Ratio=  DT=  ms  S/D=  IVRT=    Other Findings  Pericardium:  normal  Pleural Effusion:  none  Pulmonary Arteries:  normal  Pulmonary Venous Flow:  normal and blunted (decreased) systolic flow    Anesthesia Information  Performed  Personally  Anesthesiologist:  Yonathan Pimentel MD      Echocardiogram Comments:  76 year old male with CAD s/p JEFF, CHB s/p PM/ICD c/b pocket infection presents for lead extraction  - Mildly reduced LV function (LVEF 45%, hypokinetic lateral wall)  - Normal RV size/function  - Multiple leads within RA/RV -- no obvious vegetations  - Mild MR/TR  - Atheroma within descending aorta  - No pericardial effusion    S/p single lead removal  - No pericardial effusion post lead removal  - No changes above    Findings discussed with surgical team      Physical Exam  Neuro: AAOx3, no gross deficits  CV: S1S2 RRR, no murmur  Resp: non-labored, CTA  GI: BS+, abd soft  Ext: pedal pulses palp, no edema  Tele: A-Paced  Skin: left subclavian well approximated with steri-strips, no hematoma, no drainage; right subclavian rolled kerlex CDI    Condition on Discharge: stable    Discharge Medications     Discharge Medications        New Medications        Instructions Start Date   levoFLOXacin 750 MG tablet  Commonly known as: Levaquin   750 mg, Oral, Daily      sodium hypochlorite 0.125 % solution topical solution 0.125%  Commonly known as: DAKIN'S 1/4 STRENGTH   118.25 mL, Topical, 2 Times Daily             Continue These Medications        Instructions Start Date   acebutolol 200 MG capsule  Commonly known as: SECTRAL   TAKE 1 CAPSULE EVERY DAY      amitriptyline 10 MG tablet  Commonly known as: ELAVIL   TAKE 2 TABLETS AT BEDTIME      amLODIPine 10 MG tablet  Commonly known as: NORVASC   10 mg, Oral, Every Night at Bedtime      APPLE CIDER VINEGAR PO   10 mL, Oral      aspirin 81 MG tablet   81 mg, Oral, Daily, Do not take day of surgery      atorvastatin 20 MG tablet  Commonly known as: LIPITOR   TAKE 1 TABLET EVERY DAY      cetirizine 10 MG tablet  Commonly known as: zyrTEC   10 mg, Oral, Daily      Cinnamon 500 MG capsule   500 mg, Oral, 2 Times Daily, Stop on 2/26 for surgery      Eliquis 5 MG tablet tablet  Generic drug:  apixaban   TAKE 1 TABLET BY MOUTH EVERY 12 HOURS      enalapril 20 MG tablet  Commonly known as: VASOTEC   20 mg, Oral, Daily      esomeprazole 40 MG capsule  Commonly known as: nexIUM   40 mg, Oral, Daily      glucosamine-chondroitin 500-400 MG capsule capsule   1 capsule, Oral, 2 Times Daily With Meals, Stop on 2/26 for surgery      HONEY PO   10 mL, Oral      isosorbide mononitrate 60 MG 24 hr tablet  Commonly known as: IMDUR   60 mg, Oral, Daily      METAMUCIL FIBER PO   Oral      OXcarbazepine 300 MG tablet  Commonly known as: TRILEPTAL   TAKE 1 TABLET BY MOUTH TWICE DAILY      OXcarbazepine 150 MG tablet  Commonly known as: TRILEPTAL   TAKE 2 TABLETS BY MOUTH THREE TIMES DAILY TAKE  AN  EXTRA  150  TO  300  MG  AS  NEEDED  -INCREASE  SODIUM  INTAKE)      PROBIOTIC ADVANCED PO   1 capsule, Oral, Daily, Stop on 2/26 for surgery      TURMERIC PO   1 capsule, Oral, Daily, Stop on 2/26 for surgery      vitamin C 250 MG tablet  Commonly known as: ASCORBIC ACID   250 mg, Oral, Daily      Zinc 25 MG tablet   Oral             Stop These Medications      b complex-C-folic acid 1 MG capsule     METAMUCIL CLEAR & NATURAL PO              Discharge Diet: HHD    Activity at Discharge: no overhead reaching, heavy lifting, or strenuous activity    Discharge disposition: home with home health    Follow-up Appointments  Future Appointments   Date Time Provider Department Center   11/3/2022 10:45 AM Naveed Rios MD [facility]   12/5/2022  9:30 AM Sarita Davalos APRN [facility]   12/8/2022 11:30 AM Yonathan Carrizales DO [facility]   1/9/2023 10:15 AM Dominick Jackson MD [facility]     Additional Instructions for the Follow-ups that You Need to Schedule       Ambulatory Referral to Home Health (Hospital)   As directed      Face to Face Visit Date: 10/21/2022    Follow-up provider for Plan of Care?: I treated the patient in an acute care facility and will not continue treatment  after discharge.    Follow-up provider: ZOLTAN MIMS [[id number]]    Reason/Clinical Findings: Wound care needs    Describe mobility limitations that make leaving home difficult: post pacemaker restrictions, no driving    Nursing/Therapeutic Services Requested: Skilled Nursing    Skilled nursing orders: Wound care dressing/changes    Frequency: 1 Week 1                 Test Results Pending at Discharge  Pending Labs       Order Current Status    Anaerobic Culture - Hardware / Foreign Body, Chest, Right In process    Anaerobic Culture - Wound, Chest, Left In process    Anaerobic Culture - Wound, Chest, Right In process    Fungus Culture - Hardware / Foreign Body, Chest, Right In process    Blood Culture - Blood, Arm, Left Preliminary result    Blood Culture - Blood, Arm, Left Preliminary result    Wound Culture - Wound, Chest, Left Preliminary result             Electronically signed by BERTHA Salinas, 10/21/22, 1:24 PM EDT.    Time: > 30 minutes spent on discharge     Patietn seen and examined, agree with above findings, assessment and plan.  Patient stable for discharge, to continue PO antibiotics and dressing changes with home health.

## 2022-10-21 NOTE — PROGRESS NOTES
Discharge Planning Assessment  [facility]     Patient Name: Mode Duffy  MRN: [id number]  Today's Date: 10/21/2022    Admit Date: 10/19/2022    Plan: Home with family and HH   Discharge Needs Assessment     Row Name 10/21/22 1156       Living Environment    People in Home spouse    Current Living Arrangements home    Primary Care Provided by self    Provides Primary Care For no one    Family Caregiver if Needed spouse;child(rinku), adult    Quality of Family Relationships helpful;involved;supportive    Able to Return to Prior Arrangements yes       Resource/Environmental Concerns    Resource/Environmental Concerns none       Transition Planning    Patient/Family Anticipates Transition to home with family    Transportation Anticipated family or friend will provide;car, drives self       Discharge Needs Assessment    Equipment Currently Used at Home none    Concerns to be Addressed adjustment to diagnosis/illness    Equipment Needed After Discharge wound care supplies    Discharge Facility/Level of Care Needs home with home health    Provided Post Acute Provider List? N/A    Provided Post Acute Provider Quality & Resource List? N/A               Discharge Plan     Row Name 10/21/22 1201       Plan    Plan Home with family and HH    Plan Comments Spoke with patient, face sheet verified. Patient lives with his spouse, he is IADL and does not use any DME. S/w APRN, Caron Diaz, pt will dc home w/ BID dressing changes. Discussed dc options with patient, he is apprehensive about learning how to do dressing changes. He is going to ask his daughter to assist. He denies SNF needs and does not want to go to ACU for dressings. He is agreeable with referral to [facility], [facility] notified. Referral pending with [facility].              Continued Care and Services - Admitted Since 10/19/2022     Home Medical Care     Service Provider Request Status Selected Services Address Phone Fax Patient Preferred    [facility] Pending  - Request Sent N/A [address], [place] [address] [phone] --              Expected Discharge Date and Time     Expected Discharge Date Expected Discharge Time    Oct 21, 2022          Demographic Summary    No documentation.                Functional Status    No documentation.                Psychosocial    No documentation.                Abuse/Neglect    No documentation.                Legal    No documentation.                Substance Abuse    No documentation.                Patient Forms    No documentation.                   Natalie Borja RN

## 2022-10-21 NOTE — PROGRESS NOTES
Patient is discharging today. Spoke to wife who confirmed face sheet information is correct  And has no current home health. Discussed learning dressing changes and she was agreeable. Orders in [facility] for home health SN, dressing changes - 1/4 Strength Dakins wet to dry bid. Thank you !

## 2022-10-21 NOTE — PROGRESS NOTES
LOS: 0 days     Chief Complaint:  Pacemaker pocket infection     Interval History: Afebrile, no new complaints or events.  Tolerating antibiotics without abdominal pain nausea vomiting or diarrhea.  No rashes    Vital Signs  Temp:  [97.4 °F (36.3 °C)-97.9 °F (36.6 °C)] 97.7 °F (36.5 °C)  Heart Rate:  [60-62] 60  Resp:  [16-17] 17  BP: (140-160)/(80-88) 152/80    Physical Exam:  General: In no acute distress  Cardiovascular: RRR, no LE edema   Respiratory: Breathing comfortably on room air  GI: Soft, NT/ND, + bowel sounds bilaterally  Skin: No rashes surgical incisions without erythema or purulence    Antibiotics:  Cefazolin 2 g IV every 8 hours     Results Review:    Lab Results   Component Value Date    WBC 9.16 10/20/2022    HGB 13.3 10/20/2022    HCT 37.1 (L) 10/20/2022    MCV 94.9 10/20/2022     10/20/2022     Lab Results   Component Value Date    GLUCOSE 124 (H) 10/20/2022    BUN 16 10/20/2022    CREATININE 0.96 10/20/2022    EGFRIFNONA 102 03/07/2020    BCR 16.7 10/20/2022    CO2 25.0 10/20/2022    CALCIUM 8.7 10/20/2022    ALBUMIN 3.90 10/20/2022    LABIL2 1.6 11/06/2017    AST 21 10/20/2022    ALT 29 10/20/2022       Microbiology:  10/19 Right chest pocket cx Klebsiella species  10/19 Left chect poacket cx NGTD    Assessment & Plan   Concern for Pacemaker pocket infection status post pacemaker removal with reimplantation of a new RV pacing lead    Operative cultures with Klebsiella only from the right sided wound.  Left-sided chest pocket remains negative to date.  Recommend Levaquin 750 mg p.o. daily for an additional 10 days to complete a 14-day course of antibiotics.  At this time I do not recommend suppressive antibiotic therapy as right-sided cultures are negative.    Patient is okay for discharge from an ID standpoint.

## 2022-10-21 NOTE — PROGRESS NOTES
[facility] Clinical Pharmacy Services: Patient Counseling Consult    Mode Duffy has requested a pharmacist to  this patient on their new medications. Counseling points included the following:    Explained indication of each medication, and patient's need for these medications.  Went over dosing and frequency of each medication.  Discussed any special administration, storage, or monitoring instructions with medications.  Discussed all important drug interactions, including over-the-counter medications and supplements.  Explained possible side effects for each medication.  Instructed the patient not to begin or discontinue any medications without informing his/her physician/pharmacist.  Addressed any specific questions the patient had in regards to their medication:    Patient expressed understanding and had no further questions.      Irwin Rizo, [facility]  Clinical Pharmacist

## 2022-10-21 NOTE — PROGRESS NOTES
Continued Stay Note  [facility]     Patient Name: Mode Duffy  MRN: [id number]  Today's Date: 10/21/2022    Admit Date: 10/19/2022    Plan: Home with family and HH   Discharge Plan     Row Name 10/21/22 3969       Plan    Plan Comments [facility]/Martín accepted. Ольга/[facility] following    Row Name 10/21/22 120       Plan    Plan Home with family and HH    Plan Comments Spoke with patient, face sheet verified. Patient lives with his spouse, he is IADL and does not use any DME. S/w APRN, Caron Diaz, pt will dc home w/ BID dressing changes. Discussed dc options with patient, he is apprehensive about learning how to do dressing changes. He is going to ask his daughter to assist. He denies SNF needs and does not want to go to ACU for dressings. He is agreeable with referral to [facility]/Martín, [facility]/[place]onel notified. Referral pending with [facility].    Final Discharge Disposition Code --               Discharge Codes    No documentation.               Expected Discharge Date and Time     Expected Discharge Date Expected Discharge Time    Oct 21, 2022             Natalie Borja RN

## 2022-10-22 ENCOUNTER — HOME CARE VISIT (OUTPATIENT)
Dept: HOME HEALTH SERVICES | Facility: HOME HEALTHCARE | Age: 77
End: 2022-10-22

## 2022-10-22 VITALS
HEART RATE: 60 BPM | RESPIRATION RATE: 16 BRPM | SYSTOLIC BLOOD PRESSURE: 138 MMHG | DIASTOLIC BLOOD PRESSURE: 74 MMHG | TEMPERATURE: 99 F | OXYGEN SATURATION: 97 %

## 2022-10-22 LAB
BACTERIA SPEC AEROBE CULT: NORMAL
GRAM STN SPEC: NORMAL

## 2022-10-22 PROCEDURE — G0299 HHS/HOSPICE OF RN EA 15 MIN: HCPCS

## 2022-10-22 NOTE — HOME HEALTH
"Patient is being admitted for diagnosis of Left sided pacer pocket infection with SN skilled need for wound care teaching to spouse and SN wound assessments for potential problems or S/S of infection, cardiopulmonary assessment,  and medication teaching.      Patients stated goals for home health are for \"HH to manage my wound and get my spouse comfortable with the procedure and just check my vitals and health status\"    Plan for next visit  cardiopulmonary assessment  wound assessment and coaching spouse through wound care  assess for BM  medication teaching"

## 2022-10-23 ENCOUNTER — HOME CARE VISIT (OUTPATIENT)
Dept: HOME HEALTH SERVICES | Facility: HOME HEALTHCARE | Age: 77
End: 2022-10-23

## 2022-10-23 PROCEDURE — G0299 HHS/HOSPICE OF RN EA 15 MIN: HCPCS

## 2022-10-23 NOTE — HOME HEALTH
Visit being made at request of patient and spouse to  spouse through performing sterile w/d dakins dressing change. Patient was developing some redness around wound that appeared to be an irritation rash where tegaderm had come in contact with skin. Gentle border gauze, sterile 4x4's, sterile cotton swabs in 2 packs and skin prep delivered to home to protect skin. Spouse demonstrated sterile wet to dry dakins dressing change after applying skin prep to inact skin. Absorbent gentle border gauze used as cover dressing in place of tegaderm. After performing dressing change both patient and spouse request HH SN make visit to watch procedure again for the morning dressing change. Alerted clinical manager and oncall nurse to need for am visit.    Plan for next visit  observe wet to dry dressing change   offer suggestion to simplify process  cardiopulmonary assessment

## 2022-10-23 NOTE — HOME HEALTH
Spouse anxious about performing wound care at this time. SN guided spouse through dressing change and she did very well at this time. Spouse encouraged to be as sterile as possible but to know that in the real world everything is not completely perfect. Spouse was able to perform without issue at this time and is feeling more comfortable. Pt and spouse agreeable to performing independently today and tomorrow then to have SN check in with them on Tuesday for next visit to see how things are going at that time. Pt reports some tenderness to wound with dressing change but no other pains or issues at this time.       CP assess  wound care and assess  wound care education  assess supplies and which dressings should be ordered  assess supplies  assess meds

## 2022-10-24 LAB
ACT BLD: 132 SECONDS (ref 82–152)
BACTERIA SPEC AEROBE CULT: NORMAL
BACTERIA SPEC ANAEROBE CULT: NORMAL
BASE EXCESS BLDA CALC-SCNC: -1 MMOL/L (ref -5–5)
CA-I BLDA-SCNC: ABNORMAL MMOL/L
CO2 BLDA-SCNC: 26 MMOL/L (ref 24–29)
GLUCOSE BLDC GLUCOMTR-MCNC: 144 MG/DL (ref 70–130)
HCO3 BLDA-SCNC: 25 MMOL/L (ref 22–26)
HCT VFR BLDA CALC: 36 % (ref 38–51)
HGB BLDA-MCNC: 12.2 G/DL (ref 12–17)
PCO2 BLDA: 44.8 MM HG (ref 35–45)
PH BLDA: 7.37 PH UNITS (ref 7.35–7.6)
PO2 BLDA: 240 MMHG (ref 80–105)
POTASSIUM BLDA-SCNC: 3.8 MMOL/L (ref 3.5–4.9)
SAO2 % BLDA: 100 % (ref 95–98)

## 2022-10-24 NOTE — CASE MANAGEMENT/SOCIAL WORK
Case Management Discharge Note      Final Note: Pt discharged home, 10/21/22 with [facility]  Martín…..Lauren LANIER /MAYNOR MALIN    Provided Post Acute Provider List?: N/A  Provided Post Acute Provider Quality & Resource List?: N/A    Selected Continued Care - Discharged on 10/21/2022 Admission date: 10/19/2022 - Discharge disposition: Home or Self Care    Destination    No services have been selected for the patient.              Durable Medical Equipment    No services have been selected for the patient.              Dialysis/Infusion    No services have been selected for the patient.              Home Medical Care     Service Provider Selected Services Address Phone Fax Patient Preferred     [facility] ,  [facility] [address] [place] [address] [phone] --          Therapy    No services have been selected for the patient.              Community Resources    No services have been selected for the patient.              Community & DME    No services have been selected for the patient.                       Final Discharge Disposition Code: 06 - home with home health care

## 2022-10-25 ENCOUNTER — TELEPHONE (OUTPATIENT)
Dept: CARDIOLOGY | Facility: CLINIC | Age: 77
End: 2022-10-25

## 2022-10-25 ENCOUNTER — HOME CARE VISIT (OUTPATIENT)
Dept: HOME HEALTH SERVICES | Facility: HOME HEALTHCARE | Age: 77
End: 2022-10-25

## 2022-10-25 VITALS
SYSTOLIC BLOOD PRESSURE: 128 MMHG | TEMPERATURE: 96.9 F | DIASTOLIC BLOOD PRESSURE: 70 MMHG | HEART RATE: 62 BPM | RESPIRATION RATE: 18 BRPM | OXYGEN SATURATION: 99 %

## 2022-10-25 LAB — BACTERIA SPEC AEROBE CULT: NORMAL

## 2022-10-25 PROCEDURE — G0299 HHS/HOSPICE OF RN EA 15 MIN: HCPCS

## 2022-10-25 NOTE — TELEPHONE ENCOUNTER
Nova with Home health called regarding incision at groin site, I advised her to contact Dr Rios's office, do to unsure of why, the pt has the open incision in the groin. Dr Rios did recent procedure on the pt.

## 2022-10-26 NOTE — HOME HEALTH
pt. with wound to right upper chest, from pacemaker inplant, pt. is packing with Daikins twice daily and covering with bordered gauze.  Pt. showed SN wound today in right groin from incision that is open and moist, minimal drainage, SN called Dr. Germain's office and received verbal order for pt. to apply Betadine daily on wound, SN to assess next visit.       Next SN visit: CP assess, pain assess, wound care and assess.

## 2022-10-28 ENCOUNTER — HOME CARE VISIT (OUTPATIENT)
Dept: HOME HEALTH SERVICES | Facility: HOME HEALTHCARE | Age: 77
End: 2022-10-28

## 2022-10-28 PROCEDURE — G0299 HHS/HOSPICE OF RN EA 15 MIN: HCPCS

## 2022-10-30 VITALS
OXYGEN SATURATION: 96 % | TEMPERATURE: 97 F | DIASTOLIC BLOOD PRESSURE: 68 MMHG | HEART RATE: 60 BPM | RESPIRATION RATE: 18 BRPM | SYSTOLIC BLOOD PRESSURE: 154 MMHG

## 2022-10-30 NOTE — HOME HEALTH
Routine Visit Note:  no changes in pt. status, wound care performed today, pt. tolerated well.     Skill/education provided: wound care to right chest incision, wound monitoring to right groin    Patient/caregiver response: tolerated well.     Plan for next visit: wound care.    Other pertinent info: supplies ordered Wednesday

## 2022-11-01 ENCOUNTER — HOME CARE VISIT (OUTPATIENT)
Dept: HOME HEALTH SERVICES | Facility: HOME HEALTHCARE | Age: 77
End: 2022-11-01

## 2022-11-01 VITALS
OXYGEN SATURATION: 96 % | SYSTOLIC BLOOD PRESSURE: 156 MMHG | TEMPERATURE: 97.3 F | DIASTOLIC BLOOD PRESSURE: 72 MMHG | HEART RATE: 60 BPM | RESPIRATION RATE: 18 BRPM

## 2022-11-01 PROCEDURE — G0299 HHS/HOSPICE OF RN EA 15 MIN: HCPCS

## 2022-11-03 ENCOUNTER — OFFICE VISIT (OUTPATIENT)
Dept: CARDIOLOGY | Facility: CLINIC | Age: 77
End: 2022-11-03

## 2022-11-03 VITALS
RESPIRATION RATE: 16 BRPM | DIASTOLIC BLOOD PRESSURE: 90 MMHG | HEART RATE: 60 BPM | OXYGEN SATURATION: 97 % | SYSTOLIC BLOOD PRESSURE: 140 MMHG | BODY MASS INDEX: 28.36 KG/M2 | HEIGHT: 73 IN | WEIGHT: 214 LBS

## 2022-11-03 DIAGNOSIS — I44.2 COMPLETE HEART BLOCK: Primary | ICD-10-CM

## 2022-11-03 DIAGNOSIS — I25.10 CORONARY ARTERY DISEASE INVOLVING NATIVE CORONARY ARTERY OF NATIVE HEART WITHOUT ANGINA PECTORIS: ICD-10-CM

## 2022-11-03 DIAGNOSIS — Z95.0 CARDIAC PACEMAKER IN SITU: ICD-10-CM

## 2022-11-03 PROCEDURE — 99024 POSTOP FOLLOW-UP VISIT: CPT | Performed by: INTERNAL MEDICINE

## 2022-11-03 PROCEDURE — G0180 MD CERTIFICATION HHA PATIENT: HCPCS | Performed by: INTERNAL MEDICINE

## 2022-11-03 NOTE — PROGRESS NOTES
Subjective:     Encounter Date:11/03/2022      Patient ID: Mode Duffy is a 76 y.o. male.    Chief Complaint:  No chief complaint on file.      HPI:  Mr Duffy is a 77 yo patient referred by Dr. Jackson for evaluation of a pacemaker pocket skin erosion.  His past medical history is significant for HTN, HLD, CAD and SHELTON.  He had a pacemaker initially implanted in 1999 due to complete heart block. In 1/2020 the patient was noted to have an EF of 35-40% and was having symptoms of dyspnea and lower extremity.  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 An epicardial LV lead was placed 3/2020. The chronic RV lead was also found to be malfunctioning and so a new RV pacing lead was placed via the right subclavian vein and tunneled into the left pectoral pocket. About a month ago the patient developed a skin erosion of the right pectoral pocket.  He was treated with doxycycline and Bactroban but the wound has not healed.  He has not had any systemic symptoms and he had a normal ESR and WBC.     Pacemaker pocket skin erosion - pacing lead visible, has failed to heal with conservative measures.  The pocket is certainly infected.  Discussed the indications, risks and benefits of pacemaker lead extraction.  This is a difficult situation with him being pacemaker dependent.  The left pectoral pocket looks ok.  At this point in time, I would remove the right sided RV pacemaker lead and hopefully not cross contaminate the left pectoral pocket.  I would also not reimplant a new RV pacing lead at this time and would simply let him LV pace.  The LV lead functions well with a 7mv R wave and 1.6V threshold.     He underwent removal of chronic RV pacing lead on 10/19/22 with reimplantation of a new [employer] dual chamber PPM in the left pectoral pocket with LV only pacing.  Patient tolerated well with complications.  Operative cultures with Klebsiella only from the  "right sided wound.  Left-sided chest pocket remains negative to date.  ID recommends \"Levaquin 750 mg p.o. daily for an additional 10 days to complete a 14-day course of antibiotics.  He has continued twice daily dressing changes.    Today, he is without complaints.         The following portions of the patient's history were reviewed and updated as appropriate: current medications, past family history, past medical history, past social history, past surgical history and problem list.    Problem List:  Patient Active Problem List   Diagnosis   • Fothergill's neuralgia   • Hyponatremia   • Cardiac pacemaker in situ   • Bradycardia   • Cardiovascular disease   • Hypertension   • Coronary artery disease involving native coronary artery of native heart without angina pectoris   • Hyperlipidemia   • Sick sinus syndrome (HCC)   • Chronic systolic congestive heart failure (HCC)   • Cardiomyopathy (HCC)   • Chronic heart failure (HCC)   • Complete heart block (HCC)   • Pacemaker lead malfunction   • Acute bronchitis   • Allergic rhinitis   • Dyspnea   • Palpitations   • Renal cyst   • Trigeminal neuralgia   • Infection of pacemaker pocket ([facility])   • Infection of pacemaker pocket, initial encounter ([facility])       Active Med List:    Current Outpatient Medications:   •  acebutolol (SECTRAL) 200 MG capsule, TAKE 1 CAPSULE EVERY DAY (Patient taking differently: TAKE 1 CAPSULE EVERY DAY orally), Disp: 90 capsule, Rfl: 1  •  amitriptyline (ELAVIL) 10 MG tablet, TAKE 2 TABLETS AT BEDTIME (Patient taking differently: TAKE 2 TABLETS AT BEDTIME orally), Disp: 180 tablet, Rfl: 3  •  amLODIPine (NORVASC) 10 MG tablet, Take 1 tablet by mouth every night at bedtime. Indications: High Blood Pressure Disorder, Disp: , Rfl:   •  APPLE CIDER VINEGAR PO, Take 10 mL by mouth., Disp: , Rfl:   •  aspirin 81 MG tablet, Take 1 tablet by mouth Daily. Do not take day of surgery  Indications: Venous Thromboembolism, Disp: , Rfl:   •  atorvastatin " (LIPITOR) 20 MG tablet, TAKE 1 TABLET EVERY DAY (Patient taking differently: TAKE 1 TABLET EVERY DAY orally), Disp: 90 tablet, Rfl: 3  •  cetirizine (zyrTEC) 10 MG tablet, Take 1 tablet by mouth Daily. Indications: Upper Respiratory Tract Allergy, Disp: , Rfl:   •  Cinnamon 500 MG capsule, Take 1 capsule by mouth 2 (Two) Times a Day. Stop on 2/26 for surgery  Indications: Sugar stabilization, Disp: , Rfl:   •  Eliquis 5 MG tablet tablet, TAKE 1 TABLET BY MOUTH EVERY 12 HOURS, Disp: 60 tablet, Rfl: 3  •  enalapril (VASOTEC) 20 MG tablet, Take 1 tablet by mouth Daily. Indications: High Blood Pressure Disorder, Disp: , Rfl:   •  esomeprazole (nexIUM) 40 MG capsule, Take 1 capsule by mouth Daily. Indications: Gastroesophageal Reflux Disease, Disp: , Rfl:   •  glucosamine-chondroitin 500-400 MG capsule capsule, Take 1 capsule by mouth 2 (Two) Times a Day With Meals. Stop on 2/26 for surgery  Indications: Joint Damage causing Pain and Loss of Function, Disp: , Rfl:   •  HONEY PO, Take 10 mL by mouth., Disp: , Rfl:   •  isosorbide mononitrate (IMDUR) 60 MG 24 hr tablet, Take 1 tablet by mouth Daily. Indications: Stable Angina Pectoris, Disp: , Rfl:   •  OXcarbazepine (TRILEPTAL) 150 MG tablet, TAKE 2 TABLETS BY MOUTH THREE TIMES DAILY TAKE  AN  EXTRA  150  TO  300  MG  AS  NEEDED  -INCREASE  SODIUM  INTAKE), Disp: 180 tablet, Rfl: 0  •  OXcarbazepine (TRILEPTAL) 300 MG tablet, TAKE 1 TABLET BY MOUTH TWICE DAILY, Disp: 180 tablet, Rfl: 3  •  povidone-iodine (BETADINE) 7.5 % solution, Apply 1 application topically to the appropriate area as directed 2 (Two) Times a Day., Disp: , Rfl:   •  Probiotic Product (PROBIOTIC ADVANCED PO), Take 1 capsule by mouth Daily. Stop on 2/26 for surgery  Indications: Edith replacement, Disp: , Rfl:   •  Psyllium (METAMUCIL FIBER PO), Take  by mouth., Disp: , Rfl:   •  sodium hypochlorite (DAKIN'S 1/4 STRENGTH) 0.125 % solution topical solution 0.125%, Apply 118.25 mL topically to the  appropriate area as directed 2 (Two) Times a Day., Disp: 1000 mL, Rfl: 1  •  TURMERIC PO, Take 1 capsule by mouth Daily. Stop on 2/26 for surgery  Indications: inflammation, Disp: , Rfl:   •  vitamin C (ASCORBIC ACID) 250 MG tablet, Take 1 tablet by mouth Daily. Indications: supplement, Disp: , Rfl:   •  Zinc 25 MG tablet, Take 25 mg by mouth Daily. Indications: Zinc Deficiency, Disp: , Rfl:      Past Medical History:  Past Medical History:   Diagnosis Date   • Arthritis    • Arthritis    • BPH (benign prostatic hyperplasia)    • CHF (congestive heart failure) (HCC)    • Coronary artery disease     dr. Carrizales   • Deviated septum    • FH: cataracts    • Gait abnormality    • GERD (gastroesophageal reflux disease)    • History of trigeminal neuralgia    • Hyperlipidemia    • Hypertension    • Renal cyst    • Sciatica    • Sleep apnea     unable to tolerate machine       Past Surgical History:  Past Surgical History:   Procedure Laterality Date   • APPENDECTOMY     • CARDIAC CATHETERIZATION     • CARDIAC CATHETERIZATION N/A 3/5/2020    Procedure: Percutaneous Subclavian Intervention;  Surgeon: Nick Greenberg MD;  Location: [facility] CATH INVASIVE LOCATION;  Service: Cardiovascular;  Laterality: N/A;   • CARDIAC ELECTROPHYSIOLOGY PROCEDURE N/A 1/29/2020    Procedure: Biventricular Device Upgrade;  Surgeon: Dominick Jackson MD;  Location: [facility] CATH INVASIVE LOCATION;  Service: Cardiovascular   • CARDIAC ELECTROPHYSIOLOGY PROCEDURE N/A 3/5/2020    Procedure: Lead Revision;  Surgeon: Nick Greenberg MD;  Location: [facility] CATH INVASIVE LOCATION;  Service: Cardiovascular;  Laterality: N/A;   • CORONARY STENT PLACEMENT  2014    x 2   • IMPLANTABLE CARDIOVERTER DEFIBRILLATOR LEAD REPLACEMENT/POCKET REVISION Right 10/19/2022    Procedure: RT. TRANSVENOUS ICD LEAD EXTRACTION;  Surgeon: Naveed Rios MD;  Location: [facility];  Service: Cardiology;  Laterality: Right;   • PROSTATE SURGERY     •  REFRACTIVE SURGERY     • THORACOTOMY Left 3/4/2020    Procedure: THORACOTOMY MINI WITH LEAD PLACEMENT;  Surgeon: Justin Aguilar MD;  Location: [facility];  Service: Cardiothoracic;  Laterality: Left;   • TOTAL HIP ARTHROPLASTY Bilateral     different times       Social History:  Social History     Socioeconomic History   • Marital status:    Tobacco Use   • Smoking status: Former     Types: Cigarettes     Quit date:      Years since quittin.8   • Smokeless tobacco: Never   Vaping Use   • Vaping Use: Never used   Substance and Sexual Activity   • Alcohol use: Yes     Alcohol/week: 7.0 standard drinks     Types: 7 Glasses of wine per week     Comment: RED WINE BEFORE DINNER   • Drug use: No   • Sexual activity: Defer       Allergies:  No Known Allergies    Immunizations:    There is no immunization history on file for this patient.       Objective:         Review of Systems   Constitutional: Negative for fatigue and fever.   Respiratory: Negative for shortness of breath.    Cardiovascular: Negative for chest pain, palpitations and leg swelling.   All other systems reviewed and are negative.       There were no vitals taken for this visit.    Constitutional:       General: Not in acute distress.     Appearance: Well-developed.   Eyes:      General: No scleral icterus.     Conjunctiva/sclera: Conjunctivae normal.      Pupils: Pupils are equal, round, and reactive to light.   HENT:      Head: Normocephalic and atraumatic.   Neck:      Thyroid: No thyromegaly.   Pulmonary:      Effort: Pulmonary effort is normal.      Breath sounds: Normal breath sounds.   Cardiovascular:      Normal rate. Regular rhythm.   Abdominal:      General: Bowel sounds are normal.      Palpations: Abdomen is soft.   Musculoskeletal: Normal range of motion.      Cervical back: Normal range of motion. Skin:     General: Skin is warm and dry.   Neurological:      Mental Status: Alert and oriented to person, place, and time.          In-Office Procedure(s):  Procedures  No orders to display      Pacemaker eval interpretred by me  SJM 2272  Battery GREG    P wave 2mv  Threshold 0.8V  Impedance 400 ohms    R wave paced  Threshold 1.7V  Impedance 310 ohms    Events - none    ASCVD RIsk Score::  The 10-year ASCVD risk score (Donald URENA, et al., 2019) is: 35.5%    Values used to calculate the score:      Age: 76 years      Sex: Male      Is Non- : No      Diabetic: No      Tobacco smoker: No      Systolic Blood Pressure: 156 mmHg      Is BP treated: Yes      HDL Cholesterol: 55 mg/dL      Total Cholesterol: 137 mg/dL    Recent Radiology:          Lab Review:       Recent labs reviewed and interpreted for clinical significance and application          Assessment:         No diagnosis found.       Plan:      1. Right pectoral pacemaker pocket erosion - s/p removal of chronic RV pacing lead 10/19/22  - blood cultures negative  - right pectoral wound grew Klebsiella  - left chest negative.    -  Continue Dakins wet to dry bid dressing changes     2. Left pectoral pocket pacemaker replacement   - normal device function  - prior BiV ICD   - intraop CAILIN showed EF 45%, no vegetations, mild MR/TR    3. Pacemaker followup - normal device function, left pectoral wound well healed without inflammation     4. CAD s/p PCI 2014     5. PAF  - on apixaban     6. HTN     Patient to continue dressing changes until wound healed.  He will followup with Dr. Jackson  RTC prn      Level of Care:                 Naveed Rios MD  11/03/22

## 2022-11-08 ENCOUNTER — HOME CARE VISIT (OUTPATIENT)
Dept: HOME HEALTH SERVICES | Facility: HOME HEALTHCARE | Age: 77
End: 2022-11-08

## 2022-11-08 PROCEDURE — G0299 HHS/HOSPICE OF RN EA 15 MIN: HCPCS

## 2022-11-09 VITALS
DIASTOLIC BLOOD PRESSURE: 72 MMHG | HEART RATE: 64 BPM | SYSTOLIC BLOOD PRESSURE: 148 MMHG | TEMPERATURE: 98.3 F | OXYGEN SATURATION: 97 % | RESPIRATION RATE: 17 BRPM

## 2022-11-09 NOTE — CASE COMMUNICATION
Patient is not comfortable being discharged from home health services until after his wound is healed and he is requesting more visits be added for SN monitoring of wound until it is healed.

## 2022-11-09 NOTE — HOME HEALTH
Patient verbalized no new mediations, no falls, no increasing pain or any undo symptoms. Denies any recent falls. increasing amulbation and building stamina    Plan of care  cardiopulmonary assessment  wound assessment and care  assess for need to add further visits for wound care

## 2022-11-13 ENCOUNTER — HOME CARE VISIT (OUTPATIENT)
Dept: HOME HEALTH SERVICES | Facility: HOME HEALTHCARE | Age: 77
End: 2022-11-13

## 2022-11-15 ENCOUNTER — HOME CARE VISIT (OUTPATIENT)
Dept: HOME HEALTH SERVICES | Facility: HOME HEALTHCARE | Age: 77
End: 2022-11-15

## 2022-11-15 VITALS
TEMPERATURE: 98 F | DIASTOLIC BLOOD PRESSURE: 76 MMHG | HEART RATE: 76 BPM | RESPIRATION RATE: 20 BRPM | SYSTOLIC BLOOD PRESSURE: 142 MMHG | OXYGEN SATURATION: 96 %

## 2022-11-15 LAB — QT INTERVAL: 521 MS

## 2022-11-15 PROCEDURE — G0299 HHS/HOSPICE OF RN EA 15 MIN: HCPCS

## 2022-11-16 LAB — FUNGUS WND CULT: NORMAL

## 2022-11-16 RX ORDER — OXCARBAZEPINE 150 MG/1
TABLET, FILM COATED ORAL
Qty: 180 TABLET | Refills: 0 | Status: CANCELLED | OUTPATIENT
Start: 2022-11-16

## 2022-11-16 NOTE — HOME HEALTH
Routine Visit Note: Sitting in chair upon SN arrival reports doing ok. Denies pain. BM yesterday appetite good LCTA.Reports has been a long process with his pacemaker and extra lead. Wife reports wound doing better.     Skill/education provided: Cardiopulmonary assessment. Removed dressing from right upper chest wound bed oblong pink no active drainage. Cleansed with dakins around wound then packed small guaze into wound bed with dakins on it DSD applied then covered by 4x4 covaderm dressing.    Patient/caregiver response: Tolerated wound care without discomfort Voiced understanding of current meds     Plan for next visit:Cardiopulmonary assessment assess pain assess wound meaure photo with wound care.

## 2022-11-16 NOTE — PROGRESS NOTES
Patient had a dual-chamber pacemaker with progressive LV dysfunction heart failure symptoms and an occluded left subclavian system--underwent LV epicardial lead placement and post LV epicardial lead placement the old right ventricular lead malfunction was noted and underwent a right-sided new right ventricular pacing lead which was tunneled to the pocket and programming  of the device was done at last visit and feels remarkably well with ongoing biventricular pacing and is able to walk 2-1/2 miles without any symptoms of heart failure or fatigue or shortness of breath  Pacemaker interrogated and interrogation attached to chart  LV threshold went up to 3 V at 1 ms and programming was performed and LV threshold reprogrammed to 4.8 V at 1 ms and interrogation attached to the chart   Follow-up in 4 weeks  Incision is completely healed and looks very healthy  Medications reviewed and blood pressures well controlled at home at home recordings as per the patient      Electronically signed by Dominick Jackson MD, 03/27/20, 12:04 PM.  
n/a

## 2022-11-18 ENCOUNTER — TELEPHONE (OUTPATIENT)
Dept: CARDIOLOGY | Facility: CLINIC | Age: 77
End: 2022-11-18

## 2022-11-18 NOTE — TELEPHONE ENCOUNTER
Spoke with pt he is aware and will call with any other concerns. He will take prophylaxis prior to dental work.

## 2022-11-18 NOTE — TELEPHONE ENCOUNTER
----- Message from Dominick CENTENO MD sent at 11/17/2022  4:45 PM EST -----  Yes    Dr deluca  ----- Message -----  From: Hailey Cordova MA  Sent: 11/17/2022   9:42 AM EST  To: Dominick CENTENO MD    Pt called and is having issues with a tooth, and would like to know if it is needed can he have the tooth extracted. He saw Dr Germain recently for lead extraction, and new device on 10/19/22.

## 2022-11-18 NOTE — TELEPHONE ENCOUNTER
Left voice mail for pt to return call to the office.   Pt is ok to go ahead with dental work. If anything is needed by the dentist just have them call the office.     Ok for the HUB to release.

## 2022-11-18 NOTE — TELEPHONE ENCOUNTER
Caller: Mode Dfufy    Relationship: Self    Best call back number:[phone]    What is the best time to reach you: ANYTIME     Who are you requesting to speak with (clinical staff, provider,  specific staff member): DANIELA     What was the call regarding: PT NEEDS TO SPEAK W/ DANIELA. PT WANTS TO KNOW IF HE CAN GO TO THE DENTIST? PT SAID DANIELA WAS GOING TO ASK PRASANNA ABOUT THIS. PLEASE GIVE HIM A CALL BACK. THANK YOU     Do you require a callback: YES

## 2022-11-22 ENCOUNTER — HOME CARE VISIT (OUTPATIENT)
Dept: HOME HEALTH SERVICES | Facility: HOME HEALTHCARE | Age: 77
End: 2022-11-22

## 2022-11-22 VITALS
SYSTOLIC BLOOD PRESSURE: 130 MMHG | TEMPERATURE: 97.1 F | OXYGEN SATURATION: 96 % | DIASTOLIC BLOOD PRESSURE: 76 MMHG | RESPIRATION RATE: 18 BRPM | HEART RATE: 50 BPM

## 2022-11-22 PROCEDURE — G0299 HHS/HOSPICE OF RN EA 15 MIN: HCPCS

## 2022-11-22 NOTE — HOME HEALTH
Routine Visit Note:  CP assess, wound care and assess, pain assess    Skill/education provided: wound assess, pain assess, falls assess, CP assess    Patient/caregiver response: verbalized understanding    Plan for next visit: CP assess, pain assess, ? discharge from [facility] if wound still closed    Other pertinent info: n/a

## 2022-11-29 ENCOUNTER — HOME CARE VISIT (OUTPATIENT)
Dept: HOME HEALTH SERVICES | Facility: HOME HEALTHCARE | Age: 77
End: 2022-11-29

## 2022-11-29 VITALS
HEIGHT: 74 IN | OXYGEN SATURATION: 98 % | SYSTOLIC BLOOD PRESSURE: 122 MMHG | TEMPERATURE: 97.6 F | RESPIRATION RATE: 18 BRPM | BODY MASS INDEX: 27.72 KG/M2 | HEART RATE: 74 BPM | DIASTOLIC BLOOD PRESSURE: 68 MMHG | WEIGHT: 216 LBS

## 2022-11-29 PROCEDURE — 93296 REM INTERROG EVL PM/IDS: CPT | Performed by: INTERNAL MEDICINE

## 2022-11-29 PROCEDURE — 93294 REM INTERROG EVL PM/LDLS PM: CPT | Performed by: INTERNAL MEDICINE

## 2022-11-29 PROCEDURE — G0299 HHS/HOSPICE OF RN EA 15 MIN: HCPCS

## 2022-11-29 NOTE — HOME HEALTH
pt. with discharge from [facility] today, pt. has been release from MD, is out and about needing no assistance with ADL's, aware and in agreement with discharge

## 2022-12-01 ENCOUNTER — OFFICE (AMBULATORY)
Dept: URBAN - METROPOLITAN AREA CLINIC 64 | Facility: CLINIC | Age: 77
End: 2022-12-01

## 2022-12-01 VITALS
WEIGHT: 222 LBS | DIASTOLIC BLOOD PRESSURE: 83 MMHG | HEART RATE: 70 BPM | HEIGHT: 73 IN | SYSTOLIC BLOOD PRESSURE: 155 MMHG

## 2022-12-01 DIAGNOSIS — K21.00 GASTRO-ESOPHAGEAL REFLUX DISEASE WITH ESOPHAGITIS, WITHOUT B: ICD-10-CM

## 2022-12-01 PROCEDURE — 99213 OFFICE O/P EST LOW 20 MIN: CPT | Performed by: INTERNAL MEDICINE

## 2022-12-01 RX ORDER — ESOMEPRAZOLE MAGNESIUM 40 MG/1
CAPSULE, DELAYED RELEASE ORAL
Qty: 90 | Refills: 0 | Status: ACTIVE

## 2022-12-08 ENCOUNTER — OFFICE VISIT (OUTPATIENT)
Dept: CARDIOLOGY | Facility: CLINIC | Age: 77
End: 2022-12-08

## 2022-12-08 VITALS
DIASTOLIC BLOOD PRESSURE: 89 MMHG | SYSTOLIC BLOOD PRESSURE: 163 MMHG | HEART RATE: 59 BPM | HEIGHT: 73 IN | OXYGEN SATURATION: 97 % | BODY MASS INDEX: 29.29 KG/M2 | RESPIRATION RATE: 18 BRPM | WEIGHT: 221 LBS

## 2022-12-08 DIAGNOSIS — I49.5 SICK SINUS SYNDROME: ICD-10-CM

## 2022-12-08 DIAGNOSIS — Z95.0 CARDIAC PACEMAKER IN SITU: Primary | ICD-10-CM

## 2022-12-08 DIAGNOSIS — I25.10 CARDIOVASCULAR DISEASE: ICD-10-CM

## 2022-12-08 DIAGNOSIS — I25.10 CORONARY ARTERY DISEASE INVOLVING NATIVE CORONARY ARTERY OF NATIVE HEART WITHOUT ANGINA PECTORIS: ICD-10-CM

## 2022-12-08 DIAGNOSIS — I50.22 CHRONIC SYSTOLIC CONGESTIVE HEART FAILURE: ICD-10-CM

## 2022-12-08 DIAGNOSIS — E78.2 MIXED HYPERLIPIDEMIA: ICD-10-CM

## 2022-12-08 DIAGNOSIS — I10 PRIMARY HYPERTENSION: ICD-10-CM

## 2022-12-08 PROCEDURE — 99214 OFFICE O/P EST MOD 30 MIN: CPT | Performed by: INTERNAL MEDICINE

## 2022-12-08 NOTE — PATIENT INSTRUCTIONS
Call if you want to do sleep study  Have Renetta check device for arrhythmia at next visit  Follow-up 6 months

## 2022-12-08 NOTE — PROGRESS NOTES
Cardiology Office Visit      Encounter Date:  12/08/2022    Patient ID:   Mode Duffy is a 77 y.o. male.    Reason For Followup:  Coronary artery disease  Cardiomyopathy    Brief Clinical History:  Dear Dr. Vidal, Temo Hadley MD    I had the pleasure of seeing Mode Duffy today. As you are well aware, this is a 77 y.o. male with a known history of ischemic heart disease. He underwent cardiac catheterization with resultant PCI of an obtuse marginal branch and diagonal branch in November 2014. He has additional history of hypertension with hypertensive cardiovascular disease, dyslipidemia, paroxysmal atrial fibrillation, antiplatelet therapy as well as sick sinus syndrome status post biventricular permanent pacemaker placement. He presents today for followup on the above conditions.     Interval History:  He denies any chest pain pressure heaviness or tightness.    He denies any shortness of breath out of character.  He denies any PND orthopnea.  He denies any syncope or near syncope.  He reports feeling in his usual state of cardiac health.    His blood pressure is elevated today.  This is typical of his office visits.  His blood pressure at home was 138/80 before coming to the office today.    Patient reports that he had a sleep study in the past but could not find a mask that he could tolerate so he is not utilized a device.  We discussed the improvements in CPAP therapy and he is going to consider retesting.     Assessment & Plan     Impressions:  Coronary artery disease status post PCI and stenting most recently in November of 2014.        This was cutting Balloon angioplasty of a circumflex branch and a diagonal branch.   Dyslipidemia.  Hypertension with hypertensive cardiovascular disease with a white coat component  Cardiomyopathy most recent EF of 35 to 40% in October 2019 echocardiogram  Sick sinus syndrome status post biventricular permanent pacemaker placement.  Paroxysmal atrial  "fibrillation  Coagulopathy secondary to Eliquis  Lumbago  Hyponatremia secondary to Trileptal  Renal cysts  History of trigeminal neuralgia presently treated with Trileptal.  Hip pain currently undergoing preoperative cardiovascular risk assessment for right hip surgery.  Trigeminal neuralgia     Recommendations:  Continuation of his current cardiovascular regimen at the present time.     This includes antihypertensives, anticoagulants, and antiplatelets.  [facility]  Follow-up with EP as scheduled  Follow-up in 6 months, sooner should there be difficulties    Diagnoses and all orders for this visit:    1. Cardiac pacemaker in situ (Primary)    2. Cardiovascular disease    3. Coronary artery disease involving native coronary artery of native heart without angina pectoris    4. Primary hypertension    5. Mixed hyperlipidemia    6. Sick sinus syndrome (HCC)    7. Chronic systolic congestive heart failure (HCC)  Overview:  Added automatically from request for surgery [id number]          Objective:    Vitals:  Vitals:    12/08/22 1126   BP: 163/89   BP Location: Left arm   Patient Position: Sitting   Cuff Size: Large Adult   Pulse: 59   Resp: 18   SpO2: 97%   Weight: 100 kg (221 lb)   Height: 185.4 cm (73\")     Body mass index is 29.16 kg/m².      Physical Exam:    General: Alert, cooperative, no distress, appears stated age  Head:  Normocephalic, atraumatic, mucous membranes moist  Eyes:  Conjunctiva/corneas clear, EOM's intact     Neck:  Supple,  no bruit    Lungs: Clear to auscultation bilaterally, no wheezes rhonchi rales are noted  Chest wall: No tenderness.  Pacing device in place  Heart::  Regular rate and rhythm, S1 and S2 normal, 1/6 holosystolic murmur.  No rub or gallop  Abdomen: Soft, non-tender, nondistended bowel sounds active  Extremities: No cyanosis, clubbing, or edema  Pulses: 2+ and symmetric all extremities  Skin:  No rashes or lesions  Neuro/psych: A&O x3. CN II through XII are grossly intact " with appropriate affect      Allergies:  No Known Allergies    Medication Review:     Current Outpatient Medications:   •  acebutolol (SECTRAL) 200 MG capsule, TAKE 1 CAPSULE EVERY DAY (Patient taking differently: No sig reported), Disp: 90 capsule, Rfl: 1  •  amitriptyline (ELAVIL) 10 MG tablet, TAKE 2 TABLETS AT BEDTIME (Patient taking differently: No sig reported), Disp: 180 tablet, Rfl: 3  •  amLODIPine (NORVASC) 10 MG tablet, Take 1 tablet by mouth every night at bedtime. Indications: High Blood Pressure Disorder, Disp: , Rfl:   •  APPLE CIDER VINEGAR PO, Take 10 mL by mouth., Disp: , Rfl:   •  aspirin 81 MG tablet, Take 1 tablet by mouth Daily. Do not take day of surgery  Indications: Venous Thromboembolism, Disp: , Rfl:   •  atorvastatin (LIPITOR) 20 MG tablet, TAKE 1 TABLET EVERY DAY (Patient taking differently: No sig reported), Disp: 90 tablet, Rfl: 3  •  cetirizine (zyrTEC) 10 MG tablet, Take 1 tablet by mouth Daily. Indications: Upper Respiratory Tract Allergy, Disp: , Rfl:   •  Cinnamon 500 MG capsule, Take 1 capsule by mouth 2 (Two) Times a Day. Stop on 2/26 for surgery  Indications: Sugar stabilization, Disp: , Rfl:   •  Eliquis 5 MG tablet tablet, TAKE 1 TABLET BY MOUTH EVERY 12 HOURS, Disp: 60 tablet, Rfl: 3  •  enalapril (VASOTEC) 20 MG tablet, Take 1 tablet by mouth Daily. Indications: High Blood Pressure Disorder, Disp: , Rfl:   •  esomeprazole (nexIUM) 40 MG capsule, Take 1 capsule by mouth Daily. Indications: Gastroesophageal Reflux Disease, Disp: , Rfl:   •  glucosamine-chondroitin 500-400 MG capsule capsule, Take 1 capsule by mouth 2 (Two) Times a Day With Meals. Stop on 2/26 for surgery  Indications: Joint Damage causing Pain and Loss of Function, Disp: , Rfl:   •  HONEY PO, Take 10 mL by mouth., Disp: , Rfl:   •  isosorbide mononitrate (IMDUR) 60 MG 24 hr tablet, Take 1 tablet by mouth Daily. Indications: Stable Angina Pectoris, Disp: , Rfl:   •  OXcarbazepine (TRILEPTAL) 150 MG tablet,  TAKE 2 TABLETS BY MOUTH THREE TIMES DAILY TAKE  AN  EXTRA  150  TO  300  MG  AS  NEEDED  -INCREASE  SODIUM  INTAKE) (Patient taking differently: No sig reported), Disp: 180 tablet, Rfl: 0  •  OXcarbazepine (TRILEPTAL) 300 MG tablet, TAKE 1 TABLET BY MOUTH TWICE DAILY, Disp: 180 tablet, Rfl: 3  •  povidone-iodine (BETADINE) 7.5 % solution, Apply 1 application topically to the appropriate area as directed 2 (Two) Times a Day. Indications: paint right groin , Disp: , Rfl:   •  Probiotic Product (PROBIOTIC ADVANCED PO), Take 1 capsule by mouth Daily. Stop on 2/26 for surgery  Indications: Edith replacement, Disp: , Rfl:   •  Psyllium (METAMUCIL FIBER PO), Take  by mouth., Disp: , Rfl:   •  sodium hypochlorite (DAKIN'S 1/4 STRENGTH) 0.125 % solution topical solution 0.125%, Apply 118.25 mL topically to the appropriate area as directed 2 (Two) Times a Day., Disp: 1000 mL, Rfl: 1  •  TURMERIC PO, Take 1 capsule by mouth Daily. Stop on 2/26 for surgery  Indications: inflammation, Disp: , Rfl:   •  vitamin C (ASCORBIC ACID) 250 MG tablet, Take 1 tablet by mouth Daily. Indications: supplement, Disp: , Rfl:   •  Zinc 25 MG tablet, Take 25 mg by mouth Daily. Indications: Zinc Deficiency, Disp: , Rfl:     Family History:  Family History   Problem Relation Age of Onset   • Cancer Mother    • Heart disease Father    • Arthritis Father    • Diabetes Father    • Hypertension Father    • Hyperlipidemia Father    • Kidney disease Father    • Malig Hyperthermia Neg Hx        Past Medical History:  Past Medical History:   Diagnosis Date   • Arthritis    • Arthritis    • BPH (benign prostatic hyperplasia)    • CHF (congestive heart failure) ([facility])    • Coronary artery disease     dr. Carrizales   • Deviated septum    • FH: cataracts    • Gait abnormality    • GERD (gastroesophageal reflux disease)    • History of trigeminal neuralgia    • Hyperlipidemia    • Hypertension    • Renal cyst    • Sciatica    • Sleep apnea     unable to tolerate  machine       Past Surgical History:  Past Surgical History:   Procedure Laterality Date   • APPENDECTOMY     • CARDIAC CATHETERIZATION     • CARDIAC CATHETERIZATION N/A 3/5/2020    Procedure: Percutaneous Subclavian Intervention;  Surgeon: Nick Greenberg MD;  Location: [facility] CATH INVASIVE LOCATION;  Service: Cardiovascular;  Laterality: N/A;   • CARDIAC ELECTROPHYSIOLOGY PROCEDURE N/A 2020    Procedure: Biventricular Device Upgrade;  Surgeon: Dominick Jackson MD;  Location: [facility] CATH INVASIVE LOCATION;  Service: Cardiovascular   • CARDIAC ELECTROPHYSIOLOGY PROCEDURE N/A 3/5/2020    Procedure: Lead Revision;  Surgeon: Nick Greenberg MD;  Location: [facility] CATH INVASIVE LOCATION;  Service: Cardiovascular;  Laterality: N/A;   • CORONARY STENT PLACEMENT  2014    x 2   • IMPLANTABLE CARDIOVERTER DEFIBRILLATOR LEAD REPLACEMENT/POCKET REVISION Right 10/19/2022    Procedure: RT. TRANSVENOUS ICD LEAD EXTRACTION;  Surgeon: Naveed Rios MD;  Location: [facility];  Service: Cardiology;  Laterality: Right;   • PROSTATE SURGERY     • REFRACTIVE SURGERY     • THORACOTOMY Left 3/4/2020    Procedure: THORACOTOMY MINI WITH LEAD PLACEMENT;  Surgeon: Justin Aguilar MD;  Location: [facility];  Service: Cardiothoracic;  Laterality: Left;   • TOTAL HIP ARTHROPLASTY Bilateral     different times       Social History:  Social History     Socioeconomic History   • Marital status:    Tobacco Use   • Smoking status: Former     Types: Cigarettes     Quit date:      Years since quittin.9   • Smokeless tobacco: Never   Vaping Use   • Vaping Use: Never used   Substance and Sexual Activity   • Alcohol use: Yes     Alcohol/week: 7.0 standard drinks     Types: 7 Glasses of wine per week     Comment: RED WINE BEFORE DINNER   • Drug use: No   • Sexual activity: Defer       Review of Systems:  The following systems were reviewed as they relate to the cardiovascular system: Constitutional,  Eyes, ENT, Cardiovascular, Respiratory, Gastrointestinal, Integumentary, Neurological, Psychiatric, Hematologic, Endocrine, Musculoskeletal, and Genitourinary. The pertinent cardiovascular findings are reported above with all other cardiovascular points within those systems being negative.    Diagnostic Study Review:     Current Electrocardiogram:  Procedures no new EKG.  EKG dated 20 October 2022 demonstrates atrial paced complexes with a rate of 60 bpm.    Laboratory Data:  Lab Results   Component Value Date    GLUCOSE 124 (H) 10/20/2022    BUN 16 10/20/2022    CREATININE 0.96 10/20/2022    EGFRIFNONA 102 03/07/2020    BCR 16.7 10/20/2022    K 4.2 10/20/2022    CO2 25.0 10/20/2022    CALCIUM 8.7 10/20/2022    ALBUMIN 3.90 10/20/2022    LABIL2 1.6 11/06/2017    AST 21 10/20/2022    ALT 29 10/20/2022     Lab Results   Component Value Date    GLUCOSE 124 (H) 10/20/2022    CALCIUM 8.7 10/20/2022     (L) 10/20/2022    K 4.2 10/20/2022    CO2 25.0 10/20/2022    CL 98 10/20/2022    BUN 16 10/20/2022    CREATININE 0.96 10/20/2022    EGFRIFNONA 102 03/07/2020    BCR 16.7 10/20/2022    ANIONGAP 10.0 10/20/2022     Lab Results   Component Value Date    WBC 9.16 10/20/2022    HGB 13.3 10/20/2022    HCT 37.1 (L) 10/20/2022    MCV 94.9 10/20/2022     10/20/2022     Lab Results   Component Value Date    CHOL 137 03/02/2020    TRIG 115 03/02/2020    HDL 55 03/02/2020    LDL 59 03/02/2020     Lab Results   Component Value Date    HGBA1C 5.9 (H) 03/02/2020     Lab Results   Component Value Date    INR 0.99 10/05/2022    INR 1.05 05/22/2022    INR 1.04 03/05/2020    PROTIME 10.2 10/05/2022    PROTIME 10.8 05/22/2022    PROTIME 10.9 03/05/2020       Most Recent Echo:  Results for orders placed in visit on 10/19/22    Diagnostic IntraOp Anesthesia Mal    Narrative  Diagnostic IntraOp Anesthesia Mal    Procedure Performed: Diagnostic IntraOp Anesthesia Mal  Start Time:  End Time:    Preanesthesia Checklist:  Patient  identified, IV assessed, risks and benefits discussed, monitors and equipment assessed, procedure being performed at surgeon's request and anesthesia consent obtained.    General Procedure Information  Diagnostic Indications for Echo:  assessment of ascending aorta, defect repair evaluation and hemodynamic monitoring  Physician Requesting Echo: Naveed Rios MD  Location performed:  OR  Intubated  Bite block placed  Heart visualized  Probe Insertion:  Easy  Probe Type:  Multiplane  Modalities:  2D only, color flow mapping, continuous wave Doppler and pulse wave Doppler    Echocardiographic and Doppler Measurements    Ventricles    Right Ventricle:  Cavity size normal.  Global function normal.  Left Ventricle:  Cavity size dilated.  Global Function mildly impaired.  Ejection Fraction 45%.        Valves    Aortic Valve:  Annulus normal.  Regurgitation trace.    Mitral Valve:  Annulus normal.  Regurgitation mild.    Tricuspid Valve:  Annulus normal.  Regurgitation mild.  Pulmonic Valve:  Annulus normal.  Regurgitation trace.      Aorta    Ascending Aorta:  Size normal.  Dissection not present.  Aortic Arch:  Size normal.  Dissection not present.  Descending Aorta:  Size normal.  Dissection not present.  Plaque thickness greater than 3 mm.      Atria    Right Atrium:  Size normal.  Left Atrium:  Size normal.  Left atrial appendage normal.      Septa    Atrial Septum:  Intra-atrial septal morphology lipomatous hypertrophy.    Ventricular Septum:  Intra-ventricular septum morphology normal.    Diastolic Function Measurements:  Diastolic Dysfunction Grade= indeterminate  E=  ms  A=  ms  E/A Ratio=  DT=  ms  S/D=  IVRT=    Other Findings  Pericardium:  normal  Pleural Effusion:  none  Pulmonary Arteries:  normal  Pulmonary Venous Flow:  normal and blunted (decreased) systolic flow    Anesthesia Information  Performed Personally  Anesthesiologist:  Yonathan Pimentel MD      Echocardiogram Comments:  76 year old  male with CAD s/p JEFF, CHB s/p PM/ICD c/b pocket infection presents for lead extraction  - Mildly reduced LV function (LVEF 45%, hypokinetic lateral wall)  - Normal RV size/function  - Multiple leads within RA/RV -- no obvious vegetations  - Mild MR/TR  - Atheroma within descending aorta  - No pericardial effusion    S/p single lead removal  - No pericardial effusion post lead removal  - No changes above    Findings discussed with surgical team       Most Recent Stress Test:       Most Recent Cardiac Catheterization:   Results for orders placed during the hospital encounter of 03/04/20    Cardiac Catheterization/Vascular Study    Narrative  Lead revision procedure note.    Procedure:  Failed PTA attempt to occluded left subclavian vein.  Placement of RV  lead through right subclavian vein approach and tunneled into left subclavian pocket and open pocket and connected RV lead to the generator and capped the existing malfunctioning RV lead.  Temporary pacemaker placement through right femoral venous approach    Indication: This is a 74-year-old with complete heart block and previous history of dual-chamber pacemaker over 21 years ago and LV dysfunction EF of 35 to 40% probably due to 100% pacer dependent status and CHF with lower extremity edema and tiredness and history of CAD and PCI, hypertension, dyslipidemia here for procedure.  Patient was initially attempted to have left subclavian approach LV lead placement but was found to have a occluded left subclavian vein therefore was referred to surgery and underwent thoracotomy for LV lead placement and was noticed to have RV lead malfunction, postoperatively, therefore is here for procedure.      Hardware:  Right ventricular   lead is dynaTrace software 5076-65 serial number [id number].      Thresholds:  Right ventricular threshold revealed a R wave amplitude of over 6 mV at 1.2V and impedance of 700 ohms        Description of procedure:  Under conscious sedation (initially  was given by me and monitored.  Then subsequently anesthesiologist came in gave and monitored anesthesia ) and local anesthesia , existing left subclavian pocket was opened and subclavian vein was accessed and a 035 Glidewire was advanced into the occlusion and with the help of a glide catheter attempts were done to cross the 100% occluded subclavian vein but was unsuccessful.  Then the right subclavian vein was accessed and [employer] 65 cm lead was placed in the RV adequate thresholds obtained then it was secured on the right side then tunneled to the left side with the help of electrophysiologist Dr. Jackson, then the generator was explanted from the pocket pocket cleaned with antibiotic solution existing RV lead was disconnected and capped and the new RV lead from the right subclavian which was tunneled was connected to the generator placed in the pocket and closed pocket with sutures. patient tolerated procedure well complications were none.    Blood loss :blood loss was 5 cc.        Conclusion: Successful placement of right ventricular lead through right subclavian vein approach which was tunneled to the left infraclavicular area and connected to the generator      Plan:  Per primary electrophysiologist Dr. Jackson  We will monitor overnight.  Routine post pacemaker device implant care.  Recheck device in a.m.  Pacer care and wound care education  Follow-up in 1 week in the office for staple removal  Follow-up in pacemaker clinic       NOTE: The following portions of the patient's note were reviewed, confirmed and/or updated this visit as appropriate: History of present illness/Interval history, physical examination, assessment & plan, allergies, current medications, past family history, past medical history, past social history, past surgical history and problem list.

## 2023-01-09 ENCOUNTER — OFFICE VISIT (OUTPATIENT)
Dept: CARDIOLOGY | Facility: CLINIC | Age: 78
End: 2023-01-09
Payer: MEDICARE

## 2023-01-09 VITALS
HEIGHT: 72 IN | OXYGEN SATURATION: 98 % | WEIGHT: 215 LBS | HEART RATE: 60 BPM | SYSTOLIC BLOOD PRESSURE: 134 MMHG | DIASTOLIC BLOOD PRESSURE: 71 MMHG | BODY MASS INDEX: 29.12 KG/M2

## 2023-01-09 DIAGNOSIS — I25.10 CORONARY ARTERY DISEASE INVOLVING NATIVE CORONARY ARTERY OF NATIVE HEART WITHOUT ANGINA PECTORIS: ICD-10-CM

## 2023-01-09 DIAGNOSIS — I44.2 COMPLETE HEART BLOCK: ICD-10-CM

## 2023-01-09 DIAGNOSIS — Z95.0 CARDIAC PACEMAKER IN SITU: Primary | ICD-10-CM

## 2023-01-09 DIAGNOSIS — I10 PRIMARY HYPERTENSION: ICD-10-CM

## 2023-01-09 PROBLEM — T82.7XXA: Status: RESOLVED | Noted: 2022-10-19 | Resolved: 2023-01-09

## 2023-01-09 PROBLEM — T82.7XXA INFECTION OF PACEMAKER POCKET: Status: RESOLVED | Noted: 2022-09-19 | Resolved: 2023-01-09

## 2023-01-09 PROCEDURE — 93280 PM DEVICE PROGR EVAL DUAL: CPT | Performed by: INTERNAL MEDICINE

## 2023-01-09 PROCEDURE — 99214 OFFICE O/P EST MOD 30 MIN: CPT | Performed by: INTERNAL MEDICINE

## 2023-01-09 NOTE — PROGRESS NOTES
CC--complete heart block, coronary artery disease    Sub--77-year-old complex patient with complete heart block had a biventricular pacemaker with a right ventricular pacing lead placed from right subclavian approach with a left subclavian occlusion and patient had an erosion on the right sided incision with a left-sided pocket being healthy.  He was seen by Dr. Rios and he had a right-sided pacemaker lead extracted and a new pacer implanted on the left side with LV pacing only.  He comes in for follow-up.  Denies any fever or chills.  He does have coronary disease hypertension hyperlipidemia in the past.    Patient's original pacemaker was a left-sided implant in 1999.  He was developing heart failure symptoms and left subclavian was occluded with placement of a CRT lead epicardial lead.  Subsequently he had RV pacing lead malfunction needing a new old RV pacing lead placed from right subclavian approach and he had delayed dehiscence of the skin on the right sided pocket needing removal of the RV pacing lead.        Past Medical History:   Diagnosis Date   • Arthritis    • Arthritis    • BPH (benign prostatic hyperplasia)    • CHF (congestive heart failure) ([facility])    • Coronary artery disease     dr. Carrizales   • Deviated septum    • FH: cataracts    • Gait abnormality    • GERD (gastroesophageal reflux disease)    • History of trigeminal neuralgia    • Hyperlipidemia    • Hypertension    • Renal cyst    • Sciatica    • Sleep apnea     unable to tolerate machine     Past Surgical History:   Procedure Laterality Date   • APPENDECTOMY     • CARDIAC CATHETERIZATION     • CARDIAC CATHETERIZATION N/A 3/5/2020    Procedure: Percutaneous Subclavian Intervention;  Surgeon: Nick Greenberg MD;  Location:  INVASIVE LOCATION;  Service: Cardiovascular;  Laterality: N/A;   • CARDIAC ELECTROPHYSIOLOGY PROCEDURE N/A 1/29/2020    Procedure: Biventricular Device Upgrade;  Surgeon: Dominick Jackson MD;   Location: [facility] CATH INVASIVE LOCATION;  Service: Cardiovascular   • CARDIAC ELECTROPHYSIOLOGY PROCEDURE N/A 3/5/2020    Procedure: Lead Revision;  Surgeon: Nick Greenberg MD;  Location: [facility] CATH INVASIVE LOCATION;  Service: Cardiovascular;  Laterality: N/A;   • CORONARY STENT PLACEMENT  2014    x 2   • PROSTATE SURGERY     • REFRACTIVE SURGERY     • THORACOTOMY Left 3/4/2020    Procedure: THORACOTOMY MINI WITH LEAD PLACEMENT;  Surgeon: Justin Aguilar MD;  Location: [facility] CVOR;  Service: Cardiothoracic;  Laterality: Left;   • TOTAL HIP ARTHROPLASTY Bilateral     different times       Physical Exam    General:      well developed, well nourished, in no acute distress.    Head:      normocephalic and atraumatic.    Eyes:      PERRL/EOM intact, conjunctivae and sclerae clear without nystagmus.    Neck:      no  thyromegaly, trachea central with normal respiratory effort  Lungs:      clear bilaterally to auscultation.    Heart:       regular rate and rhythm, S1, S2 without murmurs, rubs, or gallops  Skin:      intact without lesions or rashes.    Psych:      alert and cooperative; normal mood and affect; normal attention span and concentration.      Pacemaker incisions are clean          A/P    Dual-chamber pacemaker in situ with an old right atrial pacing lead placed in 1999 and a CRT epicardial lead placed in the past with a new dual-chamber pacemaker implanted during recent RV pacing lead extraction from right pocket.  Pacemaker interrogation attached to chart with appropriate function  Underlying complete heart block  Current pacing configuration is LV pacing only  Coronary artery disease stable without angina  Hyperlipidemia treated  History of atrial fibrillation without recurrence  Hypertension controlled on current regimen   Medications reviewed and follow-up appointments made  Potassium 4.2 with normal hemoglobin and normal creatinine      Pacemaker interrogation revealed normally functioning  LV epicardial lead  The old right atrial pacing lead showed an impedance of 680 ohms and sensing of only 1 mV.  Sensitivity reduced to 0.4 mV.  Increased threshold is noted on bipolar configuration and changed to unipolar configuration and pacemaker reprogrammed which is attached to chart.    Follow-up in 3 months      Electronically signed by Dominick Jackson MD, 01/09/23, 10:52 AM EST.

## 2023-01-13 ENCOUNTER — OFFICE (AMBULATORY)
Dept: URBAN - METROPOLITAN AREA CLINIC 64 | Facility: CLINIC | Age: 78
End: 2023-01-13

## 2023-01-13 VITALS
WEIGHT: 217 LBS | SYSTOLIC BLOOD PRESSURE: 155 MMHG | DIASTOLIC BLOOD PRESSURE: 89 MMHG | HEART RATE: 60 BPM | HEIGHT: 73 IN

## 2023-01-13 DIAGNOSIS — K59.00 CONSTIPATION, UNSPECIFIED: ICD-10-CM

## 2023-01-13 PROCEDURE — 99213 OFFICE O/P EST LOW 20 MIN: CPT | Performed by: NURSE PRACTITIONER

## 2023-01-13 RX ORDER — SORBITOL SOLUTION 70 %
SOLUTION, ORAL MISCELLANEOUS
Qty: 30 | Refills: 0 | Status: COMPLETED
Start: 2023-01-13 | End: 2023-03-27

## 2023-01-16 DIAGNOSIS — G50.0 TRIGEMINAL NEURALGIA: ICD-10-CM

## 2023-01-24 DIAGNOSIS — G50.0 TRIGEMINAL NEURALGIA: Primary | ICD-10-CM

## 2023-01-24 RX ORDER — OXCARBAZEPINE 300 MG/1
TABLET, FILM COATED ORAL
Qty: 180 TABLET | Refills: 3 | Status: SHIPPED | OUTPATIENT
Start: 2023-01-24 | End: 2023-03-22

## 2023-02-28 PROCEDURE — 93296 REM INTERROG EVL PM/IDS: CPT | Performed by: INTERNAL MEDICINE

## 2023-02-28 PROCEDURE — 93294 REM INTERROG EVL PM/LDLS PM: CPT | Performed by: INTERNAL MEDICINE

## 2023-03-01 RX ORDER — ACEBUTOLOL HYDROCHLORIDE 200 MG/1
CAPSULE ORAL
Qty: 90 CAPSULE | Refills: 1 | Status: SHIPPED | OUTPATIENT
Start: 2023-03-01

## 2023-03-14 ENCOUNTER — TELEPHONE (OUTPATIENT)
Dept: CARDIOLOGY | Facility: CLINIC | Age: 78
End: 2023-03-14

## 2023-03-14 NOTE — TELEPHONE ENCOUNTER
Caller: Mode Duffy    Relationship: Self    Best call back number: [phone]    What is the best time to reach you: ANY     Who are you requesting to speak with (clinical staff, provider,  specific staff member): GRAZYNA     Do you know the name of the person who called:     What was the call regarding: PT IS HAVING CATARACT SURGERY WITH DR. FISHMAN. HE HAS A QUESTION ABOUT STAYING OFF OF  ELIQUIS AND ASPIRIN.     Do you require a callback: YES           detailed exam

## 2023-03-16 NOTE — PROGRESS NOTES
Subjective: R Trigeminal neuralgia, syncope resolved    Patient ID: Mode Duffy is a 77 y.o. male.    History of Present Illness Mr. Duffy is a very pleasant 77-year-old   who has been followed by this neurology clinic for many years.  He is currently on oxcarbazepine 300 mg twice a day and Elavil 10 mg at bedtime.  He states that he is doing very well and will occasionally have a flareup and take an extra 150 mg of oxcarbazepine.  The patient asked if the pain becomes worse if he could take 300 mg 3 times a day.  The patient was notified that oxcarbazepine 303 times a day is permissible however it may decrease his sodium quicker.  The patient states he is following up with his sodium levels with his primary care doctor and is drinking V8 every day to keep his sodium where it should be.  The patient states that trigeminal area on the right is unchanged.      He reports he has had no further syncopal spells and feels very good on his current medications.          The following portions of the patient's history were reviewed and updated as appropriate: allergies, current medications, past family history, past medical history, past social history, past surgical history and problem list.    Family History   Problem Relation Age of Onset   • Cancer Mother    • Heart disease Father    • Arthritis Father    • Diabetes Father    • Hypertension Father    • Hyperlipidemia Father    • Kidney disease Father    • Malig Hyperthermia Neg Hx        Past Medical History:   Diagnosis Date   • Arthritis    • Arthritis    • BPH (benign prostatic hyperplasia)    • CHF (congestive heart failure) ([facility])    • Coronary artery disease     dr. Carrizales   • Deviated septum    • FH: cataracts    • Gait abnormality    • GERD (gastroesophageal reflux disease)    • History of trigeminal neuralgia    • Hyperlipidemia    • Hypertension    • Renal cyst    • Sciatica    • Sleep apnea     unable to tolerate machine       Social  History     Socioeconomic History   • Marital status:    Tobacco Use   • Smoking status: Former     Types: Cigarettes     Quit date:      Years since quittin.2   • Smokeless tobacco: Never   Vaping Use   • Vaping Use: Never used   Substance and Sexual Activity   • Alcohol use: Yes     Alcohol/week: 7.0 standard drinks     Types: 7 Glasses of wine per week     Comment: RED WINE BEFORE DINNER   • Drug use: No   • Sexual activity: Defer         Current Outpatient Medications:   •  acebutolol (SECTRAL) 200 MG capsule, TAKE 1 CAPSULE EVERY DAY, Disp: 90 capsule, Rfl: 1  •  amitriptyline (ELAVIL) 10 MG tablet, , Disp: , Rfl:   •  amLODIPine (NORVASC) 10 MG tablet, Take 1 tablet by mouth every night at bedtime. Indications: High Blood Pressure Disorder, Disp: , Rfl:   •  APPLE CIDER VINEGAR PO, Take 10 mL by mouth., Disp: , Rfl:   •  aspirin 81 MG tablet, Take 1 tablet by mouth Daily. Do not take day of surgery  Indications: Venous Thromboembolism, Disp: , Rfl:   •  atorvastatin (LIPITOR) 20 MG tablet, TAKE 1 TABLET EVERY DAY (Patient taking differently: No sig reported), Disp: 90 tablet, Rfl: 3  •  benzonatate (TESSALON) 100 MG capsule, Take 1 capsule by mouth 3 (Three) Times a Day As Needed for Cough., Disp: 30 capsule, Rfl: 0  •  cetirizine (zyrTEC) 10 MG tablet, Take 1 tablet by mouth Daily. Indications: Upper Respiratory Tract Allergy, Disp: , Rfl:   •  Cinnamon 500 MG capsule, Take 1 capsule by mouth 2 (Two) Times a Day. Stop on  for surgery  Indications: Sugar stabilization, Disp: , Rfl:   •  Eliquis 5 MG tablet tablet, TAKE 1 TABLET BY MOUTH EVERY 12 HOURS, Disp: 60 tablet, Rfl: 3  •  enalapril (VASOTEC) 20 MG tablet, Take 1 tablet by mouth Daily. Indications: High Blood Pressure Disorder, Disp: , Rfl:   •  esomeprazole (nexIUM) 40 MG capsule, Take 1 capsule by mouth Daily. Indications: Gastroesophageal Reflux Disease, Disp: , Rfl:   •  glucosamine-chondroitin 500-400 MG capsule capsule, Take 1  capsule by mouth 2 (Two) Times a Day With Meals. Stop on 2/26 for surgery  Indications: Joint Damage causing Pain and Loss of Function, Disp: , Rfl:   •  guaiFENesin (MUCINEX) 600 MG 12 hr tablet, 1 PO q12h prn mucus, Disp: 20 tablet, Rfl: 0  •  HONEY PO, Take 10 mL by mouth., Disp: , Rfl:   •  isosorbide mononitrate (IMDUR) 60 MG 24 hr tablet, Take 1 tablet by mouth Daily. Indications: Stable Angina Pectoris, Disp: , Rfl:   •  montelukast (SINGULAIR) 10 MG tablet, Take 1 tablet by mouth Daily., Disp: , Rfl:   •  povidone-iodine (BETADINE) 7.5 % solution, Apply 1 application topically to the appropriate area as directed 2 (Two) Times a Day. Indications: paint right groin , Disp: , Rfl:   •  Probiotic Product (PROBIOTIC ADVANCED PO), Take 1 capsule by mouth Daily. Stop on 2/26 for surgery  Indications: Edith replacement, Disp: , Rfl:   •  Psyllium (METAMUCIL FIBER PO), Take  by mouth., Disp: , Rfl:   •  sodium hypochlorite (DAKIN'S 1/4 STRENGTH) 0.125 % solution topical solution 0.125%, Apply 118.25 mL topically to the appropriate area as directed 2 (Two) Times a Day., Disp: 1000 mL, Rfl: 1  •  TURMERIC PO, Take 1 capsule by mouth Daily. Stop on 2/26 for surgery  Indications: inflammation, Disp: , Rfl:   •  vitamin C (ASCORBIC ACID) 250 MG tablet, Take 1 tablet by mouth Daily. Indications: supplement, Disp: , Rfl:   •  Zinc 25 MG tablet, Take 25 mg by mouth Daily. Indications: Zinc Deficiency, Disp: , Rfl:   •  OXcarbazepine (TRILEPTAL) 150 MG tablet, Take 1-2 tab per day as needed for Trigeminal pain, Disp: 60 tablet, Rfl: 3  •  OXcarbazepine (TRILEPTAL) 300 MG tablet, Take 2 tablets by mouth 2 (Two) Times a Day., Disp: 180 tablet, Rfl: 3    Review of Systems   Eyes: Positive for visual disturbance.   All other systems reviewed and are negative.         I have reviewed ROS completed by medical assistant.     Objective:    Neurologic Exam     Gait, Coordination, and Reflexes     Gait  Gait: normal      Physical  Exam  Vitals reviewed.   Constitutional:       Appearance: Normal appearance. He is well-developed, well-groomed and normal weight.   HENT:      Head: Normocephalic and atraumatic.      Right Ear: Hearing normal.      Left Ear: Hearing normal.      Nose: Nose normal.      Mouth/Throat:      Lips: Pink.      Mouth: Mucous membranes are moist.   Eyes:      General: Lids are normal. No visual field deficit.  Cardiovascular:      Rate and Rhythm: Normal rate.   Pulmonary:      Effort: Pulmonary effort is normal.   Musculoskeletal:         General: Normal range of motion.      Cervical back: Normal range of motion.   Skin:     General: Skin is warm and dry.   Neurological:      Mental Status: He is alert.      Cranial Nerves: No cranial nerve deficit, dysarthria or facial asymmetry.      Motor: Motor function is intact.      Gait: Gait is intact.   Psychiatric:         Mood and Affect: Mood normal.         Behavior: Behavior is cooperative.         Assessment/Plan:    Discussion: The patient will be continued on oxcarbazepine 300 mg twice daily.  He is to continue drinking V8 to keep his sodium level up.  He was cautioned never to let the sodium go below 120.  He was also given a prescription for oxcarbazepine 150 and notified he can take that up to twice a day or 2 doses in the afternoon if needed in addition to his oxcarbazepine 300 mg making the total dose 900/day.  He is to call if the pain is still not controlled.  Otherwise the patient will be scheduled back for a follow-up in 1 year.    Diagnoses and all orders for this visit:    1. Trigeminal neuralgia  -     OXcarbazepine (TRILEPTAL) 300 MG tablet; Take 2 tablets by mouth 2 (Two) Times a Day.  Dispense: 180 tablet; Refill: 3  -     OXcarbazepine (TRILEPTAL) 150 MG tablet; Take 1-2 tab per day as needed for Trigeminal pain  Dispense: 60 tablet; Refill: 3          Return in about 1 year (around 3/22/2024) for Dr Seipel .     I spent 29 minutes caring for Mode on  this date of service. This time includes time spent by me in the following activities: obtaining and/or reviewing a separately obtained history, performing a medically appropriate examination and/or evaluation, counseling and educating the patient/family/caregiver, ordering medications, tests, or procedures and documenting information in the medical record.      This document has been electronically signed by MIKAYLA Joiner on March 22, 2023 12:34 EDT

## 2023-03-22 ENCOUNTER — OFFICE VISIT (OUTPATIENT)
Dept: NEUROLOGY | Facility: CLINIC | Age: 78
End: 2023-03-22
Payer: MEDICARE

## 2023-03-22 VITALS
TEMPERATURE: 97.9 F | HEIGHT: 72 IN | HEART RATE: 60 BPM | DIASTOLIC BLOOD PRESSURE: 77 MMHG | SYSTOLIC BLOOD PRESSURE: 133 MMHG | BODY MASS INDEX: 29.53 KG/M2 | WEIGHT: 218 LBS

## 2023-03-22 DIAGNOSIS — G50.0 TRIGEMINAL NEURALGIA: ICD-10-CM

## 2023-03-22 PROCEDURE — 99213 OFFICE O/P EST LOW 20 MIN: CPT | Performed by: NURSE PRACTITIONER

## 2023-03-22 PROCEDURE — 3078F DIAST BP <80 MM HG: CPT | Performed by: NURSE PRACTITIONER

## 2023-03-22 PROCEDURE — 3075F SYST BP GE 130 - 139MM HG: CPT | Performed by: NURSE PRACTITIONER

## 2023-03-22 RX ORDER — OXCARBAZEPINE 300 MG/1
600 TABLET, FILM COATED ORAL 2 TIMES DAILY
Qty: 180 TABLET | Refills: 3 | Status: SHIPPED | OUTPATIENT
Start: 2023-03-22 | End: 2023-03-22

## 2023-03-22 RX ORDER — OXCARBAZEPINE 150 MG/1
TABLET, FILM COATED ORAL
Qty: 180 TABLET | Refills: 3 | Status: SHIPPED | OUTPATIENT
Start: 2023-03-22

## 2023-03-22 RX ORDER — MONTELUKAST SODIUM 10 MG/1
1 TABLET ORAL DAILY
COMMUNITY
Start: 2023-02-28

## 2023-03-22 RX ORDER — OXCARBAZEPINE 150 MG/1
TABLET, FILM COATED ORAL
Qty: 60 TABLET | Refills: 3 | Status: SHIPPED | OUTPATIENT
Start: 2023-03-22 | End: 2023-03-22

## 2023-03-22 RX ORDER — OXCARBAZEPINE 300 MG/1
TABLET, FILM COATED ORAL
Qty: 360 TABLET | Refills: 3 | Status: SHIPPED | OUTPATIENT
Start: 2023-03-22

## 2023-03-22 RX ORDER — AMITRIPTYLINE HYDROCHLORIDE 10 MG/1
TABLET, FILM COATED ORAL
COMMUNITY
Start: 2023-03-15

## 2023-03-27 ENCOUNTER — OFFICE (AMBULATORY)
Dept: URBAN - METROPOLITAN AREA CLINIC 64 | Facility: CLINIC | Age: 78
End: 2023-03-27

## 2023-03-27 VITALS
DIASTOLIC BLOOD PRESSURE: 83 MMHG | WEIGHT: 217 LBS | SYSTOLIC BLOOD PRESSURE: 137 MMHG | HEIGHT: 73 IN | HEART RATE: 60 BPM

## 2023-03-27 DIAGNOSIS — K59.00 CONSTIPATION, UNSPECIFIED: ICD-10-CM

## 2023-03-27 PROCEDURE — 99212 OFFICE O/P EST SF 10 MIN: CPT | Performed by: NURSE PRACTITIONER

## 2023-04-04 ENCOUNTER — TELEPHONE (OUTPATIENT)
Dept: NEUROLOGY | Facility: CLINIC | Age: 78
End: 2023-04-04

## 2023-04-04 NOTE — TELEPHONE ENCOUNTER
Caller: Mode Duffy    Relationship: Self     Best call back number: [phone]    What is the best time to reach you: ANY    What was the call regarding: PT RECEIVED A LETTER STATING THAT SOMEONE NAME ANGELIQUE FROM  IS REQUESTING BLOOD WORK. PT IS REQUESTING TO KNOW WHY BLOOD WORK IS BEING REQUESTED  AND WHAT TYPE OF BLOOD WORK. PT STATED HE DOES [facility] AND THAT THE PROVIDER NEVER MENTIONED IT.     Do you require a callback: YES      PLEASE REVIEW AND ADVISE    THANK YOU

## 2023-04-10 ENCOUNTER — OFFICE VISIT (OUTPATIENT)
Dept: CARDIOLOGY | Facility: CLINIC | Age: 78
End: 2023-04-10
Payer: MEDICARE

## 2023-04-10 VITALS
WEIGHT: 218 LBS | SYSTOLIC BLOOD PRESSURE: 136 MMHG | DIASTOLIC BLOOD PRESSURE: 78 MMHG | HEART RATE: 68 BPM | HEIGHT: 72 IN | BODY MASS INDEX: 29.53 KG/M2 | OXYGEN SATURATION: 97 %

## 2023-04-10 DIAGNOSIS — I48.0 PAROXYSMAL ATRIAL FIBRILLATION: ICD-10-CM

## 2023-04-10 DIAGNOSIS — Z95.0 CARDIAC PACEMAKER IN SITU: ICD-10-CM

## 2023-04-10 DIAGNOSIS — I10 PRIMARY HYPERTENSION: ICD-10-CM

## 2023-04-10 DIAGNOSIS — I44.2 COMPLETE HEART BLOCK: Primary | ICD-10-CM

## 2023-04-10 DIAGNOSIS — I25.10 CORONARY ARTERY DISEASE INVOLVING NATIVE CORONARY ARTERY OF NATIVE HEART WITHOUT ANGINA PECTORIS: ICD-10-CM

## 2023-04-10 PROCEDURE — 93000 ELECTROCARDIOGRAM COMPLETE: CPT | Performed by: INTERNAL MEDICINE

## 2023-04-10 PROCEDURE — 3078F DIAST BP <80 MM HG: CPT | Performed by: INTERNAL MEDICINE

## 2023-04-10 PROCEDURE — 1160F RVW MEDS BY RX/DR IN RCRD: CPT | Performed by: INTERNAL MEDICINE

## 2023-04-10 PROCEDURE — 93280 PM DEVICE PROGR EVAL DUAL: CPT | Performed by: INTERNAL MEDICINE

## 2023-04-10 PROCEDURE — 3075F SYST BP GE 130 - 139MM HG: CPT | Performed by: INTERNAL MEDICINE

## 2023-04-10 PROCEDURE — 99214 OFFICE O/P EST MOD 30 MIN: CPT | Performed by: INTERNAL MEDICINE

## 2023-04-10 PROCEDURE — 1159F MED LIST DOCD IN RCRD: CPT | Performed by: INTERNAL MEDICINE

## 2023-04-10 NOTE — PROGRESS NOTES
CC--complete heart block, coronary artery disease    Sub--77-year-old male patient has complete heart block and coronary artery disease.  Patient had a biventricular pacemaker with a right ventricular pacing lead placed from right subclavian approach since the patient had a left subclavian occlusion.  Patient had a erosion on the right segmentation and the left-sided pocket was healthy.  He subsequently underwent lead extraction from the right side and left to LV pacing only.  Patient was doing pacemaker was a left-sided implant done in 1999.  He was having symptoms of heart failure with continuous RV pacing needing CRT pacing--- patient had a prior LV lead placed epicardially.  Subsequently developed RV lead malfunction needing a new RV pacing lead from right side which had to be extracted.  He does have history of hypertension hyperlipidemia and coronary artery disease in the past.  Patient has noticed more AF since last seen        Past Medical History:   Diagnosis Date   • Arthritis    • Arthritis    • BPH (benign prostatic hyperplasia)    • CHF (congestive heart failure) ([facility])    • Coronary artery disease     dr. Carrizales   • Deviated septum    • FH: cataracts    • Gait abnormality    • GERD (gastroesophageal reflux disease)    • History of trigeminal neuralgia    • Hyperlipidemia    • Hypertension    • Renal cyst    • Sciatica    • Sleep apnea     unable to tolerate machine     Past Surgical History:   Procedure Laterality Date   • APPENDECTOMY     • CARDIAC CATHETERIZATION     • CARDIAC CATHETERIZATION N/A 3/5/2020    Procedure: Percutaneous Subclavian Intervention;  Surgeon: Nick Greenberg MD;  Location: [facility] CATH INVASIVE LOCATION;  Service: Cardiovascular;  Laterality: N/A;   • CARDIAC ELECTROPHYSIOLOGY PROCEDURE N/A 1/29/2020    Procedure: Biventricular Device Upgrade;  Surgeon: Dominick Jackson MD;  Location: [facility] CATH INVASIVE LOCATION;  Service: Cardiovascular   • CARDIAC  ELECTROPHYSIOLOGY PROCEDURE N/A 3/5/2020    Procedure: Lead Revision;  Surgeon: Nick Greenberg MD;  Location: [facility] CATH INVASIVE LOCATION;  Service: Cardiovascular;  Laterality: N/A;   • CORONARY STENT PLACEMENT  2014    x 2   • PROSTATE SURGERY     • REFRACTIVE SURGERY     • THORACOTOMY Left 3/4/2020    Procedure: THORACOTOMY MINI WITH LEAD PLACEMENT;  Surgeon: Justin Aguilar MD;  Location: [facility] CVOR;  Service: Cardiothoracic;  Laterality: Left;   • TOTAL HIP ARTHROPLASTY Bilateral     different times       Physical Exam    General:      well developed, well nourished, in no acute distress.    Head:      normocephalic and atraumatic.    Eyes:      PERRL/EOM intact, conjunctivae and sclerae clear without nystagmus.    Neck:      no  thyromegaly, trachea central with normal respiratory effort  Lungs:      clear bilaterally to auscultation.    Heart:       regular rate and rhythm, S1, S2 without murmurs, rubs, or gallops  Skin:      intact without lesions or rashes.    Psych:      alert and cooperative; normal mood and affect; normal attention span and concentration.          A/P    Complex pacing system as described above.  Currently has a dual-chamber pacemaker with an old right atrial pacing lead placed in 1999 and a CRT epicardial lead as dictated above.  Underlying complete heart block  Current pacing configuration is LV lead pacing only as described above  Coronary disease stable without angina  Hyperlipidemia treated  History of atrial fibrillation without recurrence  Essential hypertension well-controlled  Medications reviewed and follow-up appointments made  Most recent potassium of 4.2 with normal creatinine  Slightly increased burden of AF noted.  Pacemaker reprogrammed to a higher rate of 70s to suppress AF.  AV delay for pacing reduced to 180 ms.  Reprogramming of the device attached to chart.  Atrial lead currently in unipolar mode because of better capture at 1.75 V.  Reevaluate this  patient in 3 months.        ECG 12 Lead    Date/Time: 4/10/2023 9:20 AM  Performed by: Dominick Jackson MD  Authorized by: Dominick Jackson MD   Comparison: compared with previous ECG   Similar to previous ECG  Rhythm: sinus rhythm and paced  Ectopy: atrial premature contractions  Rate: normal            Electronically signed by Dominick Jackson MD, 04/10/23, 9:20 AM EDT.

## 2023-04-10 NOTE — LETTER
April 10, 2023       No Recipients    Patient: Mode Duffy   YOB: 1945   Date of Visit: 4/10/2023       Dear Dr. Waters Recipients:    Thank you for referring Mode Duffy to me for evaluation. Below are the relevant portions of my assessment and plan of care.    If you have questions, please do not hesitate to call me. I look forward to following Mode along with you.         Sincerely,        Dominick Jackson MD        CC:   No Recipients    Dominick Jackson MD  04/10/23 0922  Signed  CC--complete heart block, coronary artery disease    Sub--77-year-old male patient has complete heart block and coronary artery disease.  Patient had a biventricular pacemaker with a right ventricular pacing lead placed from right subclavian approach since the patient had a left subclavian occlusion.  Patient had a erosion on the right segmentation and the left-sided pocket was healthy.  He subsequently underwent lead extraction from the right side and left to LV pacing only.  Patient was doing pacemaker was a left-sided implant done in 1999.  He was having symptoms of heart failure with continuous RV pacing needing CRT pacing--- patient had a prior LV lead placed epicardially.  Subsequently developed RV lead malfunction needing a new RV pacing lead from right side which had to be extracted.  He does have history of hypertension hyperlipidemia and coronary artery disease in the past.  Patient has noticed more AF since last seen        Past Medical History:   Diagnosis Date   • Arthritis    • Arthritis    • BPH (benign prostatic hyperplasia)    • CHF (congestive heart failure) ([facility])    • Coronary artery disease     dr. Carrizales   • Deviated septum    • FH: cataracts    • Gait abnormality    • GERD (gastroesophageal reflux disease)    • History of trigeminal neuralgia    • Hyperlipidemia    • Hypertension    • Renal cyst    • Sciatica    • Sleep apnea     unable to tolerate machine     Past Surgical History:    Procedure Laterality Date   • APPENDECTOMY     • CARDIAC CATHETERIZATION     • CARDIAC CATHETERIZATION N/A 3/5/2020    Procedure: Percutaneous Subclavian Intervention;  Surgeon: Nick Greenberg MD;  Location: [facility] CATH INVASIVE LOCATION;  Service: Cardiovascular;  Laterality: N/A;   • CARDIAC ELECTROPHYSIOLOGY PROCEDURE N/A 1/29/2020    Procedure: Biventricular Device Upgrade;  Surgeon: Dominick Jackson MD;  Location: [facility] CATH INVASIVE LOCATION;  Service: Cardiovascular   • CARDIAC ELECTROPHYSIOLOGY PROCEDURE N/A 3/5/2020    Procedure: Lead Revision;  Surgeon: Nick Greebnerg MD;  Location: [facility] CATH INVASIVE LOCATION;  Service: Cardiovascular;  Laterality: N/A;   • CORONARY STENT PLACEMENT  2014    x 2   • PROSTATE SURGERY     • REFRACTIVE SURGERY     • THORACOTOMY Left 3/4/2020    Procedure: THORACOTOMY MINI WITH LEAD PLACEMENT;  Surgeon: Justin Aguilar MD;  Location: [facility] CVOR;  Service: Cardiothoracic;  Laterality: Left;   • TOTAL HIP ARTHROPLASTY Bilateral     different times       Physical Exam    General:      well developed, well nourished, in no acute distress.    Head:      normocephalic and atraumatic.    Eyes:      PERRL/EOM intact, conjunctivae and sclerae clear without nystagmus.    Neck:      no  thyromegaly, trachea central with normal respiratory effort  Lungs:      clear bilaterally to auscultation.    Heart:       regular rate and rhythm, S1, S2 without murmurs, rubs, or gallops  Skin:      intact without lesions or rashes.    Psych:      alert and cooperative; normal mood and affect; normal attention span and concentration.          A/P    Complex pacing system as described above.  Currently has a dual-chamber pacemaker with an old right atrial pacing lead placed in 1999 and a CRT epicardial lead as dictated above.  Underlying complete heart block  Current pacing configuration is LV lead pacing only as described above  Coronary disease stable without  angina  Hyperlipidemia treated  History of atrial fibrillation without recurrence  Essential hypertension well-controlled  Medications reviewed and follow-up appointments made  Most recent potassium of 4.2 with normal creatinine  Slightly increased burden of AF noted.  Pacemaker reprogrammed to a higher rate of 70s to suppress AF.  AV delay for pacing reduced to 180 ms.  Reprogramming of the device attached to chart.  Atrial lead currently in unipolar mode because of better capture at 1.75 V.  Reevaluate this patient in 3 months.        ECG 12 Lead    Date/Time: 4/10/2023 9:20 AM  Performed by: Dominick Jackson MD  Authorized by: Dominick Jackson MD   Comparison: compared with previous ECG   Similar to previous ECG  Rhythm: sinus rhythm and paced  Ectopy: atrial premature contractions  Rate: normal            Electronically signed by Dominick Jackson MD, 04/10/23, 9:20 AM EDT.

## 2023-05-10 ENCOUNTER — TELEPHONE (OUTPATIENT)
Dept: NEUROLOGY | Facility: CLINIC | Age: 78
End: 2023-05-10

## 2023-05-10 NOTE — TELEPHONE ENCOUNTER
Called Lou at [facility], the patient has trigeminal neuralgia and has been having more pain and was increased to 600 mg twice daily a while back, his pain is better and he would like to decrease it to 1 pill twice a day so I agreed with that.  They will fix the prescription.

## 2023-05-10 NOTE — TELEPHONE ENCOUNTER
PHARMACY CALLING REGARDING PT  OXCARBAZEPINE 300 MG TABLET.  INSTRUCTIONS SAY 2 TABLETS BY MOUTH TWICE DAILY.    PT TELLING PHARMACY HE ONLY TAKES 1 TABLET BY MOUTH TWICE DAILY.    PLEASE CALL JEROME AT [facility], [phone] TO VERIFY DOSE    THANK YOU

## 2023-05-31 RX ORDER — AMITRIPTYLINE HYDROCHLORIDE 10 MG/1
TABLET, FILM COATED ORAL
Qty: 180 TABLET | Refills: 0 | Status: SHIPPED | OUTPATIENT
Start: 2023-05-31

## 2023-06-06 ENCOUNTER — OFFICE VISIT (OUTPATIENT)
Dept: CARDIOLOGY | Facility: CLINIC | Age: 78
End: 2023-06-06
Payer: MEDICARE

## 2023-06-06 VITALS
WEIGHT: 215 LBS | BODY MASS INDEX: 29.12 KG/M2 | DIASTOLIC BLOOD PRESSURE: 80 MMHG | OXYGEN SATURATION: 97 % | HEIGHT: 72 IN | HEART RATE: 72 BPM | SYSTOLIC BLOOD PRESSURE: 140 MMHG

## 2023-06-06 DIAGNOSIS — I50.22 CHRONIC SYSTOLIC CONGESTIVE HEART FAILURE: ICD-10-CM

## 2023-06-06 DIAGNOSIS — I44.2 COMPLETE HEART BLOCK: ICD-10-CM

## 2023-06-06 DIAGNOSIS — I10 PRIMARY HYPERTENSION: ICD-10-CM

## 2023-06-06 DIAGNOSIS — I42.8 OTHER CARDIOMYOPATHY: ICD-10-CM

## 2023-06-06 DIAGNOSIS — I25.10 CORONARY ARTERY DISEASE INVOLVING NATIVE CORONARY ARTERY OF NATIVE HEART WITHOUT ANGINA PECTORIS: ICD-10-CM

## 2023-06-06 DIAGNOSIS — Z95.0 CARDIAC PACEMAKER IN SITU: Primary | ICD-10-CM

## 2023-06-06 DIAGNOSIS — E78.2 MIXED HYPERLIPIDEMIA: ICD-10-CM

## 2023-06-06 NOTE — PROGRESS NOTES
Cardiology Office Visit      Encounter Date:  06/06/2023    Patient ID:   Mode Duffy is a 77 y.o. male.    Reason For Followup:  Coronary artery disease  Cardiomyopathy    Brief Clinical History:  Dear Dr. Vidal, Temo Hadley MD    I had the pleasure of seeing Mode Duffy today. As you are well aware, this is a 77 y.o. male with a known history of ischemic heart disease. He underwent cardiac catheterization with resultant PCI of an obtuse marginal branch and diagonal branch in November 2014. He has additional history of hypertension with hypertensive cardiovascular disease, dyslipidemia, paroxysmal atrial fibrillation, antiplatelet therapy as well as sick sinus syndrome status post biventricular permanent pacemaker placement. He presents today for followup on the above conditions.     Interval History:  He denies any chest pain pressure heaviness or tightness.    He denies any shortness of breath out of character.  He denies any PND orthopnea.  He denies any syncope or near syncope.  He reports feeling in his usual state of cardiac health.    Although his blood pressure is elevated today, he reports that his reading at home before coming in was 138/80.    [facility]  We reviewed those today.    His blood pressure is elevated today.  This is typical of his office visits.  His blood pressure at home was 138/80 before coming to the office today.     Assessment & Plan     Impressions:  Coronary artery disease status post PCI and stenting most recently in November of 2014.        This was cutting Balloon angioplasty of a circumflex branch and a diagonal branch.   Dyslipidemia.  Hypertension with hypertensive cardiovascular disease with a white coat component  Cardiomyopathy most recent EF of 35 to 40% in October 2019 echocardiogram  Sick sinus syndrome status post biventricular permanent pacemaker placement.  Paroxysmal atrial fibrillation  Coagulopathy secondary to  "Eliquis  Lumbago  Hyponatremia secondary to Trileptal  Renal cysts  History of trigeminal neuralgia presently treated with Trileptal.  Hip pain currently undergoing preoperative cardiovascular risk assessment for right hip surgery.  Trigeminal neuralgia     Recommendations:  Continuation of his current cardiovascular regimen at the present time.     This includes antihypertensives, anticoagulants, and antiplatelets.  Follow-up with EP as scheduled  Follow-up in 9 months, sooner should there be difficulties    Diagnoses and all orders for this visit:    1. Cardiac pacemaker in situ (Primary)    2. Other cardiomyopathy  Overview:  Added automatically from request for surgery [id number]      3. Chronic systolic congestive heart failure  Overview:  Added automatically from request for surgery [id number]      4. Complete heart block    5. Coronary artery disease involving native coronary artery of native heart without angina pectoris    6. Mixed hyperlipidemia    7. Primary hypertension          Objective:    Vitals:  Vitals:    06/06/23 1050   BP: 140/80   BP Location: Left arm   Patient Position: Sitting   Pulse: 72   SpO2: 97%   Weight: 97.5 kg (215 lb)   Height: 182.9 cm (72\")     Body mass index is 29.16 kg/m².      Physical Exam:    General: Alert, cooperative, no distress, appears stated age  Head:  Normocephalic, atraumatic, mucous membranes moist  Eyes:  Conjunctiva/corneas clear, EOM's intact     Neck:  Supple,  no bruit    Lungs: Clear to auscultation bilaterally, no wheezes rhonchi rales are noted  Chest wall: No tenderness.  Pacing device in place  Heart::  Regular rate and rhythm, S1 and S2 normal, 1/6 holosystolic murmur.  No rub or gallop  Abdomen: Soft, non-tender, nondistended bowel sounds active  Extremities: No cyanosis, clubbing, or edema  Pulses: 2+ and symmetric all extremities  Skin:  No rashes or lesions  Neuro/psych: A&O x3. CN II through XII are grossly intact with appropriate " affect      Allergies:  No Known Allergies    Medication Review:     Current Outpatient Medications:     acebutolol (SECTRAL) 200 MG capsule, TAKE 1 CAPSULE EVERY DAY, Disp: 90 capsule, Rfl: 1    amitriptyline (ELAVIL) 10 MG tablet, TAKE 2 TABLETS AT BEDTIME, Disp: 180 tablet, Rfl: 0    amLODIPine (NORVASC) 10 MG tablet, Take 1 tablet by mouth every night at bedtime. Indications: High Blood Pressure Disorder, Disp: , Rfl:     aspirin 81 MG tablet, Take 1 tablet by mouth Daily. Do not take day of surgery  Indications: Venous Thromboembolism, Disp: , Rfl:     atorvastatin (LIPITOR) 20 MG tablet, TAKE 1 TABLET EVERY DAY (Patient taking differently: No sig reported), Disp: 90 tablet, Rfl: 3    cetirizine (zyrTEC) 10 MG tablet, Take 1 tablet by mouth Daily. Indications: Upper Respiratory Tract Allergy, Disp: , Rfl:     Cinnamon 500 MG capsule, Take 1 capsule by mouth 2 (Two) Times a Day. Stop on 2/26 for surgery  Indications: Sugar stabilization, Disp: , Rfl:     Eliquis 5 MG tablet tablet, TAKE 1 TABLET BY MOUTH EVERY 12 HOURS, Disp: 60 tablet, Rfl: 3    enalapril (VASOTEC) 20 MG tablet, Take 1 tablet by mouth Daily. Indications: High Blood Pressure Disorder, Disp: , Rfl:     esomeprazole (nexIUM) 40 MG capsule, Take 1 capsule by mouth Daily. Indications: Gastroesophageal Reflux Disease, Disp: , Rfl:     glucosamine-chondroitin 500-400 MG capsule capsule, Take 1 capsule by mouth 2 (Two) Times a Day With Meals. Stop on 2/26 for surgery  Indications: Joint Damage causing Pain and Loss of Function, Disp: , Rfl:     isosorbide mononitrate (IMDUR) 60 MG 24 hr tablet, Take 1 tablet by mouth Daily. Indications: Stable Angina Pectoris, Disp: , Rfl:     montelukast (SINGULAIR) 10 MG tablet, Take 1 tablet by mouth Daily., Disp: , Rfl:     OXcarbazepine (TRILEPTAL) 150 MG tablet, TAKE 1 TO 2 TABLETS DAILY AS NEEDED FOR TRIGEMINAL PAIN, Disp: 180 tablet, Rfl: 3    OXcarbazepine (TRILEPTAL) 300 MG tablet, TAKE 2 TABLETS BY MOUTH  TWICE DAILY, Disp: 360 tablet, Rfl: 3    Probiotic Product (PROBIOTIC ADVANCED PO), Take 1 capsule by mouth Daily. Stop on 2/26 for surgery  Indications: Edith replacement, Disp: , Rfl:     vitamin C (ASCORBIC ACID) 250 MG tablet, Take 1 tablet by mouth Daily. Indications: supplement, Disp: , Rfl:     Zinc 25 MG tablet, Take 25 mg by mouth Daily. Indications: Zinc Deficiency, Disp: , Rfl:     sodium hypochlorite (DAKIN'S 1/4 STRENGTH) 0.125 % solution topical solution 0.125%, Apply 118.25 mL topically to the appropriate area as directed 2 (Two) Times a Day., Disp: 1000 mL, Rfl: 1    Family History:  Family History   Problem Relation Age of Onset    Cancer Mother     Heart disease Father     Arthritis Father     Diabetes Father     Hypertension Father     Hyperlipidemia Father     Kidney disease Father     Malig Hyperthermia Neg Hx        Past Medical History:  Past Medical History:   Diagnosis Date    Arthritis     Arthritis     BPH (benign prostatic hyperplasia)     CHF (congestive heart failure)     Coronary artery disease     dr. Carrizales    Deviated septum     FH: cataracts     Gait abnormality     GERD (gastroesophageal reflux disease)     History of trigeminal neuralgia     Hyperlipidemia     Hypertension     Renal cyst     Sciatica     Sleep apnea     unable to tolerate machine       Past Surgical History:  Past Surgical History:   Procedure Laterality Date    APPENDECTOMY      CARDIAC CATHETERIZATION      CARDIAC CATHETERIZATION N/A 3/5/2020    Procedure: Percutaneous Subclavian Intervention;  Surgeon: Nick Greenberg MD;  Location: [facility] CATH INVASIVE LOCATION;  Service: Cardiovascular;  Laterality: N/A;    CARDIAC ELECTROPHYSIOLOGY PROCEDURE N/A 1/29/2020    Procedure: Biventricular Device Upgrade;  Surgeon: Dominick Jackson MD;  Location: [facility] CATH INVASIVE LOCATION;  Service: Cardiovascular    CARDIAC ELECTROPHYSIOLOGY PROCEDURE N/A 3/5/2020    Procedure: Lead Revision;  Surgeon:  Nick Greenberg MD;  Location: [facility] CATH INVASIVE LOCATION;  Service: Cardiovascular;  Laterality: N/A;    CORONARY STENT PLACEMENT  2014    x 2    IMPLANTABLE CARDIOVERTER DEFIBRILLATOR LEAD REPLACEMENT/POCKET REVISION Right 10/19/2022    Procedure: RT. TRANSVENOUS ICD LEAD EXTRACTION;  Surgeon: Naveed Rios MD;  Location: [facility];  Service: Cardiology;  Laterality: Right;    PROSTATE SURGERY      REFRACTIVE SURGERY      THORACOTOMY Left 3/4/2020    Procedure: THORACOTOMY MINI WITH LEAD PLACEMENT;  Surgeon: Justin Aguilar MD;  Location: [facility];  Service: Cardiothoracic;  Laterality: Left;    TOTAL HIP ARTHROPLASTY Bilateral     different times       Social History:  Social History     Socioeconomic History    Marital status:    Tobacco Use    Smoking status: Former     Types: Cigarettes     Quit date:      Years since quittin.4    Smokeless tobacco: Never   Vaping Use    Vaping Use: Never used   Substance and Sexual Activity    Alcohol use: Yes     Alcohol/week: 7.0 standard drinks     Types: 7 Glasses of wine per week     Comment: RED WINE BEFORE DINNER    Drug use: No    Sexual activity: Defer       Review of Systems:  The following systems were reviewed as they relate to the cardiovascular system: Constitutional, Eyes, ENT, Cardiovascular, Respiratory, Gastrointestinal, Integumentary, Neurological, Psychiatric, Hematologic, Endocrine, Musculoskeletal, and Genitourinary. The pertinent cardiovascular findings are reported above with all other cardiovascular points within those systems being negative.    Diagnostic Study Review:     Current Electrocardiogram:  Procedures no new EKG. EKG dated 4/10/2023 demonstrates atrial paced complexes with a rate of 68 bpm.    Laboratory Data:  Lab Results   Component Value Date    GLUCOSE 124 (H) 10/20/2022    BUN 16 10/20/2022    CREATININE 0.96 10/20/2022    EGFRIFNONA 102 2020    BCR 16.7 10/20/2022    K 4.2 10/20/2022     CO2 25.0 10/20/2022    CALCIUM 8.7 10/20/2022    ALBUMIN 3.90 10/20/2022    LABIL2 1.6 11/06/2017    AST 21 10/20/2022    ALT 29 10/20/2022     Lab Results   Component Value Date    GLUCOSE 124 (H) 10/20/2022    CALCIUM 8.7 10/20/2022     (L) 10/20/2022    K 4.2 10/20/2022    CO2 25.0 10/20/2022    CL 98 10/20/2022    BUN 16 10/20/2022    CREATININE 0.96 10/20/2022    EGFRIFNONA 102 03/07/2020    BCR 16.7 10/20/2022    ANIONGAP 10.0 10/20/2022     Lab Results   Component Value Date    WBC 9.16 10/20/2022    HGB 13.3 10/20/2022    HCT 37.1 (L) 10/20/2022    MCV 94.9 10/20/2022     10/20/2022     Lab Results   Component Value Date    CHOL 137 03/02/2020    TRIG 115 03/02/2020    HDL 55 03/02/2020    LDL 59 03/02/2020     Lab Results   Component Value Date    HGBA1C 5.9 (H) 03/02/2020     Lab Results   Component Value Date    INR 0.99 10/05/2022    INR 1.05 05/22/2022    INR 1.04 03/05/2020    PROTIME 10.2 10/05/2022    PROTIME 10.8 05/22/2022    PROTIME 10.9 03/05/2020       Most Recent Echo:  Results for orders placed in visit on 10/19/22    Diagnostic IntraOp Anesthesia Mal    Narrative  Diagnostic IntraOp Anesthesia Mal    Procedure Performed: Diagnostic IntraOp Anesthesia Mal  Start Time:  End Time:    Preanesthesia Checklist:  Patient identified, IV assessed, risks and benefits discussed, monitors and equipment assessed, procedure being performed at surgeon's request and anesthesia consent obtained.    General Procedure Information  Diagnostic Indications for Echo:  assessment of ascending aorta, defect repair evaluation and hemodynamic monitoring  Physician Requesting Echo: Naveed Rios MD  Location performed:  OR  Intubated  Bite block placed  Heart visualized  Probe Insertion:  Easy  Probe Type:  Multiplane  Modalities:  2D only, color flow mapping, continuous wave Doppler and pulse wave Doppler    Echocardiographic and Doppler Measurements    Ventricles    Right Ventricle:  Cavity size  normal.  Global function normal.  Left Ventricle:  Cavity size dilated.  Global Function mildly impaired.  Ejection Fraction 45%.        Valves    Aortic Valve:  Annulus normal.  Regurgitation trace.    Mitral Valve:  Annulus normal.  Regurgitation mild.    Tricuspid Valve:  Annulus normal.  Regurgitation mild.  Pulmonic Valve:  Annulus normal.  Regurgitation trace.      Aorta    Ascending Aorta:  Size normal.  Dissection not present.  Aortic Arch:  Size normal.  Dissection not present.  Descending Aorta:  Size normal.  Dissection not present.  Plaque thickness greater than 3 mm.      Atria    Right Atrium:  Size normal.  Left Atrium:  Size normal.  Left atrial appendage normal.      Septa    Atrial Septum:  Intra-atrial septal morphology lipomatous hypertrophy.    Ventricular Septum:  Intra-ventricular septum morphology normal.    Diastolic Function Measurements:  Diastolic Dysfunction Grade= indeterminate  E=  ms  A=  ms  E/A Ratio=  DT=  ms  S/D=  IVRT=    Other Findings  Pericardium:  normal  Pleural Effusion:  none  Pulmonary Arteries:  normal  Pulmonary Venous Flow:  normal and blunted (decreased) systolic flow    Anesthesia Information  Performed Personally  Anesthesiologist:  Yonathan Pimentel MD      Echocardiogram Comments:  76 year old male with CAD s/p JEFF, CHB s/p PM/ICD c/b pocket infection presents for lead extraction  - Mildly reduced LV function (LVEF 45%, hypokinetic lateral wall)  - Normal RV size/function  - Multiple leads within RA/RV -- no obvious vegetations  - Mild MR/TR  - Atheroma within descending aorta  - No pericardial effusion    S/p single lead removal  - No pericardial effusion post lead removal  - No changes above    Findings discussed with surgical team       Most Recent Stress Test:       Most Recent Cardiac Catheterization:   Results for orders placed during the hospital encounter of 03/04/20    Cardiac Catheterization/Vascular Study    Narrative  Lead revision procedure  note.    Procedure:  Failed PTA attempt to occluded left subclavian vein.  Placement of RV  lead through right subclavian vein approach and tunneled into left subclavian pocket and open pocket and connected RV lead to the generator and capped the existing malfunctioning RV lead.  Temporary pacemaker placement through right femoral venous approach    Indication: This is a 74-year-old with complete heart block and previous history of dual-chamber pacemaker over 21 years ago and LV dysfunction EF of 35 to 40% probably due to 100% pacer dependent status and CHF with lower extremity edema and tiredness and history of CAD and PCI, hypertension, dyslipidemia here for procedure.  Patient was initially attempted to have left subclavian approach LV lead placement but was found to have a occluded left subclavian vein therefore was referred to surgery and underwent thoracotomy for LV lead placement and was noticed to have RV lead malfunction, postoperatively, therefore is here for procedure.      Hardware:  Right ventricular   lead is Medtronic [id number] serial number [id number].      Thresholds:  Right ventricular threshold revealed a R wave amplitude of over 6 mV at 1.2V and impedance of 700 ohms        Description of procedure:  Under conscious sedation (initially was given by me and monitored.  Then subsequently anesthesiologist came in gave and monitored anesthesia ) and local anesthesia , existing left subclavian pocket was opened and subclavian vein was accessed and a 035 Glidewire was advanced into the occlusion and with the help of a glide catheter attempts were done to cross the 100% occluded subclavian vein but was unsuccessful.  Then the right subclavian vein was accessed and Medtronic 65 cm lead was placed in the RV adequate thresholds obtained then it was secured on the right side then tunneled to the left side with the help of electrophysiologist Dr. Jackson, then the generator was explanted from the pocket  pocket cleaned with antibiotic solution existing RV lead was disconnected and capped and the new RV lead from the right subclavian which was tunneled was connected to the generator placed in the pocket and closed pocket with sutures. patient tolerated procedure well complications were none.    Blood loss :blood loss was 5 cc.        Conclusion: Successful placement of right ventricular lead through right subclavian vein approach which was tunneled to the left infraclavicular area and connected to the generator      Plan:  Per primary electrophysiologist Dr. Jackson  We will monitor overnight.  Routine post pacemaker device implant care.  Recheck device in a.m.  Pacer care and wound care education  Follow-up in 1 week in the office for staple removal  Follow-up in pacemaker clinic       NOTE: The following portions of the patient's note were reviewed, confirmed and/or updated this visit as appropriate: History of present illness/Interval history, physical examination, assessment & plan, allergies, current medications, past family history, past medical history, past social history, past surgical history and problem list.

## 2023-08-28 ENCOUNTER — TELEPHONE (OUTPATIENT)
Dept: NEUROLOGY | Facility: CLINIC | Age: 78
End: 2023-08-28
Payer: MEDICARE

## 2023-08-28 ENCOUNTER — LAB (OUTPATIENT)
Dept: LAB | Facility: HOSPITAL | Age: 78
End: 2023-08-28
Payer: MEDICARE

## 2023-08-28 DIAGNOSIS — G50.0 TRIGEMINAL NEURALGIA: ICD-10-CM

## 2023-08-28 LAB
ALBUMIN SERPL-MCNC: 4.4 G/DL (ref 3.5–5.2)
ALP SERPL-CCNC: 80 U/L (ref 39–117)
ALT SERPL W P-5'-P-CCNC: 31 U/L (ref 1–41)
AST SERPL-CCNC: 25 U/L (ref 1–40)
BILIRUB CONJ SERPL-MCNC: <0.2 MG/DL (ref 0–0.3)
BILIRUB INDIRECT SERPL-MCNC: NORMAL MG/DL
BILIRUB SERPL-MCNC: 0.6 MG/DL (ref 0–1.2)
CARBAMAZEPINE SERPL-MCNC: <2 MCG/ML (ref 4–12)
CREAT SERPL-MCNC: 0.83 MG/DL (ref 0.76–1.27)
EGFRCR SERPLBLD CKD-EPI 2021: 90.1 ML/MIN/1.73
PROT SERPL-MCNC: 6.8 G/DL (ref 6–8.5)

## 2023-08-28 PROCEDURE — 84295 ASSAY OF SERUM SODIUM: CPT | Performed by: NURSE PRACTITIONER

## 2023-08-28 PROCEDURE — 80076 HEPATIC FUNCTION PANEL: CPT

## 2023-08-28 PROCEDURE — 82565 ASSAY OF CREATININE: CPT

## 2023-08-28 PROCEDURE — 36415 COLL VENOUS BLD VENIPUNCTURE: CPT

## 2023-08-28 PROCEDURE — 80156 ASSAY CARBAMAZEPINE TOTAL: CPT

## 2023-08-28 NOTE — TELEPHONE ENCOUNTER
Caller: MARTHA     Luis Miguel call back number:[phone]       What was the call regarding: PT CALLED SAID ELVA RIGGS CAN'T SEE HIS LABS IN CHART CALLED OFFICE TALKED TO ZO SHE TOOK CALL AS PT MAY COME GET PAPER SCRIPT. LABS ARE IN CHART AND ELVA RIGGS SHOULD BE ABLE TO SEE?     Is it okay if the provider responds through MyChart: NO         PLEASE ADVISE

## 2023-08-28 NOTE — TELEPHONE ENCOUNTER
Spoke to patient and let him know that [facility] should be able to see his orders and that I'm not sure why they would be having trouble.   Patient insisted on coming and getting a paper copy. Orders were printed so patient could come pick them up.

## 2023-08-29 DIAGNOSIS — E87.1 LOW SODIUM LEVELS: ICD-10-CM

## 2023-08-29 DIAGNOSIS — G50.0 TRIGEMINAL NEURALGIA: Primary | ICD-10-CM

## 2023-10-09 ENCOUNTER — OFFICE (AMBULATORY)
Dept: URBAN - METROPOLITAN AREA CLINIC 64 | Facility: CLINIC | Age: 78
End: 2023-10-09

## 2023-10-09 VITALS
HEART RATE: 70 BPM | WEIGHT: 218 LBS | DIASTOLIC BLOOD PRESSURE: 88 MMHG | HEIGHT: 73 IN | SYSTOLIC BLOOD PRESSURE: 133 MMHG

## 2023-10-09 DIAGNOSIS — R10.32 LEFT LOWER QUADRANT PAIN: ICD-10-CM

## 2023-10-09 DIAGNOSIS — K59.00 CONSTIPATION, UNSPECIFIED: ICD-10-CM

## 2023-10-09 PROCEDURE — 99214 OFFICE O/P EST MOD 30 MIN: CPT | Performed by: INTERNAL MEDICINE

## 2023-10-09 RX ORDER — DOXYCYCLINE 100 MG/1
200 TABLET, FILM COATED ORAL
Qty: 14 | Refills: 0 | Status: ACTIVE
Start: 2023-10-09

## 2023-10-10 DIAGNOSIS — E78.2 MIXED HYPERLIPIDEMIA: ICD-10-CM

## 2023-10-10 RX ORDER — ATORVASTATIN CALCIUM 20 MG/1
TABLET, FILM COATED ORAL
Qty: 90 TABLET | Refills: 3 | Status: SHIPPED | OUTPATIENT
Start: 2023-10-10

## 2023-10-20 ENCOUNTER — OFFICE VISIT (OUTPATIENT)
Dept: CARDIOLOGY | Facility: CLINIC | Age: 78
End: 2023-10-20
Payer: MEDICARE

## 2023-10-20 VITALS
SYSTOLIC BLOOD PRESSURE: 135 MMHG | HEART RATE: 75 BPM | WEIGHT: 216 LBS | OXYGEN SATURATION: 98 % | DIASTOLIC BLOOD PRESSURE: 82 MMHG | BODY MASS INDEX: 29.29 KG/M2

## 2023-10-20 DIAGNOSIS — I10 PRIMARY HYPERTENSION: ICD-10-CM

## 2023-10-20 DIAGNOSIS — Z95.0 CARDIAC PACEMAKER IN SITU: Primary | ICD-10-CM

## 2023-10-20 DIAGNOSIS — I48.0 PAROXYSMAL ATRIAL FIBRILLATION: ICD-10-CM

## 2023-10-20 DIAGNOSIS — I25.10 CORONARY ARTERY DISEASE INVOLVING NATIVE CORONARY ARTERY OF NATIVE HEART WITHOUT ANGINA PECTORIS: ICD-10-CM

## 2023-10-20 DIAGNOSIS — I44.2 COMPLETE HEART BLOCK: ICD-10-CM

## 2023-10-20 NOTE — PROGRESS NOTES
CC--complete heart block, coronary artery disease    Sub--77-year-old complex patient comes in for follow-up  Patient has complete heart block with intermittent atrial fibrillation  Patient has a pacemaker in situ and history of coronary artery disease.  Patient had a prior biventricular pacemaker with a right ventricle pacing lead placed from right subclavian approach since the patient had left subclavian occlusion.  Patient had erosion of right-sided pocket and subsequent underwent lead extraction on right side and left to LV pacing only.  Patient has a left-sided implant in 1999 and had significant issues with RV pacing alone with heart failure symptoms and has LV lead placed epicardially because of left subclavian occlusion.  After LV lead epicardial placement patient was found to have RV lead malfunction, needing a new RV lead placed from right side which was removed secondary to elevation of pocket.            Past Medical History:   Diagnosis Date   • Arthritis    • Arthritis    • BPH (benign prostatic hyperplasia)    • CHF (congestive heart failure) (HCC)    • Coronary artery disease     dr. Carrizales   • Deviated septum    • FH: cataracts    • Gait abnormality    • GERD (gastroesophageal reflux disease)    • History of trigeminal neuralgia    • Hyperlipidemia    • Hypertension    • Renal cyst    • Sciatica    • Sleep apnea     unable to tolerate machine     Past Surgical History:   Procedure Laterality Date   • APPENDECTOMY     • CARDIAC CATHETERIZATION     • CARDIAC CATHETERIZATION N/A 3/5/2020    Procedure: Percutaneous Subclavian Intervention;  Surgeon: Nick Greenberg MD;  Location: [facility] CATH INVASIVE LOCATION;  Service: Cardiovascular;  Laterality: N/A;   • CARDIAC ELECTROPHYSIOLOGY PROCEDURE N/A 1/29/2020    Procedure: Biventricular Device Upgrade;  Surgeon: Dominick Jackson MD;  Location: [facility] CATH INVASIVE LOCATION;  Service: Cardiovascular   • CARDIAC ELECTROPHYSIOLOGY PROCEDURE N/A  3/5/2020    Procedure: Lead Revision;  Surgeon: Nick Greenberg MD;  Location: [facility] CATH INVASIVE LOCATION;  Service: Cardiovascular;  Laterality: N/A;   • CORONARY STENT PLACEMENT  2014    x 2   • PROSTATE SURGERY     • REFRACTIVE SURGERY     • THORACOTOMY Left 3/4/2020    Procedure: THORACOTOMY MINI WITH LEAD PLACEMENT;  Surgeon: Justin Aguilar MD;  Location: [facility] CVOR;  Service: Cardiothoracic;  Laterality: Left;   • TOTAL HIP ARTHROPLASTY Bilateral     different times         Physical Exam    General:      well developed, well nourished, in no acute distress.    Head:      normocephalic and atraumatic.    Eyes:      PERRL/EOM intact, conjunctivae and sclerae clear without nystagmus.    Neck:      no  thyromegaly, trachea central with normal respiratory effort  Lungs:      clear bilaterally to auscultation.    Heart:       regular rate and rhythm, S1, S2 without murmurs, rubs, or gallops  Skin:      intact without lesions or rashes.    Psych:      alert and cooperative; normal mood and affect; normal attention span and concentration.            A/P    Recent creatinine is 0.83  Pacemaker in situ and home monitoring reviewed  Underlying complete heart block  Intermittent atrial fibrillation  Coronary artery disease stable without angina  Hyperlipidemia on statins  Echocardiogram done few months ago showed EF of 56% discussed with the patient  Medications reviewed and follow-up appointments made      ECG 12 Lead    Date/Time: 10/20/2023 10:50 AM  Performed by: Dominick Jackson MD    Authorized by: Dominick Jackson MD  Comparison: compared with previous ECG   Similar to previous ECG  Rhythm: sinus rhythm and paced  Ectopy: atrial premature contractions          Electronically signed by Dominick Jackson MD, 10/20/23, 10:45 AM EDT.

## 2023-10-22 DIAGNOSIS — G50.0 TRIGEMINAL NEURALGIA: ICD-10-CM

## 2023-10-23 RX ORDER — OXCARBAZEPINE 300 MG/1
TABLET, FILM COATED ORAL
Qty: 540 TABLET | Refills: 0 | Status: SHIPPED | OUTPATIENT
Start: 2023-10-23

## 2023-11-02 ENCOUNTER — HOSPITAL ENCOUNTER (OUTPATIENT)
Facility: HOSPITAL | Age: 78
Discharge: HOME | End: 2023-11-04
Attending: EMERGENCY MEDICINE | Admitting: HOSPITALIST
Payer: MEDICARE

## 2023-11-02 DIAGNOSIS — N13.2 URETERAL STONE WITH HYDRONEPHROSIS: ICD-10-CM

## 2023-11-02 DIAGNOSIS — N20.1 CALCULI, URETER: ICD-10-CM

## 2023-11-02 DIAGNOSIS — R10.9 LEFT SIDED ABDOMINAL PAIN: ICD-10-CM

## 2023-11-02 DIAGNOSIS — N20.1 URETEROLITHIASIS: Primary | ICD-10-CM

## 2023-11-02 LAB
ALBUMIN SERPL-MCNC: 4.4 G/DL (ref 3.5–5.2)
ALBUMIN/GLOB SERPL: 1.9 G/DL
ALP SERPL-CCNC: 86 U/L (ref 39–117)
ALT SERPL W P-5'-P-CCNC: 21 U/L (ref 1–41)
ANION GAP SERPL CALCULATED.3IONS-SCNC: 12 MMOL/L (ref 5–15)
AST SERPL-CCNC: 17 U/L (ref 1–40)
BASOPHILS # BLD AUTO: 0.1 10*3/MM3 (ref 0–0.2)
BASOPHILS NFR BLD AUTO: 0.7 % (ref 0–1.5)
BILIRUB SERPL-MCNC: 0.3 MG/DL (ref 0–1.2)
BUN SERPL-MCNC: 18 MG/DL (ref 8–23)
BUN/CREAT SERPL: 18 (ref 7–25)
CALCIUM SPEC-SCNC: 9.4 MG/DL (ref 8.6–10.5)
CHLORIDE SERPL-SCNC: 100 MMOL/L (ref 98–107)
CO2 SERPL-SCNC: 25 MMOL/L (ref 22–29)
CREAT SERPL-MCNC: 1 MG/DL (ref 0.76–1.27)
DEPRECATED RDW RBC AUTO: 46.8 FL (ref 37–54)
EGFRCR SERPLBLD CKD-EPI 2021: 77.5 ML/MIN/1.73
EOSINOPHIL # BLD AUTO: 0.1 10*3/MM3 (ref 0–0.4)
EOSINOPHIL NFR BLD AUTO: 1.8 % (ref 0.3–6.2)
ERYTHROCYTE [DISTWIDTH] IN BLOOD BY AUTOMATED COUNT: 13.8 % (ref 12.3–15.4)
GLOBULIN UR ELPH-MCNC: 2.3 GM/DL
GLUCOSE SERPL-MCNC: 165 MG/DL (ref 65–99)
HCT VFR BLD AUTO: 44.1 % (ref 37.5–51)
HGB BLD-MCNC: 14.7 G/DL (ref 13–17.7)
LIPASE SERPL-CCNC: 19 U/L (ref 13–60)
LYMPHOCYTES # BLD AUTO: 1.2 10*3/MM3 (ref 0.7–3.1)
LYMPHOCYTES NFR BLD AUTO: 15.8 % (ref 19.6–45.3)
MCH RBC QN AUTO: 33 PG (ref 26.6–33)
MCHC RBC AUTO-ENTMCNC: 33.4 G/DL (ref 31.5–35.7)
MCV RBC AUTO: 98.7 FL (ref 79–97)
MONOCYTES # BLD AUTO: 0.4 10*3/MM3 (ref 0.1–0.9)
MONOCYTES NFR BLD AUTO: 4.8 % (ref 5–12)
NEUTROPHILS NFR BLD AUTO: 6.1 10*3/MM3 (ref 1.7–7)
NEUTROPHILS NFR BLD AUTO: 76.9 % (ref 42.7–76)
NRBC BLD AUTO-RTO: 0 /100 WBC (ref 0–0.2)
PLATELET # BLD AUTO: 226 10*3/MM3 (ref 140–450)
PMV BLD AUTO: 7.2 FL (ref 6–12)
POTASSIUM SERPL-SCNC: 4.2 MMOL/L (ref 3.5–5.2)
PROT SERPL-MCNC: 6.7 G/DL (ref 6–8.5)
RBC # BLD AUTO: 4.47 10*6/MM3 (ref 4.14–5.8)
SODIUM SERPL-SCNC: 137 MMOL/L (ref 136–145)
WBC NRBC COR # BLD: 7.9 10*3/MM3 (ref 3.4–10.8)

## 2023-11-02 PROCEDURE — 25010000002 ONDANSETRON PER 1 MG: Performed by: NURSE PRACTITIONER

## 2023-11-02 PROCEDURE — 99285 EMERGENCY DEPT VISIT HI MDM: CPT

## 2023-11-02 PROCEDURE — 25010000002 MORPHINE PER 10 MG: Performed by: NURSE PRACTITIONER

## 2023-11-02 PROCEDURE — 85025 COMPLETE CBC W/AUTO DIFF WBC: CPT | Performed by: NURSE PRACTITIONER

## 2023-11-02 PROCEDURE — 96374 THER/PROPH/DIAG INJ IV PUSH: CPT

## 2023-11-02 PROCEDURE — 83690 ASSAY OF LIPASE: CPT | Performed by: NURSE PRACTITIONER

## 2023-11-02 PROCEDURE — 80053 COMPREHEN METABOLIC PANEL: CPT | Performed by: NURSE PRACTITIONER

## 2023-11-02 PROCEDURE — 96375 TX/PRO/DX INJ NEW DRUG ADDON: CPT

## 2023-11-02 RX ORDER — ONDANSETRON 2 MG/ML
4 INJECTION INTRAMUSCULAR; INTRAVENOUS ONCE
Status: COMPLETED | OUTPATIENT
Start: 2023-11-02 | End: 2023-11-02

## 2023-11-02 RX ORDER — SODIUM CHLORIDE 0.9 % (FLUSH) 0.9 %
10 SYRINGE (ML) INJECTION AS NEEDED
Status: DISCONTINUED | OUTPATIENT
Start: 2023-11-02 | End: 2023-11-04 | Stop reason: HOSPADM

## 2023-11-02 RX ADMIN — MORPHINE SULFATE 4 MG: 4 INJECTION, SOLUTION INTRAMUSCULAR; INTRAVENOUS at 23:29

## 2023-11-02 RX ADMIN — ONDANSETRON 4 MG: 2 INJECTION INTRAMUSCULAR; INTRAVENOUS at 23:29

## 2023-11-02 NOTE — Clinical Note
Level of Care: Med/Surg [1]   Admitting Physician: WILFRIDO MACHADO [[id number]]   Attending Physician: WILFRIDO MACHADO [[id number]]

## 2023-11-03 ENCOUNTER — ANESTHESIA EVENT (OUTPATIENT)
Dept: PERIOP | Facility: HOSPITAL | Age: 78
End: 2023-11-03
Payer: MEDICARE

## 2023-11-03 ENCOUNTER — APPOINTMENT (OUTPATIENT)
Dept: CT IMAGING | Facility: HOSPITAL | Age: 78
End: 2023-11-03
Payer: MEDICARE

## 2023-11-03 ENCOUNTER — ANESTHESIA (OUTPATIENT)
Dept: PERIOP | Facility: HOSPITAL | Age: 78
End: 2023-11-03
Payer: MEDICARE

## 2023-11-03 PROBLEM — N13.2 URETERAL STONE WITH HYDRONEPHROSIS: Status: ACTIVE | Noted: 2023-11-03

## 2023-11-03 LAB
BACTERIA UR QL AUTO: ABNORMAL /HPF
BILIRUB UR QL STRIP: NEGATIVE
CLARITY UR: CLEAR
COLOR UR: YELLOW
GLUCOSE UR STRIP-MCNC: NEGATIVE MG/DL
HGB UR QL STRIP.AUTO: ABNORMAL
HYALINE CASTS UR QL AUTO: ABNORMAL /LPF
KETONES UR QL STRIP: ABNORMAL
LEUKOCYTE ESTERASE UR QL STRIP.AUTO: NEGATIVE
NITRITE UR QL STRIP: NEGATIVE
PH UR STRIP.AUTO: 5.5 [PH] (ref 5–8)
PROT UR QL STRIP: NEGATIVE
RBC # UR STRIP: ABNORMAL /HPF
REF LAB TEST METHOD: ABNORMAL
SP GR UR STRIP: 1.02 (ref 1–1.03)
SQUAMOUS #/AREA URNS HPF: ABNORMAL /HPF
UROBILINOGEN UR QL STRIP: ABNORMAL
WBC # UR STRIP: ABNORMAL /HPF

## 2023-11-03 PROCEDURE — 25810000003 LACTATED RINGERS PER 1000 ML: Performed by: NURSE ANESTHETIST, CERTIFIED REGISTERED

## 2023-11-03 PROCEDURE — A9270 NON-COVERED ITEM OR SERVICE: HCPCS | Performed by: UROLOGY

## 2023-11-03 PROCEDURE — 63710000001 ASPIRIN 81 MG TABLET DELAYED-RELEASE: Performed by: UROLOGY

## 2023-11-03 PROCEDURE — 25010000002 CEFTRIAXONE PER 250 MG: Performed by: UROLOGY

## 2023-11-03 PROCEDURE — 63710000001 ACETAMINOPHEN 325 MG TABLET: Performed by: UROLOGY

## 2023-11-03 PROCEDURE — G0378 HOSPITAL OBSERVATION PER HR: HCPCS

## 2023-11-03 PROCEDURE — A9270 NON-COVERED ITEM OR SERVICE: HCPCS | Performed by: STUDENT IN AN ORGANIZED HEALTH CARE EDUCATION/TRAINING PROGRAM

## 2023-11-03 PROCEDURE — 63710000001 AMLODIPINE 5 MG TABLET: Performed by: UROLOGY

## 2023-11-03 PROCEDURE — 82365 CALCULUS SPECTROSCOPY: CPT | Performed by: UROLOGY

## 2023-11-03 PROCEDURE — 96376 TX/PRO/DX INJ SAME DRUG ADON: CPT

## 2023-11-03 PROCEDURE — 63710000001 ATORVASTATIN 20 MG TABLET: Performed by: UROLOGY

## 2023-11-03 PROCEDURE — 74177 CT ABD & PELVIS W/CONTRAST: CPT

## 2023-11-03 PROCEDURE — 25010000002 PROPOFOL 200 MG/20ML EMULSION: Performed by: NURSE ANESTHETIST, CERTIFIED REGISTERED

## 2023-11-03 PROCEDURE — 25010000002 ONDANSETRON PER 1 MG: Performed by: NURSE ANESTHETIST, CERTIFIED REGISTERED

## 2023-11-03 PROCEDURE — C1769 GUIDE WIRE: HCPCS | Performed by: UROLOGY

## 2023-11-03 PROCEDURE — 81001 URINALYSIS AUTO W/SCOPE: CPT | Performed by: NURSE PRACTITIONER

## 2023-11-03 PROCEDURE — 63710000001 MONTELUKAST 10 MG TABLET: Performed by: UROLOGY

## 2023-11-03 PROCEDURE — 25010000002 FENTANYL CITRATE (PF) 100 MCG/2ML SOLUTION: Performed by: NURSE ANESTHETIST, CERTIFIED REGISTERED

## 2023-11-03 PROCEDURE — 25510000001 IOPAMIDOL PER 1 ML: Performed by: EMERGENCY MEDICINE

## 2023-11-03 PROCEDURE — 25010000002 MORPHINE PER 10 MG: Performed by: NURSE PRACTITIONER

## 2023-11-03 PROCEDURE — 63710000001 AMITRIPTYLINE 10 MG TABLET: Performed by: UROLOGY

## 2023-11-03 PROCEDURE — 63710000001 OXCARBAZEPINE 600 MG TABLET: Performed by: UROLOGY

## 2023-11-03 PROCEDURE — 25810000003 LACTATED RINGERS PER 1000 ML: Performed by: UROLOGY

## 2023-11-03 PROCEDURE — 25010000002 DEXAMETHASONE PER 1 MG: Performed by: NURSE ANESTHETIST, CERTIFIED REGISTERED

## 2023-11-03 PROCEDURE — 63710000001 PANTOPRAZOLE 40 MG TABLET DELAYED-RELEASE: Performed by: UROLOGY

## 2023-11-03 PROCEDURE — 63710000001 DOCUSATE SODIUM 100 MG CAPSULE: Performed by: STUDENT IN AN ORGANIZED HEALTH CARE EDUCATION/TRAINING PROGRAM

## 2023-11-03 PROCEDURE — C2617 STENT, NON-COR, TEM W/O DEL: HCPCS | Performed by: UROLOGY

## 2023-11-03 DEVICE — URETERAL STENT
Type: IMPLANTABLE DEVICE | Site: URETER | Status: FUNCTIONAL
Brand: PERCUFLEX™ PLUS

## 2023-11-03 RX ORDER — ONDANSETRON 2 MG/ML
4 INJECTION INTRAMUSCULAR; INTRAVENOUS EVERY 6 HOURS PRN
Status: DISCONTINUED | OUTPATIENT
Start: 2023-11-03 | End: 2023-11-04 | Stop reason: HOSPADM

## 2023-11-03 RX ORDER — PANTOPRAZOLE SODIUM 40 MG/1
40 TABLET, DELAYED RELEASE ORAL NIGHTLY
Status: DISCONTINUED | OUTPATIENT
Start: 2023-11-03 | End: 2023-11-04 | Stop reason: HOSPADM

## 2023-11-03 RX ORDER — ONDANSETRON 4 MG/1
4 TABLET, FILM COATED ORAL EVERY 6 HOURS PRN
Status: DISCONTINUED | OUTPATIENT
Start: 2023-11-03 | End: 2023-11-04 | Stop reason: HOSPADM

## 2023-11-03 RX ORDER — ONDANSETRON 2 MG/ML
INJECTION INTRAMUSCULAR; INTRAVENOUS AS NEEDED
Status: DISCONTINUED | OUTPATIENT
Start: 2023-11-03 | End: 2023-11-03 | Stop reason: SURG

## 2023-11-03 RX ORDER — ACETAMINOPHEN 650 MG/1
650 SUPPOSITORY RECTAL EVERY 4 HOURS PRN
Status: DISCONTINUED | OUTPATIENT
Start: 2023-11-03 | End: 2023-11-04 | Stop reason: HOSPADM

## 2023-11-03 RX ORDER — AMLODIPINE BESYLATE 5 MG/1
10 TABLET ORAL EVERY 24 HOURS
Status: DISCONTINUED | OUTPATIENT
Start: 2023-11-03 | End: 2023-11-04 | Stop reason: HOSPADM

## 2023-11-03 RX ORDER — PROMETHAZINE HYDROCHLORIDE 25 MG/1
25 TABLET ORAL ONCE AS NEEDED
Status: DISCONTINUED | OUTPATIENT
Start: 2023-11-03 | End: 2023-11-03 | Stop reason: HOSPADM

## 2023-11-03 RX ORDER — DROPERIDOL 2.5 MG/ML
0.62 INJECTION, SOLUTION INTRAMUSCULAR; INTRAVENOUS
Status: DISCONTINUED | OUTPATIENT
Start: 2023-11-03 | End: 2023-11-03 | Stop reason: HOSPADM

## 2023-11-03 RX ORDER — CEPHALEXIN 500 MG/1
500 CAPSULE ORAL 3 TIMES DAILY
Qty: 15 CAPSULE | Refills: 0 | Status: SHIPPED | OUTPATIENT
Start: 2023-11-03 | End: 2023-11-08

## 2023-11-03 RX ORDER — DOCUSATE SODIUM 100 MG/1
100 CAPSULE, LIQUID FILLED ORAL DAILY
Status: DISCONTINUED | OUTPATIENT
Start: 2023-11-03 | End: 2023-11-04 | Stop reason: HOSPADM

## 2023-11-03 RX ORDER — ENALAPRIL MALEATE 5 MG/1
20 TABLET ORAL DAILY
Status: DISCONTINUED | OUTPATIENT
Start: 2023-11-03 | End: 2023-11-04 | Stop reason: HOSPADM

## 2023-11-03 RX ORDER — IPRATROPIUM BROMIDE AND ALBUTEROL SULFATE 2.5; .5 MG/3ML; MG/3ML
3 SOLUTION RESPIRATORY (INHALATION) ONCE AS NEEDED
Status: DISCONTINUED | OUTPATIENT
Start: 2023-11-03 | End: 2023-11-03 | Stop reason: HOSPADM

## 2023-11-03 RX ORDER — LIDOCAINE HYDROCHLORIDE 10 MG/ML
0.5 INJECTION, SOLUTION INFILTRATION; PERINEURAL ONCE AS NEEDED
Status: DISCONTINUED | OUTPATIENT
Start: 2023-11-03 | End: 2023-11-03 | Stop reason: HOSPADM

## 2023-11-03 RX ORDER — AMITRIPTYLINE HYDROCHLORIDE 10 MG/1
20 TABLET, FILM COATED ORAL NIGHTLY
Status: DISCONTINUED | OUTPATIENT
Start: 2023-11-03 | End: 2023-11-04 | Stop reason: HOSPADM

## 2023-11-03 RX ORDER — FENTANYL CITRATE 50 UG/ML
50 INJECTION, SOLUTION INTRAMUSCULAR; INTRAVENOUS
Status: DISCONTINUED | OUTPATIENT
Start: 2023-11-03 | End: 2023-11-03 | Stop reason: HOSPADM

## 2023-11-03 RX ORDER — SODIUM CHLORIDE 0.9 % (FLUSH) 0.9 %
10 SYRINGE (ML) INJECTION AS NEEDED
Status: DISCONTINUED | OUTPATIENT
Start: 2023-11-03 | End: 2023-11-04 | Stop reason: HOSPADM

## 2023-11-03 RX ORDER — PROMETHAZINE HYDROCHLORIDE 25 MG/1
25 SUPPOSITORY RECTAL ONCE AS NEEDED
Status: DISCONTINUED | OUTPATIENT
Start: 2023-11-03 | End: 2023-11-03 | Stop reason: HOSPADM

## 2023-11-03 RX ORDER — MORPHINE SULFATE 2 MG/ML
2 INJECTION, SOLUTION INTRAMUSCULAR; INTRAVENOUS ONCE
Status: COMPLETED | OUTPATIENT
Start: 2023-11-03 | End: 2023-11-03

## 2023-11-03 RX ORDER — ACETAMINOPHEN 325 MG/1
650 TABLET ORAL EVERY 4 HOURS PRN
Status: DISCONTINUED | OUTPATIENT
Start: 2023-11-03 | End: 2023-11-04 | Stop reason: HOSPADM

## 2023-11-03 RX ORDER — ATORVASTATIN CALCIUM 20 MG/1
20 TABLET, FILM COATED ORAL NIGHTLY
Status: DISCONTINUED | OUTPATIENT
Start: 2023-11-03 | End: 2023-11-04 | Stop reason: HOSPADM

## 2023-11-03 RX ORDER — POLYETHYLENE GLYCOL 3350 17 G/17G
17 POWDER, FOR SOLUTION ORAL DAILY PRN
Status: DISCONTINUED | OUTPATIENT
Start: 2023-11-03 | End: 2023-11-04 | Stop reason: HOSPADM

## 2023-11-03 RX ORDER — MONTELUKAST SODIUM 10 MG/1
10 TABLET ORAL NIGHTLY
Status: DISCONTINUED | OUTPATIENT
Start: 2023-11-03 | End: 2023-11-04 | Stop reason: HOSPADM

## 2023-11-03 RX ORDER — BISACODYL 5 MG/1
5 TABLET, DELAYED RELEASE ORAL DAILY PRN
Status: DISCONTINUED | OUTPATIENT
Start: 2023-11-03 | End: 2023-11-04 | Stop reason: HOSPADM

## 2023-11-03 RX ORDER — SODIUM CHLORIDE 0.9 % (FLUSH) 0.9 %
10 SYRINGE (ML) INJECTION EVERY 12 HOURS SCHEDULED
Status: DISCONTINUED | OUTPATIENT
Start: 2023-11-03 | End: 2023-11-04 | Stop reason: HOSPADM

## 2023-11-03 RX ORDER — SODIUM CHLORIDE, SODIUM LACTATE, POTASSIUM CHLORIDE, CALCIUM CHLORIDE 600; 310; 30; 20 MG/100ML; MG/100ML; MG/100ML; MG/100ML
INJECTION, SOLUTION INTRAVENOUS CONTINUOUS PRN
Status: DISCONTINUED | OUTPATIENT
Start: 2023-11-03 | End: 2023-11-03 | Stop reason: SURG

## 2023-11-03 RX ORDER — TAMSULOSIN HYDROCHLORIDE 0.4 MG/1
1 CAPSULE ORAL DAILY
Qty: 30 CAPSULE | Refills: 0 | Status: SHIPPED | OUTPATIENT
Start: 2023-11-03

## 2023-11-03 RX ORDER — ASPIRIN 81 MG/1
81 TABLET ORAL ONCE
Status: COMPLETED | OUTPATIENT
Start: 2023-11-03 | End: 2023-11-03

## 2023-11-03 RX ORDER — ACETAMINOPHEN 160 MG/5ML
650 SOLUTION ORAL EVERY 4 HOURS PRN
Status: DISCONTINUED | OUTPATIENT
Start: 2023-11-03 | End: 2023-11-04 | Stop reason: HOSPADM

## 2023-11-03 RX ORDER — FLUMAZENIL 0.1 MG/ML
0.2 INJECTION INTRAVENOUS AS NEEDED
Status: DISCONTINUED | OUTPATIENT
Start: 2023-11-03 | End: 2023-11-03 | Stop reason: HOSPADM

## 2023-11-03 RX ORDER — BISACODYL 10 MG
10 SUPPOSITORY, RECTAL RECTAL DAILY PRN
Status: DISCONTINUED | OUTPATIENT
Start: 2023-11-03 | End: 2023-11-04 | Stop reason: HOSPADM

## 2023-11-03 RX ORDER — OXYCODONE HYDROCHLORIDE AND ACETAMINOPHEN 5; 325 MG/1; MG/1
1-2 TABLET ORAL EVERY 6 HOURS PRN
Qty: 15 TABLET | Refills: 0 | Status: SHIPPED | OUTPATIENT
Start: 2023-11-03

## 2023-11-03 RX ORDER — DIPHENHYDRAMINE HYDROCHLORIDE 50 MG/ML
12.5 INJECTION INTRAMUSCULAR; INTRAVENOUS
Status: DISCONTINUED | OUTPATIENT
Start: 2023-11-03 | End: 2023-11-03 | Stop reason: HOSPADM

## 2023-11-03 RX ORDER — CHOLECALCIFEROL (VITAMIN D3) 125 MCG
5 CAPSULE ORAL NIGHTLY PRN
Status: DISCONTINUED | OUTPATIENT
Start: 2023-11-03 | End: 2023-11-04 | Stop reason: HOSPADM

## 2023-11-03 RX ORDER — ONDANSETRON 2 MG/ML
4 INJECTION INTRAMUSCULAR; INTRAVENOUS ONCE AS NEEDED
Status: DISCONTINUED | OUTPATIENT
Start: 2023-11-03 | End: 2023-11-03 | Stop reason: HOSPADM

## 2023-11-03 RX ORDER — SODIUM CHLORIDE 0.9 % (FLUSH) 0.9 %
10 SYRINGE (ML) INJECTION AS NEEDED
Status: DISCONTINUED | OUTPATIENT
Start: 2023-11-03 | End: 2023-11-03 | Stop reason: HOSPADM

## 2023-11-03 RX ORDER — NALOXONE HCL 0.4 MG/ML
0.2 VIAL (ML) INJECTION AS NEEDED
Status: DISCONTINUED | OUTPATIENT
Start: 2023-11-03 | End: 2023-11-03 | Stop reason: HOSPADM

## 2023-11-03 RX ORDER — FENTANYL CITRATE 50 UG/ML
INJECTION, SOLUTION INTRAMUSCULAR; INTRAVENOUS AS NEEDED
Status: DISCONTINUED | OUTPATIENT
Start: 2023-11-03 | End: 2023-11-03 | Stop reason: SURG

## 2023-11-03 RX ORDER — DEXAMETHASONE SODIUM PHOSPHATE 4 MG/ML
INJECTION, SOLUTION INTRA-ARTICULAR; INTRALESIONAL; INTRAMUSCULAR; INTRAVENOUS; SOFT TISSUE AS NEEDED
Status: DISCONTINUED | OUTPATIENT
Start: 2023-11-03 | End: 2023-11-03 | Stop reason: SURG

## 2023-11-03 RX ORDER — ISOSORBIDE MONONITRATE 60 MG/1
60 TABLET, EXTENDED RELEASE ORAL DAILY
Status: DISCONTINUED | OUTPATIENT
Start: 2023-11-03 | End: 2023-11-04 | Stop reason: HOSPADM

## 2023-11-03 RX ORDER — AMOXICILLIN 250 MG
2 CAPSULE ORAL 2 TIMES DAILY PRN
Status: DISCONTINUED | OUTPATIENT
Start: 2023-11-03 | End: 2023-11-04 | Stop reason: HOSPADM

## 2023-11-03 RX ORDER — SODIUM CHLORIDE, SODIUM LACTATE, POTASSIUM CHLORIDE, CALCIUM CHLORIDE 600; 310; 30; 20 MG/100ML; MG/100ML; MG/100ML; MG/100ML
1000 INJECTION, SOLUTION INTRAVENOUS CONTINUOUS
Status: DISCONTINUED | OUTPATIENT
Start: 2023-11-03 | End: 2023-11-04 | Stop reason: HOSPADM

## 2023-11-03 RX ORDER — OXCARBAZEPINE 600 MG/1
600 TABLET, FILM COATED ORAL 3 TIMES DAILY
Status: DISCONTINUED | OUTPATIENT
Start: 2023-11-03 | End: 2023-11-04 | Stop reason: HOSPADM

## 2023-11-03 RX ORDER — SODIUM CHLORIDE 9 MG/ML
40 INJECTION, SOLUTION INTRAVENOUS AS NEEDED
Status: DISCONTINUED | OUTPATIENT
Start: 2023-11-03 | End: 2023-11-04 | Stop reason: HOSPADM

## 2023-11-03 RX ORDER — PROPOFOL 10 MG/ML
INJECTION, EMULSION INTRAVENOUS AS NEEDED
Status: DISCONTINUED | OUTPATIENT
Start: 2023-11-03 | End: 2023-11-03 | Stop reason: SURG

## 2023-11-03 RX ADMIN — LIDOCAINE HYDROCHLORIDE 50 MG: 20 INJECTION, SOLUTION EPIDURAL; INFILTRATION; INTRACAUDAL; PERINEURAL at 15:47

## 2023-11-03 RX ADMIN — ACETAMINOPHEN 650 MG: 325 TABLET, FILM COATED ORAL at 03:41

## 2023-11-03 RX ADMIN — FENTANYL CITRATE 50 MCG: 50 INJECTION, SOLUTION INTRAMUSCULAR; INTRAVENOUS at 15:47

## 2023-11-03 RX ADMIN — ONDANSETRON 4 MG: 2 INJECTION INTRAMUSCULAR; INTRAVENOUS at 15:54

## 2023-11-03 RX ADMIN — OXCARBAZEPINE 600 MG: 600 TABLET, FILM COATED ORAL at 21:47

## 2023-11-03 RX ADMIN — PROPOFOL 200 MG: 10 INJECTION, EMULSION INTRAVENOUS at 15:47

## 2023-11-03 RX ADMIN — IOPAMIDOL 100 ML: 755 INJECTION, SOLUTION INTRAVENOUS at 01:53

## 2023-11-03 RX ADMIN — FENTANYL CITRATE 50 MCG: 50 INJECTION, SOLUTION INTRAMUSCULAR; INTRAVENOUS at 15:55

## 2023-11-03 RX ADMIN — SODIUM CHLORIDE, SODIUM LACTATE, POTASSIUM CHLORIDE, AND CALCIUM CHLORIDE: .6; .31; .03; .02 INJECTION, SOLUTION INTRAVENOUS at 15:42

## 2023-11-03 RX ADMIN — SODIUM CHLORIDE, POTASSIUM CHLORIDE, SODIUM LACTATE AND CALCIUM CHLORIDE 1000 ML: 600; 310; 30; 20 INJECTION, SOLUTION INTRAVENOUS at 18:28

## 2023-11-03 RX ADMIN — MONTELUKAST 10 MG: 10 TABLET, FILM COATED ORAL at 20:57

## 2023-11-03 RX ADMIN — Medication 10 ML: at 08:38

## 2023-11-03 RX ADMIN — AMLODIPINE BESYLATE 10 MG: 5 TABLET ORAL at 20:57

## 2023-11-03 RX ADMIN — ATORVASTATIN CALCIUM 20 MG: 20 TABLET, FILM COATED ORAL at 20:56

## 2023-11-03 RX ADMIN — DOCUSATE SODIUM 100 MG: 100 CAPSULE, LIQUID FILLED ORAL at 20:57

## 2023-11-03 RX ADMIN — Medication 10 ML: at 20:57

## 2023-11-03 RX ADMIN — AMITRIPTYLINE HYDROCHLORIDE 20 MG: 10 TABLET, FILM COATED ORAL at 21:47

## 2023-11-03 RX ADMIN — ASPIRIN 81 MG: 81 TABLET, COATED ORAL at 20:57

## 2023-11-03 RX ADMIN — MORPHINE SULFATE 2 MG: 2 INJECTION, SOLUTION INTRAMUSCULAR; INTRAVENOUS at 01:07

## 2023-11-03 RX ADMIN — CEFTRIAXONE 1000 MG: 1 INJECTION, POWDER, FOR SOLUTION INTRAMUSCULAR; INTRAVENOUS at 08:38

## 2023-11-03 RX ADMIN — PANTOPRAZOLE SODIUM 40 MG: 40 TABLET, DELAYED RELEASE ORAL at 20:57

## 2023-11-03 RX ADMIN — DEXAMETHASONE SODIUM PHOSPHATE 8 MG: 4 INJECTION, SOLUTION INTRAMUSCULAR; INTRAVENOUS at 15:54

## 2023-11-03 NOTE — PLAN OF CARE
Problem: Adult Inpatient Plan of Care  Goal: Plan of Care Review  11/3/2023 1729 by Ave Patel RN   Goal Outcome Evaluation:  Plan of Care Reviewed With: patient        Progress: no change

## 2023-11-03 NOTE — H&P
[facility] Medicine Services  History & Physical    Patient Name: Mode Duffy  : 1945  MRN: [id number]  Primary Care Physician:  Temo Vidal MD  Date of admission: 2023  Date and Time of Service: 11/3/2023 at 0300    Subjective      Chief Complaint: abdominal pain and nausea    History of Present Illness: Mode Duffy is a 77 y.o. male with a past medical history of CAD, CHF and HTN who presented to [facility] on 2023 complaining of abdominal pain and nausea that began approximatly 2 hours prior to arrival in the ED.  He denies any other complaints.    In the ED, CT of the abdomen and pelvis showed a 0.3 mm distal left ureteral stone with mild left-sided hydronephrosis and 0.2 mm nonobstructing left-sided kidney stone. Labwork is unremarkable.  UA shows no infection.  He is afebrile, all vitals are stable.  Hospitalist was consulted due to uncontrollable pain.     12 point ROS reviewed and negative except as mentioned above    Personal History     Past Medical History:   Diagnosis Date    Arthritis     Arthritis     BPH (benign prostatic hyperplasia)     CHF (congestive heart failure)     Coronary artery disease     dr. Carrizales    Deviated septum     FH: cataracts     Gait abnormality     GERD (gastroesophageal reflux disease)     History of trigeminal neuralgia     Hyperlipidemia     Hypertension     Renal cyst     Sciatica     Sleep apnea     unable to tolerate machine       Past Surgical History:   Procedure Laterality Date    APPENDECTOMY      CARDIAC CATHETERIZATION      CARDIAC CATHETERIZATION N/A 3/5/2020    Procedure: Percutaneous Subclavian Intervention;  Surgeon: Nick Greenberg MD;  Location: [facility] INVASIVE LOCATION;  Service: Cardiovascular;  Laterality: N/A;    CARDIAC ELECTROPHYSIOLOGY PROCEDURE N/A 2020    Procedure: Biventricular Device Upgrade;  Surgeon: Dominick Jackson MD;  Location: [facility] CATH INVASIVE LOCATION;   Service: Cardiovascular    CARDIAC ELECTROPHYSIOLOGY PROCEDURE N/A 3/5/2020    Procedure: Lead Revision;  Surgeon: Nick Greenberg MD;  Location: [facility] CATH INVASIVE LOCATION;  Service: Cardiovascular;  Laterality: N/A;    CORONARY STENT PLACEMENT  2014    x 2    IMPLANTABLE CARDIOVERTER DEFIBRILLATOR LEAD REPLACEMENT/POCKET REVISION Right 10/19/2022    Procedure: RT. TRANSVENOUS ICD LEAD EXTRACTION;  Surgeon: Naveed Rios MD;  Location: [facility];  Service: Cardiology;  Laterality: Right;    PROSTATE SURGERY      REFRACTIVE SURGERY      THORACOTOMY Left 3/4/2020    Procedure: THORACOTOMY MINI WITH LEAD PLACEMENT;  Surgeon: Justin Aguilar MD;  Location: [facility];  Service: Cardiothoracic;  Laterality: Left;    TOTAL HIP ARTHROPLASTY Bilateral     different times       Family History: family history includes Arthritis in his father; Cancer in his mother; Diabetes in his father; Heart disease in his father; Hyperlipidemia in his father; Hypertension in his father; Kidney disease in his father. Otherwise pertinent FHx was reviewed and not pertinent to current issue.    Social History:  reports that he quit smoking about 55 years ago. His smoking use included cigarettes. He has never used smokeless tobacco. He reports current alcohol use of about 7.0 standard drinks of alcohol per week. He reports that he does not use drugs.    Home Medications:  Prior to Admission Medications       Prescriptions Last Dose Informant Patient Reported? Taking?    acebutolol (SECTRAL) 200 MG capsule   No No    TAKE 1 CAPSULE EVERY DAY    amitriptyline (ELAVIL) 10 MG tablet   No No    TAKE 2 TABLETS AT BEDTIME    amLODIPine (NORVASC) 10 MG tablet   Yes No    Take 1 tablet by mouth every night at bedtime. Indications: High Blood Pressure Disorder    aspirin 81 MG tablet   Yes No    Take 1 tablet by mouth Daily. Do not take day of surgery  Indications: Venous Thromboembolism    atorvastatin (LIPITOR) 20 MG tablet    No No    TAKE 1 TABLET EVERY DAY    cetirizine (zyrTEC) 10 MG tablet   Yes No    Take 1 tablet by mouth Daily. Indications: Upper Respiratory Tract Allergy    Cinnamon 500 MG capsule   Yes No    Take 1 capsule by mouth 2 (Two) Times a Day. Stop on 2/26 for surgery  Indications: Sugar stabilization    Eliquis 5 MG tablet tablet   No No    TAKE 1 TABLET BY MOUTH EVERY 12 HOURS    enalapril (VASOTEC) 20 MG tablet  Self Yes No    Take 1 tablet by mouth Daily. Indications: High Blood Pressure Disorder    esomeprazole (nexIUM) 40 MG capsule   Yes No    Take 1 capsule by mouth Daily. Indications: Gastroesophageal Reflux Disease    glucosamine-chondroitin 500-400 MG capsule capsule   Yes No    Take 1 capsule by mouth 2 (Two) Times a Day With Meals. Stop on 2/26 for surgery  Indications: Joint Damage causing Pain and Loss of Function    isosorbide mononitrate (IMDUR) 60 MG 24 hr tablet   Yes No    Take 1 tablet by mouth Daily. Indications: Stable Angina Pectoris    montelukast (SINGULAIR) 10 MG tablet   Yes No    Take 1 tablet by mouth Daily.    OXcarbazepine (TRILEPTAL) 300 MG tablet   No No    TAKE 2 TABLETS BY MOUTH THREE TIMES DAILY    Probiotic Product (PROBIOTIC ADVANCED PO)   Yes No    Take 1 capsule by mouth Daily. Stop on 2/26 for surgery  Indications: Edith replacement    vitamin C (ASCORBIC ACID) 250 MG tablet   Yes No    Take 1 tablet by mouth Daily. Indications: supplement    Zinc 25 MG tablet  Self Yes No    Take 25 mg by mouth Daily. Indications: Zinc Deficiency              Allergies:  No Known Allergies    Objective      Vitals:   Temp:  [97.8 °F (36.6 °C)] 97.8 °F (36.6 °C)  Heart Rate:  [70-73] 70  Resp:  [18] 18  BP: (132-155)/(72-95) 143/74  Flow (L/min):  [2] 2    Physical Exam  Vitals and nursing note reviewed.   Constitutional:       General: He is sleeping.      Appearance: Normal appearance.   HENT:      Head: Normocephalic and atraumatic.      Nose: Nose normal.      Mouth/Throat:      Mouth:  Mucous membranes are moist.   Eyes:      Extraocular Movements: Extraocular movements intact.      Pupils: Pupils are equal, round, and reactive to light.   Cardiovascular:      Rate and Rhythm: Normal rate and regular rhythm.      Pulses: Normal pulses.      Heart sounds: Normal heart sounds.   Pulmonary:      Effort: Pulmonary effort is normal.      Breath sounds: Normal breath sounds.   Abdominal:      General: Bowel sounds are normal.      Palpations: Abdomen is soft.      Tenderness:  in the right lower quadrant and left lower quadrant   Musculoskeletal:         General: Normal range of motion.      Cervical back: Normal range of motion.   Skin:     General: Skin is warm and dry.   Neurological:      General: No focal deficit present.      Mental Status: He is oriented to person, place, and time and easily aroused. Mental status is at baseline.   Psychiatric:         Mood and Affect: Mood normal.         Behavior: Behavior normal.          Result Review    Result Review:  I have personally reviewed the results from the time of this admission to 11/3/2023 03:13 EDT and agree with these findings:  [x]  Laboratory  []  Microbiology  [x]  Radiology  []  EKG/Telemetry   []  Cardiology/Vascular   []  Pathology  []  Old records  []  Other:  Most notable findings include: as above      Assessment & Plan        Active Hospital Problems:  Active Hospital Problems    Diagnosis     **Ureteral stone with hydronephrosis     Cardiomyopathy     Chronic systolic congestive heart failure     Coronary artery disease involving native coronary artery of native heart without angina pectoris     Hyperlipidemia     Hypertension     Cardiac pacemaker in situ     Sick sinus syndrome      Plan:     Left Ureteral Stone with hydronephrosis  -CT abdomen and pelvis reviewed  -Analgesics as needed  -Strain all urine  -Urology consult due to intractable pain    Essential Hypertension  -Controlled  -Monitor BP  -Continue home Amlodipine,  Enalapril    CAD with Hyperlipidemia  -Chronic, stable  -Continue home Sectral, Aspirin, Eliquis, Imdur      DVT prophylaxis:  Mechanical DVT prophylaxis orders are present.    CODE STATUS:    Code Status (Patient has no pulse and is not breathing): CPR (Attempt to Resuscitate)  Medical Interventions (Patient has pulse or is breathing): Full Support    Admission Status:  I believe this patient meets observation status.    I discussed the patient's findings and my recommendations with patient.    This patient has been examined wearing appropriate Personal Protective Equipment  11/03/23      Signature: Electronically signed by Nancy Schilling DNP, APRN, 11/03/23, 03:13 EDT.  [facility] Hospitalist Team

## 2023-11-03 NOTE — ANESTHESIA PREPROCEDURE EVALUATION
Anesthesia Evaluation     Patient summary reviewed and Nursing notes reviewed   NPO Solid Status: > 8 hours  NPO Liquid Status: > 8 hours           Airway   Mallampati: II  TM distance: >3 FB  Neck ROM: full  No difficulty expected  Dental - normal exam     Pulmonary    (+) ,sleep apnea  Cardiovascular     (+) pacemaker pacemaker, ICD, hypertension, CAD, cardiac stents , dysrhythmias, CHF , hyperlipidemia      Neuro/Psych  GI/Hepatic/Renal/Endo    (+) GERD, renal disease-    Musculoskeletal     Abdominal    Substance History      OB/GYN          Other   arthritis,     ROS/Med Hx Other:  Hx of SSS and complete heart block    Left ventricular systolic function is normal. Calculated left ventricular EF = 56.2%  ·  Left ventricular wall thickness is consistent with mild concentric hypertrophy.  ·  Left ventricular diastolic function is consistent with (grade Ia w/high LAP) impaired relaxation.  ·  The left atrial cavity is mild to moderately dilated.  ·  Estimated right ventricular systolic pressure from tricuspid regurgitation is mildly elevated (35-45 mmHg).                Anesthesia Plan    ASA 4     general     intravenous induction     Anesthetic plan, risks, benefits, and alternatives have been provided, discussed and informed consent has been obtained with: patient.    Plan discussed with CRNA.    CODE STATUS:    Code Status (Patient has no pulse and is not breathing): CPR (Attempt to Resuscitate)  Medical Interventions (Patient has pulse or is breathing): Full Support

## 2023-11-03 NOTE — PLAN OF CARE
Problem: Adult Inpatient Plan of Care  Goal: Plan of Care Review  11/3/2023 1728 by Ave Patel, RN  Outcome: Ongoing, Progressing  Flowsheets (Taken 11/3/2023 1728)  Progress: no change  Plan of Care Reviewed With: patient  11/3/2023 1725 by Ave Patel, RN  Outcome: Ongoing, Progressing  Flowsheets  Taken 11/3/2023 1725 by Ave Patel, RN  Progress: no change  Taken 11/3/2023 1607 by Adele Green RN  Plan of Care Reviewed With: patient   Goal Outcome Evaluation:  Plan of Care Reviewed With: patient        Progress: no change

## 2023-11-03 NOTE — CONSULTS
[facility] CONS[facility]      Patient Identification:  NAME:  Mode Duffy  Age:  77 y.o.   Sex:  male   :  1945   MRN:  [id number]       Chief complaint/Reason for consult: Left distal stone    History of present illness:  77 y.o. male known to Dr. Marlow following left renal stones one of the stones is now passing in the distal left ureter.  He is admitted for uncontrolled pain.  No UTI      Past medical history:  Past Medical History:   Diagnosis Date    Arthritis     Arthritis     BPH (benign prostatic hyperplasia)     CHF (congestive heart failure)     Coronary artery disease     dr. Carrizales    Deviated septum     FH: cataracts     Gait abnormality     GERD (gastroesophageal reflux disease)     History of trigeminal neuralgia     Hyperlipidemia     Hypertension     Renal cyst     Sciatica     Sleep apnea     unable to tolerate machine       Past surgical history:  Past Surgical History:   Procedure Laterality Date    APPENDECTOMY      CARDIAC CATHETERIZATION      CARDIAC CATHETERIZATION N/A 3/5/2020    Procedure: Percutaneous Subclavian Intervention;  Surgeon: Nick Greenberg MD;  Location: [facility] CATH INVASIVE LOCATION;  Service: Cardiovascular;  Laterality: N/A;    CARDIAC ELECTROPHYSIOLOGY PROCEDURE N/A 2020    Procedure: Biventricular Device Upgrade;  Surgeon: Dominick Jackson MD;  Location: [facility] CATH INVASIVE LOCATION;  Service: Cardiovascular    CARDIAC ELECTROPHYSIOLOGY PROCEDURE N/A 3/5/2020    Procedure: Lead Revision;  Surgeon: Nick Greenberg MD;  Location: [facility] CATH INVASIVE LOCATION;  Service: Cardiovascular;  Laterality: N/A;    CORONARY STENT PLACEMENT  2014    x 2    IMPLANTABLE CARDIOVERTER DEFIBRILLATOR LEAD REPLACEMENT/POCKET REVISION Right 10/19/2022    Procedure: RT. TRANSVENOUS ICD LEAD EXTRACTION;  Surgeon: Naveed Rios MD;  Location: [facility];  Service: Cardiology;  Laterality: Right;    PROSTATE SURGERY      REFRACTIVE SURGERY       THORACOTOMY Left 3/4/2020    Procedure: THORACOTOMY MINI WITH LEAD PLACEMENT;  Surgeon: Justin Aguilar MD;  Location: [facility];  Service: Cardiothoracic;  Laterality: Left;    TOTAL HIP ARTHROPLASTY Bilateral     different times       Allergies:  Patient has no known allergies.    Home medications:  (Not in a hospital admission)       Hospital medications:  sodium chloride, 10 mL, Intravenous, Q12H           acetaminophen **OR** acetaminophen **OR** acetaminophen    senna-docusate sodium **AND** polyethylene glycol **AND** bisacodyl **AND** bisacodyl    melatonin    Morphine    ondansetron **OR** ondansetron    [COMPLETED] Insert Peripheral IV **AND** sodium chloride    sodium chloride    sodium chloride    Family history:  Family History   Problem Relation Age of Onset    Cancer Mother     Heart disease Father     Arthritis Father     Diabetes Father     Hypertension Father     Hyperlipidemia Father     Kidney disease Father     Malig Hyperthermia Neg Hx        Social history:  Social History     Tobacco Use    Smoking status: Former     Types: Cigarettes     Quit date:      Years since quittin.8    Smokeless tobacco: Never   Vaping Use    Vaping Use: Never used   Substance Use Topics    Alcohol use: Yes     Alcohol/week: 7.0 standard drinks of alcohol     Types: 7 Glasses of wine per week     Comment: RED WINE BEFORE DINNER    Drug use: No           Objective:  TMax 24 hours:   Temp (24hrs), Av.8 °F (36.6 °C), Min:97.8 °F (36.6 °C), Max:97.8 °F (36.6 °C)      Vitals Ranges:   Temp:  [97.8 °F (36.6 °C)] 97.8 °F (36.6 °C)  Heart Rate:  [70-74] 74  Resp:  [18] 18  BP: (132-168)/(72-96) 151/79    Intake/Output Last 3 shifts:  No intake/output data recorded.     Physical Exam:    General Appearance:    Alert, cooperative, NAD   HEENT:    No trauma, hearing intact   Lungs:     Respirations unlabored, no audible wheezing    Heart:    No cyanosis, No significant edema   Abdomen:     Soft, ND    :     No suprapubic distention or tenderness   Extremities:   No significant edema, no deformity   Lymphatic:   No neck or groin LAD   Skin:   No bleeding, bruising or rashes   Neuro/Psych:   Mood/affect pleasant, no focal findings       Results review:   I reviewed the patient's new clinical results.    Data review:  Lab Results (last 24 hours)       Procedure Component Value Units Date/Time    Urinalysis, Microscopic Only - Urine, Clean Catch [[id number]]  (Abnormal) Collected: 11/03/23 0112    Specimen: Urine, Clean Catch Updated: 11/03/23 0129     RBC, UA Too Numerous to Count /HPF      WBC, UA 0-2 /HPF      Bacteria, UA None Seen /HPF      Squamous Epithelial Cells, UA 0-2 /HPF      Hyaline Casts, UA 0-2 /LPF      Methodology Automated Microscopy    Urinalysis With Microscopic If Indicated (No Culture) - Urine, Clean Catch [[id number]]  (Abnormal) Collected: 11/03/23 0112    Specimen: Urine, Clean Catch Updated: 11/03/23 0128     Color, UA Yellow     Appearance, UA Clear     pH, UA 5.5     Specific Gravity, UA 1.019     Glucose, UA Negative     Ketones, UA Trace     Bilirubin, UA Negative     Blood, UA Large (3+)     Protein, UA Negative     Leuk Esterase, UA Negative     Nitrite, UA Negative     Urobilinogen, UA 0.2 E.U./dL    Comprehensive Metabolic Panel [[id number]]  (Abnormal) Collected: 11/02/23 2323    Specimen: Blood Updated: 11/02/23 2355     Glucose 165 mg/dL      BUN 18 mg/dL      Creatinine 1.00 mg/dL      Sodium 137 mmol/L      Potassium 4.2 mmol/L      Chloride 100 mmol/L      CO2 25.0 mmol/L      Calcium 9.4 mg/dL      Total Protein 6.7 g/dL      Albumin 4.4 g/dL      ALT (SGPT) 21 U/L      AST (SGOT) 17 U/L      Alkaline Phosphatase 86 U/L      Total Bilirubin 0.3 mg/dL      Globulin 2.3 gm/dL      A/G Ratio 1.9 g/dL      BUN/Creatinine Ratio 18.0     Anion Gap 12.0 mmol/L      eGFR 77.5 mL/min/1.73     Narrative:      GFR Normal >60  Chronic Kidney Disease <60  Kidney Failure <15    The GFR formula is  only valid for adults with stable renal function between ages 18 and 70.    Lipase [[id number]]  (Normal) Collected: 11/02/23 2323    Specimen: Blood Updated: 11/02/23 2355     Lipase 19 U/L     CBC & Differential [[id number]]  (Abnormal) Collected: 11/02/23 2323    Specimen: Blood Updated: 11/02/23 2329    Narrative:      The following orders were created for panel order CBC & Differential.  Procedure                               Abnormality         Status                     ---------                               -----------         ------                     CBC Auto Differential[[id number]]        Abnormal            Final result                 Please view results for these tests on the individual orders.    CBC Auto Differential [[id number]]  (Abnormal) Collected: 11/02/23 2323    Specimen: Blood Updated: 11/02/23 2329     WBC 7.90 10*3/mm3      RBC 4.47 10*6/mm3      Hemoglobin 14.7 g/dL      Hematocrit 44.1 %      MCV 98.7 fL      MCH 33.0 pg      MCHC 33.4 g/dL      RDW 13.8 %      RDW-SD 46.8 fl      MPV 7.2 fL      Platelets 226 10*3/mm3      Neutrophil % 76.9 %      Lymphocyte % 15.8 %      Monocyte % 4.8 %      Eosinophil % 1.8 %      Basophil % 0.7 %      Neutrophils, Absolute 6.10 10*3/mm3      Lymphocytes, Absolute 1.20 10*3/mm3      Monocytes, Absolute 0.40 10*3/mm3      Eosinophils, Absolute 0.10 10*3/mm3      Basophils, Absolute 0.10 10*3/mm3      nRBC 0.0 /100 WBC              Imaging:  Imaging Results (Last 24 Hours)       Procedure Component Value Units Date/Time    CT Abdomen Pelvis With Contrast [[id number]] Collected: 11/03/23 0156     Updated: 11/03/23 0202    Narrative:      CT ABDOMEN PELVIS W CONTRAST    Date of Exam: 11/3/2023 1:30 AM EDT    Indication: LLQ abdominal pain, h/o diverticulitis.    Comparison: 11/6/2017.    Technique: Axial CT images were obtained of the abdomen and pelvis following the uneventful intravenous administration of iodinated contrast. Sagittal and coronal  reconstructions were performed.  Automated exposure control and iterative reconstruction   methods were used.        Findings:  The lung bases are clear. Distal esophagus is unremarkable. The heart is enlarged. ICD leads noted in the right heart. There are coronary artery calcifications. No pericardial effusion. There are no acute findings in the superficial soft tissues. There   are bilateral hip prostheses in place. There are bilateral small fat-containing inguinal hernias and there is a small fat-containing periumbilical hernia. There are no acute osseous abnormalities or destructive bone lesions. There are mild thoracolumbar   degenerative changes.    There is a small cystic lesion along the posterior right hemidiaphragm on image 65 measuring 16 mm, likely a benign incidental cyst. The liver is homogeneous. There are gallstones without acute cholecystitis. The biliary tree, pancreas, spleen and   adrenal glands appear within normal limits. There are simple bilateral kidney cysts measuring 56 mm on the right and 7 mm on the left. There is no right-sided hydronephrosis. There is a 2 mm nonobstructing mid left kidney stone. There is mild left-sided   hydronephrosis due to a distal left ureteral stone measuring 3 mm diameter. Urinary bladder is unremarkable. The prostate is normal size. There are multiple phleboliths in the pelvis. There is mild dilatation of the urinary bladder. Patient is status   post appendectomy. There is colonic diverticulosis. No small bowel distention. There is mild diffuse aortoiliac atherosclerotic disease. No evidence of aortic aneurysm. There is aneurysmal dilatation of the right common iliac artery up to 18 mm in the   left common iliac artery up to 19 mm. No ascites, pneumoperitoneum or lymphadenopathy.      Impression:      Impression:  1.3 mm distal left ureteral stone with mild left-sided hydronephrosis.  2.2 mm nonobstructing left-sided kidney stone.  3.Colonic  diverticulosis.  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  5.Cholelithiasis without acute cholecystitis.  6.Cardiomegaly and coronary artery disease.        Electronically Signed: Chris Asif MD    11/3/2023 2:00 AM EDT    Workstation ID: [id number]               Assessment:       Ureteral stone with hydronephrosis    Cardiac pacemaker in situ    Hypertension    Coronary artery disease involving native coronary artery of native heart without angina pectoris    Hyperlipidemia    Sick sinus syndrome    Chronic systolic congestive heart failure    Cardiomyopathy      Left distal stone    Plan:     CT images reviewed with left distal stone, plan left ureteroscopy laser lithotripsy and stent placement today  Start Rocephin for surgical prophylaxis  All risks, benefits and alternatives to surgery were discussed with the patient preoperatively including but not limited to need for multiple procedures, infection, sepsis, bleeding, risk of anesthesia and damage to other organs. The details of the procedure have been fully reviewed with the patient and informed consent has been obtained.      Chris Patel MD  [facility]  Formerly [facility] [address] [facility], [address]  [place] [address]  Office: [phone]  Available via [employer]  11/03/23  07:58 EDT

## 2023-11-03 NOTE — ED NOTES
Pt has had abd pain in LLQ for 2 hours. Pt was diagnosed with diverticulitis and was given antibiotics for it. Pt states the pain went away but then it came back. Pt also reports nausea

## 2023-11-03 NOTE — ANESTHESIA POSTPROCEDURE EVALUATION
Patient: Mode Duffy    Procedure Summary       Date: 11/03/23 Room / Location: [facility] OR 01 / [facility] MAIN OR    Anesthesia Start: 1542 Anesthesia Stop: 1613    Procedure: CYSTOSCOPY URETEROSCOPY STONE EXTRACTION STENT INSERTION (Left: Ureter) Diagnosis:     Surgeons: Chris Patel MD Provider: Temo Crook MD    Anesthesia Type: general ASA Status: 4            Anesthesia Type: general    Vitals  Vitals Value Taken Time   /84 11/03/23 1657   Temp 98 °F (36.7 °C) 11/03/23 1654   Pulse 70 11/03/23 1701   Resp 14 11/03/23 1654   SpO2 88 % 11/03/23 1701   Vitals shown include unfiled device data.        Post Anesthesia Care and Evaluation    Patient location during evaluation: PACU  Patient participation: complete - patient participated  Level of consciousness: awake  Pain score: 0  Pain management: adequate  Anesthetic complications: No anesthetic complications  PONV Status: none  Cardiovascular status: acceptable  Respiratory status: acceptable  Hydration status: acceptable

## 2023-11-03 NOTE — ED TRIAGE NOTES
Pt arrived via PV c/o abdominal pain x 2 hours in his LLQ. Pt reports hx of kidney stones. Pt reports nausea but no vomiting.

## 2023-11-03 NOTE — PLAN OF CARE
Problem: Adult Inpatient Plan of Care  Goal: Plan of Care Review  11/3/2023 1732 by Ave Patel, RN  Outcome: Ongoing, Progressing  11/3/2023 1729 by Ave Patel, RN  O Goal Outcome Evaluation:  Plan of Care Reviewed With: patient        Progress: no change

## 2023-11-03 NOTE — ED PROVIDER NOTES
Subjective   Chief Complaint   Patient presents with    Abdominal Pain       History of Present Illness  Patient is a 77-year male presents the ED with complaint of abdominal pain, left lower quadrant worse tonight.  Denies any vomiting or diarrhea.  Reports nausea.  No fevers or chills.  No urinary symptoms.    Reports he was seen by Dr. Jacobo 2 weeks ago, placed on antibiotics for diverticulitis.  He does report he completed the entire course  Review of Systems   Constitutional:  Negative for chills and fever.   Respiratory:  Negative for shortness of breath.    Cardiovascular:  Negative for chest pain.   Gastrointestinal:  Positive for abdominal pain and nausea. Negative for blood in stool, diarrhea and vomiting.   Musculoskeletal:  Negative for back pain and neck pain.   Skin:  Negative for color change and rash.   Neurological:  Negative for dizziness, syncope and light-headedness.       Past Medical History:   Diagnosis Date    Arthritis     Arthritis     BPH (benign prostatic hyperplasia)     CHF (congestive heart failure)     Coronary artery disease     dr. Carrizales    Deviated septum     FH: cataracts     Gait abnormality     GERD (gastroesophageal reflux disease)     History of trigeminal neuralgia     Hyperlipidemia     Hypertension     Renal cyst     Sciatica     Sleep apnea     unable to tolerate machine       No Known Allergies    Past Surgical History:   Procedure Laterality Date    APPENDECTOMY      CARDIAC CATHETERIZATION      CARDIAC CATHETERIZATION N/A 3/5/2020    Procedure: Percutaneous Subclavian Intervention;  Surgeon: Nick Greenberg MD;  Location: [facility] CATH INVASIVE LOCATION;  Service: Cardiovascular;  Laterality: N/A;    CARDIAC ELECTROPHYSIOLOGY PROCEDURE N/A 1/29/2020    Procedure: Biventricular Device Upgrade;  Surgeon: Dominick Jackson MD;  Location: [facility] CATH INVASIVE LOCATION;  Service: Cardiovascular    CARDIAC ELECTROPHYSIOLOGY PROCEDURE N/A 3/5/2020    Procedure:  Lead Revision;  Surgeon: Nick Greenberg MD;  Location: [facility] CATH INVASIVE LOCATION;  Service: Cardiovascular;  Laterality: N/A;    CORONARY STENT PLACEMENT  2014    x 2    IMPLANTABLE CARDIOVERTER DEFIBRILLATOR LEAD REPLACEMENT/POCKET REVISION Right 10/19/2022    Procedure: RT. TRANSVENOUS ICD LEAD EXTRACTION;  Surgeon: Naveed Rios MD;  Location: [facility];  Service: Cardiology;  Laterality: Right;    PROSTATE SURGERY      REFRACTIVE SURGERY      THORACOTOMY Left 3/4/2020    Procedure: THORACOTOMY MINI WITH LEAD PLACEMENT;  Surgeon: Justin Aguilar MD;  Location: [facility];  Service: Cardiothoracic;  Laterality: Left;    TOTAL HIP ARTHROPLASTY Bilateral     different times       Family History   Problem Relation Age of Onset    Cancer Mother     Heart disease Father     Arthritis Father     Diabetes Father     Hypertension Father     Hyperlipidemia Father     Kidney disease Father     Malig Hyperthermia Neg Hx        Social History     Socioeconomic History    Marital status:    Tobacco Use    Smoking status: Former     Types: Cigarettes     Quit date:      Years since quittin.8    Smokeless tobacco: Never   Vaping Use    Vaping Use: Never used   Substance and Sexual Activity    Alcohol use: Yes     Alcohol/week: 7.0 standard drinks of alcohol     Types: 7 Glasses of wine per week     Comment: RED WINE BEFORE DINNER    Drug use: No    Sexual activity: Defer           Objective   Physical Exam  Vitals and nursing note reviewed.   Constitutional:       Appearance: He is well-developed. He is not toxic-appearing.   HENT:      Head: Normocephalic and atraumatic.      Mouth/Throat:      Mouth: Mucous membranes are moist.      Pharynx: Oropharynx is clear.   Eyes:      Extraocular Movements: Extraocular movements intact.      Pupils: Pupils are equal, round, and reactive to light.   Cardiovascular:      Rate and Rhythm: Normal rate and regular rhythm.      Heart sounds: No  "murmur heard.     No friction rub. No gallop.   Pulmonary:      Effort: Pulmonary effort is normal.      Breath sounds: Normal breath sounds.   Abdominal:      General: Abdomen is protuberant.      Palpations: Abdomen is soft.   Skin:     General: Skin is warm and dry.   Neurological:      General: No focal deficit present.      Mental Status: He is alert.         Procedures           ED Course      /93   Pulse 73   Temp 97.8 °F (36.6 °C) (Oral)   Resp 18   Ht 185.4 cm (73\")   Wt 97.5 kg (215 lb)   SpO2 95%   BMI 28.37 kg/m²   Medications   sodium chloride 0.9 % flush 10 mL (has no administration in time range)   morphine injection 4 mg (4 mg Intravenous Given 11/2/23 2329)   ondansetron (ZOFRAN) injection 4 mg (4 mg Intravenous Given 11/2/23 2329)   morphine injection 2 mg (2 mg Intravenous Given 11/3/23 0107)   iopamidol (ISOVUE-370) 76 % injection 100 mL (100 mL Intravenous Given 11/3/23 0153)     CT Abdomen Pelvis With Contrast    Result Date: 11/3/2023  Impression: 1.3 mm distal left ureteral stone with mild left-sided hydronephrosis. 2.2 mm nonobstructing left-sided kidney stone. 3.Colonic diverticulosis. 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 5.Cholelithiasis without acute cholecystitis. 6.Cardiomegaly and coronary artery disease. Electronically Signed: Chris Asif MD  11/3/2023 2:00 AM EDT  Workstation ID: [id number]   Lab Results (last 24 hours)       Procedure Component Value Units Date/Time    CBC & Differential [[id number]]  (Abnormal) Collected: 11/02/23 2323    Specimen: Blood Updated: 11/02/23 2329    Narrative:      The following orders were created for panel order CBC & Differential.  Procedure                               Abnormality         Status                     ---------                               -----------         ------                     CBC Auto Differential[[id number]]        Abnormal            Final result                 Please view results for these " tests on the individual orders.    Comprehensive Metabolic Panel [[id number]]  (Abnormal) Collected: 11/02/23 2323    Specimen: Blood Updated: 11/02/23 2355     Glucose 165 mg/dL      BUN 18 mg/dL      Creatinine 1.00 mg/dL      Sodium 137 mmol/L      Potassium 4.2 mmol/L      Chloride 100 mmol/L      CO2 25.0 mmol/L      Calcium 9.4 mg/dL      Total Protein 6.7 g/dL      Albumin 4.4 g/dL      ALT (SGPT) 21 U/L      AST (SGOT) 17 U/L      Alkaline Phosphatase 86 U/L      Total Bilirubin 0.3 mg/dL      Globulin 2.3 gm/dL      A/G Ratio 1.9 g/dL      BUN/Creatinine Ratio 18.0     Anion Gap 12.0 mmol/L      eGFR 77.5 mL/min/1.73     Narrative:      GFR Normal >60  Chronic Kidney Disease <60  Kidney Failure <15    The GFR formula is only valid for adults with stable renal function between ages 18 and 70.    Lipase [[id number]]  (Normal) Collected: 11/02/23 2323    Specimen: Blood Updated: 11/02/23 2355     Lipase 19 U/L     CBC Auto Differential [[id number]]  (Abnormal) Collected: 11/02/23 2323    Specimen: Blood Updated: 11/02/23 2329     WBC 7.90 10*3/mm3      RBC 4.47 10*6/mm3      Hemoglobin 14.7 g/dL      Hematocrit 44.1 %      MCV 98.7 fL      MCH 33.0 pg      MCHC 33.4 g/dL      RDW 13.8 %      RDW-SD 46.8 fl      MPV 7.2 fL      Platelets 226 10*3/mm3      Neutrophil % 76.9 %      Lymphocyte % 15.8 %      Monocyte % 4.8 %      Eosinophil % 1.8 %      Basophil % 0.7 %      Neutrophils, Absolute 6.10 10*3/mm3      Lymphocytes, Absolute 1.20 10*3/mm3      Monocytes, Absolute 0.40 10*3/mm3      Eosinophils, Absolute 0.10 10*3/mm3      Basophils, Absolute 0.10 10*3/mm3      nRBC 0.0 /100 WBC     Urinalysis With Microscopic If Indicated (No Culture) - Urine, Clean Catch [[id number]]  (Abnormal) Collected: 11/03/23 0112    Specimen: Urine, Clean Catch Updated: 11/03/23 0128     Color, UA Yellow     Appearance, UA Clear     pH, UA 5.5     Specific Gravity, UA 1.019     Glucose, UA Negative     Ketones, UA Trace      Bilirubin, UA Negative     Blood, UA Large (3+)     Protein, UA Negative     Leuk Esterase, UA Negative     Nitrite, UA Negative     Urobilinogen, UA 0.2 E.U./dL    Urinalysis, Microscopic Only - Urine, Clean Catch [[id number]]  (Abnormal) Collected: 11/03/23 0112    Specimen: Urine, Clean Catch Updated: 11/03/23 0129     RBC, UA Too Numerous to Count /HPF      WBC, UA 0-2 /HPF      Bacteria, UA None Seen /HPF      Squamous Epithelial Cells, UA 0-2 /HPF      Hyaline Casts, UA 0-2 /LPF      Methodology Automated Microscopy                                               Medical Decision Making  Wife also provides history  Chart Review: Most recent progress note 10/20/2023 from cardiology.    Labs: CBC CMP reviewed.  Lipase normal.  Urinalysis TNTC red cells.  No evidence for infection.    Imaging: CT abdomen pelvis was obtained reveals 3 mm distal left ureteral stone which reveals most likely cause of patient's pain today.  Cholelithiasis no acute cholecystitis.  2 mm nonobstructing left-sided kidney stone.  Diverticulosis without diverticulitis.    Discussion/Consultation with other providers: Discussed with hospitalist provider, Nancy, given patient's continued pain will admit for pain control expect urology consultation.  Patient reports he has seen Dr. Marlow before.    Patient underwent above exam and work-up for left lower quadrant abdominal pain, flank pain.  Differential diagnoses considered for patient presentation, this list is not all inclusive of diagnoses considered: Diverticulitis, ureterolithiasis, zoster.    Patient given pain medication, he did drop his O2 sats after morphine, was placed on 2 L is tolerating this well.    Disposition: I discussed with the patient their test results, work-up here in the emergency department, and need for admission and further evaluation. Patient is agreeable to the plan of care. At time of disposition patient's VS are reviewed, and patient without acute distress.   Opportunity was provided for questions at the bedside, all questions and concerns were addressed.    Note Disclaimer: At [facility], we believe that sharing information builds trust and better relationships. You are receiving this note because you recently visited [facility]. It is possible you will see health information before a provider has talked with you about it. This kind of information can be easy to misunderstand. To help you fully understand what it means for your health, we urge you to discuss this note with your provider.Note dictated utilizing Dragon Dictation. Appropriate PPE worn during patient interactions.        Amount and/or Complexity of Data Reviewed  Labs: ordered.  Radiology: ordered.    Risk  Prescription drug management.        Final diagnoses:   Ureterolithiasis   Left sided abdominal pain       ED Disposition  ED Disposition       ED Disposition   Decision to Admit    Condition   --    Comment   Level of Care: Med/Surg [1]   Admitting Physician: WILFRIDO MACHADO [[id number]]   Attending Physician: WILFRIDO MACHADO [[id number]]                 No follow-up provider specified.       Medication List      No changes were made to your prescriptions during this visit.            Cally Chicas, APRN  11/03/23 0229

## 2023-11-03 NOTE — OP NOTE
Urology Operative Note    11/3/2023    Mode Duffy  77 y.o.  1945  male  [id number]      Surgeon(s) and Role:  Chris Patel MD - Primary     Pre-operative Diagnosis: 3 mm left distal stone, 2 mm left renal stone    Post-operative Diagnosis: Same    Complications: None    Procedures:  Cystoscopy  Left ureteroscopy  Basket-extraction of stone  Stent placement  Fluoroscopy with Interpretation     Indications   Mode Duffy is a 77 y.o. male who was found to have 3 mm left distal stone admitted for uncontrolled pain and added on for intervention    During the informed consent process, the procedure was discussed in detail including the risks of bleeding, infection, damage to surrounding structures and need for staged procedure.      Findings     Fluoroscopy with interpretation: Wire placed the upper pole left kidney.  Stent placed with curl in the middle renal pelvis.    Description of procedure:  The patient was properly identified in the preoperative holding area and taken to the operating room where general anesthesia was induced. The patient was placed in the cystolithotomy position and prepped and draped in a sterile fashion. The patient was given antibiotics intravenously before the start of the surgery. After ensuring that all of the required equipment was ready and available a surgical timeout was performed.     A 21 Martiniquais rigid cystoscope was inserted into the bladder under direct visualization.  Prostate had a middle lobe with 2+ obstruction.  A Sensor wire was passed up the ureter under fluoroscopic guidance.  Dual-lumen was used to dilate the UVJ.  Semirigid ureteroscope was passed into the ureter. The stone was visualized. The stone was easily extracted with a basket  No more stones were seen within the ureter under direct visualization.   The cystoscope was then replaced over the wire and a 6 Martiniquais x 26cm stent was placed under direct and fluoroscopic visualization.  The bladder was then  emptied and scope removed.    There were no apparent complications. The patient woke up in the operating room and was taken to the recovery room in stable condition.     I was present and scrubbed for the entire procedure.     Specimens: None    Estimated Blood Loss: minimal      Plan   -Follow up with Dr. Patel 1 to 2 weeks for stent removal         Chris Patel MD  [facility]  [facility] [facility] [address], [address]  [place] [address] [phone]

## 2023-11-03 NOTE — ANESTHESIA PROCEDURE NOTES
Airway  Urgency: elective    Date/Time: 11/3/2023 3:48 PM  Airway not difficult    General Information and Staff    Patient location during procedure: OR  CRNA/CAA: Dutch Carter CRNA    Indications and Patient Condition    Preoxygenated: yes      Final Airway Details  Final airway type: supraglottic airway      Successful airway: classic  Size 5     Number of attempts at approach: 1  Assessment: lips, teeth, and gum same as pre-op

## 2023-11-04 ENCOUNTER — READMISSION MANAGEMENT (OUTPATIENT)
Dept: CALL CENTER | Facility: HOSPITAL | Age: 78
End: 2023-11-04
Payer: MEDICARE

## 2023-11-04 VITALS
OXYGEN SATURATION: 98 % | RESPIRATION RATE: 12 BRPM | TEMPERATURE: 97.4 F | WEIGHT: 212.3 LBS | BODY MASS INDEX: 28.14 KG/M2 | HEART RATE: 70 BPM | DIASTOLIC BLOOD PRESSURE: 72 MMHG | SYSTOLIC BLOOD PRESSURE: 114 MMHG | HEIGHT: 73 IN

## 2023-11-04 LAB
ANION GAP SERPL CALCULATED.3IONS-SCNC: 9 MMOL/L (ref 5–15)
BASOPHILS # BLD AUTO: 0 10*3/MM3 (ref 0–0.2)
BASOPHILS NFR BLD AUTO: 0.4 % (ref 0–1.5)
BUN SERPL-MCNC: 22 MG/DL (ref 8–23)
BUN/CREAT SERPL: 22.9 (ref 7–25)
CALCIUM SPEC-SCNC: 9.8 MG/DL (ref 8.6–10.5)
CHLORIDE SERPL-SCNC: 98 MMOL/L (ref 98–107)
CO2 SERPL-SCNC: 26 MMOL/L (ref 22–29)
CREAT SERPL-MCNC: 0.96 MG/DL (ref 0.76–1.27)
DEPRECATED RDW RBC AUTO: 45.5 FL (ref 37–54)
EGFRCR SERPLBLD CKD-EPI 2021: 81.4 ML/MIN/1.73
EOSINOPHIL # BLD AUTO: 0 10*3/MM3 (ref 0–0.4)
EOSINOPHIL NFR BLD AUTO: 0 % (ref 0.3–6.2)
ERYTHROCYTE [DISTWIDTH] IN BLOOD BY AUTOMATED COUNT: 13.4 % (ref 12.3–15.4)
GLUCOSE SERPL-MCNC: 127 MG/DL (ref 65–99)
HCT VFR BLD AUTO: 43.3 % (ref 37.5–51)
HGB BLD-MCNC: 14.9 G/DL (ref 13–17.7)
LYMPHOCYTES # BLD AUTO: 0.6 10*3/MM3 (ref 0.7–3.1)
LYMPHOCYTES NFR BLD AUTO: 9.1 % (ref 19.6–45.3)
MCH RBC QN AUTO: 33.7 PG (ref 26.6–33)
MCHC RBC AUTO-ENTMCNC: 34.3 G/DL (ref 31.5–35.7)
MCV RBC AUTO: 98.4 FL (ref 79–97)
MONOCYTES # BLD AUTO: 0.3 10*3/MM3 (ref 0.1–0.9)
MONOCYTES NFR BLD AUTO: 4 % (ref 5–12)
NEUTROPHILS NFR BLD AUTO: 6 10*3/MM3 (ref 1.7–7)
NEUTROPHILS NFR BLD AUTO: 86.5 % (ref 42.7–76)
NRBC BLD AUTO-RTO: 0.1 /100 WBC (ref 0–0.2)
PLATELET # BLD AUTO: 219 10*3/MM3 (ref 140–450)
PMV BLD AUTO: 7.3 FL (ref 6–12)
POTASSIUM SERPL-SCNC: 4.9 MMOL/L (ref 3.5–5.2)
RBC # BLD AUTO: 4.4 10*6/MM3 (ref 4.14–5.8)
SODIUM SERPL-SCNC: 133 MMOL/L (ref 136–145)
WBC NRBC COR # BLD: 7 10*3/MM3 (ref 3.4–10.8)

## 2023-11-04 PROCEDURE — 63710000001 DOCUSATE SODIUM 100 MG CAPSULE: Performed by: STUDENT IN AN ORGANIZED HEALTH CARE EDUCATION/TRAINING PROGRAM

## 2023-11-04 PROCEDURE — A9270 NON-COVERED ITEM OR SERVICE: HCPCS | Performed by: STUDENT IN AN ORGANIZED HEALTH CARE EDUCATION/TRAINING PROGRAM

## 2023-11-04 PROCEDURE — A9270 NON-COVERED ITEM OR SERVICE: HCPCS | Performed by: UROLOGY

## 2023-11-04 PROCEDURE — 63710000001 OXCARBAZEPINE 600 MG TABLET: Performed by: UROLOGY

## 2023-11-04 PROCEDURE — 36415 COLL VENOUS BLD VENIPUNCTURE: CPT | Performed by: UROLOGY

## 2023-11-04 PROCEDURE — 80048 BASIC METABOLIC PNL TOTAL CA: CPT | Performed by: UROLOGY

## 2023-11-04 PROCEDURE — 85025 COMPLETE CBC W/AUTO DIFF WBC: CPT | Performed by: UROLOGY

## 2023-11-04 PROCEDURE — G0378 HOSPITAL OBSERVATION PER HR: HCPCS

## 2023-11-04 PROCEDURE — 25010000002 CEFTRIAXONE PER 250 MG: Performed by: UROLOGY

## 2023-11-04 PROCEDURE — 63710000001 ENALAPRIL 5 MG TABLET: Performed by: UROLOGY

## 2023-11-04 PROCEDURE — 63710000001 ISOSORBIDE MONONITRATE 60 MG TABLET SUSTAINED-RELEASE 24 HOUR: Performed by: UROLOGY

## 2023-11-04 RX ADMIN — ISOSORBIDE MONONITRATE 60 MG: 60 TABLET, EXTENDED RELEASE ORAL at 08:18

## 2023-11-04 RX ADMIN — DOCUSATE SODIUM 100 MG: 100 CAPSULE, LIQUID FILLED ORAL at 08:18

## 2023-11-04 RX ADMIN — OXCARBAZEPINE 600 MG: 600 TABLET, FILM COATED ORAL at 08:18

## 2023-11-04 RX ADMIN — ENALAPRIL MALEATE 20 MG: 5 TABLET ORAL at 08:18

## 2023-11-04 RX ADMIN — Medication 10 ML: at 08:18

## 2023-11-04 RX ADMIN — CEFTRIAXONE 1000 MG: 1 INJECTION, POWDER, FOR SOLUTION INTRAMUSCULAR; INTRAVENOUS at 08:18

## 2023-11-04 NOTE — DISCHARGE SUMMARY
PHYSICIAN DISCHARGE SUMMARY                                                                       [facility]    Patient Identification:  Name: Mode Duffy  Age: 77 y.o.  Sex: male  :  1945  MRN: [id number]  Primary Care Physician: Temo Vidal MD    Admit date: 2023  Discharge date and time:2023  Discharged Condition: good    Admission diagnosis  Ureterolithiasis [N20.1]  Left sided abdominal pain [R10.9]  Ureteral stone with hydronephrosis [N13.2]      Discharge Diagnoses:  Active Hospital Problems    Diagnosis  POA    **Ureteral stone with hydronephrosis [N13.2]  Yes    Cardiomyopathy [I42.9]  Yes    Chronic systolic congestive heart failure [I50.22]  Yes    Coronary artery disease involving native coronary artery of native heart without angina pectoris [I25.10]  Yes    Hyperlipidemia [E78.5]  Yes    Hypertension [I10]  Yes    Cardiac pacemaker in situ [Z95.0]  Yes    Sick sinus syndrome [I49.5]  Yes      Resolved Hospital Problems   No resolved problems to display.      Patient Active Problem List   Diagnosis Code    Fothergill's neuralgia G50.0    Hyponatremia E87.1    Cardiac pacemaker in situ Z95.0    Bradycardia R00.1    Cardiovascular disease I25.10    Hypertension I10    Coronary artery disease involving native coronary artery of native heart without angina pectoris I25.10    Hyperlipidemia E78.5    Sick sinus syndrome I49.5    Chronic systolic congestive heart failure I50.22    Cardiomyopathy I42.9    Chronic heart failure I50.9    Complete heart block I44.2    Pacemaker lead malfunction T82.110A    Acute bronchitis J20.9    Allergic rhinitis J30.9    Dyspnea R06.00    Palpitations R00.2    Renal cyst N28.1    Trigeminal neuralgia G50.0    Ureteral stone with hydronephrosis N13.2       PMHX:   Past Medical History:   Diagnosis Date    Arthritis     Arthritis     BPH (benign prostatic hyperplasia)      CHF (congestive heart failure)     Coronary artery disease     dr. Carrizales    Deviated septum     FH: cataracts     Gait abnormality     GERD (gastroesophageal reflux disease)     History of trigeminal neuralgia     Hyperlipidemia     Hypertension     Renal cyst     Sciatica     Sleep apnea     unable to tolerate machine     PSHX:   Past Surgical History:   Procedure Laterality Date    APPENDECTOMY      CARDIAC CATHETERIZATION      CARDIAC CATHETERIZATION N/A 3/5/2020    Procedure: Percutaneous Subclavian Intervention;  Surgeon: Nick Greenberg MD;  Location: [facility] CATH INVASIVE LOCATION;  Service: Cardiovascular;  Laterality: N/A;    CARDIAC ELECTROPHYSIOLOGY PROCEDURE N/A 1/29/2020    Procedure: Biventricular Device Upgrade;  Surgeon: Dominick Jackson MD;  Location: [facility] CATH INVASIVE LOCATION;  Service: Cardiovascular    CARDIAC ELECTROPHYSIOLOGY PROCEDURE N/A 3/5/2020    Procedure: Lead Revision;  Surgeon: Nick Greenberg MD;  Location: [facility] CATH INVASIVE LOCATION;  Service: Cardiovascular;  Laterality: N/A;    CORONARY STENT PLACEMENT  2014    x 2    IMPLANTABLE CARDIOVERTER DEFIBRILLATOR LEAD REPLACEMENT/POCKET REVISION Right 10/19/2022    Procedure: RT. TRANSVENOUS ICD LEAD EXTRACTION;  Surgeon: Naveed Rios MD;  Location: [facility];  Service: Cardiology;  Laterality: Right;    PROSTATE SURGERY      REFRACTIVE SURGERY      THORACOTOMY Left 3/4/2020    Procedure: THORACOTOMY MINI WITH LEAD PLACEMENT;  Surgeon: Justin Aguilar MD;  Location: [facility];  Service: Cardiothoracic;  Laterality: Left;    TOTAL HIP ARTHROPLASTY Bilateral     different times       Hospital Course: Mode Duffy   is a 77 y.o. male with a past medical history of CAD, CHF and HTN who presented to [facility] on 11/2/2023 complaining of abdominal pain and nausea that began approximatly 2 hours prior to arrival in the ED.  He denies any other complaints.     In the ED, CT of the  abdomen and pelvis showed a 0.3 mm distal left ureteral stone with mild left-sided hydronephrosis and 0.2 mm nonobstructing left-sided kidney stone. Labwork is unremarkable.  UA shows no infection.  He is afebrile, all vitals are stable.  Hospitalist was consulted due to uncontrollable pain.  The patient was admitted to the hospital and seen by urology.  Patient had ureteral stent placed and he was feeling better afterwards.  He looked well enough to go home and he will follow-up with urology in a week or 2 for stent removal.  He will also follow-up with his primary care physician.    Consults:     Consults       Date and Time Order Name Status Description    11/3/2023  3:17 AM Inpatient Urology Consult Completed     11/3/2023  2:14 AM Hospitalist (on-call MD unless specified)            Results from last 7 days   Lab Units 11/04/23  0206   WBC 10*3/mm3 7.00   HEMOGLOBIN g/dL 14.9   HEMATOCRIT % 43.3   PLATELETS 10*3/mm3 219     Results from last 7 days   Lab Units 11/04/23  0206   SODIUM mmol/L 133*   POTASSIUM mmol/L 4.9   CHLORIDE mmol/L 98   CO2 mmol/L 26.0   BUN mg/dL 22   CREATININE mg/dL 0.96   GLUCOSE mg/dL 127*   CALCIUM mg/dL 9.8     Significant Diagnostic Studies:   WBC   Date Value Ref Range Status   11/04/2023 7.00 3.40 - 10.80 10*3/mm3 Final     Hemoglobin   Date Value Ref Range Status   11/04/2023 14.9 13.0 - 17.7 g/dL Final     Hematocrit   Date Value Ref Range Status   11/04/2023 43.3 37.5 - 51.0 % Final     Platelets   Date Value Ref Range Status   11/04/2023 219 140 - 450 10*3/mm3 Final     Sodium   Date Value Ref Range Status   11/04/2023 133 (L) 136 - 145 mmol/L Final     Potassium   Date Value Ref Range Status   11/04/2023 4.9 3.5 - 5.2 mmol/L Final     Chloride   Date Value Ref Range Status   11/04/2023 98 98 - 107 mmol/L Final     CO2   Date Value Ref Range Status   11/04/2023 26.0 22.0 - 29.0 mmol/L Final     BUN   Date Value Ref Range Status   11/04/2023 22 8 - 23 mg/dL Final     Creatinine  "  Date Value Ref Range Status   11/04/2023 0.96 0.76 - 1.27 mg/dL Final     Glucose   Date Value Ref Range Status   11/04/2023 127 (H) 65 - 99 mg/dL Final     Calcium   Date Value Ref Range Status   11/04/2023 9.8 8.6 - 10.5 mg/dL Final     AST (SGOT)   Date Value Ref Range Status   11/02/2023 17 1 - 40 U/L Final     ALT (SGPT)   Date Value Ref Range Status   11/02/2023 21 1 - 41 U/L Final     Alkaline Phosphatase   Date Value Ref Range Status   11/02/2023 86 39 - 117 U/L Final     No results found for: \"APTT\", \"INR\"  Color, UA   Date Value Ref Range Status   11/03/2023 Yellow Yellow, Straw Final     Appearance, UA   Date Value Ref Range Status   11/03/2023 Clear Clear Final     pH, UA   Date Value Ref Range Status   11/03/2023 5.5 5.0 - 8.0 Final     Glucose, UA   Date Value Ref Range Status   11/03/2023 Negative Negative Final     Ketones, UA   Date Value Ref Range Status   11/03/2023 Trace (A) Negative Final     Blood, UA   Date Value Ref Range Status   11/03/2023 Large (3+) (A) Negative Final     Leuk Esterase, UA   Date Value Ref Range Status   11/03/2023 Negative Negative Final     Bilirubin, UA   Date Value Ref Range Status   11/03/2023 Negative Negative Final     Urobilinogen, UA   Date Value Ref Range Status   11/03/2023 0.2 E.U./dL 0.2 - 1.0 E.U./dL Final     RBC, UA   Date Value Ref Range Status   11/03/2023 Too Numerous to Count (A) None Seen, 0-2 /HPF Final     WBC, UA   Date Value Ref Range Status   11/03/2023 0-2 None Seen, 0-2 /HPF Final     Bacteria, UA   Date Value Ref Range Status   11/03/2023 None Seen None Seen /HPF Final     No results found for: \"TROPONINT\", \"TROPONINI\", \"BNP\"  No components found for: \"HGBA1C;2\"  No components found for: \"TSH;2\"  Imaging Results (All)       Procedure Component Value Units Date/Time    CT Abdomen Pelvis With Contrast [[id number]] Collected: 11/03/23 0156     Updated: 11/03/23 0202    Narrative:      CT ABDOMEN PELVIS W CONTRAST    Date of Exam: 11/3/2023 1:30 " AM EDT    Indication: LLQ abdominal pain, h/o diverticulitis.    Comparison: 11/6/2017.    Technique: Axial CT images were obtained of the abdomen and pelvis following the uneventful intravenous administration of iodinated contrast. Sagittal and coronal reconstructions were performed.  Automated exposure control and iterative reconstruction   methods were used.        Findings:  The lung bases are clear. Distal esophagus is unremarkable. The heart is enlarged. ICD leads noted in the right heart. There are coronary artery calcifications. No pericardial effusion. There are no acute findings in the superficial soft tissues. There   are bilateral hip prostheses in place. There are bilateral small fat-containing inguinal hernias and there is a small fat-containing periumbilical hernia. There are no acute osseous abnormalities or destructive bone lesions. There are mild thoracolumbar   degenerative changes.    There is a small cystic lesion along the posterior right hemidiaphragm on image 65 measuring 16 mm, likely a benign incidental cyst. The liver is homogeneous. There are gallstones without acute cholecystitis. The biliary tree, pancreas, spleen and   adrenal glands appear within normal limits. There are simple bilateral kidney cysts measuring 56 mm on the right and 7 mm on the left. There is no right-sided hydronephrosis. There is a 2 mm nonobstructing mid left kidney stone. There is mild left-sided   hydronephrosis due to a distal left ureteral stone measuring 3 mm diameter. Urinary bladder is unremarkable. The prostate is normal size. There are multiple phleboliths in the pelvis. There is mild dilatation of the urinary bladder. Patient is status   post appendectomy. There is colonic diverticulosis. No small bowel distention. There is mild diffuse aortoiliac atherosclerotic disease. No evidence of aortic aneurysm. There is aneurysmal dilatation of the right common iliac artery up to 18 mm in the   left common iliac  artery up to 19 mm. No ascites, pneumoperitoneum or lymphadenopathy.      Impression:      Impression:  1.3 mm distal left ureteral stone with mild left-sided hydronephrosis.  2.2 mm nonobstructing left-sided kidney stone.  3.Colonic diverticulosis.  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  5.Cholelithiasis without acute cholecystitis.  6.Cardiomegaly and coronary artery disease.        Electronically Signed: Chris Asif MD    11/3/2023 2:00 AM EDT    Workstation ID: [id number]          Lab Results (last 7 days)       Procedure Component Value Units Date/Time    STONE ANALYSIS - Calculus, Ureter, Left [[id number]] Collected: 11/03/23 1557    Specimen: Calculus from Ureter, Left Updated: 11/04/23 0750    Basic Metabolic Panel [[id number]]  (Abnormal) Collected: 11/04/23 0206    Specimen: Blood Updated: 11/04/23 0246     Glucose 127 mg/dL      BUN 22 mg/dL      Creatinine 0.96 mg/dL      Sodium 133 mmol/L      Potassium 4.9 mmol/L      Chloride 98 mmol/L      CO2 26.0 mmol/L      Calcium 9.8 mg/dL      BUN/Creatinine Ratio 22.9     Anion Gap 9.0 mmol/L      eGFR 81.4 mL/min/1.73     Narrative:      GFR Normal >60  Chronic Kidney Disease <60  Kidney Failure <15    The GFR formula is only valid for adults with stable renal function between ages 18 and 70.    CBC & Differential [[id number]]  (Abnormal) Collected: 11/04/23 0206    Specimen: Blood Updated: 11/04/23 0227    Narrative:      The following orders were created for panel order CBC & Differential.  Procedure                               Abnormality         Status                     ---------                               -----------         ------                     CBC Auto Differential[[id number]]        Abnormal            Final result                 Please view results for these tests on the individual orders.    CBC Auto Differential [[id number]]  (Abnormal) Collected: 11/04/23 0206    Specimen: Blood Updated: 11/04/23 0227     WBC 7.00  10*3/mm3      RBC 4.40 10*6/mm3      Hemoglobin 14.9 g/dL      Hematocrit 43.3 %      MCV 98.4 fL      MCH 33.7 pg      MCHC 34.3 g/dL      RDW 13.4 %      RDW-SD 45.5 fl      MPV 7.3 fL      Platelets 219 10*3/mm3      Neutrophil % 86.5 %      Lymphocyte % 9.1 %      Monocyte % 4.0 %      Eosinophil % 0.0 %      Basophil % 0.4 %      Neutrophils, Absolute 6.00 10*3/mm3      Lymphocytes, Absolute 0.60 10*3/mm3      Monocytes, Absolute 0.30 10*3/mm3      Eosinophils, Absolute 0.00 10*3/mm3      Basophils, Absolute 0.00 10*3/mm3      nRBC 0.1 /100 WBC     Urinalysis, Microscopic Only - Urine, Clean Catch [[id number]]  (Abnormal) Collected: 11/03/23 0112    Specimen: Urine, Clean Catch Updated: 11/03/23 0129     RBC, UA Too Numerous to Count /HPF      WBC, UA 0-2 /HPF      Bacteria, UA None Seen /HPF      Squamous Epithelial Cells, UA 0-2 /HPF      Hyaline Casts, UA 0-2 /LPF      Methodology Automated Microscopy    Urinalysis With Microscopic If Indicated (No Culture) - Urine, Clean Catch [[id number]]  (Abnormal) Collected: 11/03/23 0112    Specimen: Urine, Clean Catch Updated: 11/03/23 0128     Color, UA Yellow     Appearance, UA Clear     pH, UA 5.5     Specific Gravity, UA 1.019     Glucose, UA Negative     Ketones, UA Trace     Bilirubin, UA Negative     Blood, UA Large (3+)     Protein, UA Negative     Leuk Esterase, UA Negative     Nitrite, UA Negative     Urobilinogen, UA 0.2 E.U./dL    Comprehensive Metabolic Panel [[id number]]  (Abnormal) Collected: 11/02/23 2323    Specimen: Blood Updated: 11/02/23 2355     Glucose 165 mg/dL      BUN 18 mg/dL      Creatinine 1.00 mg/dL      Sodium 137 mmol/L      Potassium 4.2 mmol/L      Chloride 100 mmol/L      CO2 25.0 mmol/L      Calcium 9.4 mg/dL      Total Protein 6.7 g/dL      Albumin 4.4 g/dL      ALT (SGPT) 21 U/L      AST (SGOT) 17 U/L      Alkaline Phosphatase 86 U/L      Total Bilirubin 0.3 mg/dL      Globulin 2.3 gm/dL      A/G Ratio 1.9 g/dL      BUN/Creatinine  "Ratio 18.0     Anion Gap 12.0 mmol/L      eGFR 77.5 mL/min/1.73     Narrative:      GFR Normal >60  Chronic Kidney Disease <60  Kidney Failure <15    The GFR formula is only valid for adults with stable renal function between ages 18 and 70.    Lipase [[id number]]  (Normal) Collected: 11/02/23 2323    Specimen: Blood Updated: 11/02/23 2355     Lipase 19 U/L     CBC & Differential [[id number]]  (Abnormal) Collected: 11/02/23 2323    Specimen: Blood Updated: 11/02/23 2329    Narrative:      The following orders were created for panel order CBC & Differential.  Procedure                               Abnormality         Status                     ---------                               -----------         ------                     CBC Auto Differential[[id number]]        Abnormal            Final result                 Please view results for these tests on the individual orders.    CBC Auto Differential [[id number]]  (Abnormal) Collected: 11/02/23 2323    Specimen: Blood Updated: 11/02/23 2329     WBC 7.90 10*3/mm3      RBC 4.47 10*6/mm3      Hemoglobin 14.7 g/dL      Hematocrit 44.1 %      MCV 98.7 fL      MCH 33.0 pg      MCHC 33.4 g/dL      RDW 13.8 %      RDW-SD 46.8 fl      MPV 7.2 fL      Platelets 226 10*3/mm3      Neutrophil % 76.9 %      Lymphocyte % 15.8 %      Monocyte % 4.8 %      Eosinophil % 1.8 %      Basophil % 0.7 %      Neutrophils, Absolute 6.10 10*3/mm3      Lymphocytes, Absolute 1.20 10*3/mm3      Monocytes, Absolute 0.40 10*3/mm3      Eosinophils, Absolute 0.10 10*3/mm3      Basophils, Absolute 0.10 10*3/mm3      nRBC 0.0 /100 WBC           /72 (BP Location: Right arm, Patient Position: Lying)   Pulse 70   Temp 97.4 °F (36.3 °C) (Oral)   Resp 12   Ht 185.4 cm (73\")   Wt 96.3 kg (212 lb 4.9 oz)   SpO2 98%   BMI 28.01 kg/m²     Discharge Exam:  General Appearance:    Alert, cooperative, no distress                          Head:    Normocephalic, without obvious abnormality, atraumatic    "                       Eyes:                            Throat:   Lips, tongue, gums normal                          Neck:   Supple, symmetrical, trachea midline, no JVD                        Lungs:     Clear to auscultation bilaterally, respirations unlabored                Chest Wall:    No tenderness or deformity                        Heart:    Regular rate and rhythm, S1 and S2 normal, no murmur,no  Rub  or gallop                  Abdomen:     Soft, non-tender, bowel sounds active, no masses, no  organomegaly                  Extremities:   Extremities normal, atraumatic, no cyanosis or edema                             Skin:   Skin is warm and dry,  no rashes or palpable lesions                  Neurologic:   no focal deficits noted     Disposition:  Home    Patient Instructions:     Diet and activity as tolerated  Diet Order   Procedures    Diet: Regular/House Diet; Texture: Regular Texture (IDDSI 7); Fluid Consistency: Thin (IDDSI 0)          Discharge Medications        New Medications        Instructions Start Date   cephalexin 500 MG capsule  Commonly known as: KEFLEX   500 mg, Oral, 3 Times Daily      oxyCODONE-acetaminophen 5-325 MG per tablet  Commonly known as: PERCOCET   1-2 tablets, Oral, Every 6 Hours PRN      tamsulosin 0.4 MG capsule 24 hr capsule  Commonly known as: FLOMAX   0.4 mg, Oral, Daily             Continue These Medications        Instructions Start Date   acebutolol 200 MG capsule  Commonly known as: SECTRAL   TAKE 1 CAPSULE EVERY DAY      amitriptyline 10 MG tablet  Commonly known as: ELAVIL   TAKE 2 TABLETS AT BEDTIME      amLODIPine 10 MG tablet  Commonly known as: NORVASC   1 tablet, Oral, Every Night at Bedtime      aspirin 81 MG tablet   1 tablet, Oral, Daily, Do not take day of surgery      atorvastatin 20 MG tablet  Commonly known as: LIPITOR   TAKE 1 TABLET EVERY DAY      cetirizine 10 MG tablet  Commonly known as: zyrTEC   1 tablet, Oral, Daily      Eliquis 5 MG tablet  tablet  Generic drug: apixaban   TAKE 1 TABLET BY MOUTH EVERY 12 HOURS      enalapril 20 MG tablet  Commonly known as: VASOTEC   1 tablet, Oral, Daily      esomeprazole 40 MG capsule  Commonly known as: nexIUM   1 capsule, Oral, Daily      isosorbide mononitrate 60 MG 24 hr tablet  Commonly known as: IMDUR   1 tablet, Oral, Daily      montelukast 10 MG tablet  Commonly known as: SINGULAIR   1 tablet, Oral, Daily      OXcarbazepine 300 MG tablet  Commonly known as: TRILEPTAL   TAKE 2 TABLETS BY MOUTH THREE TIMES DAILY      PROBIOTIC ADVANCED PO   1 capsule, Oral, Daily, Stop on 2/26 for surgery      vitamin C 250 MG tablet  Commonly known as: ASCORBIC ACID   1 tablet, Oral, Daily      Zinc 25 MG tablet   25 mg, Oral, Daily             Stop These Medications      Cinnamon 500 MG capsule     glucosamine-chondroitin 500-400 MG capsule capsule            Future Appointments   Date Time Provider Department Center   3/12/2024 11:00 AM Yonathan Carrizales DO [facility]   3/26/2024 10:15 AM Seipel, Joseph F, MD [facility]   4/22/2024 11:15 AM Dominick Jackson MD [facility]      Follow-up Information       Temo Vidal MD Follow up in 1 week(s).    Specialty: Family Medicine  Contact information:  [address] [place] [facility]  [phone]               Temo Vidal MD .    Specialty: Family Medicine  Contact information:  [address] [facility] [address]  [place] [facility]  [phone]               Chris Patel MD Follow up.    Specialty: Urology  Why: 1-2 weeks for stent removal  Contact information:  [address] [place] [address] [place] [facility]  [phone]                           Discharge Order (From admission, onward)       Start     Ordered    11/04/23 0925  Discharge patient  Once        Expected Discharge Date: 11/04/23   Discharge Disposition: Home or Self Care   Physician of Record for Attribution - Please select from Treatment Team: BEARD,  MILAN OLIVER [[id number]]   Review needed by CMO to determine Physician of Record: No      Question Answer Comment   Physician of Record for Attribution - Please select from Treatment Team MILAN CHATTERJEE    Review needed by CMO to determine Physician of Record No        11/04/23 0929                    Total time spent discharging patient including evaluation,post hospitalization follow up,  medication and post hospitalization instructions and education total time exceeds 30 minutes.    Signed:  Milan Chatterjee MD  11/4/2023  09:32 EDT

## 2023-11-04 NOTE — OUTREACH NOTE
Prep Survey      Flowsheet Row Responses   [facility] from? [facility]   Is LACE score < 7 ? Yes   Eligibility Readm Mgmt   Discharge diagnosis Ureteral stone with hydrnephrosis/CHF   Does the patient have one of the following disease processes/diagnoses(primary or secondary)? CHF   Does the patient have Home health ordered? No   Is there a DME ordered? No   Prep survey completed? Yes            FELIX CAVAZOS - Registered Nurse

## 2023-11-04 NOTE — PLAN OF CARE
Goal Outcome Evaluation:  Plan of Care Reviewed With: patient        Progress: improving  Outcome Evaluation: Patient is to be discharged home with spouse via private vehicle. Per urology, patient can start taking eliquis again tomorrow. Education complete.

## 2023-11-05 NOTE — CASE MANAGEMENT/SOCIAL WORK
Continued Stay Note   [facility]     Patient Name: Mode Duffy  MRN: [id number]  Today's Date: 11/5/2023    Admit Date: 11/2/2023        Discharge Plan       Row Name 11/05/23 0810       Plan    Final Discharge Disposition Code 01 - home or self-care    Final Note home- prior to cm assess.                    Expected Discharge Date and Time       Expected Discharge Date Expected Discharge Time    Nov 4, 2023               Dee Watson RN

## 2023-11-07 ENCOUNTER — READMISSION MANAGEMENT (OUTPATIENT)
Dept: CALL CENTER | Facility: HOSPITAL | Age: 78
End: 2023-11-07
Payer: MEDICARE

## 2023-11-13 ENCOUNTER — NURSE TRIAGE (OUTPATIENT)
Dept: CALL CENTER | Facility: HOSPITAL | Age: 78
End: 2023-11-13
Payer: MEDICARE

## 2023-11-13 NOTE — TELEPHONE ENCOUNTER
Caller saw Dr Patel today and had a urinary stent removed.  He forgot to ask if he could start his cinnamon and glucosamine back. Office number given to caller to speak with office nurse for instructions.

## 2023-11-13 NOTE — TELEPHONE ENCOUNTER
"Reason for Disposition  • [1] Caller requesting NON-URGENT health information AND [2] PCP's office is the best resource    Additional Information  • Negative: [1] Caller is not with the adult (patient) AND [2] reporting urgent symptoms  • Negative: Lab result questions  • Negative: Medication questions  • Negative: Caller can't be reached by phone  • Negative: Caller has already spoken to PCP or another triager  • Negative: RN needs further essential information from caller in order to complete triage  • Negative: Requesting regular office appointment    Answer Assessment - Initial Assessment Questions  1. REASON FOR CALL or QUESTION: \"What is your reason for calling today?\" or \"How can I best help you?\" or \"What question do you have that I can help answer?\"      I forgot to ask my urologist if I can start my cinnamon and glucosamine back.    Protocols used: Information Only Call-ADULT-    "

## 2023-11-14 ENCOUNTER — READMISSION MANAGEMENT (OUTPATIENT)
Dept: CALL CENTER | Facility: HOSPITAL | Age: 78
End: 2023-11-14
Payer: MEDICARE

## 2023-11-14 LAB
COLOR STONE: NORMAL
COM MFR STONE: 100 %
COMPN STONE: NORMAL
LABORATORY COMMENT REPORT: NORMAL
Lab: NORMAL
Lab: NORMAL
PHOTO: NORMAL
SIZE STONE: NORMAL MM
SPEC SOURCE SUBJ: NORMAL
WT STONE: 24 MG

## 2023-11-14 NOTE — OUTREACH NOTE
CHF Week 2 Survey      Flowsheet Row Responses   [facility] patient discharged from? [facility]   Does the patient have one of the following disease processes/diagnoses(primary or secondary)? CHF   Week 2 attempt successful? Yes   Call start time 1435   Call end time 1442   Discharge diagnosis Ureteral stone with hydronephrosis, chronic systolic CHF   Person spoke with today (if not patient) and relationship Patient   Meds reviewed with patient/caregiver? Yes   Does the patient have all medications ordered at discharge? Yes   Is the patient taking all medications as directed (includes completed medication regime)? Yes   Does the patient have a primary care provider?  Yes   Has the patient kept scheduled appointments due by today? Yes  [Patient reports that she has had appt and had stent removed.]   Has home health visited the patient within 72 hours of discharge? N/A   Pulse Ox monitoring None   Psychosocial issues? No   Did the patient receive a copy of their discharge instructions? Yes   Nursing interventions Reviewed instructions with patient   What is the patient's perception of their health status since discharge? Improving   Nursing interventions Nurse provided patient education   Is the patient able to teach back signs and symptoms of worsening condition? (i.e. weight gain, shortness of air, etc.) Yes   If the patient is a current smoker, are they able to teach back resources for cessation? Not a smoker   Is the patient/caregiver able to teach back the hierarchy of who to call/visit for symptoms/problems? PCP, Specialist, Home health nurse, Urgent Care, ED, 911 Yes   CHF Zone this Call Green Zone   Green Zone No new swelling -  feet, ankles and legs look normal for you   Green Zone Interventions Meds as directed, Follow up visits planned   CHF Week 2 call completed? Yes   Revoked No further contact(revokes)-requires comment   Is the patient interested in additional calls from an ambulatory ? No    Would this patient benefit from a Referral to [facility] Social Work? No   Graduated/Revoked comments Patient reports that he is doing well. Denies any questions or needs today. Denies any further pain from kidney stone,  reports passing urine without difficulty.   Call end time 1442            MYA PEÑALOZA - Registered Nurse

## 2023-12-28 ENCOUNTER — TELEPHONE (OUTPATIENT)
Dept: CARDIOLOGY | Facility: CLINIC | Age: 78
End: 2023-12-28
Payer: MEDICARE

## 2023-12-28 NOTE — TELEPHONE ENCOUNTER
Caller: MARTHA    Relationship:PATIENT    Callback number: [phone]   Is it ok to leave a message: [x] Yes [] No    Requested medication for samples: ELIQUIS 5MG    How much medication does the patient currently have left: NONE    Who will be picking up the samples: PATIENT    Do you need information about patient financial assistance for this medication: [] Yes [x] No    Additional details provided: PLEASE GIVE HIM A CALL AND LET HIM KNOW IF HE CAN . THANK YOU.

## 2024-01-04 DIAGNOSIS — G50.0 TRIGEMINAL NEURALGIA: ICD-10-CM

## 2024-01-04 NOTE — TELEPHONE ENCOUNTER
Caller: Mode Duffy    Relationship: Self    Best call back number: [phone]    Requested Prescriptions:   Requested Prescriptions     Pending Prescriptions Disp Refills    OXcarbazepine (TRILEPTAL) 300 MG tablet 540 tablet 0     Sig: Take 2 tablets by mouth 3 (Three) Times a Day.     Pharmacy where request should be sent:    THIS WAS SENT TO THE WRONG PHARMACY LAST TIME.  PLEASE SEND TO [facility] ON [facility] AT [facility].  [phone]      Last office visit with prescribing clinician: 6/28/2023   Last telemedicine visit with prescribing clinician: Visit date not found   Next office visit with prescribing clinician: Visit date not found     Additional details provided by patient:   PT STATES THE SCRIPT WAS SENT WRONG THE LAST TIME IT WAS SENT.    THE INSTRUCTIONS READ TAKE 2 TABLETS BY MOUTH 3 TIMES A DAY BUT PT ONLY TAKES THIS RX ONE TABLET THREE TIMES A DAY. PLEASE CORRECT THE INSTRUCTIONS FOR TAKING THIS RX. PT ONLY NEEDS 3 TABLETS A DAY NOT 6 TABLETS.    PLEASE SEND THIS SCRIPT WITH THESE INSTRUCTIONS.    THE PT IS VERY CONCERNED THIS NEEDS TO BE CORRECT.    Does the patient have less than a 3 day supply:  [] Yes  [x] No    Would you like a call back once the refill request has been completed: [] Yes [] No    If the office needs to give you a call back, can they leave a voicemail: [] Yes [] No    Hamlet Andrews Rep   01/04/24 11:43 EST

## 2024-01-08 RX ORDER — OXCARBAZEPINE 300 MG/1
TABLET, FILM COATED ORAL
Qty: 270 TABLET | Refills: 3 | Status: SHIPPED | OUTPATIENT
Start: 2024-01-08

## 2024-01-09 RX ORDER — AMITRIPTYLINE HYDROCHLORIDE 10 MG/1
TABLET, FILM COATED ORAL
Qty: 180 TABLET | Refills: 0 | Status: SHIPPED | OUTPATIENT
Start: 2024-01-09

## 2024-02-16 RX ORDER — ACEBUTOLOL HYDROCHLORIDE 200 MG/1
CAPSULE ORAL
Qty: 90 CAPSULE | Refills: 3 | Status: SHIPPED | OUTPATIENT
Start: 2024-02-16

## 2024-03-12 ENCOUNTER — OFFICE VISIT (OUTPATIENT)
Dept: CARDIOLOGY | Facility: CLINIC | Age: 79
End: 2024-03-12
Payer: MEDICARE

## 2024-03-12 VITALS
HEIGHT: 73 IN | WEIGHT: 214 LBS | HEART RATE: 69 BPM | OXYGEN SATURATION: 99 % | BODY MASS INDEX: 28.36 KG/M2 | SYSTOLIC BLOOD PRESSURE: 154 MMHG | DIASTOLIC BLOOD PRESSURE: 78 MMHG

## 2024-03-12 DIAGNOSIS — I50.22 CHRONIC SYSTOLIC CONGESTIVE HEART FAILURE: ICD-10-CM

## 2024-03-12 DIAGNOSIS — I25.10 CORONARY ARTERY DISEASE INVOLVING NATIVE CORONARY ARTERY OF NATIVE HEART WITHOUT ANGINA PECTORIS: ICD-10-CM

## 2024-03-12 DIAGNOSIS — Z95.0 CARDIAC PACEMAKER IN SITU: Primary | ICD-10-CM

## 2024-03-12 DIAGNOSIS — I49.5 SICK SINUS SYNDROME: ICD-10-CM

## 2024-03-12 DIAGNOSIS — E78.2 MIXED HYPERLIPIDEMIA: ICD-10-CM

## 2024-03-12 PROCEDURE — 1160F RVW MEDS BY RX/DR IN RCRD: CPT | Performed by: INTERNAL MEDICINE

## 2024-03-12 PROCEDURE — 3077F SYST BP >= 140 MM HG: CPT | Performed by: INTERNAL MEDICINE

## 2024-03-12 PROCEDURE — 99214 OFFICE O/P EST MOD 30 MIN: CPT | Performed by: INTERNAL MEDICINE

## 2024-03-12 PROCEDURE — 3078F DIAST BP <80 MM HG: CPT | Performed by: INTERNAL MEDICINE

## 2024-03-12 PROCEDURE — 1159F MED LIST DOCD IN RCRD: CPT | Performed by: INTERNAL MEDICINE

## 2024-03-12 PROCEDURE — 93000 ELECTROCARDIOGRAM COMPLETE: CPT | Performed by: INTERNAL MEDICINE

## 2024-03-12 RX ORDER — AMPICILLIN TRIHYDRATE 250 MG
500 CAPSULE ORAL DAILY
COMMUNITY

## 2024-03-12 RX ORDER — SODIUM PHOSPHATE,MONO-DIBASIC 19G-7G/118
ENEMA (ML) RECTAL
COMMUNITY

## 2024-03-12 RX ORDER — CYCLOBENZAPRINE HCL 5 MG
TABLET ORAL
COMMUNITY
Start: 2024-02-20

## 2024-03-12 RX ORDER — OXCARBAZEPINE 150 MG/1
TABLET, FILM COATED ORAL
COMMUNITY
Start: 2024-02-02

## 2024-03-12 RX ORDER — VIT C/B6/B5/MAGNESIUM/HERB 173 50-5-6-5MG
CAPSULE ORAL
COMMUNITY

## 2024-03-12 NOTE — PROGRESS NOTES
Cardiology Office Visit      Encounter Date:  03/12/2024    Patient ID:   Mode Duffy is a 78 y.o. male.    Reason For Followup:  Coronary artery disease  Cardiomyopathy    Brief Clinical History:  Dear Dr. Vidal, Temo Hadley MD    I had the pleasure of seeing Mode Duffy today. As you are well aware, this is a 78 y.o. male with a known history of ischemic heart disease. He underwent cardiac catheterization with resultant PCI of an obtuse marginal branch and diagonal branch in November 2014. He has additional history of hypertension with hypertensive cardiovascular disease, dyslipidemia, paroxysmal atrial fibrillation, antiplatelet therapy as well as sick sinus syndrome status post biventricular permanent pacemaker placement. He presents today for followup on the above conditions.     Interval History:  He denies any chest pain pressure heaviness or tightness.    He denies any shortness of breath out of character.  He denies any PND orthopnea.  He denies any syncope or near syncope.  He reports feeling in his usual state of cardiac health.    Although his blood pressure is elevated today, he reports that his reading at home before coming in was 138/80.    [facility]  We reviewed those today.    His blood pressure is elevated today.  This is typical of his office visits.  His blood pressure at home was 138/80 before coming to the office today.    03/12/2024        Patient reports no chest pain or shortness of breath. He has no palpitations. No new issues to report. Blood pressure is elevated today but was 137/78 at home before coming to the office.      Assessment & Plan     Impressions:  Coronary artery disease status post PCI and stenting most recently in November of 2014.        This was cutting Balloon angioplasty of a circumflex branch and a diagonal branch.   Dyslipidemia.  Hypertension with hypertensive cardiovascular disease with a white coat  "component  Cardiomyopathy most recent EF of 35 to 40% in October 2019 echocardiogram  Sick sinus syndrome status post biventricular permanent pacemaker placement.  Paroxysmal atrial fibrillation  Coagulopathy secondary to Eliquis  Lumbago  Hyponatremia secondary to Trileptal  Renal cysts  History of trigeminal neuralgia presently treated with Trileptal.  Hip pain currently undergoing preoperative cardiovascular risk assessment for right hip surgery.  Trigeminal neuralgia     Recommendations:  Continuation of his current cardiovascular regimen at the present time.     This includes antihypertensives, anticoagulants, and antiplatelets.  Follow-up with EP as scheduled  Follow-up in 9 months, sooner should there be difficulties    Diagnoses and all orders for this visit:    1. Cardiac pacemaker in situ (Primary)  -     ECG 12 Lead    2. Coronary artery disease involving native coronary artery of native heart without angina pectoris  -     ECG 12 Lead    3. Mixed hyperlipidemia  -     ECG 12 Lead    4. Sick sinus syndrome  -     ECG 12 Lead    5. Chronic systolic congestive heart failure  Overview:  Added automatically from request for surgery [id number]    Orders:  -     ECG 12 Lead            Objective:    Vitals:  Vitals:    03/12/24 1053   BP: 154/78   Pulse: 69   SpO2: 99%   Weight: 97.1 kg (214 lb)   Height: 185.4 cm (73\")     Body mass index is 28.23 kg/m².      Physical Exam:    General: Alert, cooperative, no distress, appears stated age  Head:  Normocephalic, atraumatic, mucous membranes moist  Eyes:  Conjunctiva/corneas clear, EOM's intact     Neck:  Supple,  no bruit    Lungs: Clear to auscultation bilaterally, no wheezes rhonchi rales are noted  Chest wall: No tenderness.  Pacing device in place  Heart::  Regular rate and rhythm, S1 and S2 normal, 1/6 holosystolic murmur.  No rub or gallop  Abdomen: Soft, non-tender, nondistended bowel sounds active  Extremities: No cyanosis, clubbing, or edema  Pulses: 2+ and " symmetric all extremities  Skin:  No rashes or lesions  Neuro/psych: A&O x3. CN II through XII are grossly intact with appropriate affect      Allergies:  No Known Allergies    Medication Review:     Current Outpatient Medications:     acebutolol (SECTRAL) 200 MG capsule, TAKE 1 CAPSULE EVERY DAY, Disp: 90 capsule, Rfl: 3    amitriptyline (ELAVIL) 10 MG tablet, TAKE 2 TABLETS AT BEDTIME, Disp: 180 tablet, Rfl: 0    amLODIPine (NORVASC) 10 MG tablet, Take 1 tablet by mouth every night at bedtime. Indications: High Blood Pressure Disorder, Disp: , Rfl:     aspirin 81 MG tablet, Take 1 tablet by mouth Daily. Do not take day of surgery  Indications: Venous Thromboembolism, Disp: , Rfl:     atorvastatin (LIPITOR) 20 MG tablet, TAKE 1 TABLET EVERY DAY, Disp: 90 tablet, Rfl: 3    Calcium Carb-Cholecalciferol (CALCIUM 500 + D PO), Take  by mouth., Disp: , Rfl:     cetirizine (zyrTEC) 10 MG tablet, Take 1 tablet by mouth Daily. Indications: Upper Respiratory Tract Allergy, Disp: , Rfl:     Cinnamon 500 MG capsule, Take 1 capsule by mouth Daily., Disp: , Rfl:     cyclobenzaprine (FLEXERIL) 5 MG tablet, TAKE 1 TABLET BY MOUTH EVERY 8 HOURS AS NEEDED FOR MUSCLE TIGHTNESS, Disp: , Rfl:     Eliquis 5 MG tablet tablet, TAKE 1 TABLET BY MOUTH EVERY 12 HOURS, Disp: 60 tablet, Rfl: 3    enalapril (VASOTEC) 20 MG tablet, Take 1 tablet by mouth Daily. Indications: High Blood Pressure Disorder, Disp: , Rfl:     esomeprazole (nexIUM) 40 MG capsule, Take 1 capsule by mouth Daily. Indications: Gastroesophageal Reflux Disease, Disp: , Rfl:     glucosamine-chondroitin 500-400 MG capsule capsule, Take  by mouth 3 (Three) Times a Day With Meals., Disp: , Rfl:     isosorbide mononitrate (IMDUR) 60 MG 24 hr tablet, Take 1 tablet by mouth Daily. Indications: Stable Angina Pectoris, Disp: , Rfl:     montelukast (SINGULAIR) 10 MG tablet, Take 1 tablet by mouth Daily., Disp: , Rfl:     OXcarbazepine (TRILEPTAL) 150 MG tablet, TAKE ONE TO TWO TABLETS  BY MOUTH DAILY AS NEEDED, Disp: , Rfl:     OXcarbazepine (TRILEPTAL) 300 MG tablet, Take 1 tab three times a day, Disp: 270 tablet, Rfl: 3    oxyCODONE-acetaminophen (PERCOCET) 5-325 MG per tablet, Take 1-2 tablets by mouth Every 6 (Six) Hours As Needed for Severe Pain., Disp: 15 tablet, Rfl: 0    Probiotic Product (PROBIOTIC ADVANCED PO), Take 1 capsule by mouth Daily. Stop on 2/26 for surgery  Indications: Edith replacement, Disp: , Rfl:     tamsulosin (FLOMAX) 0.4 MG capsule 24 hr capsule, Take 1 capsule by mouth Daily., Disp: 30 capsule, Rfl: 0    Turmeric 500 MG capsule, Take  by mouth., Disp: , Rfl:     vitamin C (ASCORBIC ACID) 250 MG tablet, Take 1 tablet by mouth Daily. Indications: supplement, Disp: , Rfl:     Zinc 25 MG tablet, Take 25 mg by mouth Daily. Indications: Zinc Deficiency, Disp: , Rfl:     Family History:  Family History   Problem Relation Age of Onset    Cancer Mother     Heart disease Father     Arthritis Father     Diabetes Father     Hypertension Father     Hyperlipidemia Father     Kidney disease Father     Malig Hyperthermia Neg Hx        Past Medical History:  Past Medical History:   Diagnosis Date    Arthritis     Arthritis     BPH (benign prostatic hyperplasia)     CHF (congestive heart failure)     Coronary artery disease     dr. Carrizales    Deviated septum     FH: cataracts     Gait abnormality     GERD (gastroesophageal reflux disease)     History of trigeminal neuralgia     Hyperlipidemia     Hypertension     Renal cyst     Sciatica     Sleep apnea     unable to tolerate machine       Past Surgical History:  Past Surgical History:   Procedure Laterality Date    APPENDECTOMY      CARDIAC CATHETERIZATION      CARDIAC CATHETERIZATION N/A 3/5/2020    Procedure: Percutaneous Subclavian Intervention;  Surgeon: Nick Greenberg MD;  Location: [facility] INVASIVE LOCATION;  Service: Cardiovascular;  Laterality: N/A;    CARDIAC ELECTROPHYSIOLOGY PROCEDURE N/A 1/29/2020     Procedure: Biventricular Device Upgrade;  Surgeon: Dominick Jackson MD;  Location: [facility] CATH INVASIVE LOCATION;  Service: Cardiovascular    CARDIAC ELECTROPHYSIOLOGY PROCEDURE N/A 3/5/2020    Procedure: Lead Revision;  Surgeon: Nick Greenberg MD;  Location: [facility] CATH INVASIVE LOCATION;  Service: Cardiovascular;  Laterality: N/A;    CORONARY STENT PLACEMENT  2014    x 2    CYSTOSCOPY, URETEROSCOPY, RETROGRADE PYELOGRAM, STENT INSERTION Left 11/3/2023    Procedure: CYSTOSCOPY URETEROSCOPY STONE EXTRACTION STENT INSERTION;  Surgeon: Chris Patel MD;  Location: [facility] MAIN OR;  Service: Urology;  Laterality: Left;    IMPLANTABLE CARDIOVERTER DEFIBRILLATOR LEAD REPLACEMENT/POCKET REVISION Right 10/19/2022    Procedure: RT. TRANSVENOUS ICD LEAD EXTRACTION;  Surgeon: Naveed Rios MD;  Location: [facility];  Service: Cardiology;  Laterality: Right;    PROSTATE SURGERY      REFRACTIVE SURGERY      THORACOTOMY Left 3/4/2020    Procedure: THORACOTOMY MINI WITH LEAD PLACEMENT;  Surgeon: Justin Aguilar MD;  Location: [facility];  Service: Cardiothoracic;  Laterality: Left;    TOTAL HIP ARTHROPLASTY Bilateral     different times       Social History:  Social History     Socioeconomic History    Marital status:    Tobacco Use    Smoking status: Former     Current packs/day: 0.00     Types: Cigarettes     Quit date: 1968     Years since quittin.2     Passive exposure: Past    Smokeless tobacco: Never   Vaping Use    Vaping status: Never Used   Substance and Sexual Activity    Alcohol use: Yes     Alcohol/week: 7.0 standard drinks of alcohol     Types: 7 Glasses of wine per week     Comment: RED WINE BEFORE DINNER    Drug use: No    Sexual activity: Defer       Review of Systems:  The following systems were reviewed as they relate to the cardiovascular system: Constitutional, Eyes, ENT, Cardiovascular, Respiratory, Gastrointestinal, Integumentary, Neurological, Psychiatric, Hematologic,  Endocrine, Musculoskeletal, and Genitourinary. The pertinent cardiovascular findings are reported above with all other cardiovascular points within those systems being negative.    Diagnostic Study Review:     Current Electrocardiogram:    ECG 12 Lead    Date/Time: 3/12/2024 5:35 PM  Performed by: Yonathan Carrizales DO    Authorized by: Yonathan Carrizales DO  Comparison: not compared with previous ECG   Previous ECG: no previous ECG available  Comments: Normal sinus rhythm with a ventricular rate of 70 bpm.  Right bundle branch block.  Prolonged QT and QTc intervals of 508 and 549 ms.  Repolarization changes consider ischemia.          Laboratory Data:  Lab Results   Component Value Date    GLUCOSE 127 (H) 11/04/2023    BUN 22 11/04/2023    CREATININE 0.96 11/04/2023    EGFRIFNONA 102 03/07/2020    BCR 22.9 11/04/2023    K 4.9 11/04/2023    CO2 26.0 11/04/2023    CALCIUM 9.8 11/04/2023    ALBUMIN 4.4 11/02/2023    LABIL2 1.6 11/06/2017    AST 17 11/02/2023    ALT 21 11/02/2023     Lab Results   Component Value Date    GLUCOSE 127 (H) 11/04/2023    CALCIUM 9.8 11/04/2023     (L) 11/04/2023    K 4.9 11/04/2023    CO2 26.0 11/04/2023    CL 98 11/04/2023    BUN 22 11/04/2023    CREATININE 0.96 11/04/2023    EGFRIFNONA 102 03/07/2020    BCR 22.9 11/04/2023    ANIONGAP 9.0 11/04/2023     Lab Results   Component Value Date    WBC 7.00 11/04/2023    HGB 14.9 11/04/2023    HCT 43.3 11/04/2023    MCV 98.4 (H) 11/04/2023     11/04/2023     Lab Results   Component Value Date    CHOL 137 03/02/2020    TRIG 115 03/02/2020    HDL 55 03/02/2020    LDL 59 03/02/2020     Lab Results   Component Value Date    HGBA1C 5.9 (H) 03/02/2020     Lab Results   Component Value Date    INR 0.99 10/05/2022    INR 1.05 05/22/2022    INR 1.04 03/05/2020    PROTIME 10.2 10/05/2022    PROTIME 10.8 05/22/2022    PROTIME 10.9 03/05/2020       Most Recent Echo:  Results for orders placed during the hospital encounter of  07/18/23    Adult Transthoracic Echo Complete w/ Color, Spectral and Contrast if necessary per protocol    Interpretation Summary    Left ventricular systolic function is normal. Calculated left ventricular EF = 56.2%    Left ventricular wall thickness is consistent with mild concentric hypertrophy.    Left ventricular diastolic function is consistent with (grade Ia w/high LAP) impaired relaxation.    The left atrial cavity is mild to moderately dilated.    Estimated right ventricular systolic pressure from tricuspid regurgitation is mildly elevated (35-45 mmHg).    Transthoracic echocardiography reveals mild LVH with EF of 56% with diastolic dysfunction.  Mild to moderate left atrial lodgment noted.  Mild to moderate TR with mild pulmonary hypertension.  Trace to mild MR and AI noted.  No effusion.    Electronically signed by Dominick Jackson MD, 07/18/23, 1:42 PM EDT.       Most Recent Stress Test:       Most Recent Cardiac Catheterization:   Results for orders placed during the hospital encounter of 03/04/20    Cardiac Catheterization/Vascular Study    Narrative  Lead revision procedure note.    Procedure:  Failed PTA attempt to occluded left subclavian vein.  Placement of RV  lead through right subclavian vein approach and tunneled into left subclavian pocket and open pocket and connected RV lead to the generator and capped the existing malfunctioning RV lead.  Temporary pacemaker placement through right femoral venous approach    Indication: This is a 74-year-old with complete heart block and previous history of dual-chamber pacemaker over 21 years ago and LV dysfunction EF of 35 to 40% probably due to 100% pacer dependent status and CHF with lower extremity edema and tiredness and history of CAD and PCI, hypertension, dyslipidemia here for procedure.  Patient was initially attempted to have left subclavian approach LV lead placement but was found to have a occluded left subclavian vein therefore was  referred to surgery and underwent thoracotomy for LV lead placement and was noticed to have RV lead malfunction, postoperatively, therefore is here for procedure.      Hardware:  Right ventricular   lead is Medtronic [id number] serial number [id number].      Thresholds:  Right ventricular threshold revealed a R wave amplitude of over 6 mV at 1.2V and impedance of 700 ohms        Description of procedure:  Under conscious sedation (initially was given by me and monitored.  Then subsequently anesthesiologist came in gave and monitored anesthesia ) and local anesthesia , existing left subclavian pocket was opened and subclavian vein was accessed and a 035 Glidewire was advanced into the occlusion and with the help of a glide catheter attempts were done to cross the 100% occluded subclavian vein but was unsuccessful.  Then the right subclavian vein was accessed and Medtronic 65 cm lead was placed in the RV adequate thresholds obtained then it was secured on the right side then tunneled to the left side with the help of electrophysiologist Dr. Jackson, then the generator was explanted from the pocket pocket cleaned with antibiotic solution existing RV lead was disconnected and capped and the new RV lead from the right subclavian which was tunneled was connected to the generator placed in the pocket and closed pocket with sutures. patient tolerated procedure well complications were none.    Blood loss :blood loss was 5 cc.        Conclusion: Successful placement of right ventricular lead through right subclavian vein approach which was tunneled to the left infraclavicular area and connected to the generator      Plan:  Per primary electrophysiologist Dr. Jackson  We will monitor overnight.  Routine post pacemaker device implant care.  Recheck device in a.m.  Pacer care and wound care education  Follow-up in 1 week in the office for staple removal  Follow-up in pacemaker clinic       NOTE:     Today,03/12/2024 ,the  "following portions of the patient's note were reviewed, confirmed and/or updated as appropriate: History of present illness/Interval history, physical examination, assessment & plan, allergies, current medications, past family history, past medical history, past social history, past surgical history and problem list.    Labs pertinent to today's visit on 03/12/2024 (including but not limited to CBC, CMP, and lipid profiles) were requested from the patient's primary care provider/hospital/clinical laboratory.  If the labs were available for the visit, they were reviewed with the patient.  If they were not available, when received, special interest will be made to the labs pertinent to this visit.  The patient's most recent \"in-house\" labs are noted below and have been reviewed.  Outside labs pertinent to this visit are scanned into the record and have been reviewed.    Discussions held today, 03/12/2024,regarding procedures included risk, benefits, and options including but not limited to: Death, MI, stroke, pain, bleeding, infection, and possible need for vascular/thoracic/cardiothoracic surgery.    Copied information within this note was reviewed and is current as of 03/12/2024.    Assessment and plan noted herein represents the current plan of care as of 03/12/2024.    Significant resources from our office and staff are inherent in engaging this patient today,03/12/2024,and in a continuous and active collaborative plan of care related to their chronic cardiovascular conditions outlined below.  The management of these conditions requires the direction of our service with specialized clinical knowledge, skills, and experience.  This collaborative care includes but is not limited to patient education, expectations and responsibilities, shared decision making around therapeutic goals, and shared commitments to achieve those goals.  "

## 2024-03-25 NOTE — PROGRESS NOTES
Chief Complaint  Follow-up (Trigeminal neuralgia )    Subjective          Mode Duffy presents to [facility] NEUROLOGY for  Trigeminal neuralgia   History of Present Illness  Patient is here to f/u on Trigeminal neuralgia patient states he has been doing well,last flare-up 2-3 days ago lasted about 2 hrs and then went away he currently takes trileptal 300 mg 1 qid and 150 mg prn on top of other dose    He had proton beam therapy, helped for 6 months then came back     [facility]      ====PREV. OV 6/28/23 THERESA FISHMAN APRN======  History of Present Illness Mr. Duffy is a very pleasant 77-year-old  male who has presented today for a follow-up on TGN, he reports approximately 1 month ago that his pain started to get worse.  He has a right TGN.  Currently he is on oxcarbazepine 300 mg 3 times daily and Elavil 20 mg every hour.  He states the pain will wax and wane but when it presents he cannot talk cannot brush his teeth cannot eat and its extremely severe.  The patient also has a history of low sodium levels.       He is drinking V8 and his most recent labs are as follows: June 5, 2023 BUN 15, creatinine 0.86, EGFR 89, sodium 139.     I discussed with the patient and increased to 1200 mg and notified him that we would need to do labs in 2 months to include liver function kidney function and sodium.  Otherwise I discussed possibly starting gabapentin in addition to the oxcarbazepine.  The patient states that he had done some research and seen that Aimovig was sometimes beneficial and TGN and asked about potentially using that drug.  I notified the patient that that is an injection and he states he cannot give himself a shot.  He also states his wife cannot give  him a shot.  He may be interested in starting the gabapentin in the future.       Please call the patient regarding his abnormal result.  I called Mr. Duffy and notified him of his low sodium at 131 mmol/L which  is low in the normal ranges 136-145.  He states he will increase his salt intake.  The patient is currently on 1200 mg of carbamazepine for trigeminal neuralgia pain.  He states he is currently under control and may try to start to decrease the level to help his sodium level rise.  He was notified that his creatinine was normal.  His hepatic function panel was within normal limits.  His Tegretol level was low at less than 2.0.  Normal ranges 4 to 12 mcg/mL.  The patient was ordered a standing sodium level every 3 months.  He will keep his next follow-up appointment and is to call if he has any increase in trigeminal pain.       Current Outpatient Medications:     acebutolol (SECTRAL) 200 MG capsule, TAKE 1 CAPSULE EVERY DAY, Disp: 90 capsule, Rfl: 3    amitriptyline (ELAVIL) 10 MG tablet, TAKE 2 TABLETS AT BEDTIME, Disp: 180 tablet, Rfl: 0    amLODIPine (NORVASC) 10 MG tablet, Take 1 tablet by mouth every night at bedtime. Indications: High Blood Pressure Disorder, Disp: , Rfl:     aspirin 81 MG tablet, Take 1 tablet by mouth Daily. Do not take day of surgery  Indications: Venous Thromboembolism, Disp: , Rfl:     atorvastatin (LIPITOR) 20 MG tablet, TAKE 1 TABLET EVERY DAY, Disp: 90 tablet, Rfl: 3    Calcium Carb-Cholecalciferol (CALCIUM 500 + D PO), Take  by mouth., Disp: , Rfl:     cetirizine (zyrTEC) 10 MG tablet, Take 1 tablet by mouth Daily. Indications: Upper Respiratory Tract Allergy, Disp: , Rfl:     Cinnamon 500 MG capsule, Take 1 capsule by mouth Daily., Disp: , Rfl:     cyclobenzaprine (FLEXERIL) 5 MG tablet, TAKE 1 TABLET BY MOUTH EVERY 8 HOURS AS NEEDED FOR MUSCLE TIGHTNESS, Disp: , Rfl:     Eliquis 5 MG tablet tablet, TAKE 1 TABLET BY MOUTH EVERY 12 HOURS, Disp: 60 tablet, Rfl: 3    enalapril (VASOTEC) 20 MG tablet, Take 1 tablet by mouth Daily. Indications: High Blood Pressure Disorder, Disp: , Rfl:     esomeprazole (nexIUM) 40 MG capsule, Take 1 capsule by mouth Daily. Indications: Gastroesophageal  "Reflux Disease, Disp: , Rfl:     glucosamine-chondroitin 500-400 MG capsule capsule, Take  by mouth 3 (Three) Times a Day With Meals., Disp: , Rfl:     isosorbide mononitrate (IMDUR) 60 MG 24 hr tablet, Take 1 tablet by mouth Daily. Indications: Stable Angina Pectoris, Disp: , Rfl:     montelukast (SINGULAIR) 10 MG tablet, Take 1 tablet by mouth Daily., Disp: , Rfl:     OXcarbazepine (TRILEPTAL) 150 MG tablet, TAKE ONE TO TWO TABLETS BY MOUTH DAILY AS NEEDED, Disp: , Rfl:     OXcarbazepine (TRILEPTAL) 300 MG tablet, Take 1 tab three times a day (Patient taking differently: 1 tablet. Take 1 tab four times a day), Disp: 270 tablet, Rfl: 3    oxyCODONE-acetaminophen (PERCOCET) 5-325 MG per tablet, Take 1-2 tablets by mouth Every 6 (Six) Hours As Needed for Severe Pain., Disp: 15 tablet, Rfl: 0    Probiotic Product (PROBIOTIC ADVANCED PO), Take 1 capsule by mouth Daily. Stop on 2/26 for surgery  Indications: Edith replacement, Disp: , Rfl:     tamsulosin (FLOMAX) 0.4 MG capsule 24 hr capsule, Take 1 capsule by mouth Daily., Disp: 30 capsule, Rfl: 0    Turmeric 500 MG capsule, Take  by mouth., Disp: , Rfl:     vitamin C (ASCORBIC ACID) 250 MG tablet, Take 1 tablet by mouth Daily. Indications: supplement, Disp: , Rfl:     Zinc 25 MG tablet, Take 25 mg by mouth Daily. Indications: Zinc Deficiency, Disp: , Rfl:     Review of Systems   Genitourinary:  Positive for difficulty urinating.   Musculoskeletal:  Positive for back pain and gait problem.   Hematological:  Bruises/bleeds easily.   All other systems reviewed and are negative.         Objective:    Vital Signs:   /76   Pulse 70   Ht 185.4 cm (73\")   Wt 98 kg (216 lb)   BMI 28.50 kg/m²     Physical Exam  Vitals reviewed.   Cardiovascular:      Rate and Rhythm: Normal rate.   Pulmonary:      Effort: Pulmonary effort is normal. No respiratory distress.   Neurological:      Mental Status: He is alert and oriented to person, place, and time.   Psychiatric:         " Mood and Affect: Mood normal.        Result Review :                Neurologic Exam     Mental Status   Oriented to person, place, and time.         Assessment and Plan    Diagnoses and all orders for this visit:    1. Trigeminal neuralgia     Continue trileptal 1200mg, [facility]  Consider add gabapentin and lower trileptal if hyponatremia becomes more of a concern     Follow Up   Return in about 1 year (around 3/26/2025).  Patient was given instructions and counseling regarding his condition or for health maintenance advice. Please see specific information pulled into the AVS if appropriate.     This document has been electronically signed by Joseph Seipel, MD on March 26, 2024 10:50 EDT

## 2024-03-26 ENCOUNTER — OFFICE VISIT (OUTPATIENT)
Dept: NEUROLOGY | Facility: CLINIC | Age: 79
End: 2024-03-26
Payer: MEDICARE

## 2024-03-26 VITALS
DIASTOLIC BLOOD PRESSURE: 76 MMHG | SYSTOLIC BLOOD PRESSURE: 146 MMHG | HEIGHT: 73 IN | BODY MASS INDEX: 28.63 KG/M2 | WEIGHT: 216 LBS | HEART RATE: 70 BPM

## 2024-03-26 DIAGNOSIS — G50.0 TRIGEMINAL NEURALGIA: ICD-10-CM

## 2024-03-26 PROCEDURE — 99214 OFFICE O/P EST MOD 30 MIN: CPT | Performed by: PSYCHIATRY & NEUROLOGY

## 2024-03-26 PROCEDURE — 1160F RVW MEDS BY RX/DR IN RCRD: CPT | Performed by: PSYCHIATRY & NEUROLOGY

## 2024-03-26 PROCEDURE — 3077F SYST BP >= 140 MM HG: CPT | Performed by: PSYCHIATRY & NEUROLOGY

## 2024-03-26 PROCEDURE — 1159F MED LIST DOCD IN RCRD: CPT | Performed by: PSYCHIATRY & NEUROLOGY

## 2024-03-26 PROCEDURE — 3078F DIAST BP <80 MM HG: CPT | Performed by: PSYCHIATRY & NEUROLOGY

## 2024-03-26 RX ORDER — OXCARBAZEPINE 300 MG/1
300 TABLET, FILM COATED ORAL 4 TIMES DAILY
Qty: 360 TABLET | Refills: 3 | Status: SHIPPED | OUTPATIENT
Start: 2024-03-26

## 2024-03-28 ENCOUNTER — TELEPHONE (OUTPATIENT)
Dept: NEUROLOGY | Facility: CLINIC | Age: 79
End: 2024-03-28
Payer: MEDICARE

## 2024-03-28 NOTE — TELEPHONE ENCOUNTER
Provider: SEIPEL    Caller: PATIENT    Relationship to Patient: SELF    Pharmacy: LISTED    Phone Number: [phone]    Reason for Call:  PT STATES HE RECEIVED CARBARBAZEPINE FROM [facility] [place].  HE DOES NOT GET THIS MEDICATION FROM [facility] [place] AND HE IS NOT SURE WHY THIS WAS SENT TO HIM.  PT STATES HE WAS IN THE OFFICE ON 3/26/24 AND HE TOLD THE PROVIDER HE DID NOT NEED ANY  MEDICATION.      PLEASE CALL PT TO ADVISE     THANK YOU

## 2024-03-29 NOTE — TELEPHONE ENCOUNTER
Left detail message , that is the only pharmacy on file.  IF HE CALLS BACK HUB get the correct pharmacy and update it and take [facility] out if he doesn't want to use it.   Thank you

## 2024-04-09 RX ORDER — AMITRIPTYLINE HYDROCHLORIDE 10 MG/1
TABLET, FILM COATED ORAL
Qty: 180 TABLET | Refills: 3 | Status: SHIPPED | OUTPATIENT
Start: 2024-04-09

## 2024-04-22 ENCOUNTER — OFFICE VISIT (OUTPATIENT)
Dept: CARDIOLOGY | Facility: CLINIC | Age: 79
End: 2024-04-22
Payer: MEDICARE

## 2024-04-22 VITALS
HEART RATE: 69 BPM | OXYGEN SATURATION: 96 % | WEIGHT: 214 LBS | BODY MASS INDEX: 28.36 KG/M2 | SYSTOLIC BLOOD PRESSURE: 138 MMHG | HEIGHT: 73 IN | DIASTOLIC BLOOD PRESSURE: 82 MMHG

## 2024-04-22 DIAGNOSIS — I10 PRIMARY HYPERTENSION: ICD-10-CM

## 2024-04-22 DIAGNOSIS — I25.10 CORONARY ARTERY DISEASE INVOLVING NATIVE CORONARY ARTERY OF NATIVE HEART WITHOUT ANGINA PECTORIS: ICD-10-CM

## 2024-04-22 DIAGNOSIS — E78.2 MIXED HYPERLIPIDEMIA: ICD-10-CM

## 2024-04-22 DIAGNOSIS — Z95.0 CARDIAC PACEMAKER IN SITU: Primary | ICD-10-CM

## 2024-04-22 DIAGNOSIS — I48.0 PAROXYSMAL ATRIAL FIBRILLATION: ICD-10-CM

## 2024-04-22 PROCEDURE — 3075F SYST BP GE 130 - 139MM HG: CPT | Performed by: INTERNAL MEDICINE

## 2024-04-22 PROCEDURE — 99214 OFFICE O/P EST MOD 30 MIN: CPT | Performed by: INTERNAL MEDICINE

## 2024-04-22 PROCEDURE — 1159F MED LIST DOCD IN RCRD: CPT | Performed by: INTERNAL MEDICINE

## 2024-04-22 PROCEDURE — 1160F RVW MEDS BY RX/DR IN RCRD: CPT | Performed by: INTERNAL MEDICINE

## 2024-04-22 PROCEDURE — 3079F DIAST BP 80-89 MM HG: CPT | Performed by: INTERNAL MEDICINE

## 2024-04-22 NOTE — PROGRESS NOTES
CC--complete heart block, coronary artery disease    Sub--78-year-old pleasant patient has atrial fibrillation and complete heart block and coronary artery disease.  Patient had prior biventricular pacemaker with a right ventricular pacing lead placed from right subclavian approach because of left subclavian occlusion.  Patient had erosion of right-sided pocket and subsequently underwent lead extraction on the right side and left to LV pacing only.  Patient has left-sided implant in 99 and had significant issues with RV pacing alone with heart failure symptoms and LV lead was placed epicardial lead because of left subclavian occlusion.  After LV lead epicardial placement, patient was found to have RV lead malfunction needing a new RV lead placed on the right side which was removed secondary to pocket infection on the right side.      Past Medical History:   Diagnosis Date    Arthritis     Arthritis     BPH (benign prostatic hyperplasia)     CHF (congestive heart failure) (HCC)     Coronary artery disease     dr. Carrizales    Deviated septum     FH: cataracts     Gait abnormality     GERD (gastroesophageal reflux disease)     History of trigeminal neuralgia     Hyperlipidemia     Hypertension     Renal cyst     Sciatica     Sleep apnea     unable to tolerate machine     Past Surgical History:   Procedure Laterality Date    APPENDECTOMY      CARDIAC CATHETERIZATION      CARDIAC CATHETERIZATION N/A 3/5/2020    Procedure: Percutaneous Subclavian Intervention;  Surgeon: Nick Greenberg MD;  Location: [facility] CATH INVASIVE LOCATION;  Service: Cardiovascular;  Laterality: N/A;    CARDIAC ELECTROPHYSIOLOGY PROCEDURE N/A 1/29/2020    Procedure: Biventricular Device Upgrade;  Surgeon: Dominick Jackson MD;  Location: [facility] CATH INVASIVE LOCATION;  Service: Cardiovascular    CARDIAC ELECTROPHYSIOLOGY PROCEDURE N/A 3/5/2020    Procedure: Lead Revision;  Surgeon: Nick Greenberg MD;  Location: [facility] CATH  INVASIVE LOCATION;  Service: Cardiovascular;  Laterality: N/A;    CORONARY STENT PLACEMENT  2014    x 2    PROSTATE SURGERY      REFRACTIVE SURGERY      THORACOTOMY Left 3/4/2020    Procedure: THORACOTOMY MINI WITH LEAD PLACEMENT;  Surgeon: Justin Aguilar MD;  Location: [facility];  Service: Cardiothoracic;  Laterality: Left;    TOTAL HIP ARTHROPLASTY Bilateral     different times           Physical Exam    General:      well developed, well nourished, in no acute distress.    Head:      normocephalic and atraumatic.    Eyes:      PERRL/EOM intact, conjunctivae and sclerae clear without nystagmus.    Neck:      no  thyromegaly, trachea central with normal respiratory effort  Lungs:      clear bilaterally to auscultation.    Heart:       regular rate and rhythm, S1, S2 without murmurs, rubs, or gallops  Skin:      intact without lesions or rashes.    Psych:      alert and cooperative; normal mood and affect; normal attention span and concentration.                  A/P    Recent potassium 4.2, creatinine normal, hemoglobin and platelets normal  Complete heart block with a functional right atrial pacing lead and a coronary sinus lead with normal function and coronary sinus lead is an epicardial lead  Left subclavian vein occlusion  Home monitoring of pacemaker with normal function with intermittent AF  Atrial flutter atrial fibrillation on Eliquis  Coronary artery disease stable without angina on aspirin  Hyperlipidemia on atorvastatin  Hypertension on enalapril and acebutolol  Normal LV ejection fraction in the past  Medications reviewed and follow-up appointments made          Electronically signed by Dominick Jackson MD, 04/22/24, 11:37 AM EDT.

## 2024-05-06 RX ORDER — OXCARBAZEPINE 150 MG/1
TABLET, FILM COATED ORAL
Qty: 180 TABLET | Refills: 0 | Status: SHIPPED | OUTPATIENT
Start: 2024-05-06

## 2024-06-03 ENCOUNTER — TELEPHONE (OUTPATIENT)
Dept: NEUROLOGY | Facility: CLINIC | Age: 79
End: 2024-06-03
Payer: MEDICARE

## 2024-06-03 NOTE — TELEPHONE ENCOUNTER
Caller: MARTHA     Luis Miguel call back number: [phone]     What was the call regarding: PT SAID WAS TOLD BY THERESA IFSYMPTOMS  FROM NEURALOAGIA OCCURRED HE CAN CALL CALL TO GET IN ASAP?     THRE IS OPENING ON WED JUNE 5? CAN YOU SCED PT THERE AS NOT PAST MY 3 DAY TIME FRAME?    Is it okay if the provider responds through MyChart: CALL PT TO SCHED SOONER APPT IN A LOT OF PAIN    PLEASE ADVISE

## 2024-06-04 ENCOUNTER — TELEPHONE (OUTPATIENT)
Dept: NEUROLOGY | Facility: CLINIC | Age: 79
End: 2024-06-04
Payer: MEDICARE

## 2024-06-04 DIAGNOSIS — G50.0 TRIGEMINAL NEURALGIA: Primary | ICD-10-CM

## 2024-06-04 RX ORDER — OXCARBAZEPINE 300 MG/1
300 TABLET, FILM COATED ORAL 4 TIMES DAILY
Qty: 120 TABLET | Refills: 12 | Status: SHIPPED | OUTPATIENT
Start: 2024-06-04

## 2024-06-04 RX ORDER — GABAPENTIN 300 MG/1
CAPSULE ORAL
Qty: 9120 CAPSULE | Refills: 5 | Status: SHIPPED | OUTPATIENT
Start: 2024-06-04

## 2024-06-04 NOTE — TELEPHONE ENCOUNTER
Mr. Duffy in the office saying he was having a severe flareup of trigeminal neuralgia.  I notified the patient that I could order some gabapentin to add into his oxcarbazepine to see if it would control the pain.  He was in agreement with starting some gabapentin.  I will started at 300 mg at bedtime and give him a titration dose up to 4 times a day if needed.  The patient also has had treatment at [facility] per Dr. Ornelas for a radiation treatment to the TGN before that was beneficial for about 6 months.  He was told that he could have another treatment if it has been 5 years since the previous treatment.  The patient believes it has been 5 years so I will put in a referral to Dr. Ornelas at the [facility] for TGN treatment as well.  The patient is to call back if his pain is not controlled with the gabapentin and the oxcarbazepine.

## 2024-08-13 ENCOUNTER — TRANSCRIBE ORDERS (OUTPATIENT)
Dept: ADMINISTRATIVE | Facility: HOSPITAL | Age: 79
End: 2024-08-13
Payer: MEDICARE

## 2024-08-13 DIAGNOSIS — N28.89 RENAL MASS: Primary | ICD-10-CM

## 2024-09-09 ENCOUNTER — TELEPHONE (OUTPATIENT)
Dept: CARDIOLOGY | Facility: CLINIC | Age: 79
End: 2024-09-09
Payer: MEDICARE

## 2024-09-09 NOTE — TELEPHONE ENCOUNTER
REQUEST FOR CARDIAC CLEARANCE    Caller name: Mode Duffy     Phone Number: [phone]    Surgeon's name: DR. JACOBO    Type of planned surgery: CROWN ON TOOTH    Date of planned surgery: NOT SCHEDULED YET    Type of anesthesia: UNSURE    Have you been experiencing chest pain or shortness of breath? SOME SOB    Is your doctor requesting for you to stop any of your medications prior to your surgery? UNSURE IF HE NEEDS TO STOP ELIQUIS    Where should we fax the clearance to? PT DOESN'T HAVE FAX, PHONE NUMBER [phone]

## 2024-10-18 ENCOUNTER — TELEPHONE (OUTPATIENT)
Dept: CARDIOLOGY | Facility: CLINIC | Age: 79
End: 2024-10-18
Payer: MEDICARE

## 2024-10-18 RX ORDER — APIXABAN 5 MG/1
TABLET, FILM COATED ORAL
Qty: 60 TABLET | Refills: 3 | OUTPATIENT
Start: 2024-10-18

## 2024-10-18 NOTE — TELEPHONE ENCOUNTER
Caller Name: Mode Duffy FADUMO  Relationship: Self  Best Contact Number: [phone]    Patient is requesting samples of ELIQUIS 5 MG  How many days of medication do you have left? 1 DAY    Additional Information:

## 2024-11-01 ENCOUNTER — OFFICE (AMBULATORY)
Dept: URBAN - METROPOLITAN AREA CLINIC 64 | Facility: CLINIC | Age: 79
End: 2024-11-01
Payer: MEDICARE

## 2024-11-01 ENCOUNTER — OFFICE (AMBULATORY)
Age: 79
End: 2024-11-01

## 2024-11-01 VITALS
DIASTOLIC BLOOD PRESSURE: 88 MMHG | SYSTOLIC BLOOD PRESSURE: 139 MMHG | WEIGHT: 221 LBS | SYSTOLIC BLOOD PRESSURE: 139 MMHG | WEIGHT: 221 LBS | HEIGHT: 73 IN | HEIGHT: 73 IN | HEART RATE: 69 BPM | HEART RATE: 69 BPM | DIASTOLIC BLOOD PRESSURE: 88 MMHG

## 2024-11-01 DIAGNOSIS — K59.00 CONSTIPATION, UNSPECIFIED: ICD-10-CM

## 2024-11-01 DIAGNOSIS — K21.9 GASTRO-ESOPHAGEAL REFLUX DISEASE WITHOUT ESOPHAGITIS: ICD-10-CM

## 2024-11-01 DIAGNOSIS — R10.32 LEFT LOWER QUADRANT PAIN: ICD-10-CM

## 2024-11-01 PROCEDURE — 99213 OFFICE O/P EST LOW 20 MIN: CPT

## 2024-11-05 ENCOUNTER — HOSPITAL ENCOUNTER (OUTPATIENT)
Dept: CT IMAGING | Facility: HOSPITAL | Age: 79
Discharge: HOME | End: 2024-11-05
Admitting: UROLOGY
Payer: MEDICARE

## 2024-11-05 DIAGNOSIS — N28.89 RENAL MASS: ICD-10-CM

## 2024-11-05 PROCEDURE — 71250 CT THORAX DX C-: CPT

## 2024-11-21 ENCOUNTER — OFFICE VISIT (OUTPATIENT)
Dept: CARDIOLOGY | Facility: CLINIC | Age: 79
End: 2024-11-21
Payer: MEDICARE

## 2024-11-21 VITALS
HEIGHT: 73 IN | HEART RATE: 70 BPM | WEIGHT: 223 LBS | OXYGEN SATURATION: 96 % | DIASTOLIC BLOOD PRESSURE: 82 MMHG | BODY MASS INDEX: 29.55 KG/M2 | SYSTOLIC BLOOD PRESSURE: 130 MMHG

## 2024-11-21 DIAGNOSIS — I48.0 PAROXYSMAL ATRIAL FIBRILLATION: ICD-10-CM

## 2024-11-21 DIAGNOSIS — E78.2 MIXED HYPERLIPIDEMIA: ICD-10-CM

## 2024-11-21 DIAGNOSIS — I44.2 COMPLETE HEART BLOCK: Primary | ICD-10-CM

## 2024-11-21 DIAGNOSIS — Z95.0 CARDIAC PACEMAKER IN SITU: ICD-10-CM

## 2024-11-21 DIAGNOSIS — I10 PRIMARY HYPERTENSION: ICD-10-CM

## 2024-11-21 DIAGNOSIS — I25.10 CORONARY ARTERY DISEASE INVOLVING NATIVE CORONARY ARTERY OF NATIVE HEART WITHOUT ANGINA PECTORIS: ICD-10-CM

## 2024-11-21 PROBLEM — T82.110A PACEMAKER LEAD MALFUNCTION: Status: RESOLVED | Noted: 2020-03-05 | Resolved: 2024-11-21

## 2024-11-21 PROBLEM — J20.9 ACUTE BRONCHITIS: Status: RESOLVED | Noted: 2019-05-18 | Resolved: 2024-11-21

## 2024-11-21 PROBLEM — I50.22 CHRONIC SYSTOLIC CONGESTIVE HEART FAILURE: Status: RESOLVED | Noted: 2020-01-13 | Resolved: 2024-11-21

## 2024-11-21 PROBLEM — E87.1 HYPONATREMIA: Status: RESOLVED | Noted: 2017-10-20 | Resolved: 2024-11-21

## 2024-11-21 PROCEDURE — 3079F DIAST BP 80-89 MM HG: CPT | Performed by: INTERNAL MEDICINE

## 2024-11-21 PROCEDURE — 1159F MED LIST DOCD IN RCRD: CPT | Performed by: INTERNAL MEDICINE

## 2024-11-21 PROCEDURE — 1160F RVW MEDS BY RX/DR IN RCRD: CPT | Performed by: INTERNAL MEDICINE

## 2024-11-21 PROCEDURE — 93000 ELECTROCARDIOGRAM COMPLETE: CPT | Performed by: INTERNAL MEDICINE

## 2024-11-21 PROCEDURE — 99214 OFFICE O/P EST MOD 30 MIN: CPT | Performed by: INTERNAL MEDICINE

## 2024-11-21 PROCEDURE — 3075F SYST BP GE 130 - 139MM HG: CPT | Performed by: INTERNAL MEDICINE

## 2024-11-21 NOTE — PROGRESS NOTES
CC--complete heart block, coronary artery disease    Sub--79-year-old pleasant patient has atrial fibrillation and complete heart block and coronary disease.  Patient had prior biventricular pacemaker with a right ventricular pacing lead placed from right subclavian approach because of left subclavian occlusion.  Patient had erosion of right-sided pocket and subsequently underwent lead extraction on the right side and left to LV pacing only.  Patient has left-sided implant in 99 and had significant issues with RV pacing alone with heart failure symptoms and LV lead was placed epicardial lead because of left subclavian occlusion.  After LV lead epicardial placement, patient was found to have RV lead malfunction needing a new RV lead placed on the right side which was removed secondary to pocket infection on the right side.      Past Medical History:   Diagnosis Date   • Arthritis    • Arthritis    • BPH (benign prostatic hyperplasia)    • CHF (congestive heart failure) (HCC)    • Coronary artery disease     dr. Carrizales   • Deviated septum    • FH: cataracts    • Gait abnormality    • GERD (gastroesophageal reflux disease)    • History of trigeminal neuralgia    • Hyperlipidemia    • Hypertension    • Renal cyst    • Sciatica    • Sleep apnea     unable to tolerate machine     Past Surgical History:   Procedure Laterality Date   • APPENDECTOMY     • CARDIAC CATHETERIZATION     • CARDIAC CATHETERIZATION N/A 3/5/2020    Procedure: Percutaneous Subclavian Intervention;  Surgeon: Nick Greenberg MD;  Location: [facility] CATH INVASIVE LOCATION;  Service: Cardiovascular;  Laterality: N/A;   • CARDIAC ELECTROPHYSIOLOGY PROCEDURE N/A 1/29/2020    Procedure: Biventricular Device Upgrade;  Surgeon: Dominick Jackson MD;  Location: [facility] CATH INVASIVE LOCATION;  Service: Cardiovascular   • CARDIAC ELECTROPHYSIOLOGY PROCEDURE N/A 3/5/2020    Procedure: Lead Revision;  Surgeon: Nick Greenberg MD;  Location:  [facility] CATH INVASIVE LOCATION;  Service: Cardiovascular;  Laterality: N/A;   • CORONARY STENT PLACEMENT  2014    x 2   • PROSTATE SURGERY     • REFRACTIVE SURGERY     • THORACOTOMY Left 3/4/2020    Procedure: THORACOTOMY MINI WITH LEAD PLACEMENT;  Surgeon: Justin Aguilar MD;  Location: [facility] CVOR;  Service: Cardiothoracic;  Laterality: Left;   • TOTAL HIP ARTHROPLASTY Bilateral     different times         Physical Exam    General:      well developed, well nourished, in no acute distress.    Head:      normocephalic and atraumatic.    Eyes:      PERRL/EOM intact, conjunctivae and sclerae clear without nystagmus.    Neck:      no  thyromegaly, trachea central with normal respiratory effort  Lungs:      clear bilaterally to auscultation.    Heart:       regular rate and rhythm, S1, S2 without murmurs, rubs, or gallops  Skin:      intact without lesions or rashes.    Psych:      alert and cooperative; normal mood and affect; normal attention span and concentration.            A/P    Underlying complete heart block with a function of right atrial pacing lead and coronary sinus lead with normal function and coronary sinus lead is an epicardial lead.  Patient under home monitoring for outpatient evaluation with normal function  Chronic left subclavian venous occlusion  Atrial arrhythmia with atrial flutter and atrial fibrillation on Eliquis  Coronary artery disease stable without angina  Hyperlipidemia on atorvastatin  Hypertension on enalapril and acebutolol  Normal  EF in the past  Medications reviewed and follow-up appointments made        ECG 12 Lead    Date/Time: 11/21/2024 9:01 AM  Performed by: Dominick Jackson MD    Authorized by: Dominick Jackson MD  Comparison: compared with previous ECG   Similar to previous ECG  Rhythm: atrial flutter and paced  Rate: normal      Electronically signed by Dominick Jackson MD, 11/21/24, 9:01 AM EST.

## 2024-12-02 RX ORDER — AMITRIPTYLINE HYDROCHLORIDE 10 MG/1
TABLET ORAL
Qty: 180 TABLET | Refills: 3 | Status: SHIPPED | OUTPATIENT
Start: 2024-12-02

## 2024-12-05 DIAGNOSIS — E78.2 MIXED HYPERLIPIDEMIA: ICD-10-CM

## 2024-12-05 RX ORDER — ATORVASTATIN CALCIUM 20 MG/1
TABLET, FILM COATED ORAL
Qty: 90 TABLET | Refills: 3 | Status: SHIPPED | OUTPATIENT
Start: 2024-12-05

## 2024-12-05 NOTE — PROGRESS NOTES
Cardiology Office Visit      Encounter Date:  12/06/2024    Patient ID:   Mode Duffy is a 79 y.o. male.    Reason For Followup:  Coronary artery disease  Cardiomyopathy    Brief Clinical History:  Dear Dr. Vidal, Temo Hadley MD (Inactive)    I had the pleasure of seeing Mode Duffy today. As you are well aware, this is a 79 y.o. male with a known history of ischemic heart disease. He underwent cardiac catheterization with resultant PCI of an obtuse marginal branch and diagonal branch in November 2014. He has additional history of hypertension with hypertensive cardiovascular disease, dyslipidemia, paroxysmal atrial fibrillation, antiplatelet therapy as well as sick sinus syndrome status post biventricular permanent pacemaker placement. He presents today for followup on the above conditions.     Interval History:  He denies any chest pain pressure heaviness or tightness.    He denies any shortness of breath out of character.  He denies any PND orthopnea.  He denies any syncope or near syncope.  He reports feeling in his usual state of cardiac health.    Although his blood pressure is elevated today, he reports that his reading at home before coming in was 138/80.    [facility]  We reviewed those today.    His blood pressure is elevated today.  This is typical of his office visits.  His blood pressure at home was 138/80 before coming to the office today.    03/12/2024        Patient reports no chest pain or shortness of breath. He has no palpitations. No new issues to report. Blood pressure is elevated today but was 137/78 at home before coming to the office.     12/06/2024        He reports doing well today. No chest pain. No new issues. Is following regularly with EP. Still walking 3 miles daily. Blood pressure is elevated today but at home before visit at home it was 138/82.      Assessment & Plan     Impressions:  Coronary artery disease status post PCI and  "stenting most recently in November of 2014.        This was cutting Balloon angioplasty of a circumflex branch and a diagonal branch.   Dyslipidemia.  Hypertension with hypertensive cardiovascular disease with a white coat component  Cardiomyopathy most recent EF of 35 to 40% in October 2019 echocardiogram  Sick sinus syndrome status post biventricular permanent pacemaker placement.  Paroxysmal atrial fibrillation  Coagulopathy secondary to Eliquis  Lumbago  Hyponatremia secondary to Trileptal  Renal cysts  History of trigeminal neuralgia presently treated with Trileptal.  Hip pain currently undergoing preoperative cardiovascular risk assessment for right hip surgery.  Trigeminal neuralgia     Recommendations:  Continuation of his current cardiovascular regimen at the present time.     This includes antihypertensives, anticoagulants, and antiplatelets.  Follow-up with EP as scheduled  Follow-up in 9 months, sooner should there be difficulties    Diagnoses and all orders for this visit:    1. Coronary artery disease involving native coronary artery of native heart without angina pectoris (Primary)    2. Other cardiomyopathy  Overview:  Added automatically from request for surgery [id number]      3. Cardiac pacemaker in situ    4. Complete heart block    5. Primary hypertension    6. Mixed hyperlipidemia    7. Sick sinus syndrome              Objective:    Vitals:  Vitals:    12/06/24 0959   BP: 148/84   BP Location: Left arm   Patient Position: Sitting   Pulse: 70   SpO2: 96%   Weight: 99.8 kg (220 lb)   Height: 185.4 cm (73\")       Body mass index is 29.03 kg/m².      Physical Exam:    General: Alert, cooperative, no distress, appears stated age  Head:  Normocephalic, atraumatic, mucous membranes moist  Eyes:  Conjunctiva/corneas clear, EOM's intact     Neck:  Supple,  no bruit    Lungs: Clear to auscultation bilaterally, no wheezes rhonchi rales are noted  Chest wall: No tenderness.  Pacing device in " place  Heart::  Regular rate and rhythm, S1 and S2 normal, 1/6 holosystolic murmur.  No rub or gallop  Abdomen: Soft, non-tender, nondistended bowel sounds active  Extremities: No cyanosis, clubbing, or edema  Pulses: 2+ and symmetric all extremities  Skin:  No rashes or lesions  Neuro/psych: A&O x3. CN II through XII are grossly intact with appropriate affect      Allergies:  No Known Allergies    Medication Review:     Current Outpatient Medications:     amitriptyline (ELAVIL) 10 MG tablet, TAKE 2 TABLETS AT BEDTIME, Disp: 180 tablet, Rfl: 3    amLODIPine (NORVASC) 10 MG tablet, Take 1 tablet by mouth every night at bedtime. Indications: High Blood Pressure, Disp: , Rfl:     apixaban (Eliquis) 5 MG tablet tablet, Take 1 tablet by mouth Every 12 (Twelve) Hours. Indications: Atrial Fibrillation, Disp: 36 tablet, Rfl: 0    aspirin 81 MG tablet, Take 1 tablet by mouth Daily. Do not take day of surgery  Indications: Venous Thromboembolism, Disp: , Rfl:     atorvastatin (LIPITOR) 20 MG tablet, TAKE 1 TABLET EVERY DAY, Disp: 90 tablet, Rfl: 3    Calcium Carb-Cholecalciferol (CALCIUM 500 + D PO), Take  by mouth., Disp: , Rfl:     cetirizine (zyrTEC) 10 MG tablet, Take 1 tablet by mouth Daily. Indications: Upper Respiratory Tract Allergy, Disp: , Rfl:     Cinnamon 500 MG capsule, Take 1 capsule by mouth Daily., Disp: , Rfl:     cyclobenzaprine (FLEXERIL) 5 MG tablet, TAKE 1 TABLET BY MOUTH EVERY 8 HOURS AS NEEDED FOR MUSCLE TIGHTNESS, Disp: , Rfl:     enalapril (VASOTEC) 20 MG tablet, Take 1 tablet by mouth Daily. Indications: High Blood Pressure, Disp: , Rfl:     esomeprazole (nexIUM) 40 MG capsule, Take 1 capsule by mouth Daily. Indications: Gastroesophageal Reflux Disease, Disp: , Rfl:     gabapentin (NEURONTIN) 300 MG capsule, Week1: 1 tab at HS, Week 2: 1 tab bid, Week3: 1 tab tid, Week 4: 1 tab qid, Disp: 9120 capsule, Rfl: 5    glucosamine-chondroitin 500-400 MG capsule capsule, Take  by mouth 3 (Three) Times a Day  With Meals., Disp: , Rfl:     isosorbide mononitrate (IMDUR) 60 MG 24 hr tablet, Take 1 tablet by mouth Daily. Indications: Stable Angina Pectoris, Disp: , Rfl:     montelukast (SINGULAIR) 10 MG tablet, Take 1 tablet by mouth Daily., Disp: , Rfl:     OXcarbazepine (TRILEPTAL) 300 MG tablet, Take 1 tablet by mouth 4 (Four) Times a Day. Titrate down if pain controlled, Disp: 120 tablet, Rfl: 12    Probiotic Product (PROBIOTIC ADVANCED PO), Take 1 capsule by mouth Daily. Stop on 2/26 for surgery  Indications: Edith replacement, Disp: , Rfl:     tamsulosin (FLOMAX) 0.4 MG capsule 24 hr capsule, Take 1 capsule by mouth Daily., Disp: 30 capsule, Rfl: 0    Turmeric 500 MG capsule, Take  by mouth., Disp: , Rfl:     vitamin C (ASCORBIC ACID) 250 MG tablet, Take 1 tablet by mouth Daily. Indications: supplement, Disp: , Rfl:     Zinc 25 MG tablet, Take 25 mg by mouth Daily. Indications: Zinc Deficiency, Disp: , Rfl:     acebutolol (SECTRAL) 200 MG capsule, Take 1 capsule by mouth Daily., Disp: 90 capsule, Rfl: 1    oxyCODONE-acetaminophen (PERCOCET) 5-325 MG per tablet, Take 1-2 tablets by mouth Every 6 (Six) Hours As Needed for Severe Pain., Disp: 15 tablet, Rfl: 0    Family History:  Family History   Problem Relation Age of Onset    Cancer Mother     Heart disease Father     Arthritis Father     Diabetes Father     Hypertension Father     Hyperlipidemia Father     Kidney disease Father     Malig Hyperthermia Neg Hx        Past Medical History:  Past Medical History:   Diagnosis Date    Arthritis     Arthritis     BPH (benign prostatic hyperplasia)     CHF (congestive heart failure)     Coronary artery disease     dr. Carrizales    Deviated septum     FH: cataracts     Gait abnormality     GERD (gastroesophageal reflux disease)     History of trigeminal neuralgia     Hyperlipidemia     Hypertension     Renal cyst     Sciatica     Sleep apnea     unable to tolerate machine       Past Surgical History:  Past Surgical History:    Procedure Laterality Date    APPENDECTOMY      CARDIAC CATHETERIZATION      CARDIAC CATHETERIZATION N/A 3/5/2020    Procedure: Percutaneous Subclavian Intervention;  Surgeon: Nick Greenberg MD;  Location: [facility] CATH INVASIVE LOCATION;  Service: Cardiovascular;  Laterality: N/A;    CARDIAC ELECTROPHYSIOLOGY PROCEDURE N/A 2020    Procedure: Biventricular Device Upgrade;  Surgeon: Dominick Jackson MD;  Location: [facility] CATH INVASIVE LOCATION;  Service: Cardiovascular    CARDIAC ELECTROPHYSIOLOGY PROCEDURE N/A 3/5/2020    Procedure: Lead Revision;  Surgeon: Nick Greenberg MD;  Location: [facility] CATH INVASIVE LOCATION;  Service: Cardiovascular;  Laterality: N/A;    CORONARY STENT PLACEMENT  2014    x 2    CYSTOSCOPY, URETEROSCOPY, RETROGRADE PYELOGRAM, STENT INSERTION Left 11/3/2023    Procedure: CYSTOSCOPY URETEROSCOPY STONE EXTRACTION STENT INSERTION;  Surgeon: Chris Patel MD;  Location: [facility] MAIN OR;  Service: Urology;  Laterality: Left;    IMPLANTABLE CARDIOVERTER DEFIBRILLATOR LEAD REPLACEMENT/POCKET REVISION Right 10/19/2022    Procedure: RT. TRANSVENOUS ICD LEAD EXTRACTION;  Surgeon: Naveed Rios MD;  Location: [facility];  Service: Cardiology;  Laterality: Right;    PROSTATE SURGERY      REFRACTIVE SURGERY      THORACOTOMY Left 3/4/2020    Procedure: THORACOTOMY MINI WITH LEAD PLACEMENT;  Surgeon: Justin Aguilar MD;  Location: [facility];  Service: Cardiothoracic;  Laterality: Left;    TOTAL HIP ARTHROPLASTY Bilateral     different times       Social History:  Social History     Socioeconomic History    Marital status:    Tobacco Use    Smoking status: Former     Current packs/day: 0.00     Types: Cigarettes     Quit date: 1968     Years since quittin.0     Passive exposure: Past    Smokeless tobacco: Never   Vaping Use    Vaping status: Never Used   Substance and Sexual Activity    Alcohol use: Yes     Alcohol/week: 7.0 standard drinks of alcohol      Types: 7 Glasses of wine per week     Comment: RED WINE BEFORE DINNER    Drug use: No    Sexual activity: Defer       Review of Systems:  The following systems were reviewed as they relate to the cardiovascular system: Constitutional, Eyes, ENT, Cardiovascular, Respiratory, Gastrointestinal, Integumentary, Neurological, Psychiatric, Hematologic, Endocrine, Musculoskeletal, and Genitourinary. The pertinent cardiovascular findings are reported above with all other cardiovascular points within those systems being negative.    Diagnostic Study Review:     Current Electrocardiogram:  Procedures no new EKG.  EKG date 11/21/2024 demonstrates ventricular paced rhythm at 70 bpm    Laboratory Data:  Lab Results   Component Value Date    GLUCOSE 127 (H) 11/04/2023    BUN 22 11/04/2023    CREATININE 0.96 11/04/2023    EGFRIFNONA 102 03/07/2020    BCR 22.9 11/04/2023    K 4.9 11/04/2023    CO2 26.0 11/04/2023    CALCIUM 9.8 11/04/2023    ALBUMIN 4.4 11/02/2023    LABIL2 1.6 11/06/2017    AST 17 11/02/2023    ALT 21 11/02/2023     Lab Results   Component Value Date    GLUCOSE 127 (H) 11/04/2023    CALCIUM 9.8 11/04/2023     (L) 11/04/2023    K 4.9 11/04/2023    CO2 26.0 11/04/2023    CL 98 11/04/2023    BUN 22 11/04/2023    CREATININE 0.96 11/04/2023    EGFRIFNONA 102 03/07/2020    BCR 22.9 11/04/2023    ANIONGAP 9.0 11/04/2023     Lab Results   Component Value Date    WBC 7.00 11/04/2023    HGB 14.9 11/04/2023    HCT 43.3 11/04/2023    MCV 98.4 (H) 11/04/2023     11/04/2023     Lab Results   Component Value Date    CHOL 137 03/02/2020    TRIG 115 03/02/2020    HDL 55 03/02/2020    LDL 59 03/02/2020     Lab Results   Component Value Date    HGBA1C 5.9 (H) 03/02/2020     Lab Results   Component Value Date    INR 0.99 10/05/2022    INR 1.05 05/22/2022    INR 1.04 03/05/2020    PROTIME 10.2 10/05/2022    PROTIME 10.8 05/22/2022    PROTIME 10.9 03/05/2020       Most Recent Echo:  Results for orders placed during the  hospital encounter of 07/18/23    Adult Transthoracic Echo Complete w/ Color, Spectral and Contrast if necessary per protocol    Interpretation Summary    Left ventricular systolic function is normal. Calculated left ventricular EF = 56.2%    Left ventricular wall thickness is consistent with mild concentric hypertrophy.    Left ventricular diastolic function is consistent with (grade Ia w/high LAP) impaired relaxation.    The left atrial cavity is mild to moderately dilated.    Estimated right ventricular systolic pressure from tricuspid regurgitation is mildly elevated (35-45 mmHg).    Transthoracic echocardiography reveals mild LVH with EF of 56% with diastolic dysfunction.  Mild to moderate left atrial lodgment noted.  Mild to moderate TR with mild pulmonary hypertension.  Trace to mild MR and AI noted.  No effusion.    Electronically signed by Dominick Jackson MD, 07/18/23, 1:42 PM EDT.       Most Recent Stress Test:       Most Recent Cardiac Catheterization:   Results for orders placed during the hospital encounter of 03/04/20    Cardiac Catheterization/Vascular Study    Narrative  Lead revision procedure note.    Procedure:  Failed PTA attempt to occluded left subclavian vein.  Placement of RV  lead through right subclavian vein approach and tunneled into left subclavian pocket and open pocket and connected RV lead to the generator and capped the existing malfunctioning RV lead.  Temporary pacemaker placement through right femoral venous approach    Indication: This is a 74-year-old with complete heart block and previous history of dual-chamber pacemaker over 21 years ago and LV dysfunction EF of 35 to 40% probably due to 100% pacer dependent status and CHF with lower extremity edema and tiredness and history of CAD and PCI, hypertension, dyslipidemia here for procedure.  Patient was initially attempted to have left subclavian approach LV lead placement but was found to have a occluded left subclavian vein  therefore was referred to surgery and underwent thoracotomy for LV lead placement and was noticed to have RV lead malfunction, postoperatively, therefore is here for procedure.      Hardware:  Right ventricular   lead is Medtronic [id number] serial number [id number].      Thresholds:  Right ventricular threshold revealed a R wave amplitude of over 6 mV at 1.2V and impedance of 700 ohms        Description of procedure:  Under conscious sedation (initially was given by me and monitored.  Then subsequently anesthesiologist came in gave and monitored anesthesia ) and local anesthesia , existing left subclavian pocket was opened and subclavian vein was accessed and a 035 Glidewire was advanced into the occlusion and with the help of a glide catheter attempts were done to cross the 100% occluded subclavian vein but was unsuccessful.  Then the right subclavian vein was accessed and Medtronic 65 cm lead was placed in the RV adequate thresholds obtained then it was secured on the right side then tunneled to the left side with the help of electrophysiologist Dr. Jackson, then the generator was explanted from the pocket pocket cleaned with antibiotic solution existing RV lead was disconnected and capped and the new RV lead from the right subclavian which was tunneled was connected to the generator placed in the pocket and closed pocket with sutures. patient tolerated procedure well complications were none.    Blood loss :blood loss was 5 cc.        Conclusion: Successful placement of right ventricular lead through right subclavian vein approach which was tunneled to the left infraclavicular area and connected to the generator      Plan:  Per primary electrophysiologist Dr. Jackson  We will monitor overnight.  Routine post pacemaker device implant care.  Recheck device in a.m.  Pacer care and wound care education  Follow-up in 1 week in the office for staple removal  Follow-up in pacemaker clinic       NOTE:  "    Today,12/06/2024 ,the following portions of the patient's note were reviewed, confirmed and/or updated as appropriate: History of present illness/Interval history, physical examination, assessment & plan, allergies, current medications, past family history, past medical history, past social history, past surgical history and problem list.    Labs pertinent to today's visit on 12/06/2024 (including but not limited to CBC, CMP, and lipid profiles) were requested from the patient's primary care provider/hospital/clinical laboratory.  If the labs were available for the visit, they were reviewed with the patient.  If they were not available, when received, special interest will be made to the labs pertinent to this visit.  The patient's most recent \"in-house\" labs are noted below and have been reviewed.  Outside labs pertinent to this visit are scanned into the record and have been reviewed.    Discussions held today, 12/06/2024,regarding procedures included risk, benefits, and options including but not limited to: Death, MI, stroke, pain, bleeding, infection, and possible need for vascular/thoracic/cardiothoracic surgery.    Copied information within this note was reviewed and is current as of 12/06/2024.    Assessment and plan noted herein represents the current plan of care as of 12/06/2024.    Significant resources from our office and staff are inherent in engaging this patient today,12/06/2024,and in a continuous and active collaborative plan of care related to their chronic cardiovascular conditions outlined below.  The management of these conditions requires the direction of our service with specialized clinical knowledge, skills, and experience.  This collaborative care includes but is not limited to patient education, expectations and responsibilities, shared decision making around therapeutic goals, and shared commitments to achieve those goals.  "

## 2024-12-06 ENCOUNTER — OFFICE VISIT (OUTPATIENT)
Dept: CARDIOLOGY | Facility: CLINIC | Age: 79
End: 2024-12-06
Payer: MEDICARE

## 2024-12-06 VITALS
WEIGHT: 220 LBS | OXYGEN SATURATION: 96 % | HEIGHT: 73 IN | BODY MASS INDEX: 29.16 KG/M2 | SYSTOLIC BLOOD PRESSURE: 148 MMHG | HEART RATE: 70 BPM | DIASTOLIC BLOOD PRESSURE: 84 MMHG

## 2024-12-06 DIAGNOSIS — I44.2 COMPLETE HEART BLOCK: ICD-10-CM

## 2024-12-06 DIAGNOSIS — Z95.0 CARDIAC PACEMAKER IN SITU: ICD-10-CM

## 2024-12-06 DIAGNOSIS — I25.10 CORONARY ARTERY DISEASE INVOLVING NATIVE CORONARY ARTERY OF NATIVE HEART WITHOUT ANGINA PECTORIS: Primary | ICD-10-CM

## 2024-12-06 DIAGNOSIS — I42.8 OTHER CARDIOMYOPATHY: ICD-10-CM

## 2024-12-06 DIAGNOSIS — I49.5 SICK SINUS SYNDROME: ICD-10-CM

## 2024-12-06 DIAGNOSIS — I10 PRIMARY HYPERTENSION: ICD-10-CM

## 2024-12-06 DIAGNOSIS — E78.2 MIXED HYPERLIPIDEMIA: ICD-10-CM

## 2024-12-19 ENCOUNTER — OFFICE VISIT (OUTPATIENT)
Dept: PULMONOLOGY | Facility: HOSPITAL | Age: 79
End: 2024-12-19
Payer: MEDICARE

## 2024-12-19 VITALS
SYSTOLIC BLOOD PRESSURE: 143 MMHG | BODY MASS INDEX: 29.16 KG/M2 | RESPIRATION RATE: 14 BRPM | WEIGHT: 220 LBS | HEART RATE: 69 BPM | HEIGHT: 73 IN | DIASTOLIC BLOOD PRESSURE: 79 MMHG | OXYGEN SATURATION: 97 %

## 2024-12-19 DIAGNOSIS — G47.33 OSA (OBSTRUCTIVE SLEEP APNEA): ICD-10-CM

## 2024-12-19 DIAGNOSIS — R91.1 PULMONARY NODULE: Primary | ICD-10-CM

## 2024-12-19 PROCEDURE — G0463 HOSPITAL OUTPT CLINIC VISIT: HCPCS

## 2024-12-19 NOTE — PROGRESS NOTES
PULMONARY/ CRITICAL CARE/ SLEEP MEDICINE OUTPATIENT CONSULT/ FOLLOW UP NOTE        Patient Name:  Mode Duffy    :  1945    Medical Record:  [id number]    PRIMARY CARE PHYSICIAN     Temo Vidal MD (Inactive)    REASON FOR CONSULTATION    Mode Duffy is a 79 y.o. male who is referred for consultation for nodule  REVIEW OF SYSTEMS    Constitutional:  Denies fever or chills   Eyes:  Denies change in visual acuity   HENT:  Denies nasal congestion or sore throat   Respiratory:  Denies cough or shortness of breath   Cardiovascular:  Denies chest pain or edema   GI:  Denies abdominal pain, nausea, vomiting, bloody stools or diarrhea   :  Denies dysuria   Musculoskeletal:  Denies back pain or joint pain   Integument:  Denies rash   Neurologic:  Denies headache, focal weakness or sensory changes   Endocrine:  Denies polyuria or polydipsia   Lymphatic:  Denies swollen glands   Psychiatric:  Denies depression or anxiety     MEDICAL HISTORY    Past Medical History:   Diagnosis Date    Arthritis     Arthritis     BPH (benign prostatic hyperplasia)     CHF (congestive heart failure)     Coronary artery disease     dr. Carrizales    Deviated septum     FH: cataracts     Gait abnormality     GERD (gastroesophageal reflux disease)     History of trigeminal neuralgia     Hyperlipidemia     Hypertension     Renal cyst     Sciatica     Sleep apnea     unable to tolerate machine        SURGICAL HISTORY    Past Surgical History:   Procedure Laterality Date    APPENDECTOMY      CARDIAC CATHETERIZATION      CARDIAC CATHETERIZATION N/A 3/5/2020    Procedure: Percutaneous Subclavian Intervention;  Surgeon: Nick Greenberg MD;  Location: [facility] CATH INVASIVE LOCATION;  Service: Cardiovascular;  Laterality: N/A;    CARDIAC ELECTROPHYSIOLOGY PROCEDURE N/A 2020    Procedure: Biventricular Device Upgrade;  Surgeon: Dominick Jackson MD;  Location: [facility] CATH INVASIVE LOCATION;  Service: Cardiovascular     CARDIAC ELECTROPHYSIOLOGY PROCEDURE N/A 3/5/2020    Procedure: Lead Revision;  Surgeon: Nick Greenberg MD;  Location: [facility] CATH INVASIVE LOCATION;  Service: Cardiovascular;  Laterality: N/A;    CORONARY STENT PLACEMENT  2014    x 2    CYSTOSCOPY, URETEROSCOPY, RETROGRADE PYELOGRAM, STENT INSERTION Left 11/3/2023    Procedure: CYSTOSCOPY URETEROSCOPY STONE EXTRACTION STENT INSERTION;  Surgeon: Chris Patel MD;  Location: [facility] MAIN OR;  Service: Urology;  Laterality: Left;    IMPLANTABLE CARDIOVERTER DEFIBRILLATOR LEAD REPLACEMENT/POCKET REVISION Right 10/19/2022    Procedure: RT. TRANSVENOUS ICD LEAD EXTRACTION;  Surgeon: Naveed Rios MD;  Location: [facility];  Service: Cardiology;  Laterality: Right;    PROSTATE SURGERY      REFRACTIVE SURGERY      THORACOTOMY Left 3/4/2020    Procedure: THORACOTOMY MINI WITH LEAD PLACEMENT;  Surgeon: Justin Aguilar MD;  Location: [facility] CVOR;  Service: Cardiothoracic;  Laterality: Left;    TOTAL HIP ARTHROPLASTY Bilateral     different times        FAMILY HISTORY    Family History   Problem Relation Age of Onset    Cancer Mother     Heart disease Father     Arthritis Father     Diabetes Father     Hypertension Father     Hyperlipidemia Father     Kidney disease Father     Malig Hyperthermia Neg Hx        SOCIAL HISTORY    Social History     Tobacco Use    Smoking status: Former     Current packs/day: 0.00     Types: Cigarettes     Quit date:      Years since quittin.0     Passive exposure: Past    Smokeless tobacco: Never   Substance Use Topics    Alcohol use: Yes     Alcohol/week: 7.0 standard drinks of alcohol     Types: 7 Glasses of wine per week     Comment: RED WINE BEFORE DINNER        ALLERGIES    No Known Allergies      MEDICATIONS    Current Outpatient Medications on File Prior to Visit   Medication Sig Dispense Refill    acebutolol (SECTRAL) 200 MG capsule TAKE 1 CAPSULE EVERY DAY 90 capsule 3    amitriptyline (ELAVIL) 10 MG  tablet TAKE 2 TABLETS AT BEDTIME 180 tablet 3    amLODIPine (NORVASC) 10 MG tablet Take 1 tablet by mouth every night at bedtime. Indications: High Blood Pressure      apixaban (Eliquis) 5 MG tablet tablet Take 1 tablet by mouth Every 12 (Twelve) Hours. Indications: Atrial Fibrillation 36 tablet 0    aspirin 81 MG tablet Take 1 tablet by mouth Daily. Do not take day of surgery  Indications: Venous Thromboembolism      atorvastatin (LIPITOR) 20 MG tablet TAKE 1 TABLET EVERY DAY 90 tablet 3    Calcium Carb-Cholecalciferol (CALCIUM 500 + D PO) Take  by mouth.      cetirizine (zyrTEC) 10 MG tablet Take 1 tablet by mouth Daily. Indications: Upper Respiratory Tract Allergy      Cinnamon 500 MG capsule Take 1 capsule by mouth Daily.      cyclobenzaprine (FLEXERIL) 5 MG tablet TAKE 1 TABLET BY MOUTH EVERY 8 HOURS AS NEEDED FOR MUSCLE TIGHTNESS      enalapril (VASOTEC) 20 MG tablet Take 1 tablet by mouth Daily. Indications: High Blood Pressure      esomeprazole (nexIUM) 40 MG capsule Take 1 capsule by mouth Daily. Indications: Gastroesophageal Reflux Disease      gabapentin (NEURONTIN) 300 MG capsule Week1: 1 tab at HS, Week 2: 1 tab bid, Week3: 1 tab tid, Week 4: 1 tab qid 9120 capsule 5    glucosamine-chondroitin 500-400 MG capsule capsule Take  by mouth 3 (Three) Times a Day With Meals.      isosorbide mononitrate (IMDUR) 60 MG 24 hr tablet Take 1 tablet by mouth Daily. Indications: Stable Angina Pectoris      montelukast (SINGULAIR) 10 MG tablet Take 1 tablet by mouth Daily.      OXcarbazepine (TRILEPTAL) 300 MG tablet Take 1 tablet by mouth 4 (Four) Times a Day. Titrate down if pain controlled 120 tablet 12    oxyCODONE-acetaminophen (PERCOCET) 5-325 MG per tablet Take 1-2 tablets by mouth Every 6 (Six) Hours As Needed for Severe Pain. 15 tablet 0    Probiotic Product (PROBIOTIC ADVANCED PO) Take 1 capsule by mouth Daily. Stop on 2/26 for surgery  Indications: Edith replacement      tamsulosin (FLOMAX) 0.4 MG capsule 24  "hr capsule Take 1 capsule by mouth Daily. 30 capsule 0    Turmeric 500 MG capsule Take  by mouth.      vitamin C (ASCORBIC ACID) 250 MG tablet Take 1 tablet by mouth Daily. Indications: supplement      Zinc 25 MG tablet Take 25 mg by mouth Daily. Indications: Zinc Deficiency       No current facility-administered medications on file prior to visit.       PHYSICAL EXAM    Vitals:    12/19/24 1048   BP: 143/79   BP Location: Left arm   Patient Position: Sitting   Cuff Size: Adult   Pulse: 69   Resp: 14   SpO2: 97%   Weight: 99.8 kg (220 lb)   Height: 185.4 cm (73\")        Constitutional:  Well developed, well nourished, no acute distress, non-toxic appearance   Eyes:  PERRL, conjunctiva normal   HENT:  Atraumatic, external ears normal, nose normal, oropharynx moist, no pharyngeal exudates. mallampatti   Neck- normal range of motion, no tenderness, supple   Respiratory:  No respiratory distress, normal breath sounds, no rales, no wheezing   Cardiovascular:  Normal rate, normal rhythm, no murmurs, no gallops, no rubs   GI:  Soft, nondistended, normal bowel sounds, nontender, no organomegaly, no mass, no rebound, no guarding   :  No costovertebral angle tenderness   Musculoskeletal:  No edema, no tenderness, no deformities. Back- no tenderness  Integument:  Well hydrated, no rash   Lymphatic:  No lymphadenopathy noted   Neurologic:  Alert & oriented x 3, CN 2-12 normal, normal motor function, normal sensory function, no focal deficits noted   Psychiatric:  Speech and behavior appropriate     CT Chest Without Contrast Diagnostic    Result Date: 11/5/2024  1. The provided history indicates renal mass as the reason for the examination. If this represents the correct history, then dedicated CT or MRI abdomen without and with contrast renal mass protocol would be recommended. 2. 11 mm noncalcified left lower lobe lung nodule is stable since 11/3/2023. Additional 4 mm noncalcified right upper lobe lung nodule. 6-month CT " chest follow-up is suggested. 3. 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 Pacemaker lead traverses through the herniated lung parenchyma, terminating along the margin of the left lateral ventricular wall. 4.. Dense coronary artery calcifications. Correlate with cardiac history. 5.. Uncomplicated cholelithiasis Electronically Signed: Omayra Quinn MD  11/5/2024 8:01 PM EST  Workstation ID: [id number]    Results for orders placed during the hospital encounter of 07/18/23    Adult Transthoracic Echo Complete w/ Color, Spectral and Contrast if necessary per protocol    Interpretation Summary    Left ventricular systolic function is normal. Calculated left ventricular EF = 56.2%    Left ventricular wall thickness is consistent with mild concentric hypertrophy.    Left ventricular diastolic function is consistent with (grade Ia w/high LAP) impaired relaxation.    The left atrial cavity is mild to moderately dilated.    Estimated right ventricular systolic pressure from tricuspid regurgitation is mildly elevated (35-45 mmHg).    Transthoracic echocardiography reveals mild LVH with EF of 56% with diastolic dysfunction.  Mild to moderate left atrial lodgment noted.  Mild to moderate TR with mild pulmonary hypertension.  Trace to mild MR and AI noted.  No effusion.    Electronically signed by Dominick Jackson MD, 07/18/23, 1:42 PM EDT.      ASSESSMENT & PLAN:        79-year-old male with incidental left lower lobe 11 mm nodule on CT abdomen November 2023, CT chest November 2024 showed no change in size  He is non-smoker  Mother had lung cancer  Repeat CT chest without contrast after 6-month    SHELTON  Loss no witnessed apneas excessive daytime sleepiness fatigue  History of sleep apnea 40 years ago currently not on any treatment  A-fib        This document has been electronically signed by  German Johnson MD  11:00 EST

## 2024-12-30 RX ORDER — ACEBUTOLOL HYDROCHLORIDE 200 MG/1
200 CAPSULE ORAL DAILY
Qty: 90 CAPSULE | Refills: 1 | Status: SHIPPED | OUTPATIENT
Start: 2024-12-30

## 2025-03-07 ENCOUNTER — APPOINTMENT (OUTPATIENT)
Dept: GENERAL RADIOLOGY | Facility: HOSPITAL | Age: 80
End: 2025-03-07
Payer: MEDICARE

## 2025-03-07 ENCOUNTER — HOSPITAL ENCOUNTER (INPATIENT)
Facility: HOSPITAL | Age: 80
LOS: 4 days | Discharge: HOME | End: 2025-03-12
Attending: EMERGENCY MEDICINE | Admitting: INTERNAL MEDICINE
Payer: MEDICARE

## 2025-03-07 DIAGNOSIS — R07.9 CHEST PAIN, UNSPECIFIED TYPE: Primary | ICD-10-CM

## 2025-03-07 DIAGNOSIS — R06.00 DYSPNEA, UNSPECIFIED TYPE: ICD-10-CM

## 2025-03-07 LAB
ALBUMIN SERPL-MCNC: 4.2 G/DL (ref 3.5–5.2)
ALBUMIN/GLOB SERPL: 1.8 G/DL
ALP SERPL-CCNC: 88 U/L (ref 39–117)
ALT SERPL W P-5'-P-CCNC: 19 U/L (ref 1–41)
ANION GAP SERPL CALCULATED.3IONS-SCNC: 13.8 MMOL/L (ref 5–15)
APTT PPP: 31.8 SECONDS (ref 22.7–35.4)
AST SERPL-CCNC: 19 U/L (ref 1–40)
BACTERIA UR QL AUTO: ABNORMAL /HPF
BASOPHILS # BLD AUTO: 0.05 10*3/MM3 (ref 0–0.2)
BASOPHILS NFR BLD AUTO: 0.5 % (ref 0–1.5)
BILIRUB SERPL-MCNC: 1.4 MG/DL (ref 0–1.2)
BILIRUB UR QL STRIP: NEGATIVE
BUN SERPL-MCNC: 13 MG/DL (ref 8–23)
BUN/CREAT SERPL: 15.3 (ref 7–25)
CALCIUM SPEC-SCNC: 8.8 MG/DL (ref 8.6–10.5)
CHLORIDE SERPL-SCNC: 93 MMOL/L (ref 98–107)
CLARITY UR: CLEAR
CO2 SERPL-SCNC: 20.2 MMOL/L (ref 22–29)
COLOR UR: ABNORMAL
CREAT SERPL-MCNC: 0.85 MG/DL (ref 0.76–1.27)
DEPRECATED RDW RBC AUTO: 42.5 FL (ref 37–54)
EGFRCR SERPLBLD CKD-EPI 2021: 88.4 ML/MIN/1.73
EOSINOPHIL # BLD AUTO: 0.28 10*3/MM3 (ref 0–0.4)
EOSINOPHIL NFR BLD AUTO: 2.7 % (ref 0.3–6.2)
ERYTHROCYTE [DISTWIDTH] IN BLOOD BY AUTOMATED COUNT: 12.3 % (ref 12.3–15.4)
FLUAV RNA RESP QL NAA+PROBE: NOT DETECTED
FLUBV RNA RESP QL NAA+PROBE: NOT DETECTED
GEN 5 1HR TROPONIN T REFLEX: 24 NG/L
GLOBULIN UR ELPH-MCNC: 2.3 GM/DL
GLUCOSE SERPL-MCNC: 166 MG/DL (ref 65–99)
GLUCOSE UR STRIP-MCNC: NEGATIVE MG/DL
HCT VFR BLD AUTO: 39.8 % (ref 37.5–51)
HGB BLD-MCNC: 13.8 G/DL (ref 13–17.7)
HGB UR QL STRIP.AUTO: ABNORMAL
HYALINE CASTS UR QL AUTO: ABNORMAL /LPF
IMM GRANULOCYTES # BLD AUTO: 0.03 10*3/MM3 (ref 0–0.05)
IMM GRANULOCYTES NFR BLD AUTO: 0.3 % (ref 0–0.5)
INR PPP: 1.21 (ref 0.9–1.1)
KETONES UR QL STRIP: ABNORMAL
LEUKOCYTE ESTERASE UR QL STRIP.AUTO: ABNORMAL
LYMPHOCYTES # BLD AUTO: 0.54 10*3/MM3 (ref 0.7–3.1)
LYMPHOCYTES NFR BLD AUTO: 5.3 % (ref 19.6–45.3)
MAGNESIUM SERPL-MCNC: 1.8 MG/DL (ref 1.6–2.4)
MCH RBC QN AUTO: 32.6 PG (ref 26.6–33)
MCHC RBC AUTO-ENTMCNC: 34.7 G/DL (ref 31.5–35.7)
MCV RBC AUTO: 94.1 FL (ref 79–97)
MONOCYTES # BLD AUTO: 0.98 10*3/MM3 (ref 0.1–0.9)
MONOCYTES NFR BLD AUTO: 9.5 % (ref 5–12)
NEUTROPHILS NFR BLD AUTO: 8.4 10*3/MM3 (ref 1.7–7)
NEUTROPHILS NFR BLD AUTO: 81.7 % (ref 42.7–76)
NITRITE UR QL STRIP: NEGATIVE
NRBC BLD AUTO-RTO: 0 /100 WBC (ref 0–0.2)
NT-PROBNP SERPL-MCNC: 3990 PG/ML (ref 0–1800)
PH UR STRIP.AUTO: 7 [PH] (ref 5–8)
PLATELET # BLD AUTO: 211 10*3/MM3 (ref 140–450)
PMV BLD AUTO: 8.8 FL (ref 6–12)
POTASSIUM SERPL-SCNC: 4.1 MMOL/L (ref 3.5–5.2)
PROT SERPL-MCNC: 6.5 G/DL (ref 6–8.5)
PROT UR QL STRIP: ABNORMAL
PROTHROMBIN TIME: 15.2 SECONDS (ref 11.7–14.2)
RBC # BLD AUTO: 4.23 10*6/MM3 (ref 4.14–5.8)
RBC # UR STRIP: ABNORMAL /HPF
REF LAB TEST METHOD: ABNORMAL
RSV RNA RESP QL NAA+PROBE: NOT DETECTED
SARS-COV-2 RNA RESP QL NAA+PROBE: NOT DETECTED
SODIUM SERPL-SCNC: 127 MMOL/L (ref 136–145)
SP GR UR STRIP: 1.02 (ref 1–1.03)
SQUAMOUS #/AREA URNS HPF: ABNORMAL /HPF
TROPONIN T % DELTA: -4
TROPONIN T NUMERIC DELTA: -1 NG/L
TROPONIN T SERPL HS-MCNC: 25 NG/L
UROBILINOGEN UR QL STRIP: ABNORMAL
WBC # UR STRIP: ABNORMAL /HPF
WBC NRBC COR # BLD AUTO: 10.28 10*3/MM3 (ref 3.4–10.8)

## 2025-03-07 PROCEDURE — 83735 ASSAY OF MAGNESIUM: CPT | Performed by: EMERGENCY MEDICINE

## 2025-03-07 PROCEDURE — 84484 ASSAY OF TROPONIN QUANT: CPT | Performed by: EMERGENCY MEDICINE

## 2025-03-07 PROCEDURE — 85610 PROTHROMBIN TIME: CPT | Performed by: EMERGENCY MEDICINE

## 2025-03-07 PROCEDURE — 83735 ASSAY OF MAGNESIUM: CPT

## 2025-03-07 PROCEDURE — 25010000002 FUROSEMIDE PER 20 MG: Performed by: EMERGENCY MEDICINE

## 2025-03-07 PROCEDURE — G0378 HOSPITAL OBSERVATION PER HR: HCPCS

## 2025-03-07 PROCEDURE — 36415 COLL VENOUS BLD VENIPUNCTURE: CPT

## 2025-03-07 PROCEDURE — 80053 COMPREHEN METABOLIC PANEL: CPT | Performed by: EMERGENCY MEDICINE

## 2025-03-07 PROCEDURE — 85025 COMPLETE CBC W/AUTO DIFF WBC: CPT

## 2025-03-07 PROCEDURE — 93005 ELECTROCARDIOGRAM TRACING: CPT

## 2025-03-07 PROCEDURE — 83880 ASSAY OF NATRIURETIC PEPTIDE: CPT | Performed by: EMERGENCY MEDICINE

## 2025-03-07 PROCEDURE — 81001 URINALYSIS AUTO W/SCOPE: CPT | Performed by: EMERGENCY MEDICINE

## 2025-03-07 PROCEDURE — 93005 ELECTROCARDIOGRAM TRACING: CPT | Performed by: EMERGENCY MEDICINE

## 2025-03-07 PROCEDURE — 85025 COMPLETE CBC W/AUTO DIFF WBC: CPT | Performed by: EMERGENCY MEDICINE

## 2025-03-07 PROCEDURE — 85730 THROMBOPLASTIN TIME PARTIAL: CPT | Performed by: EMERGENCY MEDICINE

## 2025-03-07 PROCEDURE — 99214 OFFICE O/P EST MOD 30 MIN: CPT | Performed by: INTERNAL MEDICINE

## 2025-03-07 PROCEDURE — 71045 X-RAY EXAM CHEST 1 VIEW: CPT

## 2025-03-07 PROCEDURE — 99285 EMERGENCY DEPT VISIT HI MDM: CPT

## 2025-03-07 PROCEDURE — 87637 SARSCOV2&INF A&B&RSV AMP PRB: CPT | Performed by: EMERGENCY MEDICINE

## 2025-03-07 RX ORDER — ACETAMINOPHEN 650 MG/1
650 SUPPOSITORY RECTAL EVERY 4 HOURS PRN
Status: DISCONTINUED | OUTPATIENT
Start: 2025-03-07 | End: 2025-03-12 | Stop reason: HOSPADM

## 2025-03-07 RX ORDER — ACEBUTOLOL HYDROCHLORIDE 200 MG/1
200 CAPSULE ORAL DAILY
Status: DISCONTINUED | OUTPATIENT
Start: 2025-03-08 | End: 2025-03-12 | Stop reason: HOSPADM

## 2025-03-07 RX ORDER — ONDANSETRON 4 MG/1
4 TABLET, ORALLY DISINTEGRATING ORAL EVERY 6 HOURS PRN
Status: DISCONTINUED | OUTPATIENT
Start: 2025-03-07 | End: 2025-03-10 | Stop reason: SDUPTHER

## 2025-03-07 RX ORDER — CETIRIZINE HYDROCHLORIDE 10 MG/1
10 TABLET ORAL DAILY
Status: DISCONTINUED | OUTPATIENT
Start: 2025-03-07 | End: 2025-03-12 | Stop reason: HOSPADM

## 2025-03-07 RX ORDER — ATORVASTATIN CALCIUM 20 MG/1
20 TABLET, FILM COATED ORAL DAILY
Status: DISCONTINUED | OUTPATIENT
Start: 2025-03-07 | End: 2025-03-12 | Stop reason: HOSPADM

## 2025-03-07 RX ORDER — BISACODYL 5 MG/1
5 TABLET, DELAYED RELEASE ORAL DAILY PRN
Status: DISCONTINUED | OUTPATIENT
Start: 2025-03-07 | End: 2025-03-12 | Stop reason: HOSPADM

## 2025-03-07 RX ORDER — AMOXICILLIN 250 MG
2 CAPSULE ORAL 2 TIMES DAILY PRN
Status: DISCONTINUED | OUTPATIENT
Start: 2025-03-07 | End: 2025-03-12 | Stop reason: HOSPADM

## 2025-03-07 RX ORDER — ASPIRIN 81 MG/1
162 TABLET, CHEWABLE ORAL ONCE
Status: COMPLETED | OUTPATIENT
Start: 2025-03-07 | End: 2025-03-07

## 2025-03-07 RX ORDER — SODIUM CHLORIDE 9 MG/ML
40 INJECTION, SOLUTION INTRAVENOUS AS NEEDED
Status: DISCONTINUED | OUTPATIENT
Start: 2025-03-07 | End: 2025-03-12 | Stop reason: HOSPADM

## 2025-03-07 RX ORDER — AMITRIPTYLINE HYDROCHLORIDE 10 MG/1
20 TABLET ORAL NIGHTLY
Status: DISCONTINUED | OUTPATIENT
Start: 2025-03-07 | End: 2025-03-12 | Stop reason: HOSPADM

## 2025-03-07 RX ORDER — AMLODIPINE BESYLATE 5 MG/1
10 TABLET ORAL
Status: DISCONTINUED | OUTPATIENT
Start: 2025-03-07 | End: 2025-03-10

## 2025-03-07 RX ORDER — ASPIRIN 81 MG/1
81 TABLET ORAL DAILY
Status: DISCONTINUED | OUTPATIENT
Start: 2025-03-08 | End: 2025-03-12 | Stop reason: HOSPADM

## 2025-03-07 RX ORDER — GABAPENTIN 300 MG/1
300 CAPSULE ORAL DAILY
Status: DISCONTINUED | OUTPATIENT
Start: 2025-03-07 | End: 2025-03-12 | Stop reason: HOSPADM

## 2025-03-07 RX ORDER — FUROSEMIDE 10 MG/ML
40 INJECTION INTRAMUSCULAR; INTRAVENOUS ONCE
Status: COMPLETED | OUTPATIENT
Start: 2025-03-07 | End: 2025-03-07

## 2025-03-07 RX ORDER — SODIUM CHLORIDE 0.9 % (FLUSH) 0.9 %
10 SYRINGE (ML) INJECTION AS NEEDED
Status: DISCONTINUED | OUTPATIENT
Start: 2025-03-07 | End: 2025-03-12 | Stop reason: HOSPADM

## 2025-03-07 RX ORDER — ENALAPRIL MALEATE 5 MG/1
20 TABLET ORAL DAILY
Status: DISCONTINUED | OUTPATIENT
Start: 2025-03-08 | End: 2025-03-12 | Stop reason: HOSPADM

## 2025-03-07 RX ORDER — ACETAMINOPHEN 160 MG/5ML
650 SOLUTION ORAL EVERY 4 HOURS PRN
Status: DISCONTINUED | OUTPATIENT
Start: 2025-03-07 | End: 2025-03-12 | Stop reason: HOSPADM

## 2025-03-07 RX ORDER — GABAPENTIN 300 MG/1
300 CAPSULE ORAL DAILY
COMMUNITY

## 2025-03-07 RX ORDER — NITROFURANTOIN 25; 75 MG/1; MG/1
100 CAPSULE ORAL 2 TIMES DAILY
COMMUNITY
Start: 2025-02-28 | End: 2025-03-12 | Stop reason: HOSPADM

## 2025-03-07 RX ORDER — POLYETHYLENE GLYCOL 3350 17 G/17G
17 POWDER, FOR SOLUTION ORAL DAILY PRN
Status: DISCONTINUED | OUTPATIENT
Start: 2025-03-07 | End: 2025-03-12 | Stop reason: HOSPADM

## 2025-03-07 RX ORDER — OXCARBAZEPINE 150 MG/1
300 TABLET, FILM COATED ORAL 4 TIMES DAILY
Status: DISCONTINUED | OUTPATIENT
Start: 2025-03-07 | End: 2025-03-12 | Stop reason: HOSPADM

## 2025-03-07 RX ORDER — NITROFURANTOIN 25; 75 MG/1; MG/1
100 CAPSULE ORAL 2 TIMES DAILY
Status: DISPENSED | OUTPATIENT
Start: 2025-03-07 | End: 2025-03-08

## 2025-03-07 RX ORDER — PANTOPRAZOLE SODIUM 40 MG/1
40 TABLET, DELAYED RELEASE ORAL
Status: DISCONTINUED | OUTPATIENT
Start: 2025-03-08 | End: 2025-03-12 | Stop reason: HOSPADM

## 2025-03-07 RX ORDER — SODIUM CHLORIDE 0.9 % (FLUSH) 0.9 %
10 SYRINGE (ML) INJECTION EVERY 12 HOURS SCHEDULED
Status: DISCONTINUED | OUTPATIENT
Start: 2025-03-07 | End: 2025-03-12 | Stop reason: HOSPADM

## 2025-03-07 RX ORDER — NITROGLYCERIN 0.4 MG/1
0.4 TABLET SUBLINGUAL
Status: DISCONTINUED | OUTPATIENT
Start: 2025-03-07 | End: 2025-03-10 | Stop reason: SDUPTHER

## 2025-03-07 RX ORDER — ACETAMINOPHEN 325 MG/1
650 TABLET ORAL EVERY 4 HOURS PRN
Status: DISCONTINUED | OUTPATIENT
Start: 2025-03-07 | End: 2025-03-10 | Stop reason: SDUPTHER

## 2025-03-07 RX ORDER — BISACODYL 10 MG
10 SUPPOSITORY, RECTAL RECTAL DAILY PRN
Status: DISCONTINUED | OUTPATIENT
Start: 2025-03-07 | End: 2025-03-12 | Stop reason: HOSPADM

## 2025-03-07 RX ORDER — ISOSORBIDE MONONITRATE 60 MG/1
60 TABLET, EXTENDED RELEASE ORAL DAILY
Status: DISCONTINUED | OUTPATIENT
Start: 2025-03-08 | End: 2025-03-12 | Stop reason: HOSPADM

## 2025-03-07 RX ADMIN — FUROSEMIDE 40 MG: 10 INJECTION, SOLUTION INTRAMUSCULAR; INTRAVENOUS at 10:55

## 2025-03-07 RX ADMIN — CETIRIZINE HYDROCHLORIDE 10 MG: 10 TABLET, FILM COATED ORAL at 20:52

## 2025-03-07 RX ADMIN — APIXABAN 5 MG: 5 TABLET, FILM COATED ORAL at 20:52

## 2025-03-07 RX ADMIN — ASPIRIN 81 MG CHEWABLE TABLET 162 MG: 81 TABLET CHEWABLE at 10:55

## 2025-03-07 RX ADMIN — Medication 10 ML: at 20:53

## 2025-03-07 RX ADMIN — ATORVASTATIN CALCIUM 20 MG: 20 TABLET, FILM COATED ORAL at 20:51

## 2025-03-07 RX ADMIN — AMLODIPINE BESYLATE 10 MG: 5 TABLET ORAL at 20:51

## 2025-03-07 RX ADMIN — GABAPENTIN 300 MG: 300 CAPSULE ORAL at 21:56

## 2025-03-07 NOTE — ED PROVIDER NOTES
"Subjective   History of Present Illness  Chief complaint: She is a 79-year-old who presents with \"a small amount of pain \".  He states that he has discomfort in his chest.  It is midsternal nonradiating.  He is short of breath with it.  He he has had worsening ability to do activity.  It started yesterday morning.  He really has not had a fever or cough.  No swelling.  He has a history of cardiac disease.  He sees  and has a pacemaker as well as coronary stents.  He is on Eliquis.  His last baby aspirin was yesterday.  He also does note urinary tract infection that he just finished yesterday his third antibiotic for.  He states he is not sure if it has gone away or not.  He has not noticed any new symptoms.    Context:    Duration:    Timing:    Severity:    Associated Symptoms:        PCP:  LMP:      Review of Systems   HENT: Negative.     Respiratory:  Positive for chest tightness and shortness of breath.    Cardiovascular:  Positive for chest pain. Negative for leg swelling.   Gastrointestinal: Negative.    Genitourinary: Negative.    Musculoskeletal: Negative.        Past Medical History:   Diagnosis Date    Arthritis     Arthritis     BPH (benign prostatic hyperplasia)     CHF (congestive heart failure)     Coronary artery disease     dr. Carrizales    Deviated septum     FH: cataracts     Gait abnormality     GERD (gastroesophageal reflux disease)     History of trigeminal neuralgia     Hyperlipidemia     Hypertension     Renal cyst     Sciatica     Sleep apnea     unable to tolerate machine       No Known Allergies    Past Surgical History:   Procedure Laterality Date    APPENDECTOMY      CARDIAC CATHETERIZATION      CARDIAC CATHETERIZATION N/A 3/5/2020    Procedure: Percutaneous Subclavian Intervention;  Surgeon: Nick Greenberg MD;  Location: [facility] INVASIVE LOCATION;  Service: Cardiovascular;  Laterality: N/A;    CARDIAC ELECTROPHYSIOLOGY PROCEDURE N/A 1/29/2020    Procedure: " Biventricular Device Upgrade;  Surgeon: Dominick Jackson MD;  Location: [facility] CATH INVASIVE LOCATION;  Service: Cardiovascular    CARDIAC ELECTROPHYSIOLOGY PROCEDURE N/A 3/5/2020    Procedure: Lead Revision;  Surgeon: Nick Greenberg MD;  Location: [facility] CATH INVASIVE LOCATION;  Service: Cardiovascular;  Laterality: N/A;    CORONARY STENT PLACEMENT  2014    x 2    CYSTOSCOPY, URETEROSCOPY, RETROGRADE PYELOGRAM, STENT INSERTION Left 11/3/2023    Procedure: CYSTOSCOPY URETEROSCOPY STONE EXTRACTION STENT INSERTION;  Surgeon: Chris Patel MD;  Location: [facility] MAIN OR;  Service: Urology;  Laterality: Left;    IMPLANTABLE CARDIOVERTER DEFIBRILLATOR LEAD REPLACEMENT/POCKET REVISION Right 10/19/2022    Procedure: RT. TRANSVENOUS ICD LEAD EXTRACTION;  Surgeon: Naveed Rios MD;  Location: [facility];  Service: Cardiology;  Laterality: Right;    PROSTATE SURGERY      REFRACTIVE SURGERY      THORACOTOMY Left 3/4/2020    Procedure: THORACOTOMY MINI WITH LEAD PLACEMENT;  Surgeon: Justin Aguilar MD;  Location: [facility];  Service: Cardiothoracic;  Laterality: Left;    TOTAL HIP ARTHROPLASTY Bilateral     different times       Family History   Problem Relation Age of Onset    Cancer Mother     Heart disease Father     Arthritis Father     Diabetes Father     Hypertension Father     Hyperlipidemia Father     Kidney disease Father     Malig Hyperthermia Neg Hx        Social History     Socioeconomic History    Marital status:    Tobacco Use    Smoking status: Former     Current packs/day: 0.00     Types: Cigarettes     Quit date:      Years since quittin.2     Passive exposure: Past    Smokeless tobacco: Never   Vaping Use    Vaping status: Never Used   Substance and Sexual Activity    Alcohol use: Yes     Alcohol/week: 7.0 standard drinks of alcohol     Types: 7 Glasses of wine per week     Comment: RED WINE BEFORE DINNER    Drug use: No    Sexual activity: Defer           Objective    Physical Exam  Vitals and nursing note reviewed.   Constitutional:       Appearance: He is well-developed.   HENT:      Head: Normocephalic and atraumatic.   Cardiovascular:      Rate and Rhythm: Normal rate and regular rhythm.      Heart sounds: Normal heart sounds.   Pulmonary:      Effort: Pulmonary effort is normal.      Breath sounds: Normal breath sounds.   Musculoskeletal:         General: Normal range of motion.      Cervical back: Normal range of motion and neck supple.      Right lower leg: No edema.      Left lower leg: No edema.   Skin:     General: Skin is warm and dry.   Neurological:      General: No focal deficit present.      Mental Status: He is alert and oriented to person, place, and time.   Psychiatric:         Mood and Affect: Mood normal.         Procedures           ED Course                                             Results for orders placed or performed during the hospital encounter of 03/07/25   ECG 12 Lead Chest Pain    Collection Time: 03/07/25  8:31 AM   Result Value Ref Range    QT Interval 521 ms    QTC Interval 562 ms   COVID-19, FLU A/B, RSV PCR 1 HR TAT - Swab, Nasopharynx    Collection Time: 03/07/25  8:55 AM    Specimen: Nasopharynx; Swab   Result Value Ref Range    COVID19 Not Detected Not Detected - Ref. Range    Influenza A PCR Not Detected Not Detected    Influenza B PCR Not Detected Not Detected    RSV, PCR Not Detected Not Detected   Comprehensive Metabolic Panel    Collection Time: 03/07/25  8:55 AM    Specimen: Blood   Result Value Ref Range    Glucose 166 (H) 65 - 99 mg/dL    BUN 13 8 - 23 mg/dL    Creatinine 0.85 0.76 - 1.27 mg/dL    Sodium 127 (L) 136 - 145 mmol/L    Potassium 4.1 3.5 - 5.2 mmol/L    Chloride 93 (L) 98 - 107 mmol/L    CO2 20.2 (L) 22.0 - 29.0 mmol/L    Calcium 8.8 8.6 - 10.5 mg/dL    Total Protein 6.5 6.0 - 8.5 g/dL    Albumin 4.2 3.5 - 5.2 g/dL    ALT (SGPT) 19 1 - 41 U/L    AST (SGOT) 19 1 - 40 U/L    Alkaline Phosphatase 88 39 - 117 U/L    Total  Bilirubin 1.4 (H) 0.0 - 1.2 mg/dL    Globulin 2.3 gm/dL    A/G Ratio 1.8 g/dL    BUN/Creatinine Ratio 15.3 7.0 - 25.0    Anion Gap 13.8 5.0 - 15.0 mmol/L    eGFR 88.4 >60.0 mL/min/1.73   Protime-INR    Collection Time: 03/07/25  8:55 AM    Specimen: Blood   Result Value Ref Range    Protime 15.2 (H) 11.7 - 14.2 Seconds    INR 1.21 (H) 0.90 - 1.10   aPTT    Collection Time: 03/07/25  8:55 AM    Specimen: Blood   Result Value Ref Range    PTT 31.8 22.7 - 35.4 seconds   BNP    Collection Time: 03/07/25  8:55 AM    Specimen: Blood   Result Value Ref Range    proBNP 3,990.0 (H) 0.0 - 1,800.0 pg/mL   High Sensitivity Troponin T    Collection Time: 03/07/25  8:55 AM    Specimen: Blood   Result Value Ref Range    HS Troponin T 25 (H) <22 ng/L   Magnesium    Collection Time: 03/07/25  8:55 AM    Specimen: Blood   Result Value Ref Range    Magnesium 1.8 1.6 - 2.4 mg/dL   CBC Auto Differential    Collection Time: 03/07/25  8:55 AM    Specimen: Blood   Result Value Ref Range    WBC 10.28 3.40 - 10.80 10*3/mm3    RBC 4.23 4.14 - 5.80 10*6/mm3    Hemoglobin 13.8 13.0 - 17.7 g/dL    Hematocrit 39.8 37.5 - 51.0 %    MCV 94.1 79.0 - 97.0 fL    MCH 32.6 26.6 - 33.0 pg    MCHC 34.7 31.5 - 35.7 g/dL    RDW 12.3 12.3 - 15.4 %    RDW-SD 42.5 37.0 - 54.0 fl    MPV 8.8 6.0 - 12.0 fL    Platelets 211 140 - 450 10*3/mm3    Neutrophil % 81.7 (H) 42.7 - 76.0 %    Lymphocyte % 5.3 (L) 19.6 - 45.3 %    Monocyte % 9.5 5.0 - 12.0 %    Eosinophil % 2.7 0.3 - 6.2 %    Basophil % 0.5 0.0 - 1.5 %    Immature Grans % 0.3 0.0 - 0.5 %    Neutrophils, Absolute 8.40 (H) 1.70 - 7.00 10*3/mm3    Lymphocytes, Absolute 0.54 (L) 0.70 - 3.10 10*3/mm3    Monocytes, Absolute 0.98 (H) 0.10 - 0.90 10*3/mm3    Eosinophils, Absolute 0.28 0.00 - 0.40 10*3/mm3    Basophils, Absolute 0.05 0.00 - 0.20 10*3/mm3    Immature Grans, Absolute 0.03 0.00 - 0.05 10*3/mm3    nRBC 0.0 0.0 - 0.2 /100 WBC   Urinalysis With Culture If Indicated - Urine, Clean Catch    Collection Time:  03/07/25  9:19 AM    Specimen: Urine, Clean Catch   Result Value Ref Range    Color, UA Dark Yellow (A) Yellow, Straw    Appearance, UA Clear Clear    pH, UA 7.0 5.0 - 8.0    Specific Gravity, UA 1.019 1.005 - 1.030    Glucose, UA Negative Negative    Ketones, UA 15 mg/dL (1+) (A) Negative    Bilirubin, UA Negative Negative    Blood, UA Small (1+) (A) Negative    Protein, UA Trace (A) Negative    Leuk Esterase, UA Trace (A) Negative    Nitrite, UA Negative Negative    Urobilinogen, UA 1.0 E.U./dL 0.2 - 1.0 E.U./dL   Urinalysis, Microscopic Only - Urine, Clean Catch    Collection Time: 03/07/25  9:19 AM    Specimen: Urine, Clean Catch   Result Value Ref Range    RBC, UA 11-20 (A) None Seen, 0-2 /HPF    WBC, UA 0-2 None Seen, 0-2 /HPF    Bacteria, UA None Seen None Seen /HPF    Squamous Epithelial Cells, UA 0-2 None Seen, 0-2 /HPF    Hyaline Casts, UA 0-2 None Seen /LPF    Methodology Automated Microscopy         XR Chest 1 View    Result Date: 3/7/2025  Impression: No acute chest findings. Electronically Signed: Omayra Quinn MD  3/7/2025 9:12 AM EST  Workstation ID: [id number]          Medical Decision Making  Patient was seen evaluated for the above problem    Differential diagnosis includes was not limited to ACS, PE, CHF, pneumonia    Patient is afebrile here.  Chest x-ray reviewed by myself does appear to show some cardiomegaly with some interstitial edema.  BNP is elevated.  He did receive Lasix here in the emergency department.  His EKG interpreted by myself shows sinus rhythm.  He has a right bundle branch block.  He does have a pacemaker port present.  His prior EKG is similar except V4 and 5 which do have some ST depression.  I do believe it is secondary to interventricular conduction delay.  His troponin is very mildly elevated.  He is anticoagulated on Eliquis.  Felt that PE was less likely at this point with patient anticoagulated.  CT chest was considered, however can be further ordered is warranted  inpatient.  I spoke with Jaye on-call for the hospitalist who agrees to admit the patient.    Problems Addressed:  Chest pain, unspecified type: complicated acute illness or injury  Dyspnea, unspecified type: complicated acute illness or injury    Amount and/or Complexity of Data Reviewed  Labs: ordered. Decision-making details documented in ED Course.     Details: Labs reviewed by myself  Radiology: ordered.     Details: X-ray interpreted by myself shows some signs of pulmonary edema.  No pneumothorax  ECG/medicine tests: ordered.    Risk  OTC drugs.  Prescription drug management.  Decision regarding hospitalization.        Final diagnoses:   None   Chest pain  CHF    ED Disposition  ED Disposition       None            No follow-up provider specified.       Medication List      No changes were made to your prescriptions during this visit.            Cortes Chauhan,   03/07/25 1017

## 2025-03-07 NOTE — CONSULTS
"Cardiology Consult Note      REQUESTING PHYSICIAN    Valdemar Fish MD    PATIENT IDENTIFICATION  Name: Mode Duffy  Age: 79 y.o.  Sex: male  :  1945  MRN: [id number]             REASON FOR CONSULTATION:  79 y.o. male known to Dr. Carrizales with a history of ischemic heart disease. He underwent cardiac catheterization with resultant PCI of an obtuse marginal branch and diagonal branch in 2014. He has additional history of hypertension with hypertensive cardiovascular disease, dyslipidemia, paroxysmal atrial fibrillation, antiplatelet therapy as well as sick sinus syndrome with complete heart block status post biventricular permanent pacemaker placement.  Patient has left-sided implant in  and had significant issues with RV pacing alone with heart failure symptoms and LV lead was placed epicardial lead because of left subclavian occlusion. After LV lead epicardial placement, patient was found to have RV lead malfunction needing a new RV lead placed on the right side which was removed secondary to pocket infection on the right side.     CC:  Chest pain    HISTORY OF PRESENT ILLNESS:   Patient presented to the emergency department at [facility] 3/7/2025 with complaint of \"a little something in my chest\", nonradiating.  \"A little heavy breathing\".  Symptom onset Thursday.  He describes the chest discomfort as 2/10.  He reports a recent nonproductive cough.  Denies any lower extremity edema, GI complaints, abdominal distention.  Workup was initiated in the emergency department.  He was given aspirin as well as 40 mg IV Lasix x 1.  Cardiology was consulted for further evaluation and recommendations.  Upon my evaluation, he is sitting up in bed working a crossword puzzle.  He denies any chest discomfort at present.  Monitor shows sinus rhythm and blood pressure is quite stable.  Patient does endorse a diet that is probably too high in sodium.      REVIEW OF SYSTEMS:  Pertinent items are noted " "in HPI, all other systems reviewed and negative    OBJECTIVE   High-sensitivity troponin 25, 24  proBNP 3990  Sodium 127  Chest x-ray with no acute findings  EKG was sinus rhythm, right bundle branch block-unchanged from 3/12/2024    ASSESSMENT  Chest discomfort  Shortness of breath  Cough  Acute heart failure with preserved LV systolic function secondary to valvular heart disease  Coronary artery disease status post PCI and stenting most recently in November of 2014.        This was cutting Balloon angioplasty of a circumflex branch and a diagonal branch.   Dyslipidemia.  Hypertension with hypertensive cardiovascular disease with a white coat component  Cardiomyopathy with recovered LV systolic function (EF of 35 to 40% in October 2019).  EF 56% with mild concentric LVH, grade 1 DD, mild to moderate LAE  Sick sinus syndrome status post biventricular permanent pacemaker placement.  Valvular heart disease  Paroxysmal atrial fibrillation  Coagulopathy secondary to Eliquis  Lumbago  Hyponatremia secondary to Trileptal  Renal cysts  History of trigeminal neuralgia presently treated with Trileptal.  Hip pain currently undergoing preoperative cardiovascular risk assessment for right hip surgery.  Trigeminal neuralgia    PLAN  Continue current CV plan of care-amlodipine 10 mg daily, aspirin 81 mg daily, Lipitor 20 mg daily, Imdur 60 mg daily, ACE pule 200 mg daily.  Continue apixaban 5 mg twice daily for paroxysmal atrial fibrillation.  Received Lasix 40 mg x 1 improvement in symptoms.  Continue telemetry monitoring.  Further recommendations per cardiology        CHF Guideline Directed Medical Therapy  Beta Blocker:   ARNI/ACE/ARB:   SGLT 2 inhibitors:   Diuretics:   Aldosterone Antagonist:   Vasodilators & Nitrates:     Vital Signs  Visit Vitals  /83   Pulse 70   Temp 98.4 °F (36.9 °C) (Oral)   Resp 18   Ht 185.4 cm (73\")   Wt 97.9 kg (215 lb 12.8 oz)   SpO2 92%   BMI 28.47 kg/m²     Oxygen Therapy  SpO2: 92 " "%  Device (Oxygen Therapy): room air  Flowsheet Rows      Flowsheet Row First Filed Value   Admission Height 185.4 cm (73\") Documented at 03/07/2025 0823   Admission Weight 97.9 kg (215 lb 12.8 oz) Documented at 03/07/2025 0823          Intake & Output (last 3 days)         03/04 0701  03/05 0700 03/05 0701  03/06 0700 03/06 0701  03/07 0700 03/07 0701  03/08 0700    Urine (mL/kg/hr)    600    Total Output    600    Net    -600                  Lines, Drains & Airways       Active LDAs       Name Placement date Placement time Site Days    Peripheral IV 03/07/25 0856 Left Antecubital 03/07/25  0856  Antecubital  less than 1                    MEDICAL HISTORY    Past Medical History:   Diagnosis Date    Arthritis     Arthritis     BPH (benign prostatic hyperplasia)     CHF (congestive heart failure)     Coronary artery disease     dr. Carrizales    Deviated septum     FH: cataracts     Gait abnormality     GERD (gastroesophageal reflux disease)     History of trigeminal neuralgia     Hyperlipidemia     Hypertension     Renal cyst     Sciatica     Sleep apnea     unable to tolerate machine        SURGICAL HISTORY    Past Surgical History:   Procedure Laterality Date    APPENDECTOMY      CARDIAC CATHETERIZATION      CARDIAC CATHETERIZATION N/A 3/5/2020    Procedure: Percutaneous Subclavian Intervention;  Surgeon: Nick Greenberg MD;  Location: [facility] CATH INVASIVE LOCATION;  Service: Cardiovascular;  Laterality: N/A;    CARDIAC ELECTROPHYSIOLOGY PROCEDURE N/A 1/29/2020    Procedure: Biventricular Device Upgrade;  Surgeon: Dominick Jackson MD;  Location: [facility] CATH INVASIVE LOCATION;  Service: Cardiovascular    CARDIAC ELECTROPHYSIOLOGY PROCEDURE N/A 3/5/2020    Procedure: Lead Revision;  Surgeon: Nick Greenberg MD;  Location: [facility] CATH INVASIVE LOCATION;  Service: Cardiovascular;  Laterality: N/A;    CORONARY STENT PLACEMENT  2014    x 2    CYSTOSCOPY, URETEROSCOPY, RETROGRADE PYELOGRAM, STENT " "INSERTION Left 11/3/2023    Procedure: CYSTOSCOPY URETEROSCOPY STONE EXTRACTION STENT INSERTION;  Surgeon: Chris Patel MD;  Location: [facility] MAIN OR;  Service: Urology;  Laterality: Left;    IMPLANTABLE CARDIOVERTER DEFIBRILLATOR LEAD REPLACEMENT/POCKET REVISION Right 10/19/2022    Procedure: RT. TRANSVENOUS ICD LEAD EXTRACTION;  Surgeon: Naveed Rios MD;  Location: [facility];  Service: Cardiology;  Laterality: Right;    PROSTATE SURGERY      REFRACTIVE SURGERY      THORACOTOMY Left 3/4/2020    Procedure: THORACOTOMY MINI WITH LEAD PLACEMENT;  Surgeon: Justin Aguilar MD;  Location: [facility];  Service: Cardiothoracic;  Laterality: Left;    TOTAL HIP ARTHROPLASTY Bilateral     different times        FAMILY HISTORY    Family History   Problem Relation Age of Onset    Cancer Mother     Heart disease Father     Arthritis Father     Diabetes Father     Hypertension Father     Hyperlipidemia Father     Kidney disease Father     Malig Hyperthermia Neg Hx        SOCIAL HISTORY    Social History     Tobacco Use    Smoking status: Former     Current packs/day: 0.00     Types: Cigarettes     Quit date:      Years since quittin.2     Passive exposure: Past    Smokeless tobacco: Never   Substance Use Topics    Alcohol use: Yes     Alcohol/week: 7.0 standard drinks of alcohol     Types: 7 Glasses of wine per week     Comment: RED WINE BEFORE DINNER        ALLERGIES    No Known Allergies           /83   Pulse 70   Temp 98.4 °F (36.9 °C) (Oral)   Resp 18   Ht 185.4 cm (73\")   Wt 97.9 kg (215 lb 12.8 oz)   SpO2 92%   BMI 28.47 kg/m²   Intake/Output last 3 shifts:  No intake/output data recorded.  Intake/Output this shift:  I/O this shift:  In: -   Out: 600 [Urine:600]    PHYSICAL EXAM:    General: Alert, cooperative, no distress, appears stated age  Head:  Normocephalic, atraumatic, mucous membranes moist  Eyes:  Conjunctivae/corneas clear, EOM's intact     Neck:  Supple,  no adenopathy; no " "JVD or bruit  Lungs: Clear to auscultation bilaterally, no wheezes, rhonchi or rales are noted  Chest wall: No tenderness  Heart::  Regular rate and rhythm, S1 and S2 normal, 1/6 murmur base  Abdomen: Soft, nontender, nondistended, bowel sounds active  Extremities: No cyanosis, clubbing, or edema   Pulses: 2+ and symmetric all extremities  Skin:  No rashes or lesions  Neuro/psych: A&O x3. CN II through XII are grossly intact with appropriate affect      Scheduled Meds:           Continuous Infusions:         PRN Meds:      [COMPLETED] Insert Peripheral IV **AND** sodium chloride        Results Review:     I reviewed the patient's new clinical results.    CBC    Results from last 7 days   Lab Units 03/07/25  0855   WBC 10*3/mm3 10.28   HEMOGLOBIN g/dL 13.8   PLATELETS 10*3/mm3 211     Cr Clearance Estimated Creatinine Clearance: 86.8 mL/min (by C-G formula based on SCr of 0.85 mg/dL).  Coag   Results from last 7 days   Lab Units 03/07/25  0855   INR  1.21*   APTT seconds 31.8     HbA1C   Lab Results   Component Value Date    HGBA1C 5.9 (H) 03/02/2020     Blood Glucose No results found for: \"POCGLU\"  Infection     CMP   Results from last 7 days   Lab Units 03/07/25  0855   SODIUM mmol/L 127*   POTASSIUM mmol/L 4.1   CHLORIDE mmol/L 93*   CO2 mmol/L 20.2*   BUN mg/dL 13   CREATININE mg/dL 0.85   GLUCOSE mg/dL 166*   ALBUMIN g/dL 4.2   BILIRUBIN mg/dL 1.4*   ALK PHOS U/L 88   AST (SGOT) U/L 19   ALT (SGPT) U/L 19     ABG      UA    Results from last 7 days   Lab Units 03/07/25  0919   NITRITE UA  Negative   WBC UA /HPF 0-2   BACTERIA UA /HPF None Seen   SQUAM EPITHEL UA /HPF 0-2     SULY  No results found for: \"POCMETH\", \"POCAMPHET\", \"POCBARBITUR\", \"POCBENZO\", \"POCCOCAINE\", \"POCOPIATES\", \"POCOXYCODO\", \"POCPHENCYC\", \"POCPROPOXY\", \"POCTHC\", \"POCTRICYC\"  Lysis Labs   Results from last 7 days   Lab Units 03/07/25  0855   INR  1.21*   APTT seconds 31.8   HEMOGLOBIN g/dL 13.8   PLATELETS 10*3/mm3 211   CREATININE mg/dL 0.85 " "    Radiology(recent) XR Chest 1 View    Result Date: 3/7/2025  Impression: No acute chest findings. Electronically Signed: Omayra Quinn MD  3/7/2025 9:12 AM EST  Workstation ID: [id number]       Results from last 7 days   Lab Units 03/07/25  1101   HSTROP T ng/L 24*       X-rays, labs reviewed personally by physician.    ECG/EMG Results (most recent)       Procedure Component Value Units Date/Time    ECG 12 Lead Chest Pain [[id number]] Collected: 03/07/25 0831     Updated: 03/07/25 0832     QT Interval 521 ms      QTC Interval 562 ms     Narrative:      HEART RATE=70  bpm  RR Gonqrbds=319  ms  MT Interval=58  ms  P Horizontal Axis=127  deg  P Front Axis=0  deg  QRSD Ymhyyysc=239  ms  QT Qoetyqtp=680  ms  YZjQ=484  ms  QRS Axis=138  deg  T Wave Axis=-32  deg  - ABNORMAL ECG -  Sinus rhythm  Right bundle branch block  ST elevation secondary to IVCD  Date and Time of Study:2025-03-07 08:31:37              Medication Review:   I have reviewed the patient's current medication list  Scheduled Meds:   Continuous Infusions:   PRN Meds:.  [COMPLETED] Insert Peripheral IV **AND** sodium chloride    Imaging:  Imaging Results (Last 72 Hours)       Procedure Component Value Units Date/Time    XR Chest 1 View [[id number]] Collected: 03/07/25 0910     Updated: 03/07/25 0914    Narrative:      XR CHEST 1 VW    Date of Exam: 3/7/2025 9:07 AM EST    Indication: cp soa    Comparison: CT chest 11/5/2024, AP chest 10/19/2022    Findings:  No acute airspace disease. Heart size is within normal limits. Pulmonary vascular distribution is normal. Asymmetric/defibrillator and leads appear similarly positioned. No visible pneumothorax. No acute osseous abnormalities are identified.      Impression:      Impression:  No acute chest findings.      Electronically Signed: Omayra Quinn MD    3/7/2025 9:12 AM EST    Workstation ID: [id number]              Iliana Kerr, BERTHA  03/07/25  13:18 EST       EMR Dragon/Transcription:   \"Dictated " "utilizing Dragon dictation\".                 Electronically signed by BERTHA Castillo, 03/07/25, 1:18 PM EST.  Copied text in this note has been reviewed by me and is accurate as of 03/07/25.    Cardiology attending:  Seen and examined.  Chart and labs reviewed. Independent interpretations of cardiac testing was performed. History and exam findings are verified with above changes noted.  Assessment and plan notated by APC after being formulated by attending consultant.  Note that greater than 50% of the time spent in care of the patient was provided by attending consultant.    Patient reports that his chest discomfort is completely resolved.  He reports that it was a mild discomfort that he rated at about a 2 out of 10 in intensity.  No exacerbating circumstances are noted.  He reports that he had a little shortness of breath as well.  He had an elevated proBNP.  He was given 1 dose of Lasix and reports that this helped significantly.  We discussed options.  It has been a while since he had an ischemic workup.  We will proceed with stress testing.    Physical Exam:    General: Alert, cooperative, no distress, appears stated age  Head:  Normocephalic, atraumatic, mucous membranes moist  Eyes:  Conjunctivae/corneas clear, EOM's intact     Neck:  Supple,  no adenopathy;      Lungs:            Clear to auscultation bilaterally, no wheezes rhonchi rales are noted  Chest wall: No tenderness  Heart::  Regular rate and rhythm, S1 and S2 normal, 1/6 holosystolic murmur.  No rub or gallop  Abdomen: Soft, nontender, nondistended bowel sounds active  Extremities: No cyanosis, clubbing, trace edema noted  Pulses:            2+ and symmetric all extremities  Skin:  No rashes or lesions  Neuro/psych: A&O x3. CN II through XII are grossly intact with appropriate affect    "

## 2025-03-07 NOTE — Clinical Note
Hemostasis started on the right femoral artery. Manual pressure applied to vessel. Manual pressure was held by [place]. Manual pressure was held for 20 min. Hemostasis achieved successfully. Closure device additional comment: Hemostasis achieved at 1305

## 2025-03-07 NOTE — H&P
"[facility] Medicine Services  History & Physical    Patient Name: Mode Duffy  : 1945  MRN: [id number]  Primary Care Physician:  Temo Vidal MD (Inactive)  Date of admission: 3/7/2025  Date and Time of Service: 3/7/2025 at 1040    Subjective      Chief Complaint: chest pain    History of Present Illness: Mode Duffy is a 79 y.o. male with a CMH of CAD s/p PCI and stenting x 2, PPM, A-flutter/A-fib, CHF, left subclavian vein occlusion, GERD, HTN, HLD, SHELTON, BPH, trigeminal neuralgia who presented to [facility] on 3/7/2025 with  chest pain.    Patient presented to the emergency department with complaint of midsternal chest pain which began yesterday as he was getting out of bed.  He states the pain lasted a few seconds. He describes that pain as a discomfort and says the pain was very minimal.  He feels as if his breathing is labored when he has the pain.  He says the pain happened more than once but he did not tell me how many times this happened.  He does have a slight cough.  He has not had any palpitations, swelling in his legs, syncope, near-syncope, dizziness, nausea, diaphoresis.  He states that his symptoms are similar to the symptoms he experiences around 10 years ago at which time he was found to have coronary artery disease. He had a cardiac cath and PCI with stenting of obtuse marginal branch and diagonal branch in 2014. He says prior to the cardiac cath, he had a stress test which \"did not show anything\".  Currently he is anticoagulated with Eliquis and he is also on 81 mg aspirin daily.    Patient notes that he has been treated for a UTI recently.  He finished his third round of antibiotics yesterday.  He does not have any acute urinary symptoms.  He has chronic hematuria.  He also has a history of low sodium and he says that his neurologist monitors this because he is on oxcarbazepine.     In the ED, his initial blood pressure was 161/81, 95% on RA, " HR 80.  Labs are remarkable for troponin 25, repeat 24, proBNP 3990, sodium 127, bicarb 20.2 glucose 166, total bilirubin 1.4, INR 1.21.  UA with small amount of blood.  COVID, flu, RSV negative.  Chest x-ray showed no acute findings.    Review of Systems   Constitutional:  Negative for chills, fatigue and fever.   HENT: Negative.     Eyes:  Negative for visual disturbance.   Respiratory:  Positive for cough and shortness of breath.    Cardiovascular:  Positive for chest pain. Negative for palpitations and leg swelling.   Gastrointestinal: Negative.    Genitourinary:  Positive for hematuria. Negative for difficulty urinating, dysuria, flank pain, frequency and urgency.   Musculoskeletal: Negative.    Skin: Negative.    Neurological: Negative.        Personal History     Past Medical History:   Diagnosis Date    Arthritis     Arthritis     BPH (benign prostatic hyperplasia)     CHF (congestive heart failure)     Coronary artery disease     dr. Carrizales    Deviated septum     FH: cataracts     Gait abnormality     GERD (gastroesophageal reflux disease)     History of trigeminal neuralgia     Hyperlipidemia     Hypertension     Renal cyst     Sciatica     Sleep apnea     unable to tolerate machine       Past Surgical History:   Procedure Laterality Date    APPENDECTOMY      CARDIAC CATHETERIZATION      CARDIAC CATHETERIZATION N/A 3/5/2020    Procedure: Percutaneous Subclavian Intervention;  Surgeon: Nick Greenberg MD;  Location: [facility] CATH INVASIVE LOCATION;  Service: Cardiovascular;  Laterality: N/A;    CARDIAC ELECTROPHYSIOLOGY PROCEDURE N/A 1/29/2020    Procedure: Biventricular Device Upgrade;  Surgeon: Dominick Jackson MD;  Location: [facility] CATH INVASIVE LOCATION;  Service: Cardiovascular    CARDIAC ELECTROPHYSIOLOGY PROCEDURE N/A 3/5/2020    Procedure: Lead Revision;  Surgeon: Nick Greenberg MD;  Location: [facility] CATH INVASIVE LOCATION;  Service: Cardiovascular;  Laterality: N/A;     CORONARY STENT PLACEMENT  2014    x 2    CYSTOSCOPY, URETEROSCOPY, RETROGRADE PYELOGRAM, STENT INSERTION Left 11/3/2023    Procedure: CYSTOSCOPY URETEROSCOPY STONE EXTRACTION STENT INSERTION;  Surgeon: Chris Patel MD;  Location: [facility];  Service: Urology;  Laterality: Left;    IMPLANTABLE CARDIOVERTER DEFIBRILLATOR LEAD REPLACEMENT/POCKET REVISION Right 10/19/2022    Procedure: RT. TRANSVENOUS ICD LEAD EXTRACTION;  Surgeon: Naveed Rios MD;  Location: [facility];  Service: Cardiology;  Laterality: Right;    PROSTATE SURGERY      REFRACTIVE SURGERY      THORACOTOMY Left 3/4/2020    Procedure: THORACOTOMY MINI WITH LEAD PLACEMENT;  Surgeon: Justin Aguilar MD;  Location: [facility];  Service: Cardiothoracic;  Laterality: Left;    TOTAL HIP ARTHROPLASTY Bilateral     different times       Family History: family history includes Arthritis in his father; Cancer in his mother; Diabetes in his father; Heart disease in his father; Hyperlipidemia in his father; Hypertension in his father; Kidney disease in his father. Otherwise pertinent FHx was reviewed and not pertinent to current issue.    Social History:  reports that he quit smoking about 57 years ago. His smoking use included cigarettes. He has been exposed to tobacco smoke. He has never used smokeless tobacco. He reports current alcohol use of about 7.0 standard drinks of alcohol per week. He reports that he does not use drugs.    Home Medications:  Prior to Admission Medications       Prescriptions Last Dose Informant Patient Reported? Taking?    acebutolol (SECTRAL) 200 MG capsule   No No    Take 1 capsule by mouth Daily.    amitriptyline (ELAVIL) 10 MG tablet   No No    TAKE 2 TABLETS AT BEDTIME    amLODIPine (NORVASC) 10 MG tablet  Self Yes No    Take 1 tablet by mouth every night at bedtime. Indications: High Blood Pressure    apixaban (Eliquis) 5 MG tablet tablet   No No    Take 1 tablet by mouth Every 12 (Twelve) Hours. Indications:  Atrial Fibrillation    aspirin 81 MG tablet  Self Yes No    Take 1 tablet by mouth Daily. Do not take day of surgery  Indications: Venous Thromboembolism    atorvastatin (LIPITOR) 20 MG tablet   No No    TAKE 1 TABLET EVERY DAY    Calcium Carb-Cholecalciferol (CALCIUM 500 + D PO)   Yes No    Take  by mouth.    cetirizine (zyrTEC) 10 MG tablet  Self Yes No    Take 1 tablet by mouth Daily. Indications: Upper Respiratory Tract Allergy    Cinnamon 500 MG capsule  Self Yes No    Take 1 capsule by mouth Daily.    cyclobenzaprine (FLEXERIL) 5 MG tablet  Self Yes No    TAKE 1 TABLET BY MOUTH EVERY 8 HOURS AS NEEDED FOR MUSCLE TIGHTNESS    enalapril (VASOTEC) 20 MG tablet  Self Yes No    Take 1 tablet by mouth Daily. Indications: High Blood Pressure    esomeprazole (nexIUM) 40 MG capsule  Self Yes No    Take 1 capsule by mouth Daily. Indications: Gastroesophageal Reflux Disease    gabapentin (NEURONTIN) 300 MG capsule   No No    Week1: 1 tab at HS, Week 2: 1 tab bid, Week3: 1 tab tid, Week 4: 1 tab qid    glucosamine-chondroitin 500-400 MG capsule capsule   Yes No    Take  by mouth 3 (Three) Times a Day With Meals.    isosorbide mononitrate (IMDUR) 60 MG 24 hr tablet  Self Yes No    Take 1 tablet by mouth Daily. Indications: Stable Angina Pectoris    montelukast (SINGULAIR) 10 MG tablet  Self Yes No    Take 1 tablet by mouth Daily.    OXcarbazepine (TRILEPTAL) 300 MG tablet   No No    Take 1 tablet by mouth 4 (Four) Times a Day. Titrate down if pain controlled    oxyCODONE-acetaminophen (PERCOCET) 5-325 MG per tablet  Self No No    Take 1-2 tablets by mouth Every 6 (Six) Hours As Needed for Severe Pain.    Probiotic Product (PROBIOTIC ADVANCED PO)  Self Yes No    Take 1 capsule by mouth Daily. Stop on 2/26 for surgery  Indications: Edith replacement    tamsulosin (FLOMAX) 0.4 MG capsule 24 hr capsule  Self No No    Take 1 capsule by mouth Daily.    Turmeric 500 MG capsule   Yes No    Take  by mouth.    vitamin C (ASCORBIC  ACID) 250 MG tablet  Self Yes No    Take 1 tablet by mouth Daily. Indications: supplement    Zinc 25 MG tablet  Self Yes No    Take 25 mg by mouth Daily. Indications: Zinc Deficiency              Allergies:  No Known Allergies    Objective      Vitals:   Temp:  [98.4 °F (36.9 °C)] 98.4 °F (36.9 °C)  Heart Rate:  [70-80] 70  Resp:  [18] 18  BP: (113-161)/(65-83) 136/73  Body mass index is 28.47 kg/m².  Physical Exam  Constitutional:       General: He is not in acute distress.     Appearance: Normal appearance. He is not ill-appearing.   HENT:      Head: Normocephalic and atraumatic.      Mouth/Throat:      Mouth: Mucous membranes are moist.   Eyes:      Extraocular Movements: Extraocular movements intact.   Cardiovascular:      Rate and Rhythm: Normal rate and regular rhythm.   Pulmonary:      Effort: Pulmonary effort is normal.      Breath sounds: Normal breath sounds.   Abdominal:      Palpations: Abdomen is soft.      Tenderness: There is no abdominal tenderness.   Musculoskeletal:         General: Normal range of motion.      Cervical back: Normal range of motion.      Right lower leg: No edema.      Left lower leg: No edema.   Neurological:      General: No focal deficit present.      Mental Status: He is alert and oriented to person, place, and time.         Diagnostic Data:  Lab Results (last 24 hours)       Procedure Component Value Units Date/Time    High Sensitivity Troponin T 1Hr [[id number]]  (Abnormal) Collected: 03/07/25 1101    Specimen: Blood Updated: 03/07/25 1124     HS Troponin T 24 ng/L      Troponin T Numeric Delta -1 ng/L      Troponin T % Delta -4    Narrative:      High Sensitive Troponin T Reference Range:  <14.0 ng/L- Negative Female for AMI  <22.0 ng/L- Negative Male for AMI  >=14 - Abnormal Female indicating possible myocardial injury.  >=22 - Abnormal Male indicating possible myocardial injury.   Clinicians would have to utilize clinical acumen, EKG, Troponin, and serial changes to  determine if it is an Acute Myocardial Infarction or myocardial injury due to an underlying chronic condition.         COVID-19, FLU A/B, RSV PCR 1 HR TAT - Swab, Nasopharynx [[id number]]  (Normal) Collected: 03/07/25 0855    Specimen: Swab from Nasopharynx Updated: 03/07/25 0947     COVID19 Not Detected     Influenza A PCR Not Detected     Influenza B PCR Not Detected     RSV, PCR Not Detected    Narrative:      Fact sheet for providers: https://www.fda.gov/media/950126/download    Fact sheet for patients: https://www.fda.gov/media/887232/download    Test performed by PCR.    BNP [[id number]]  (Abnormal) Collected: 03/07/25 0855    Specimen: Blood Updated: 03/07/25 0932     proBNP 3,990.0 pg/mL     Narrative:      This assay is used as an aid in the diagnosis of individuals suspected of having heart failure. It can be used as an aid in the diagnosis of acute decompensated heart failure (ADHF) in patients presenting with signs and symptoms of ADHF to the emergency department (ED). In addition, NT-proBNP of <300 pg/mL indicates ADHF is not likely.    Age Range Result Interpretation  NT-proBNP Concentration (pg/mL:      <50             Positive            >450                   Gray                 300-450                    Negative             <300    50-75           Positive            >900                  Gray                300-900                  Negative            <300      >75             Positive            >1800                  Gray                300-1800                  Negative            <300    Comprehensive Metabolic Panel [[id number]]  (Abnormal) Collected: 03/07/25 0855    Specimen: Blood Updated: 03/07/25 0932     Glucose 166 mg/dL      BUN 13 mg/dL      Creatinine 0.85 mg/dL      Sodium 127 mmol/L      Potassium 4.1 mmol/L      Chloride 93 mmol/L      CO2 20.2 mmol/L      Calcium 8.8 mg/dL      Total Protein 6.5 g/dL      Albumin 4.2 g/dL      ALT (SGPT) 19 U/L      AST (SGOT) 19 U/L       Alkaline Phosphatase 88 U/L      Total Bilirubin 1.4 mg/dL      Globulin 2.3 gm/dL      A/G Ratio 1.8 g/dL      BUN/Creatinine Ratio 15.3     Anion Gap 13.8 mmol/L      eGFR 88.4 mL/min/1.73     Narrative:      GFR Categories in Chronic Kidney Disease (CKD)      GFR Category          GFR (mL/min/1.73)    Interpretation  G1                     90 or greater         Normal or high (1)  G2                      60-89                Mild decrease (1)  G3a                   45-59                Mild to moderate decrease  G3b                   30-44                Moderate to severe decrease  G4                    15-29                Severe decrease  G5                    14 or less           Kidney failure          (1)In the absence of evidence of kidney disease, neither GFR category G1 or G2 fulfill the criteria for CKD.    eGFR calculation 2021 CKD-EPI creatinine equation, which does not include race as a factor    High Sensitivity Troponin T [[id number]]  (Abnormal) Collected: 03/07/25 0855    Specimen: Blood Updated: 03/07/25 0932     HS Troponin T 25 ng/L     Narrative:      High Sensitive Troponin T Reference Range:  <14.0 ng/L- Negative Female for AMI  <22.0 ng/L- Negative Male for AMI  >=14 - Abnormal Female indicating possible myocardial injury.  >=22 - Abnormal Male indicating possible myocardial injury.   Clinicians would have to utilize clinical acumen, EKG, Troponin, and serial changes to determine if it is an Acute Myocardial Infarction or myocardial injury due to an underlying chronic condition.         Magnesium [[id number]]  (Normal) Collected: 03/07/25 0855    Specimen: Blood Updated: 03/07/25 0932     Magnesium 1.8 mg/dL     Urinalysis With Culture If Indicated - Urine, Clean Catch [[id number]]  (Abnormal) Collected: 03/07/25 0919    Specimen: Urine, Clean Catch Updated: 03/07/25 0930     Color, UA Dark Yellow     Appearance, UA Clear     pH, UA 7.0     Specific Gravity, UA 1.019     Glucose, UA Negative      Ketones, UA 15 mg/dL (1+)     Bilirubin, UA Negative     Blood, UA Small (1+)     Protein, UA Trace     Leuk Esterase, UA Trace     Nitrite, UA Negative     Urobilinogen, UA 1.0 E.U./dL    Narrative:      In absence of clinical symptoms, the presence of pyuria, bacteria, and/or nitrites on the urinalysis result does not correlate with infection.    Urinalysis, Microscopic Only - Urine, Clean Catch [[id number]]  (Abnormal) Collected: 03/07/25 0919    Specimen: Urine, Clean Catch Updated: 03/07/25 0930     RBC, UA 11-20 /HPF      WBC, UA 0-2 /HPF      Comment: Urine culture not indicated.        Bacteria, UA None Seen /HPF      Squamous Epithelial Cells, UA 0-2 /HPF      Hyaline Casts, UA 0-2 /LPF      Methodology Automated Microscopy    Protime-INR [[id number]]  (Abnormal) Collected: 03/07/25 0855    Specimen: Blood Updated: 03/07/25 0913     Protime 15.2 Seconds      INR 1.21    aPTT [[id number]]  (Normal) Collected: 03/07/25 0855    Specimen: Blood Updated: 03/07/25 0913     PTT 31.8 seconds     CBC & Differential [[id number]]  (Abnormal) Collected: 03/07/25 0855    Specimen: Blood Updated: 03/07/25 0904    Narrative:      The following orders were created for panel order CBC & Differential.  Procedure                               Abnormality         Status                     ---------                               -----------         ------                     CBC Auto Differential[[id number]]        Abnormal            Final result                 Please view results for these tests on the individual orders.    CBC Auto Differential [[id number]]  (Abnormal) Collected: 03/07/25 0855    Specimen: Blood Updated: 03/07/25 0904     WBC 10.28 10*3/mm3      RBC 4.23 10*6/mm3      Hemoglobin 13.8 g/dL      Hematocrit 39.8 %      MCV 94.1 fL      MCH 32.6 pg      MCHC 34.7 g/dL      RDW 12.3 %      RDW-SD 42.5 fl      MPV 8.8 fL      Platelets 211 10*3/mm3      Neutrophil % 81.7 %      Lymphocyte % 5.3 %      Monocyte %  9.5 %      Eosinophil % 2.7 %      Basophil % 0.5 %      Immature Grans % 0.3 %      Neutrophils, Absolute 8.40 10*3/mm3      Lymphocytes, Absolute 0.54 10*3/mm3      Monocytes, Absolute 0.98 10*3/mm3      Eosinophils, Absolute 0.28 10*3/mm3      Basophils, Absolute 0.05 10*3/mm3      Immature Grans, Absolute 0.03 10*3/mm3      nRBC 0.0 /100 WBC              Imaging Results (Last 24 Hours)       Procedure Component Value Units Date/Time    XR Chest 1 View [[id number]] Collected: 03/07/25 0910     Updated: 03/07/25 0914    Narrative:      XR CHEST 1 VW    Date of Exam: 3/7/2025 9:07 AM EST    Indication: cp soa    Comparison: CT chest 11/5/2024, AP chest 10/19/2022    Findings:  No acute airspace disease. Heart size is within normal limits. Pulmonary vascular distribution is normal. Asymmetric/defibrillator and leads appear similarly positioned. No visible pneumothorax. No acute osseous abnormalities are identified.      Impression:      Impression:  No acute chest findings.      Electronically Signed: Omayra Quinn MD    3/7/2025 9:12 AM EST    Workstation ID: [id number]              Assessment & Plan        This is a 79 y.o. male with:    Active and Resolved Problems  Active Hospital Problems    Diagnosis  POA    **Chest pain [R07.9]  Yes      Resolved Hospital Problems   No resolved problems to display.       Chest pain  CAD s/p stents x 2  -Patient presents with midsternal chest pain with associated trouble breathing.  He states his symptoms are very mild but I am a bit concerned with his presentation because he does state the symptoms are very similar to the symptoms he experienced around 10 years ago when he had PCI with stenting of obtuse marginal branch and diagonal branch in November 2014.  He said the symptoms have happened over the past couple days but he could not tell me how the episodes he had.  He seems to downplay his symptoms.  -Patient is ambivalent about being admitted/staying in the hospital this  weekend for further workup.  He states he had a stress test prior to his heart cath 10 years ago which he says was negative (I cannot access the report).  He is concerned about having another negative stress test only to end up needing a cardiac cath regardless. He is particularly concerned with it being a weekend as he might not end up having a heart cath until early next week if indicated.  He asked that I reach out to Dr. Carrizales, his primary cardiologist, to see if he recommends the patient stay for further workup or if he can go home and follow up early next week.  I sent a secure chat to Dr. Carrizales and his nurse practitioner. I did inform the patient that his cardiologist may be in clinic or performing procedures and may not be able to get back to me for a little while.   -Telemetry  -Continue aspirin, Eliquis, statin    CHF  -TTE 7/18/23 --LVEF 56%, LV mild concentric hypertrophy, grade 1A with high LAP left ventricular diastolic dysfunction, left atrial cavity is mild to moderately dilated, mildly elevated RVSP 35 to 45 mmHg.  -Patient's proBNP is elevated at 3990  -On exam he does not have any crackles, JVD, peripheral edema.  He appears compensated.    SSS   -S/p BiV PPM    Atrial fibrillation/Atrial flutter  -Continue Eliquis and acebutolol    Acute on chronic hyponatremia  -Patient is on oxcarbazepine which is likely cause of his chronic natremia.  It appears that his sodium levels typically run in the 130s, today is 127.  On reviewing medication dispense history, he recently filled amitriptyline last month, prescribed by his neurologist.  This is likely contributing to his hyponatremia.  Will likely need to hold this at discharge.  He is not having any neurologic symptoms.    Chronic hematuria  BPH  -UTI appears to have cleared.  He has blood in his urine.  He follows with Dr. Kashmir BENDER  -Statin    GERD  -PPI    VTE Prophylaxis:  No VTE prophylaxis order currently exists.        The patient  desires to be as follows:    CODE STATUS:    Code Status (Patient has no pulse and is not breathing): CPR (Attempt to Resuscitate)  Medical Interventions (Patient has pulse or is breathing): Full Support        Radha Jones, who can be contacted at [phone], is the designated person to make medical decisions on the patient's behalf if He is incapable of doing so. This was clarified with patient and/or next of kin on 3/7/2025 during the course of this H&P.    Admission Status:  I believe this patient meets obs status.    Expected Length of Stay: 1 day    PDMP and Medication Dispenses via Sidebar reviewed and consistent with patient reported medications.    I discussed the patient's findings and my recommendations with patient, family, and consulting provider.      Signature:     This document has been electronically signed by Jaye Wakefield PA-C on March 7, 2025 14:06 [facility] Hospitalist Team

## 2025-03-07 NOTE — PLAN OF CARE
Goal Outcome Evaluation:            Patient A&O received patient around 1730. Call light and bedside table within reach.

## 2025-03-07 NOTE — Clinical Note
Level of Care: Med/Surg [1]   Admitting Physician: RAFI GUEVARA [[id number]]   Attending Physician: RAFI GUEVARA [[id number]]

## 2025-03-07 NOTE — Clinical Note
----- Message from Chika Rosales MA sent at 9/7/2018  1:48 PM CDT -----  Contact: Patient  Patient called c/o falling 3 times today, she says after her last fall she started bleeding vaginally. Please Advise--[phone]   The left coronary artery was selectively engaged, injected and visualized.

## 2025-03-08 ENCOUNTER — APPOINTMENT (OUTPATIENT)
Dept: NUCLEAR MEDICINE | Facility: HOSPITAL | Age: 80
End: 2025-03-08
Payer: MEDICARE

## 2025-03-08 ENCOUNTER — APPOINTMENT (OUTPATIENT)
Dept: CARDIOLOGY | Facility: HOSPITAL | Age: 80
End: 2025-03-08
Payer: MEDICARE

## 2025-03-08 LAB
ANION GAP SERPL CALCULATED.3IONS-SCNC: 11.5 MMOL/L (ref 5–15)
AORTIC DIMENSIONLESS INDEX: 0.58 (DI)
AV MEAN PRESS GRAD SYS DOP V1V2: 4.8 MMHG
AV VMAX SYS DOP: 149 CM/SEC
BASOPHILS # BLD AUTO: 0.03 10*3/MM3 (ref 0–0.2)
BASOPHILS NFR BLD AUTO: 0.4 % (ref 0–1.5)
BH CV ECHO LEFT VENTRICLE GLOBAL LONGITUDINAL STRAIN: -12.7 %
BH CV ECHO MEAS - AI P1/2T: 529 MSEC
BH CV ECHO MEAS - AO MAX PG: 8.9 MMHG
BH CV ECHO MEAS - AO V2 VTI: 28.4 CM
BH CV ECHO MEAS - EDV(CUBED): 173.1 ML
BH CV ECHO MEAS - EDV(MOD-SP2): 227 ML
BH CV ECHO MEAS - EDV(MOD-SP4): 164 ML
BH CV ECHO MEAS - EF(MOD-SP2): 53.7 %
BH CV ECHO MEAS - EF(MOD-SP4): 49.8 %
BH CV ECHO MEAS - ESV(CUBED): 78 ML
BH CV ECHO MEAS - ESV(MOD-SP2): 105 ML
BH CV ECHO MEAS - ESV(MOD-SP4): 82.4 ML
BH CV ECHO MEAS - FS: 23.3 %
BH CV ECHO MEAS - IVS/LVPW: 1.03 CM
BH CV ECHO MEAS - IVSD: 1.36 CM
BH CV ECHO MEAS - LA DIMENSION: 3.5 CM
BH CV ECHO MEAS - LV DIASTOLIC VOL/BSA (35-75): 74.1 CM2
BH CV ECHO MEAS - LV MASS(C)D: 325.1 GRAMS
BH CV ECHO MEAS - LV MAX PG: 3 MMHG
BH CV ECHO MEAS - LV MEAN PG: NORMAL MMHG
BH CV ECHO MEAS - LV SYSTOLIC VOL/BSA (12-30): 37.2 CM2
BH CV ECHO MEAS - LV V1 MAX: 86.6 CM/SEC
BH CV ECHO MEAS - LV V1 VTI: NORMAL CM
BH CV ECHO MEAS - LVIDD: 5.6 CM
BH CV ECHO MEAS - LVIDS: 4.3 CM
BH CV ECHO MEAS - LVOT AREA: 3.6 CM2
BH CV ECHO MEAS - LVOT DIAM: 2.13 CM
BH CV ECHO MEAS - LVPWD: 1.32 CM
BH CV ECHO MEAS - MR MAX PG: 76.7 MMHG
BH CV ECHO MEAS - MR MAX VEL: 437.8 CM/SEC
BH CV ECHO MEAS - MV A DUR: 0.14 SEC
BH CV ECHO MEAS - MV A MAX VEL: 41.9 CM/SEC
BH CV ECHO MEAS - MV DEC SLOPE: 469.6 CM/SEC2
BH CV ECHO MEAS - MV DEC TIME: 0.16 SEC
BH CV ECHO MEAS - MV E MAX VEL: 90.1 CM/SEC
BH CV ECHO MEAS - MV E/A: 2.15
BH CV ECHO MEAS - MV MAX PG: 3.8 MMHG
BH CV ECHO MEAS - MV MEAN PG: 1.77 MMHG
BH CV ECHO MEAS - MV P1/2T: 62.8 MSEC
BH CV ECHO MEAS - MV V2 VTI: 28.9 CM
BH CV ECHO MEAS - MVA(P1/2T): 3.5 CM2
BH CV ECHO MEAS - PA ACC TIME: 0.13 SEC
BH CV ECHO MEAS - PA V2 MAX: 73.9 CM/SEC
BH CV ECHO MEAS - RAP SYSTOLE: 8 MMHG
BH CV ECHO MEAS - RV MAX PG: 1.53 MMHG
BH CV ECHO MEAS - RV V1 MAX: 61.8 CM/SEC
BH CV ECHO MEAS - RV V1 VTI: 10.8 CM
BH CV ECHO MEAS - RVSP: 39.9 MMHG
BH CV ECHO MEAS - SV(MOD-SP2): 122 ML
BH CV ECHO MEAS - SV(MOD-SP4): 81.6 ML
BH CV ECHO MEAS - SVI(MOD-SP2): 55.1 ML/M2
BH CV ECHO MEAS - SVI(MOD-SP4): 36.8 ML/M2
BH CV ECHO MEAS - TAPSE (>1.6): 1.63 CM
BH CV ECHO MEAS - TR MAX PG: 31.9 MMHG
BH CV ECHO MEAS - TR MAX VEL: 282.3 CM/SEC
BH CV NUCLEAR PRIOR STUDY: 3
BH CV REST NUCLEAR ISOTOPE DOSE: 10.8 MCI
BH CV STRESS BP STAGE 1: NORMAL
BH CV STRESS BP STAGE 2: NORMAL
BH CV STRESS BP STAGE 3: NORMAL
BH CV STRESS COMMENTS STAGE 1: NORMAL
BH CV STRESS COMMENTS STAGE 2: NORMAL
BH CV STRESS DOSE REGADENOSON STAGE 1: 0.4
BH CV STRESS DURATION MIN STAGE 1: 0
BH CV STRESS DURATION MIN STAGE 2: 4
BH CV STRESS DURATION SEC STAGE 1: 10
BH CV STRESS DURATION SEC STAGE 2: 0
BH CV STRESS HR STAGE 1: 70
BH CV STRESS HR STAGE 2: 78
BH CV STRESS HR STAGE 3: 78
BH CV STRESS NUCLEAR ISOTOPE DOSE: 31 MCI
BH CV STRESS PROTOCOL 1: NORMAL
BH CV STRESS RECOVERY BP: NORMAL MMHG
BH CV STRESS RECOVERY HR: 73 BPM
BH CV STRESS STAGE 1: 1
BH CV STRESS STAGE 2: 2
BH CV STRESS STAGE 3: 3
BUN SERPL-MCNC: 15 MG/DL (ref 8–23)
BUN/CREAT SERPL: 16.5 (ref 7–25)
CALCIUM SPEC-SCNC: 9.2 MG/DL (ref 8.6–10.5)
CHLORIDE SERPL-SCNC: 92 MMOL/L (ref 98–107)
CHLORIDE UR-SCNC: 32 MMOL/L
CO2 SERPL-SCNC: 24.5 MMOL/L (ref 22–29)
CREAT SERPL-MCNC: 0.91 MG/DL (ref 0.76–1.27)
DEPRECATED RDW RBC AUTO: 42.5 FL (ref 37–54)
EGFRCR SERPLBLD CKD-EPI 2021: 85.7 ML/MIN/1.73
EOSINOPHIL # BLD AUTO: 0.67 10*3/MM3 (ref 0–0.4)
EOSINOPHIL NFR BLD AUTO: 9.1 % (ref 0.3–6.2)
ERYTHROCYTE [DISTWIDTH] IN BLOOD BY AUTOMATED COUNT: 12.2 % (ref 12.3–15.4)
GLUCOSE SERPL-MCNC: 133 MG/DL (ref 65–99)
HCT VFR BLD AUTO: 37.4 % (ref 37.5–51)
HGB BLD-MCNC: 12.9 G/DL (ref 13–17.7)
IMM GRANULOCYTES # BLD AUTO: 0.03 10*3/MM3 (ref 0–0.05)
IMM GRANULOCYTES NFR BLD AUTO: 0.4 % (ref 0–0.5)
LEFT ATRIUM VOLUME INDEX: 37.5 ML/M2
LYMPHOCYTES # BLD AUTO: 1.26 10*3/MM3 (ref 0.7–3.1)
LYMPHOCYTES NFR BLD AUTO: 17.1 % (ref 19.6–45.3)
MAGNESIUM SERPL-MCNC: 1.9 MG/DL (ref 1.6–2.4)
MAXIMAL PREDICTED HEART RATE: 141 BPM
MCH RBC QN AUTO: 32.9 PG (ref 26.6–33)
MCHC RBC AUTO-ENTMCNC: 34.5 G/DL (ref 31.5–35.7)
MCV RBC AUTO: 95.4 FL (ref 79–97)
MONOCYTES # BLD AUTO: 0.85 10*3/MM3 (ref 0.1–0.9)
MONOCYTES NFR BLD AUTO: 11.5 % (ref 5–12)
NEUTROPHILS NFR BLD AUTO: 4.52 10*3/MM3 (ref 1.7–7)
NEUTROPHILS NFR BLD AUTO: 61.5 % (ref 42.7–76)
NRBC BLD AUTO-RTO: 0 /100 WBC (ref 0–0.2)
OSMOLALITY UR: 650 MOSM/KG (ref 300–800)
PERCENT MAX PREDICTED HR: 55.32 %
PLATELET # BLD AUTO: 192 10*3/MM3 (ref 140–450)
PMV BLD AUTO: 9.4 FL (ref 6–12)
POTASSIUM SERPL-SCNC: 3.4 MMOL/L (ref 3.5–5.2)
POTASSIUM SERPL-SCNC: 4.5 MMOL/L (ref 3.5–5.2)
QT INTERVAL: 521 MS
QTC INTERVAL: 562 MS
RBC # BLD AUTO: 3.92 10*6/MM3 (ref 4.14–5.8)
SINUS: 3.5 CM
SODIUM SERPL-SCNC: 128 MMOL/L (ref 136–145)
SODIUM UR-SCNC: 34 MMOL/L
SPECT HRT GATED+EF W RNC IV: 16 %
STJ: 2.6 CM
STRESS BASELINE BP: NORMAL MMHG
STRESS BASELINE HR: 70 BPM
STRESS PERCENT HR: 65 %
STRESS POST PEAK BP: NORMAL MMHG
STRESS POST PEAK HR: 78 BPM
STRESS TARGET HR: 120 BPM
WBC NRBC COR # BLD AUTO: 7.36 10*3/MM3 (ref 3.4–10.8)

## 2025-03-08 PROCEDURE — A9502 TC99M TETROFOSMIN: HCPCS | Performed by: INTERNAL MEDICINE

## 2025-03-08 PROCEDURE — 84132 ASSAY OF SERUM POTASSIUM: CPT | Performed by: INTERNAL MEDICINE

## 2025-03-08 PROCEDURE — 99233 SBSQ HOSP IP/OBS HIGH 50: CPT | Performed by: INTERNAL MEDICINE

## 2025-03-08 PROCEDURE — 84300 ASSAY OF URINE SODIUM: CPT | Performed by: INTERNAL MEDICINE

## 2025-03-08 PROCEDURE — 34310000005 TECHNETIUM TETROFOSMIN KIT: Performed by: INTERNAL MEDICINE

## 2025-03-08 PROCEDURE — 93016 CV STRESS TEST SUPVJ ONLY: CPT

## 2025-03-08 PROCEDURE — 83935 ASSAY OF URINE OSMOLALITY: CPT | Performed by: INTERNAL MEDICINE

## 2025-03-08 PROCEDURE — 78452 HT MUSCLE IMAGE SPECT MULT: CPT | Performed by: STUDENT IN AN ORGANIZED HEALTH CARE EDUCATION/TRAINING PROGRAM

## 2025-03-08 PROCEDURE — 84156 ASSAY OF PROTEIN URINE: CPT

## 2025-03-08 PROCEDURE — 93017 CV STRESS TEST TRACING ONLY: CPT

## 2025-03-08 PROCEDURE — 78452 HT MUSCLE IMAGE SPECT MULT: CPT

## 2025-03-08 PROCEDURE — 93306 TTE W/DOPPLER COMPLETE: CPT

## 2025-03-08 PROCEDURE — 25010000002 REGADENOSON 0.4 MG/5ML SOLUTION: Performed by: INTERNAL MEDICINE

## 2025-03-08 PROCEDURE — 93018 CV STRESS TEST I&R ONLY: CPT | Performed by: STUDENT IN AN ORGANIZED HEALTH CARE EDUCATION/TRAINING PROGRAM

## 2025-03-08 PROCEDURE — 93306 TTE W/DOPPLER COMPLETE: CPT | Performed by: INTERNAL MEDICINE

## 2025-03-08 PROCEDURE — 82570 ASSAY OF URINE CREATININE: CPT

## 2025-03-08 PROCEDURE — 82436 ASSAY OF URINE CHLORIDE: CPT | Performed by: INTERNAL MEDICINE

## 2025-03-08 PROCEDURE — 93356 MYOCRD STRAIN IMG SPCKL TRCK: CPT | Performed by: INTERNAL MEDICINE

## 2025-03-08 PROCEDURE — 93356 MYOCRD STRAIN IMG SPCKL TRCK: CPT

## 2025-03-08 RX ORDER — FUROSEMIDE 20 MG/1
20 TABLET ORAL DAILY
Status: DISCONTINUED | OUTPATIENT
Start: 2025-03-08 | End: 2025-03-10

## 2025-03-08 RX ORDER — POTASSIUM CHLORIDE 1500 MG/1
40 TABLET, EXTENDED RELEASE ORAL EVERY 4 HOURS
Status: DISCONTINUED | OUTPATIENT
Start: 2025-03-08 | End: 2025-03-08

## 2025-03-08 RX ORDER — REGADENOSON 0.08 MG/ML
0.4 INJECTION, SOLUTION INTRAVENOUS
Status: COMPLETED | OUTPATIENT
Start: 2025-03-08 | End: 2025-03-08

## 2025-03-08 RX ORDER — POTASSIUM CHLORIDE 1.5 G/1.58G
40 POWDER, FOR SOLUTION ORAL EVERY 4 HOURS
Status: COMPLETED | OUTPATIENT
Start: 2025-03-08 | End: 2025-03-08

## 2025-03-08 RX ADMIN — NITROFURANTOIN MONOHYDRATE/MACROCRYSTALS 100 MG: 25; 75 CAPSULE ORAL at 10:19

## 2025-03-08 RX ADMIN — OXCARBAZEPINE 300 MG: 150 TABLET, FILM COATED ORAL at 04:06

## 2025-03-08 RX ADMIN — APIXABAN 5 MG: 5 TABLET, FILM COATED ORAL at 10:26

## 2025-03-08 RX ADMIN — AMLODIPINE BESYLATE 10 MG: 5 TABLET ORAL at 10:22

## 2025-03-08 RX ADMIN — OXCARBAZEPINE 300 MG: 150 TABLET, FILM COATED ORAL at 17:57

## 2025-03-08 RX ADMIN — POTASSIUM CHLORIDE 40 MEQ: 1.5 POWDER, FOR SOLUTION ORAL at 10:27

## 2025-03-08 RX ADMIN — GABAPENTIN 300 MG: 300 CAPSULE ORAL at 21:43

## 2025-03-08 RX ADMIN — OXCARBAZEPINE 300 MG: 150 TABLET, FILM COATED ORAL at 13:27

## 2025-03-08 RX ADMIN — REGADENOSON 0.4 MG: 0.08 INJECTION, SOLUTION INTRAVENOUS at 08:40

## 2025-03-08 RX ADMIN — Medication 10 ML: at 21:44

## 2025-03-08 RX ADMIN — POTASSIUM CHLORIDE 40 MEQ: 1.5 POWDER, FOR SOLUTION ORAL at 04:21

## 2025-03-08 RX ADMIN — PANTOPRAZOLE SODIUM 40 MG: 40 TABLET, DELAYED RELEASE ORAL at 04:06

## 2025-03-08 RX ADMIN — FUROSEMIDE 20 MG: 20 TABLET ORAL at 14:24

## 2025-03-08 RX ADMIN — Medication 10 ML: at 10:29

## 2025-03-08 RX ADMIN — TETROFOSMIN 1 DOSE: 1.38 INJECTION, POWDER, LYOPHILIZED, FOR SOLUTION INTRAVENOUS at 08:40

## 2025-03-08 RX ADMIN — TETROFOSMIN 1 DOSE: 1.38 INJECTION, POWDER, LYOPHILIZED, FOR SOLUTION INTRAVENOUS at 07:25

## 2025-03-08 RX ADMIN — OXCARBAZEPINE 300 MG: 150 TABLET, FILM COATED ORAL at 10:11

## 2025-03-08 RX ADMIN — ASPIRIN 81 MG: 81 TABLET, COATED ORAL at 10:23

## 2025-03-08 NOTE — PLAN OF CARE
Problem: Adult Inpatient Plan of Care  Goal: Plan of Care Review  Outcome: Progressing  Goal: Absence of Hospital-Acquired Illness or Injury  Outcome: Progressing  Intervention: Identify and Manage Fall Risk  Recent Flowsheet Documentation  Taken 3/8/2025 0200 by Rupali Prakash RN  Safety Promotion/Fall Prevention: safety round/check completed  Taken 3/8/2025 0000 by Rupali Prakash RN  Safety Promotion/Fall Prevention: safety round/check completed  Taken 3/7/2025 2216 by Rupali Prakash RN  Safety Promotion/Fall Prevention: safety round/check completed  Intervention: Prevent Skin Injury  Recent Flowsheet Documentation  Taken 3/7/2025 2216 by Rupali Prakash RN  Body Position: position changed independently  Goal: Optimal Comfort and Wellbeing  Outcome: Progressing  Intervention: Provide Person-Centered Care  Recent Flowsheet Documentation  Taken 3/7/2025 2216 by Rupali Prakash RN  Trust Relationship/Rapport:   care explained   choices provided   emotional support provided   empathic listening provided   questions answered   questions encouraged   reassurance provided   thoughts/feelings acknowledged     Problem: Comorbidity Management  Goal: Blood Pressure in Desired Range  Outcome: Progressing   Goal Outcome Evaluation:           Patient denies chest pain. Denies other problems or concerns.

## 2025-03-08 NOTE — PROGRESS NOTES
Cardiology Consult Note      REQUESTING PHYSICIAN    Nayana Bonilla MD    PATIENT IDENTIFICATION  Name: Mode Duffy  Age: 79 y.o.  Sex: male  :  1945  MRN: [id number]                 Cardiology assessment and plan    Chest discomfort  Abnormal stress test  Normal troponin  Abnormal elevated proBNP  Hyponatremia  Bundle branch block  Prior cardiomyopathy  Sick sinus syndrome status post biventricular pacemaker placement  Myocardial perfusion study with fixed basal inferior wall and mid inferior wall perfusion defect with no significant reversible ischemia  LV ejection fraction is low on myocardial perfusion study  Echocardiogram-the LV systolic function  Acute heart failure with preserved LV systolic function secondary to valvular heart disease  Coronary artery disease status post PCI and stenting most recently in 2014.        This was cutting Balloon angioplasty of a circumflex branch and a diagonal branch.   Dyslipidemia.  Hypertension with hypertensive cardiovascular disease with a white coat component  Cardiomyopathy with recovered LV systolic function (EF of 35 to 40% in 2019).  EF 56% with mild concentric LVH, grade 1 DD, mild to moderate LAE  Sick sinus syndrome status post biventricular permanent pacemaker placement.  Valvular heart disease  Paroxysmal atrial fibrillation  Coagulopathy secondary to Eliquis  Lumbago  Hyponatremia secondary to Trileptal  Renal cysts  History of trigeminal neuralgia presently treated with Trileptal.  Current medications include amlodipine 10 mg p.o. once a day patient is on Lipitor 20 mg p.o. once a day patient is on Vasotec 20 mg p.o. once a day isosorbide 60 mg p.o. once a day patient is on anticoagulation therapy with Eliquis 5 mg p.o. twice daily  Tmax is 98.4 pulse is 70 respirations are 16 blood pressure is 114/62 sats are 94%  Twelve-lead EKG shows sinus rhythm and underlying right bundle branch block  Newly diagnosed  "cardiomyopathy and LV dysfunction  Dilated LV  Echocardiogram to assess LV systolic function and also rule out any significant valve pathology  Discontinue Eliquis  Likely will benefit from further invasive workup during this hospital admission  Diagnosis and treatment options reviewed and discussed with patient  Further recommendation based on patient course              REASON FOR CONSULTATION:  79 y.o. male known to Dr. Carrizales with a history of ischemic heart disease. He underwent cardiac catheterization with resultant PCI of an obtuse marginal branch and diagonal branch in November 2014. He has additional history of hypertension with hypertensive cardiovascular disease, dyslipidemia, paroxysmal atrial fibrillation, antiplatelet therapy as well as sick sinus syndrome with complete heart block status post biventricular permanent pacemaker placement.  Patient has left-sided implant in 99 and had significant issues with RV pacing alone with heart failure symptoms and LV lead was placed epicardial lead because of left subclavian occlusion. After LV lead epicardial placement, patient was found to have RV lead malfunction needing a new RV lead placed on the right side which was removed secondary to pocket infection on the right side.     CC:  Chest pain    HISTORY OF PRESENT ILLNESS:   Patient presented to the emergency department at [facility] 3/7/2025 with complaint of \"a little something in my chest\", nonradiating.  \"A little heavy breathing\".  Symptom onset Thursday.  He describes the chest discomfort as 2/10.  He reports a recent nonproductive cough.  Denies any lower extremity edema, GI complaints, abdominal distention.  Workup was initiated in the emergency department.  He was given aspirin as well as 40 mg IV Lasix x 1.  Cardiology was consulted for further evaluation and recommendations.  Upon my evaluation, he is sitting up in bed working a crossword puzzle.  He denies any chest discomfort at " "present.  Monitor shows sinus rhythm and blood pressure is quite stable.  Patient does endorse a diet that is probably too high in sodium.      REVIEW OF SYSTEMS:  Pertinent items are noted in HPI, all other systems reviewed and negative    OBJECTIVE   High-sensitivity troponin 25, 24  proBNP 3990  Sodium 127  Chest x-ray with no acute findings  EKG was sinus rhythm, right bundle branch block-unchanged from 3/12/2024    ASSESSMENT  Chest discomfort  Shortness of breath  Cough  Acute heart failure with preserved LV systolic function secondary to valvular heart disease  Coronary artery disease status post PCI and stenting most recently in November of 2014.        This was cutting Balloon angioplasty of a circumflex branch and a diagonal branch.   Dyslipidemia.  Hypertension with hypertensive cardiovascular disease with a white coat component  Cardiomyopathy with recovered LV systolic function (EF of 35 to 40% in October 2019).  EF 56% with mild concentric LVH, grade 1 DD, mild to moderate LAE  Sick sinus syndrome status post biventricular permanent pacemaker placement.  Valvular heart disease  Paroxysmal atrial fibrillation  Coagulopathy secondary to Eliquis  Lumbago  Hyponatremia secondary to Trileptal  Renal cysts  History of trigeminal neuralgia presently treated with Trileptal.  Hip pain currently undergoing preoperative cardiovascular risk assessment for right hip surgery.  Trigeminal neuralgia            CHF Guideline Directed Medical Therapy  Beta Blocker:   ARNI/ACE/ARB:   SGLT 2 inhibitors:   Diuretics:   Aldosterone Antagonist:   Vasodilators & Nitrates:     Vital Signs  Visit Vitals  /62 (BP Location: Left arm, Patient Position: Lying)   Pulse 70   Temp 97.3 °F (36.3 °C) (Oral)   Resp 17   Ht 185.4 cm (73\")   Wt 97.1 kg (214 lb 1.1 oz)   SpO2 94%   BMI 28.24 kg/m²     Oxygen Therapy  SpO2: 94 %  Pulse Oximetry Type: Continuous  Device (Oxygen Therapy): nasal cannula  Flow (L/min) (Oxygen Therapy): " "4  Flowsheet Rows      Flowsheet Row First Filed Value   Admission Height 185.4 cm (73\") Documented at 03/07/2025 0823   Admission Weight 97.9 kg (215 lb 12.8 oz) Documented at 03/07/2025 0823          Intake & Output (last 3 days)         03/04 0701  03/05 0700 03/05 0701  03/06 0700 03/06 0701  03/07 0700 03/07 0701 03/08 0700    Urine (mL/kg/hr)    600    Total Output    600    Net    -600                  Lines, Drains & Airways       Active LDAs       Name Placement date Placement time Site Days    Peripheral IV 03/07/25 0856 Left Antecubital 03/07/25  0856  Antecubital  less than 1                    MEDICAL HISTORY    Past Medical History:   Diagnosis Date    Arthritis     Arthritis     BPH (benign prostatic hyperplasia)     CHF (congestive heart failure)     Coronary artery disease     dr. Carrizales    Deviated septum     FH: cataracts     Gait abnormality     GERD (gastroesophageal reflux disease)     History of trigeminal neuralgia     Hyperlipidemia     Hypertension     Renal cyst     Sciatica     Sleep apnea     unable to tolerate machine        SURGICAL HISTORY    Past Surgical History:   Procedure Laterality Date    APPENDECTOMY      CARDIAC CATHETERIZATION      CARDIAC CATHETERIZATION N/A 3/5/2020    Procedure: Percutaneous Subclavian Intervention;  Surgeon: Nick Greenberg MD;  Location: [facility] CATH INVASIVE LOCATION;  Service: Cardiovascular;  Laterality: N/A;    CARDIAC ELECTROPHYSIOLOGY PROCEDURE N/A 1/29/2020    Procedure: Biventricular Device Upgrade;  Surgeon: Dominick Jackson MD;  Location: [facility] CATH INVASIVE LOCATION;  Service: Cardiovascular    CARDIAC ELECTROPHYSIOLOGY PROCEDURE N/A 3/5/2020    Procedure: Lead Revision;  Surgeon: Nick Greenberg MD;  Location: [facility] CATH INVASIVE LOCATION;  Service: Cardiovascular;  Laterality: N/A;    CORONARY STENT PLACEMENT  2014    x 2    CYSTOSCOPY, URETEROSCOPY, RETROGRADE PYELOGRAM, STENT INSERTION Left 11/3/2023    " "Procedure: CYSTOSCOPY URETEROSCOPY STONE EXTRACTION STENT INSERTION;  Surgeon: Chris Patel MD;  Location: [facility] MAIN OR;  Service: Urology;  Laterality: Left;    IMPLANTABLE CARDIOVERTER DEFIBRILLATOR LEAD REPLACEMENT/POCKET REVISION Right 10/19/2022    Procedure: RT. TRANSVENOUS ICD LEAD EXTRACTION;  Surgeon: Naveed Rios MD;  Location: [facility];  Service: Cardiology;  Laterality: Right;    PROSTATE SURGERY      REFRACTIVE SURGERY      THORACOTOMY Left 3/4/2020    Procedure: THORACOTOMY MINI WITH LEAD PLACEMENT;  Surgeon: Justin Aguilar MD;  Location: [facility];  Service: Cardiothoracic;  Laterality: Left;    TOTAL HIP ARTHROPLASTY Bilateral     different times        FAMILY HISTORY    Family History   Problem Relation Age of Onset    Cancer Mother     Heart disease Father     Arthritis Father     Diabetes Father     Hypertension Father     Hyperlipidemia Father     Kidney disease Father     Malig Hyperthermia Neg Hx        SOCIAL HISTORY    Social History     Tobacco Use    Smoking status: Former     Current packs/day: 0.00     Types: Cigarettes     Quit date:      Years since quittin.2     Passive exposure: Past    Smokeless tobacco: Never   Substance Use Topics    Alcohol use: Yes     Alcohol/week: 7.0 standard drinks of alcohol     Types: 7 Glasses of wine per week     Comment: RED WINE BEFORE DINNER        ALLERGIES    No Known Allergies           /62 (BP Location: Left arm, Patient Position: Lying)   Pulse 70   Temp 97.3 °F (36.3 °C) (Oral)   Resp 17   Ht 185.4 cm (73\")   Wt 97.1 kg (214 lb 1.1 oz)   SpO2 94%   BMI 28.24 kg/m²   Intake/Output last 3 shifts:  I/O last 3 completed shifts:  In: 400 [P.O.:400]  Out: 1120 [Urine:1120]  Intake/Output this shift:  No intake/output data recorded.    PHYSICAL EXAM:    General: Alert, cooperative, no distress, appears stated age  Head:  Normocephalic, atraumatic, mucous membranes moist  Eyes:  Conjunctivae/corneas clear, EOM's " intact     Neck:  Supple,  no adenopathy; no JVD or bruit  Lungs: Clear to auscultation bilaterally, no wheezes, rhonchi or rales are noted  Chest wall: No tenderness  Heart::  Regular rate and rhythm, S1 and S2 normal, 1/6 murmur base  Abdomen: Soft, nontender, nondistended, bowel sounds active  Extremities: No cyanosis, clubbing, or edema   Pulses: 2+ and symmetric all extremities  Skin:  No rashes or lesions  Neuro/psych: A&O x3. CN II through XII are grossly intact with appropriate affect      Scheduled Meds:      [Held by provider] amitriptyline, 20 mg, Oral, Nightly  amLODIPine, 10 mg, Oral, Q24H  apixaban, 5 mg, Oral, Q12H  aspirin, 81 mg, Oral, Daily  atorvastatin, 20 mg, Oral, Daily  cetirizine, 10 mg, Oral, Daily  enalapril, 20 mg, Oral, Daily  gabapentin, 300 mg, Oral, Daily  isosorbide mononitrate, 60 mg, Oral, Daily  nitrofurantoin (macrocrystal-monohydrate), 100 mg, Oral, BID  NON FORMULARY, 200 mg, Oral, Daily  OXcarbazepine, 300 mg, Oral, 4x Daily  pantoprazole, 40 mg, Oral, Q AM  sodium chloride, 10 mL, Intravenous, Q12H        Continuous Infusions:         PRN Meds:      acetaminophen **OR** acetaminophen **OR** acetaminophen    senna-docusate sodium **AND** polyethylene glycol **AND** bisacodyl **AND** bisacodyl    Calcium Replacement - Follow Nurse / BPA Driven Protocol    Magnesium Standard Dose Replacement - Follow Nurse / BPA Driven Protocol    nitroglycerin    ondansetron ODT    Phosphorus Replacement - Follow Nurse / BPA Driven Protocol    Potassium Replacement - Follow Nurse / BPA Driven Protocol    [COMPLETED] Insert Peripheral IV **AND** sodium chloride    sodium chloride    sodium chloride        Results Review:     I reviewed the patient's new clinical results.    CBC    Results from last 7 days   Lab Units 03/07/25  2350 03/07/25  0855   WBC 10*3/mm3 7.36 10.28   HEMOGLOBIN g/dL 12.9* 13.8   PLATELETS 10*3/mm3 192 211     Cr Clearance Estimated Creatinine Clearance: 80.8 mL/min (by C-G  "formula based on SCr of 0.91 mg/dL).  Coag   Results from last 7 days   Lab Units 03/07/25  0855   INR  1.21*   APTT seconds 31.8     HbA1C   Lab Results   Component Value Date    HGBA1C 5.9 (H) 03/02/2020     Blood Glucose No results found for: \"POCGLU\"  Infection     CMP   Results from last 7 days   Lab Units 03/07/25 2350 03/07/25  0855   SODIUM mmol/L 128* 127*   POTASSIUM mmol/L 3.4* 4.1   CHLORIDE mmol/L 92* 93*   CO2 mmol/L 24.5 20.2*   BUN mg/dL 15 13   CREATININE mg/dL 0.91 0.85   GLUCOSE mg/dL 133* 166*   ALBUMIN g/dL  --  4.2   BILIRUBIN mg/dL  --  1.4*   ALK PHOS U/L  --  88   AST (SGOT) U/L  --  19   ALT (SGPT) U/L  --  19     ABG      UA    Results from last 7 days   Lab Units 03/07/25  0919   NITRITE UA  Negative   WBC UA /HPF 0-2   BACTERIA UA /HPF None Seen   SQUAM EPITHEL UA /HPF 0-2     SULY  No results found for: \"POCMETH\", \"POCAMPHET\", \"POCBARBITUR\", \"POCBENZO\", \"POCCOCAINE\", \"POCOPIATES\", \"POCOXYCODO\", \"POCPHENCYC\", \"POCPROPOXY\", \"POCTHC\", \"POCTRICYC\"  Lysis Labs   Results from last 7 days   Lab Units 03/07/25 2350 03/07/25  0855   INR   --  1.21*   APTT seconds  --  31.8   HEMOGLOBIN g/dL 12.9* 13.8   PLATELETS 10*3/mm3 192 211   CREATININE mg/dL 0.91 0.85     Radiology(recent) XR Chest 1 View  Result Date: 3/7/2025  Impression: No acute chest findings. Electronically Signed: Omayra Quinn MD  3/7/2025 9:12 AM EST  Workstation ID: [id number]        Results from last 7 days   Lab Units 03/07/25  1101   HSTROP T ng/L 24*       X-rays, labs reviewed personally by physician.    ECG/EMG Results (most recent)       Procedure Component Value Units Date/Time    ECG 12 Lead Chest Pain [[id number]] Collected: 03/07/25 0831     Updated: 03/08/25 0849     QT Interval 521 ms      QTC Interval 562 ms     Narrative:      HEART RATE=70  bpm  RR Tewmcjgu=809  ms  CT Interval=58  ms  P Horizontal Axis=127  deg  P Front Axis=Invalid  deg  QRSD Hovpjwml=519  ms  QT Djmkwmdp=678  ms  UXmO=904  ms  QRS Axis=138  " deg  T Wave Axis=-32  deg  - ABNORMAL ECG -  Sinus rhythm  Right bundle branch block  ST elevation secondary to IVCD  When compared with ECG of 20-Oct-2022 06:34:51,  Significant change in rhythm  Significant repolarization change  Electronically Signed By: Cortes Chauhan ([email or web address]) 2025-03-08 08:48:52  Date and Time of Study:2025-03-07 08:31:37              Medication Review:   I have reviewed the patient's current medication list  Scheduled Meds:[Held by provider] amitriptyline, 20 mg, Oral, Nightly  amLODIPine, 10 mg, Oral, Q24H  apixaban, 5 mg, Oral, Q12H  aspirin, 81 mg, Oral, Daily  atorvastatin, 20 mg, Oral, Daily  cetirizine, 10 mg, Oral, Daily  enalapril, 20 mg, Oral, Daily  gabapentin, 300 mg, Oral, Daily  isosorbide mononitrate, 60 mg, Oral, Daily  nitrofurantoin (macrocrystal-monohydrate), 100 mg, Oral, BID  NON FORMULARY, 200 mg, Oral, Daily  OXcarbazepine, 300 mg, Oral, 4x Daily  pantoprazole, 40 mg, Oral, Q AM  sodium chloride, 10 mL, Intravenous, Q12H      Continuous Infusions:   PRN Meds:.  acetaminophen **OR** acetaminophen **OR** acetaminophen    senna-docusate sodium **AND** polyethylene glycol **AND** bisacodyl **AND** bisacodyl    Calcium Replacement - Follow Nurse / BPA Driven Protocol    Magnesium Standard Dose Replacement - Follow Nurse / BPA Driven Protocol    nitroglycerin    ondansetron ODT    Phosphorus Replacement - Follow Nurse / BPA Driven Protocol    Potassium Replacement - Follow Nurse / BPA Driven Protocol    [COMPLETED] Insert Peripheral IV **AND** sodium chloride    sodium chloride    sodium chloride    Imaging:  Imaging Results (Last 72 Hours)       Procedure Component Value Units Date/Time    XR Chest 1 View [[id number]] Collected: 03/07/25 0910     Updated: 03/07/25 0914    Narrative:      XR CHEST 1 VW    Date of Exam: 3/7/2025 9:07 AM EST    Indication: cp soa    Comparison: CT chest 11/5/2024, AP chest 10/19/2022    Findings:  No acute airspace disease. Heart size is within  normal limits. Pulmonary vascular distribution is normal. Asymmetric/defibrillator and leads appear similarly positioned. No visible pneumothorax. No acute osseous abnormalities are identified.      Impression:      Impression:  No acute chest findings.      Electronically Signed: Omayra Quinn MD    3/7/2025 9:12 AM EST    Workstation ID: [id number]              Radha Pemberton MD  03/08/25  11:24 EST

## 2025-03-08 NOTE — PLAN OF CARE
Goal Outcome Evaluation:  Pt resting in room. Pt learned they are to be admitted with heart cath this morning. Pt wearing cardiac leads and keeping up with vitals. Pt receives meds orally, no complaints by patient. Will continue to monitor.

## 2025-03-08 NOTE — PROGRESS NOTES
Hospitalist Progress Note   Mode Duffy : 1945 MRN:[id number] LOS:0     Principal Problem: Chest pain     Reason for follow up: All the medical problems listed below    Summary     Chest pain   New dilated cardiomyopathy     Assessment / Plan     Chest pain  CAD s/p stents x 2  -stress test with EF of 20 %   -echo repeat   -plan for cath on Monday  -eliquis on hold   -cardio following      CHF  -TTE 23 --LVEF 56%, LV mild concentric hypertrophy, grade 1A with high LAP left ventricular diastolic dysfunction, left atrial cavity is mild to moderately dilated, mildly elevated RVSP 35 to 45 mmHg.  -Patient's proBNP is elevated at 3990  -echo to repeat today      SSS   -S/p BiV PPM     Atrial fibrillation/Atrial flutter  -eliquis on hold for now     Acute on chronic hyponatremia  -Patient is on oxcarbazepine and  amitriptyline   -monitor BMP daily for now      Chronic hematuria  BPH  -UTI appears to have cleared.  He has blood in his urine.  He follows with Dr. Kashmir BENDER  -Statin     GERD  -PPI      Subjective / Review of systems     Review of Systems   Wants to go home   But he understood need cath     Objective / Physical Exam   Vital signs:  Temp: 97.3 °F (36.3 °C)  BP: 114/62  Heart Rate: 70  Resp: 17  SpO2: 94 %  Weight: 97.1 kg (214 lb 1.1 oz)    Admission Weight: Weight: 97.9 kg (215 lb 12.8 oz)  Current Weight: Weight: 97.1 kg (214 lb 1.1 oz)    Input/Output in last 24 hours:    Intake/Output Summary (Last 24 hours) at 3/8/2025 1311  Last data filed at 3/7/2025 2200  Gross per 24 hour   Intake 400 ml   Output 520 ml   Net -120 ml      Physical Exam   Physical Exam  HENT:      Head: Normocephalic and atraumatic.      Nose: Nose normal.   Eyes:      Extraocular Movements: Extraocular movements intact.      Conjunctivae/sclerae: Conjunctivae normal.      Pupils: Pupils are equal, round, and reactive to light.   Cardiovascular:      Rate and Rhythm: Normal       Pulses: Normal pulses.      Heart  sounds: Normal heart sounds.   Pulmonary:      Effort: normal      Breath sounds: normal   Abdominal:      General: Abdomen is flat. Bowel sounds are normal.      Palpations: Abdomen is soft.   Musculoskeletal:         General: Normal range of motion.      Cervical back: Normal range of motion and neck supple.   Skin:     General: Skin is dry.   Neurological:      General: No focal deficit present.      Mental Status: alert.   Psychiatric:         Mood and Affect: Mood normal.        Radiology and Labs     Results from last 7 days   Lab Units 03/07/25  2350 03/07/25  0855   WBC 10*3/mm3 7.36 10.28   HEMATOCRIT % 37.4* 39.8   PLATELETS 10*3/mm3 192 211      Results from last 7 days   Lab Units 03/07/25  2350 03/07/25  0855   SODIUM mmol/L 128* 127*   POTASSIUM mmol/L 3.4* 4.1   CHLORIDE mmol/L 92* 93*   CO2 mmol/L 24.5 20.2*   BUN mg/dL 15 13   CREATININE mg/dL 0.91 0.85      Current medications   Scheduled Meds: [Held by provider] amitriptyline, 20 mg, Oral, Nightly  amLODIPine, 10 mg, Oral, Q24H  aspirin, 81 mg, Oral, Daily  atorvastatin, 20 mg, Oral, Daily  cetirizine, 10 mg, Oral, Daily  enalapril, 20 mg, Oral, Daily  gabapentin, 300 mg, Oral, Daily  isosorbide mononitrate, 60 mg, Oral, Daily  nitrofurantoin (macrocrystal-monohydrate), 100 mg, Oral, BID  NON FORMULARY, 200 mg, Oral, Daily  OXcarbazepine, 300 mg, Oral, 4x Daily  pantoprazole, 40 mg, Oral, Q AM  sodium chloride, 10 mL, Intravenous, Q12H      Continuous Infusions:      Reviewed all other data in the last 24 hours, including but not limited to vitals, labs, microbiology, imaging and pertinent notes from other providers.     Nayana Bonilla MD   [facility] Medicine  03/08/25   13:11 EST

## 2025-03-09 PROBLEM — R06.00 DYSPNEA: Status: ACTIVE | Noted: 2025-03-07

## 2025-03-09 LAB
ALBUMIN SERPL-MCNC: 3.4 G/DL (ref 3.5–5.2)
ALBUMIN/GLOB SERPL: 1.7 G/DL
ALP SERPL-CCNC: 67 U/L (ref 39–117)
ALT SERPL W P-5'-P-CCNC: 24 U/L (ref 1–41)
ANION GAP SERPL CALCULATED.3IONS-SCNC: 10.2 MMOL/L (ref 5–15)
ANION GAP SERPL CALCULATED.3IONS-SCNC: 10.4 MMOL/L (ref 5–15)
ANION GAP SERPL CALCULATED.3IONS-SCNC: 12.3 MMOL/L (ref 5–15)
ANION GAP SERPL CALCULATED.3IONS-SCNC: 14.9 MMOL/L (ref 5–15)
APTT PPP: 29.6 SECONDS (ref 22.7–35.4)
APTT PPP: 41.9 SECONDS (ref 22.7–35.4)
AST SERPL-CCNC: 25 U/L (ref 1–40)
BASOPHILS # BLD AUTO: 0.04 10*3/MM3 (ref 0–0.2)
BASOPHILS NFR BLD AUTO: 0.5 % (ref 0–1.5)
BILIRUB SERPL-MCNC: 0.7 MG/DL (ref 0–1.2)
BUN SERPL-MCNC: 14 MG/DL (ref 8–23)
BUN SERPL-MCNC: 14 MG/DL (ref 8–23)
BUN SERPL-MCNC: 15 MG/DL (ref 8–23)
BUN SERPL-MCNC: 17 MG/DL (ref 8–23)
BUN/CREAT SERPL: 16.5 (ref 7–25)
BUN/CREAT SERPL: 17.9 (ref 7–25)
BUN/CREAT SERPL: 18.1 (ref 7–25)
BUN/CREAT SERPL: 18.4 (ref 7–25)
CALCIUM SPEC-SCNC: 8.3 MG/DL (ref 8.6–10.5)
CALCIUM SPEC-SCNC: 8.6 MG/DL (ref 8.6–10.5)
CALCIUM SPEC-SCNC: 8.8 MG/DL (ref 8.6–10.5)
CALCIUM SPEC-SCNC: 9.2 MG/DL (ref 8.6–10.5)
CHLORIDE SERPL-SCNC: 92 MMOL/L (ref 98–107)
CO2 SERPL-SCNC: 21.8 MMOL/L (ref 22–29)
CO2 SERPL-SCNC: 22.7 MMOL/L (ref 22–29)
CO2 SERPL-SCNC: 23.1 MMOL/L (ref 22–29)
CO2 SERPL-SCNC: 24.6 MMOL/L (ref 22–29)
CREAT SERPL-MCNC: 0.76 MG/DL (ref 0.76–1.27)
CREAT SERPL-MCNC: 0.84 MG/DL (ref 0.76–1.27)
CREAT SERPL-MCNC: 0.85 MG/DL (ref 0.76–1.27)
CREAT SERPL-MCNC: 0.94 MG/DL (ref 0.76–1.27)
CREAT UR-MCNC: 185.4 MG/DL
DEPRECATED RDW RBC AUTO: 41.5 FL (ref 37–54)
EGFRCR SERPLBLD CKD-EPI 2021: 82.5 ML/MIN/1.73
EGFRCR SERPLBLD CKD-EPI 2021: 88.4 ML/MIN/1.73
EGFRCR SERPLBLD CKD-EPI 2021: 88.7 ML/MIN/1.73
EGFRCR SERPLBLD CKD-EPI 2021: 91.4 ML/MIN/1.73
EOSINOPHIL # BLD AUTO: 0.55 10*3/MM3 (ref 0–0.4)
EOSINOPHIL NFR BLD AUTO: 7 % (ref 0.3–6.2)
ERYTHROCYTE [DISTWIDTH] IN BLOOD BY AUTOMATED COUNT: 11.9 % (ref 12.3–15.4)
GLOBULIN UR ELPH-MCNC: 2 GM/DL
GLUCOSE SERPL-MCNC: 106 MG/DL (ref 65–99)
GLUCOSE SERPL-MCNC: 118 MG/DL (ref 65–99)
GLUCOSE SERPL-MCNC: 122 MG/DL (ref 65–99)
GLUCOSE SERPL-MCNC: 96 MG/DL (ref 65–99)
HCT VFR BLD AUTO: 35.3 % (ref 37.5–51)
HGB BLD-MCNC: 12.1 G/DL (ref 13–17.7)
IMM GRANULOCYTES # BLD AUTO: 0.02 10*3/MM3 (ref 0–0.05)
IMM GRANULOCYTES NFR BLD AUTO: 0.3 % (ref 0–0.5)
INR PPP: 1.11 (ref 0.9–1.1)
LYMPHOCYTES # BLD AUTO: 1.03 10*3/MM3 (ref 0.7–3.1)
LYMPHOCYTES NFR BLD AUTO: 13 % (ref 19.6–45.3)
MAGNESIUM SERPL-MCNC: 1.8 MG/DL (ref 1.6–2.4)
MCH RBC QN AUTO: 32.3 PG (ref 26.6–33)
MCHC RBC AUTO-ENTMCNC: 34.3 G/DL (ref 31.5–35.7)
MCV RBC AUTO: 94.1 FL (ref 79–97)
MONOCYTES # BLD AUTO: 0.72 10*3/MM3 (ref 0.1–0.9)
MONOCYTES NFR BLD AUTO: 9.1 % (ref 5–12)
NEUTROPHILS NFR BLD AUTO: 5.55 10*3/MM3 (ref 1.7–7)
NEUTROPHILS NFR BLD AUTO: 70.1 % (ref 42.7–76)
NRBC BLD AUTO-RTO: 0 /100 WBC (ref 0–0.2)
OSMOLALITY SERPL: 270 MOSM/KG (ref 280–301)
OSMOLALITY UR: 648 MOSM/KG (ref 300–800)
PHOSPHATE SERPL-MCNC: 3.4 MG/DL (ref 2.5–4.5)
PLATELET # BLD AUTO: 188 10*3/MM3 (ref 140–450)
PMV BLD AUTO: 9.3 FL (ref 6–12)
POTASSIUM SERPL-SCNC: 4 MMOL/L (ref 3.5–5.2)
POTASSIUM SERPL-SCNC: 4.2 MMOL/L (ref 3.5–5.2)
POTASSIUM SERPL-SCNC: 4.5 MMOL/L (ref 3.5–5.2)
POTASSIUM SERPL-SCNC: 4.6 MMOL/L (ref 3.5–5.2)
PROT ?TM UR-MCNC: 33 MG/DL
PROT SERPL-MCNC: 5.4 G/DL (ref 6–8.5)
PROT/CREAT UR: 178 MG/G CREA (ref 0–200)
PROTHROMBIN TIME: 14.2 SECONDS (ref 11.7–14.2)
RBC # BLD AUTO: 3.75 10*6/MM3 (ref 4.14–5.8)
SODIUM SERPL-SCNC: 124 MMOL/L (ref 136–145)
SODIUM SERPL-SCNC: 127 MMOL/L (ref 136–145)
SODIUM SERPL-SCNC: 127 MMOL/L (ref 136–145)
SODIUM SERPL-SCNC: 130 MMOL/L (ref 136–145)
TSH SERPL DL<=0.05 MIU/L-ACNC: 3.26 UIU/ML (ref 0.27–4.2)
URATE SERPL-MCNC: 3.7 MG/DL (ref 3.4–7)
WBC NRBC COR # BLD AUTO: 7.91 10*3/MM3 (ref 3.4–10.8)

## 2025-03-09 PROCEDURE — 85730 THROMBOPLASTIN TIME PARTIAL: CPT | Performed by: INTERNAL MEDICINE

## 2025-03-09 PROCEDURE — 84443 ASSAY THYROID STIM HORMONE: CPT

## 2025-03-09 PROCEDURE — 85610 PROTHROMBIN TIME: CPT | Performed by: INTERNAL MEDICINE

## 2025-03-09 PROCEDURE — 84100 ASSAY OF PHOSPHORUS: CPT | Performed by: INTERNAL MEDICINE

## 2025-03-09 PROCEDURE — 83930 ASSAY OF BLOOD OSMOLALITY: CPT

## 2025-03-09 PROCEDURE — 85025 COMPLETE CBC W/AUTO DIFF WBC: CPT

## 2025-03-09 PROCEDURE — 25010000002 HEPARIN (PORCINE) 25000-0.45 UT/250ML-% SOLUTION: Performed by: INTERNAL MEDICINE

## 2025-03-09 PROCEDURE — 99232 SBSQ HOSP IP/OBS MODERATE 35: CPT | Performed by: INTERNAL MEDICINE

## 2025-03-09 PROCEDURE — 80053 COMPREHEN METABOLIC PANEL: CPT | Performed by: INTERNAL MEDICINE

## 2025-03-09 PROCEDURE — 84550 ASSAY OF BLOOD/URIC ACID: CPT

## 2025-03-09 PROCEDURE — 83735 ASSAY OF MAGNESIUM: CPT

## 2025-03-09 PROCEDURE — 83935 ASSAY OF URINE OSMOLALITY: CPT

## 2025-03-09 PROCEDURE — 25010000002 FUROSEMIDE PER 20 MG: Performed by: INTERNAL MEDICINE

## 2025-03-09 RX ORDER — DEXTROSE MONOHYDRATE 50 MG/ML
250 INJECTION, SOLUTION INTRAVENOUS CONTINUOUS
Status: DISCONTINUED | OUTPATIENT
Start: 2025-03-09 | End: 2025-03-09

## 2025-03-09 RX ORDER — DESMOPRESSIN ACETATE 4 UG/ML
1 INJECTION, SOLUTION INTRAVENOUS; SUBCUTANEOUS ONCE
Status: DISCONTINUED | OUTPATIENT
Start: 2025-03-09 | End: 2025-03-09

## 2025-03-09 RX ORDER — HEPARIN SODIUM 10000 [USP'U]/100ML
10.3 INJECTION, SOLUTION INTRAVENOUS
Status: DISCONTINUED | OUTPATIENT
Start: 2025-03-09 | End: 2025-03-10

## 2025-03-09 RX ORDER — FUROSEMIDE 10 MG/ML
40 INJECTION INTRAMUSCULAR; INTRAVENOUS ONCE
Status: COMPLETED | OUTPATIENT
Start: 2025-03-09 | End: 2025-03-09

## 2025-03-09 RX ADMIN — OXCARBAZEPINE 300 MG: 150 TABLET, FILM COATED ORAL at 16:17

## 2025-03-09 RX ADMIN — Medication 10 ML: at 09:14

## 2025-03-09 RX ADMIN — OXCARBAZEPINE 300 MG: 150 TABLET, FILM COATED ORAL at 09:13

## 2025-03-09 RX ADMIN — FUROSEMIDE 40 MG: 10 INJECTION, SOLUTION INTRAMUSCULAR; INTRAVENOUS at 16:31

## 2025-03-09 RX ADMIN — ISOSORBIDE MONONITRATE 60 MG: 60 TABLET, EXTENDED RELEASE ORAL at 09:13

## 2025-03-09 RX ADMIN — Medication 10 ML: at 20:36

## 2025-03-09 RX ADMIN — OXCARBAZEPINE 300 MG: 150 TABLET, FILM COATED ORAL at 20:29

## 2025-03-09 RX ADMIN — PANTOPRAZOLE SODIUM 40 MG: 40 TABLET, DELAYED RELEASE ORAL at 03:55

## 2025-03-09 RX ADMIN — GABAPENTIN 300 MG: 300 CAPSULE ORAL at 22:14

## 2025-03-09 RX ADMIN — HEPARIN SODIUM 10.3 UNITS/KG/HR: 10000 INJECTION, SOLUTION INTRAVENOUS at 14:34

## 2025-03-09 RX ADMIN — OXCARBAZEPINE 300 MG: 150 TABLET, FILM COATED ORAL at 03:55

## 2025-03-09 RX ADMIN — ATORVASTATIN CALCIUM 20 MG: 20 TABLET, FILM COATED ORAL at 22:14

## 2025-03-09 RX ADMIN — AMLODIPINE BESYLATE 10 MG: 5 TABLET ORAL at 09:13

## 2025-03-09 RX ADMIN — ENALAPRIL MALEATE 20 MG: 5 TABLET ORAL at 23:09

## 2025-03-09 RX ADMIN — ASPIRIN 81 MG: 81 TABLET, COATED ORAL at 09:13

## 2025-03-09 RX ADMIN — ENALAPRIL MALEATE 20 MG: 5 TABLET ORAL at 05:01

## 2025-03-09 RX ADMIN — HEPARIN SODIUM 13.3 UNITS/KG/HR: 10000 INJECTION, SOLUTION INTRAVENOUS at 21:26

## 2025-03-09 RX ADMIN — FUROSEMIDE 20 MG: 20 TABLET ORAL at 09:13

## 2025-03-09 NOTE — CONSULTS
Patient Name: Mode Duffy  : 1945  MRN: [id number]  Primary Care Physician: Temo Vidal MD (Inactive)  Date of admission: 3/7/2025    Patient Care Team:  Temo Vidal MD (Inactive) as PCP - General  Temo Vidal MD (Inactive) as PCP - Family Medicine        Reason for Consult:       Hyponatremia    Subjective   History of Present Illness:   Chief Complaint:   Chief Complaint   Patient presents with    Chest Pain     Chest pain, soa, started yersterday, pain is substernal across chest, and is congested.         HISTORY:  Mode Duffy is a 79 y.o. male with past medical history of CAD s/p PCI and stenting x 2, PPM, Aflutter/Afib, CHF, left subclavian vein occlusion, GERD, HTN, HLD, SHELTON, BPH, trigeminal neuralgia, chronic hematuria who follows Dr Marlow. Echocardiogram 3/8/25 with EF 41-45%, mild-mod LVH, RVSP 35-45 mmHg, mild pulmonary hypertension.    Patient presented to ED with chest pain that began when he was getting out of bed the day prior along with nonproductive cough, mild dyspnea. He does report being treated for recent UTI with antibiotics. CXR negative for acute findings. Covid/flu/RSV negative. Upon initial evaluation hemodynamically stable, Cardiology consulted, given IV lasix 40mg x 1, stress test with EF 20%, plans for cardiac cath on 3/10. Labs significant for proBNP 3,990, HS troponin 25, sodium 127 now worsened to 124, CO2 20.2 with normal anion gap of 13.8, UA with 15 ketones, 1+ occult blood with 11-20 RBCs, trace proteinuria, no pyuria, urine Na 34, urine osm 650. Home medications include amlodipine, enalapril, gabapentin, oxcarbazepine. Nephrology consulted for hyponatremia.    Review of systems:  Constitutional: weakness.  HEENT:  No headache, otalgia, itchy eyes, nasal discharge or sore throat.  Cardiac:  No chest pain, dyspnea, orthopnea or PND.  Chest:              cough  Abdomen:  No abdominal pain, nausea or vomiting.  Neuro:  No focal  weakness, abnormal movements or seizure-like activity.  :   No hematuria, no pyuria, no dysuria, no flank pain.  ROS was otherwise negative except as mentioned in the [place].       Personal History:     Past Medical History:   Past Medical History:   Diagnosis Date    Arthritis     Arthritis     BPH (benign prostatic hyperplasia)     CHF (congestive heart failure)     Coronary artery disease     dr. Carrizales    Deviated septum     FH: cataracts     Gait abnormality     GERD (gastroesophageal reflux disease)     History of trigeminal neuralgia     Hyperlipidemia     Hypertension     Renal cyst     Sciatica     Sleep apnea     unable to tolerate machine       Surgical History:      Past Surgical History:   Procedure Laterality Date    APPENDECTOMY      CARDIAC CATHETERIZATION      CARDIAC CATHETERIZATION N/A 3/5/2020    Procedure: Percutaneous Subclavian Intervention;  Surgeon: Nick Greenberg MD;  Location: [facility] CATH INVASIVE LOCATION;  Service: Cardiovascular;  Laterality: N/A;    CARDIAC ELECTROPHYSIOLOGY PROCEDURE N/A 1/29/2020    Procedure: Biventricular Device Upgrade;  Surgeon: Dominick Jackson MD;  Location: [facility] CATH INVASIVE LOCATION;  Service: Cardiovascular    CARDIAC ELECTROPHYSIOLOGY PROCEDURE N/A 3/5/2020    Procedure: Lead Revision;  Surgeon: Nick Greenberg MD;  Location: [facility] CATH INVASIVE LOCATION;  Service: Cardiovascular;  Laterality: N/A;    CORONARY STENT PLACEMENT  2014    x 2    CYSTOSCOPY, URETEROSCOPY, RETROGRADE PYELOGRAM, STENT INSERTION Left 11/3/2023    Procedure: CYSTOSCOPY URETEROSCOPY STONE EXTRACTION STENT INSERTION;  Surgeon: Chris Patel MD;  Location: [facility] OR;  Service: Urology;  Laterality: Left;    IMPLANTABLE CARDIOVERTER DEFIBRILLATOR LEAD REPLACEMENT/POCKET REVISION Right 10/19/2022    Procedure: RT. TRANSVENOUS ICD LEAD EXTRACTION;  Surgeon: Naveed Rios MD;  Location: [facility];  Service: Cardiology;  Laterality: Right;     PROSTATE SURGERY      REFRACTIVE SURGERY      THORACOTOMY Left 3/4/2020    Procedure: THORACOTOMY MINI WITH LEAD PLACEMENT;  Surgeon: Justin Aguilar MD;  Location: [facility];  Service: Cardiothoracic;  Laterality: Left;    TOTAL HIP ARTHROPLASTY Bilateral     different times       Family History: family history includes Arthritis in his father; Cancer in his mother; Diabetes in his father; Heart disease in his father; Hyperlipidemia in his father; Hypertension in his father; Kidney disease in his father. Otherwise pertinent FHx was reviewed and unremarkable.     Social History:  reports that he quit smoking about 57 years ago. His smoking use included cigarettes. He has been exposed to tobacco smoke. He has never used smokeless tobacco. He reports current alcohol use of about 7.0 standard drinks of alcohol per week. He reports that he does not use drugs.    Medications:  Prior to Admission medications    Medication Sig Start Date End Date Taking? Authorizing Provider   acebutolol (SECTRAL) 200 MG capsule Take 1 capsule by mouth Daily. 12/30/24  Yes Dominick Jackson MD   amitriptyline (ELAVIL) 10 MG tablet TAKE 2 TABLETS AT BEDTIME 12/2/24  Yes Seipel, Joseph F, MD   amLODIPine (NORVASC) 10 MG tablet Take 1 tablet by mouth every night at bedtime. Indications: High Blood Pressure 10/15/14  Yes Eric Fragoso MD   apixaban (Eliquis) 5 MG tablet tablet Take 1 tablet by mouth Every 12 (Twelve) Hours. Indications: Atrial Fibrillation 10/18/24  Yes Dominick Jackson MD   aspirin 81 MG tablet Take 1 tablet by mouth Daily. Do not take day of surgery  Indications: Venous Thromboembolism 10/15/14  Yes Eric Fragoso MD   atorvastatin (LIPITOR) 20 MG tablet TAKE 1 TABLET EVERY DAY 12/5/24  Yes Yonathan Carrizales, DO   Calcium Carb-Cholecalciferol (CALCIUM 500 + D PO) Take 1 tablet by mouth Daily.   Yes Eric Fragoso MD   cetirizine (zyrTEC) 10 MG tablet Take 1 tablet by mouth Daily.  Indications: Upper Respiratory Tract Allergy   Yes Eric Fragoso MD   Cinnamon 500 MG capsule Take 1 capsule by mouth Daily.   Yes Eric Fragoso MD   enalapril (VASOTEC) 20 MG tablet Take 1 tablet by mouth Daily. Indications: High Blood Pressure 10/15/14  Yes Eric Fragoso MD   esomeprazole (nexIUM) 40 MG capsule Take 1 capsule by mouth Daily. Indications: Gastroesophageal Reflux Disease 10/15/14  Yes Eric Fragoso MD   gabapentin (NEURONTIN) 300 MG capsule Take 1 capsule by mouth Daily.   Yes Eric Fragoso MD   glucosamine-chondroitin 500-400 MG capsule capsule Take  by mouth 3 (Three) Times a Day With Meals.   Yes Eric Fragoso MD   isosorbide mononitrate (IMDUR) 60 MG 24 hr tablet Take 1 tablet by mouth Daily. Indications: Stable Angina Pectoris   Yes Eric Fragoso MD   OXcarbazepine (TRILEPTAL) 300 MG tablet Take 1 tablet by mouth 4 (Four) Times a Day. Titrate down if pain controlled 6/4/24  Yes Sarita Davalos APRN   Probiotic Product (PROBIOTIC ADVANCED PO) Take 1 capsule by mouth Daily. Stop on 2/26 for surgery  Indications: Edith replacement   Yes Eric Fragoso MD   Turmeric 500 MG capsule Take  by mouth.   Yes Eric Fragoso MD   vitamin C (ASCORBIC ACID) 250 MG tablet Take 1 tablet by mouth Daily. Indications: supplement   Yes Eric Fragoso MD   Zinc 25 MG tablet Take 25 mg by mouth Daily. Indications: Zinc Deficiency 4/5/21  Yes Eric Fragoso MD     Scheduled Meds:[Held by provider] amitriptyline, 20 mg, Oral, Nightly  amLODIPine, 10 mg, Oral, Q24H  aspirin, 81 mg, Oral, Daily  atorvastatin, 20 mg, Oral, Daily  cetirizine, 10 mg, Oral, Daily  enalapril, 20 mg, Oral, Daily  furosemide, 20 mg, Oral, Daily  gabapentin, 300 mg, Oral, Daily  isosorbide mononitrate, 60 mg, Oral, Daily  NON FORMULARY, 200 mg, Oral, Daily  OXcarbazepine, 300 mg, Oral, 4x Daily  pantoprazole, 40 mg, Oral, Q AM  sodium chloride, 10 mL,  Intravenous, Q12H      Continuous Infusions:heparin, 10.3 Units/kg/hr      PRN Meds:  acetaminophen **OR** acetaminophen **OR** acetaminophen    senna-docusate sodium **AND** polyethylene glycol **AND** bisacodyl **AND** bisacodyl    Calcium Replacement - Follow Nurse / BPA Driven Protocol    heparin    heparin    Magnesium Standard Dose Replacement - Follow Nurse / BPA Driven Protocol    nitroglycerin    ondansetron ODT    Phosphorus Replacement - Follow Nurse / BPA Driven Protocol    Potassium Replacement - Follow Nurse / BPA Driven Protocol    [COMPLETED] Insert Peripheral IV **AND** sodium chloride    sodium chloride    sodium chloride  Allergies:  No Known Allergies    Objective   Exam:     Vital Signs  Temp:  [97.8 °F (36.6 °C)-98.3 °F (36.8 °C)] 98.3 °F (36.8 °C)  Heart Rate:  [70-72] 70  Resp:  [14-23] 14  BP: (129-151)/(73-82) 132/82  SpO2:  [93 %-97 %] 95 %  on  Flow (L/min) (Oxygen Therapy):  [2] 2;   Device (Oxygen Therapy): room air  Body mass index is 28.24 kg/m².  EXAM  General:  Alert, in no acute distress.    Head:      Normocephalic and atraumatic.    Eyes:      PERRL/EOM intact, conjunctivae and sclerae clear without nystagmus.    Neck:      No masses, thyromegaly,  trachea central   Lungs:    Clear bilaterally to auscultation.    Heart:      Regular rate and rhythm, no murmur no gallop  Abd:        Soft, nontender, not distended, bowel sounds positive, no shifting dullness.  Msk:        No deformity or scoliosis noted of thoracic or lumbar spine.    Pulses:   Pulses normal in all 4 extremities.    Extremities:        No cyanosis or clubbing--no edema.    Neuro:    No focal deficits.   alert oriented x3  Skin:       Intact without lesions or rashes.    Psych:    Alert and cooperative; normal mood and affect; normal attention span       Results Review:  I have personally reviewed most recent Data :  BMP @LABRCNT(creatinine:10)  CBC    Results from last 7 days   Lab Units 03/09/25  3072  03/07/25  2350 03/07/25  0855   WBC 10*3/mm3 7.91 7.36 10.28   HEMOGLOBIN g/dL 12.1* 12.9* 13.8   PLATELETS 10*3/mm3 188 192 211     CMP   Results from last 7 days   Lab Units 03/09/25  0406 03/08/25  1334 03/07/25  2350 03/07/25  0855   SODIUM mmol/L 124*  --  128* 127*   POTASSIUM mmol/L 4.0 4.5 3.4* 4.1   CHLORIDE mmol/L 92*  --  92* 93*   CO2 mmol/L 21.8*  --  24.5 20.2*   BUN mg/dL 14  --  15 13   CREATININE mg/dL 0.76  --  0.91 0.85   GLUCOSE mg/dL 118*  --  133* 166*   ALBUMIN g/dL 3.4*  --   --  4.2   BILIRUBIN mg/dL 0.7  --   --  1.4*   ALK PHOS U/L 67  --   --  88   AST (SGOT) U/L 25  --   --  19   ALT (SGPT) U/L 24  --   --  19     ABG      XR Chest 1 View  Result Date: 3/7/2025  Impression: No acute chest findings. Electronically Signed: Omayra Quinn MD  3/7/2025 9:12 AM EST  Workstation ID: [id number]      Results for orders placed during the hospital encounter of 03/07/25    Adult Transthoracic Echo Complete W/ Cont if Necessary Per Protocol    Interpretation Summary    Left ventricular systolic function is normal. Left ventricular ejection fraction appears to be 41 - 45%.    The left ventricular cavity is mildly dilated.    Left ventricular wall thickness is consistent with mild to moderate concentric hypertrophy.    The following left ventricular wall segments are hypokinetic: mid anterior, mid anterolateral, apical lateral, mid inferolateral and apex hypokinetic. The following left ventricular wall segments are akinetic: basal anterolateral.    Left ventricular diastolic function is consistent with (grade II w/high LAP) pseudonormalization.    The left atrial cavity is mildly dilated.    There is mild calcification of the aortic valve mainly affecting the left coronary and right coronary cusp(s).    Estimated right ventricular systolic pressure from tricuspid regurgitation is mildly elevated (35-45 mmHg).    Mild pulmonary hypertension is present.        Assessment & Plan   Assessment and Plan:          Chest pain    ASSESSMENT:  Hyponatremia, chronic, likely 2/2 CHF, also on oxcarbazepine  Mild NAGMA  Chronic hematuria, follows Urology outpatient  Recent UTI  Chest pain  Abnormal stress test, plans for cardiac cath  CHF  CAD s/p PCI and stenting x 2  S/p PPM  Aflutter/Afib  Hx left subclavian vein occlusion  GERD  HTN  HLD  SHELTON  BPH  Trigeminal neuralgia    Echocardiogram 3/8/25 with EF 41-45%, mild-mod LVH, RVSP 35-45 mmHg, mild pulmonary hypertension.      PLAN :     Patient with chronic hyponatremia, patient is aware, underlying CHF, with evidence of increased volume as well as risk of increased ADH, on oxcarbazepine    Patient does not look like drinking a lot of water, but might benefit with more aggressive nutritional support, start boost, continue fluid restriction 1500 mL a day, patient remains on oral Lasix, I will give dose of IV Lasix this afternoon,  Needs daily weight,  Will check full hyponatremia workup  BMP Q4hrs, if Na <124 or >129 please notify nephrology  1.5L FR daily, encourage high protein diet  Continue lasix 20mg po daily  Monitor mild NAGMA  Renal function intact, other electrolytes acceptable  Cardiology consulted, plans for cardiac cath tomorrow, ok to proceed from nephrology standpoint  Strict I&O's, daily weights, 2g Na diet  Avoid NSAIDs, thiazides  We will follow    Thank you for this consultation!    Note transcribed for [facility]  3/9/2025  12:13 EDT     Attestation Statement:   I personally have examined the patient and interviewed the patient. I have reviewed the history, data, problems, assessment and plan with the nurse practitioner during rounds. and I concur with the history. exam, asssesment and plan as described in the Progress note, with comments additions, revisions as noted.      MD [facility]        No

## 2025-03-09 NOTE — PLAN OF CARE
Problem: Adult Inpatient Plan of Care  Goal: Absence of Hospital-Acquired Illness or Injury  Outcome: Progressing  Intervention: Identify and Manage Fall Risk  Recent Flowsheet Documentation  Taken 3/9/2025 0400 by Rupali Prakash RN  Safety Promotion/Fall Prevention: safety round/check completed  Taken 3/9/2025 0300 by Rupali Prakash RN  Safety Promotion/Fall Prevention: (time change) safety round/check completed  Taken 3/9/2025 0000 by Rupali Prakash RN  Safety Promotion/Fall Prevention: safety round/check completed  Taken 3/8/2025 2200 by Rupali Prakash RN  Safety Promotion/Fall Prevention: safety round/check completed  Taken 3/8/2025 2015 by Rupali Prakash RN  Safety Promotion/Fall Prevention: safety round/check completed  Intervention: Prevent Skin Injury  Recent Flowsheet Documentation  Taken 3/8/2025 2015 by Rupali Prakash RN  Body Position: position changed independently  Goal: Optimal Comfort and Wellbeing  Outcome: Progressing  Goal: Readiness for Transition of Care  Outcome: Progressing   Goal Outcome Evaluation:         No episodes of chest pain today. Denies problems or concerns. Heart cath on Monday.

## 2025-03-09 NOTE — PLAN OF CARE
Goal Outcome Evaluation:  Pt resting comfortably in bed. Pt started Heparin drip today, first PTT draw will be around 2000. Pt reports no pain or nausea. New IV placed on right forearm. Dressed with clear gauze and reinforced borders with tape. Pt is likely going to receive heart cath tomorrow. Will continue to monitor.

## 2025-03-09 NOTE — PROGRESS NOTES
Cardiology Consult Note      REQUESTING PHYSICIAN    Nayana Bonilla MD    PATIENT IDENTIFICATION  Name: Mode Duffy  Age: 79 y.o.  Sex: male  :  1945  MRN: [id number]                 Cardiology assessment and plan    Chest discomfort  Abnormal stress test  Normal troponin  Abnormal elevated proBNP  Hyponatremia  Bundle branch block  Prior cardiomyopathy  Sick sinus syndrome status post biventricular pacemaker placement  Myocardial perfusion study with fixed basal inferior wall and mid inferior wall perfusion defect with no significant reversible ischemia  LV ejection fraction is low on myocardial perfusion study  Echocardiogram-the LV systolic function  Acute heart failure with preserved LV systolic function secondary to valvular heart disease  Coronary artery disease status post PCI and stenting most recently in 2014.        This was cutting Balloon angioplasty of a circumflex branch and a diagonal branch.   Dyslipidemia.  Hypertension with hypertensive cardiovascular disease with a white coat component  Cardiomyopathy with recovered LV systolic function (EF of 35 to 40% in 2019).  EF 56% with mild concentric LVH, grade 1 DD, mild to moderate LAE  Sick sinus syndrome status post biventricular permanent pacemaker placement.  Valvular heart disease  Paroxysmal atrial fibrillation  Coagulopathy secondary to Eliquis  Lumbago  Hyponatremia secondary to Trileptal  Renal cysts  History of trigeminal neuralgia presently treated with Trileptal.  Current medications include amlodipine 10 mg p.o. once a day patient is on Lipitor 20 mg p.o. once a day patient is on Vasotec 20 mg p.o. once a day isosorbide 60 mg p.o. once a day patient is on anticoagulation therapy with Eliquis 5 mg p.o. twice daily  Hyponatremia  Tmax is 98.2 pulse is 70 respirations are 14 blood pressure is 132/82 sats are 95% on 2 L  Sodium is 124 potassium is 4.0 creatinine 0.76 LFTs are normal hemoglobin is  "12.1  Twelve-lead EKG shows sinus rhythm and underlying right bundle branch block  Newly diagnosed cardiomyopathy and LV dysfunction  Dilated LV  Echocardiogram to assess LV systolic function and also rule out any significant valve pathology  Discontinue Eliquis  Likely will benefit from further invasive workup during this hospital admission  Diagnosis and treatment options reviewed and discussed with patient  Close monitoring of sodium level for hyponatremia  Need for fluid restriction reviewed and discussed with patient  Plan to schedule patient for cardiac catheterization tomorrow  Start patient on anticoagulation therapy with heparin for today  Further recommendation based on patient course              REASON FOR CONSULTATION:  79 y.o. male known to Dr. Carrizales with a history of ischemic heart disease. He underwent cardiac catheterization with resultant PCI of an obtuse marginal branch and diagonal branch in November 2014. He has additional history of hypertension with hypertensive cardiovascular disease, dyslipidemia, paroxysmal atrial fibrillation, antiplatelet therapy as well as sick sinus syndrome with complete heart block status post biventricular permanent pacemaker placement.  Patient has left-sided implant in 99 and had significant issues with RV pacing alone with heart failure symptoms and LV lead was placed epicardial lead because of left subclavian occlusion. After LV lead epicardial placement, patient was found to have RV lead malfunction needing a new RV lead placed on the right side which was removed secondary to pocket infection on the right side.     CC:  Chest pain    HISTORY OF PRESENT ILLNESS:   Patient presented to the emergency department at [facility] 3/7/2025 with complaint of \"a little something in my chest\", nonradiating.  \"A little heavy breathing\".  Symptom onset Thursday.  He describes the chest discomfort as 2/10.  He reports a recent nonproductive cough.  Denies any " lower extremity edema, GI complaints, abdominal distention.  Workup was initiated in the emergency department.  He was given aspirin as well as 40 mg IV Lasix x 1.  Cardiology was consulted for further evaluation and recommendations.  Upon my evaluation, he is sitting up in bed working a [employer] puzzle.  He denies any chest discomfort at present.  Monitor shows sinus rhythm and blood pressure is quite stable.  Patient does endorse a diet that is probably too high in sodium.      REVIEW OF SYSTEMS:  Pertinent items are noted in HPI, all other systems reviewed and negative    OBJECTIVE   High-sensitivity troponin 25, 24  proBNP 3990  Sodium 127  Chest x-ray with no acute findings  EKG was sinus rhythm, right bundle branch block-unchanged from 3/12/2024    ASSESSMENT  Chest discomfort  Shortness of breath  Cough  Acute heart failure with preserved LV systolic function secondary to valvular heart disease  Coronary artery disease status post PCI and stenting most recently in November of 2014.        This was cutting Balloon angioplasty of a circumflex branch and a diagonal branch.   Dyslipidemia.  Hypertension with hypertensive cardiovascular disease with a white coat component  Cardiomyopathy with recovered LV systolic function (EF of 35 to 40% in October 2019).  EF 56% with mild concentric LVH, grade 1 DD, mild to moderate LAE  Sick sinus syndrome status post biventricular permanent pacemaker placement.  Valvular heart disease  Paroxysmal atrial fibrillation  Coagulopathy secondary to Eliquis  Lumbago  Hyponatremia secondary to Trileptal  Renal cysts  History of trigeminal neuralgia presently treated with Trileptal.  Hip pain currently undergoing preoperative cardiovascular risk assessment for right hip surgery.  Trigeminal neuralgia            CHF Guideline Directed Medical Therapy  Beta Blocker:   ARNI/ACE/ARB:   SGLT 2 inhibitors:   Diuretics:   Aldosterone Antagonist:   Vasodilators & Nitrates:     Vital  "Signs  Visit Vitals  /82 (BP Location: Right arm, Patient Position: Sitting)   Pulse 70   Temp 98.3 °F (36.8 °C) (Oral)   Resp 14   Ht 185.4 cm (73\")   Wt 97.1 kg (214 lb 1.1 oz)   SpO2 95%   BMI 28.24 kg/m²     Oxygen Therapy  SpO2: 95 %  Pulse Oximetry Type: Intermittent  Device (Oxygen Therapy): room air  Flow (L/min) (Oxygen Therapy): 2  Flowsheet Rows      Flowsheet Row First Filed Value   Admission Height 185.4 cm (73\") Documented at 03/07/2025 0823   Admission Weight 97.9 kg (215 lb 12.8 oz) Documented at 03/07/2025 0823          Intake & Output (last 3 days)         03/04 0701 03/05 0700 03/05 0701  03/06 0700 03/06 0701 03/07 0700 03/07 0701 03/08 0700    Urine (mL/kg/hr)    600    Total Output    600    Net    -600                  Lines, Drains & Airways       Active LDAs       Name Placement date Placement time Site Days    Peripheral IV 03/07/25 0856 Left Antecubital 03/07/25  0856  Antecubital  less than 1                    MEDICAL HISTORY    Past Medical History:   Diagnosis Date    Arthritis     Arthritis     BPH (benign prostatic hyperplasia)     CHF (congestive heart failure)     Coronary artery disease     dr. Carrizales    Deviated septum     FH: cataracts     Gait abnormality     GERD (gastroesophageal reflux disease)     History of trigeminal neuralgia     Hyperlipidemia     Hypertension     Renal cyst     Sciatica     Sleep apnea     unable to tolerate machine        SURGICAL HISTORY    Past Surgical History:   Procedure Laterality Date    APPENDECTOMY      CARDIAC CATHETERIZATION      CARDIAC CATHETERIZATION N/A 3/5/2020    Procedure: Percutaneous Subclavian Intervention;  Surgeon: Nick Greenberg MD;  Location: [facility] CATH INVASIVE LOCATION;  Service: Cardiovascular;  Laterality: N/A;    CARDIAC ELECTROPHYSIOLOGY PROCEDURE N/A 1/29/2020    Procedure: Biventricular Device Upgrade;  Surgeon: Dominick Jackson MD;  Location: [facility] CATH INVASIVE LOCATION;  Service: " "Cardiovascular    CARDIAC ELECTROPHYSIOLOGY PROCEDURE N/A 3/5/2020    Procedure: Lead Revision;  Surgeon: Nick Greenberg MD;  Location: [facility] CATH INVASIVE LOCATION;  Service: Cardiovascular;  Laterality: N/A;    CORONARY STENT PLACEMENT  2014    x 2    CYSTOSCOPY, URETEROSCOPY, RETROGRADE PYELOGRAM, STENT INSERTION Left 11/3/2023    Procedure: CYSTOSCOPY URETEROSCOPY STONE EXTRACTION STENT INSERTION;  Surgeon: Chris Patel MD;  Location: [facility] MAIN OR;  Service: Urology;  Laterality: Left;    IMPLANTABLE CARDIOVERTER DEFIBRILLATOR LEAD REPLACEMENT/POCKET REVISION Right 10/19/2022    Procedure: RT. TRANSVENOUS ICD LEAD EXTRACTION;  Surgeon: Naveed Rios MD;  Location: [facility];  Service: Cardiology;  Laterality: Right;    PROSTATE SURGERY      REFRACTIVE SURGERY      THORACOTOMY Left 3/4/2020    Procedure: THORACOTOMY MINI WITH LEAD PLACEMENT;  Surgeon: Justin Aguilar MD;  Location: [facility];  Service: Cardiothoracic;  Laterality: Left;    TOTAL HIP ARTHROPLASTY Bilateral     different times        FAMILY HISTORY    Family History   Problem Relation Age of Onset    Cancer Mother     Heart disease Father     Arthritis Father     Diabetes Father     Hypertension Father     Hyperlipidemia Father     Kidney disease Father     Malig Hyperthermia Neg Hx        SOCIAL HISTORY    Social History     Tobacco Use    Smoking status: Former     Current packs/day: 0.00     Types: Cigarettes     Quit date:      Years since quittin.2     Passive exposure: Past    Smokeless tobacco: Never   Substance Use Topics    Alcohol use: Yes     Alcohol/week: 7.0 standard drinks of alcohol     Types: 7 Glasses of wine per week     Comment: RED WINE BEFORE DINNER        ALLERGIES    No Known Allergies           /82 (BP Location: Right arm, Patient Position: Sitting)   Pulse 70   Temp 98.3 °F (36.8 °C) (Oral)   Resp 14   Ht 185.4 cm (73\")   Wt 97.1 kg (214 lb 1.1 oz)   SpO2 95%   BMI 28.24 " kg/m²   Intake/Output last 3 shifts:  I/O last 3 completed shifts:  In: 1460 [P.O.:1460]  Out: 1020 [Urine:1020]  Intake/Output this shift:  I/O this shift:  In: 360 [P.O.:360]  Out: 600 [Urine:600]    PHYSICAL EXAM:    General: Alert, cooperative, no distress, appears stated age  Head:  Normocephalic, atraumatic, mucous membranes moist  Eyes:  Conjunctivae/corneas clear, EOM's intact     Neck:  Supple,  no adenopathy; no JVD or bruit  Lungs: Clear to auscultation bilaterally, no wheezes, rhonchi or rales are noted  Chest wall: No tenderness  Heart::  Regular rate and rhythm, S1 and S2 normal, 1/6 murmur base  Abdomen: Soft, nontender, nondistended, bowel sounds active  Extremities: No cyanosis, clubbing, or edema   Pulses: 2+ and symmetric all extremities  Skin:  No rashes or lesions  Neuro/psych: A&O x3. CN II through XII are grossly intact with appropriate affect      Scheduled Meds:      [Held by provider] amitriptyline, 20 mg, Oral, Nightly  amLODIPine, 10 mg, Oral, Q24H  aspirin, 81 mg, Oral, Daily  atorvastatin, 20 mg, Oral, Daily  cetirizine, 10 mg, Oral, Daily  enalapril, 20 mg, Oral, Daily  furosemide, 20 mg, Oral, Daily  gabapentin, 300 mg, Oral, Daily  isosorbide mononitrate, 60 mg, Oral, Daily  NON FORMULARY, 200 mg, Oral, Daily  OXcarbazepine, 300 mg, Oral, 4x Daily  pantoprazole, 40 mg, Oral, Q AM  sodium chloride, 10 mL, Intravenous, Q12H        Continuous Infusions:         PRN Meds:      acetaminophen **OR** acetaminophen **OR** acetaminophen    senna-docusate sodium **AND** polyethylene glycol **AND** bisacodyl **AND** bisacodyl    Calcium Replacement - Follow Nurse / BPA Driven Protocol    Magnesium Standard Dose Replacement - Follow Nurse / BPA Driven Protocol    nitroglycerin    ondansetron ODT    Phosphorus Replacement - Follow Nurse / BPA Driven Protocol    Potassium Replacement - Follow Nurse / BPA Driven Protocol    [COMPLETED] Insert Peripheral IV **AND** sodium chloride    sodium  "chloride    sodium chloride        Results Review:     I reviewed the patient's new clinical results.    CBC    Results from last 7 days   Lab Units 03/09/25  0406 03/07/25  2350 03/07/25  0855   WBC 10*3/mm3 7.91 7.36 10.28   HEMOGLOBIN g/dL 12.1* 12.9* 13.8   PLATELETS 10*3/mm3 188 192 211     Cr Clearance Estimated Creatinine Clearance: 96.8 mL/min (by C-G formula based on SCr of 0.76 mg/dL).  Coag   Results from last 7 days   Lab Units 03/07/25  0855   INR  1.21*   APTT seconds 31.8     HbA1C   Lab Results   Component Value Date    HGBA1C 5.9 (H) 03/02/2020     Blood Glucose No results found for: \"POCGLU\"  Infection     CMP   Results from last 7 days   Lab Units 03/09/25  0406 03/08/25  1334 03/07/25 2350 03/07/25  0855   SODIUM mmol/L 124*  --  128* 127*   POTASSIUM mmol/L 4.0 4.5 3.4* 4.1   CHLORIDE mmol/L 92*  --  92* 93*   CO2 mmol/L 21.8*  --  24.5 20.2*   BUN mg/dL 14  --  15 13   CREATININE mg/dL 0.76  --  0.91 0.85   GLUCOSE mg/dL 118*  --  133* 166*   ALBUMIN g/dL 3.4*  --   --  4.2   BILIRUBIN mg/dL 0.7  --   --  1.4*   ALK PHOS U/L 67  --   --  88   AST (SGOT) U/L 25  --   --  19   ALT (SGPT) U/L 24  --   --  19     ABG      UA    Results from last 7 days   Lab Units 03/07/25  0919   NITRITE UA  Negative   WBC UA /HPF 0-2   BACTERIA UA /HPF None Seen   SQUAM EPITHEL UA /HPF 0-2     SULY  No results found for: \"POCMETH\", \"POCAMPHET\", \"POCBARBITUR\", \"POCBENZO\", \"POCCOCAINE\", \"POCOPIATES\", \"POCOXYCODO\", \"POCPHENCYC\", \"POCPROPOXY\", \"POCTHC\", \"POCTRICYC\"  Lysis Labs   Results from last 7 days   Lab Units 03/09/25  0406 03/07/25  2350 03/07/25  0855   INR   --   --  1.21*   APTT seconds  --   --  31.8   HEMOGLOBIN g/dL 12.1* 12.9* 13.8   PLATELETS 10*3/mm3 188 192 211   CREATININE mg/dL 0.76 0.91 0.85     Radiology(recent) No radiology results for the last day        Results from last 7 days   Lab Units 03/07/25  1101   HSTROP T ng/L 24*       X-rays, labs reviewed personally by physician.    ECG/EMG " Results (most recent)       Procedure Component Value Units Date/Time    ECG 12 Lead Chest Pain [[id number]] Collected: 03/07/25 0831     Updated: 03/08/25 0849     QT Interval 521 ms      QTC Interval 562 ms     Narrative:      HEART RATE=70  bpm  RR Ygmcvofb=713  ms  MT Interval=58  ms  P Horizontal Axis=127  deg  P Front Axis=Invalid  deg  QRSD Usedovnj=276  ms  QT Zyrcyusf=627  ms  CHkE=932  ms  QRS Axis=138  deg  T Wave Axis=-32  deg  - ABNORMAL ECG -  Sinus rhythm  Right bundle branch block  ST elevation secondary to IVCD  When compared with ECG of 20-Oct-2022 06:34:51,  Significant change in rhythm  Significant repolarization change  Electronically Signed By: Cortes Chauhan ([email or web address]) 2025-03-08 08:48:52  Date and Time of Study:2025-03-07 08:31:37    Adult Transthoracic Echo Complete W/ Cont if Necessary Per Protocol [[id number]] Resulted: 03/08/25 1655     Updated: 03/08/25 1655     LV GLOBAL STRAIN  -12.7 %      LVIDd 5.6 cm      LVIDs 4.3 cm      IVSd 1.36 cm      LVPWd 1.32 cm      FS 23.3 %      IVS/LVPW 1.03 cm      ESV(cubed) 78.0 ml      LV Sys Vol (BSA corrected) 37.2 cm2      EDV(cubed) 173.1 ml      LV Garcia Vol (BSA corrected) 74.1 cm2      LV mass(C)d 325.1 grams      LVOT area 3.6 cm2      LVOT diam 2.13 cm      EDV(MOD-sp2) 227.0 ml      EDV(MOD-sp4) 164.0 ml      ESV(MOD-sp2) 105.0 ml      ESV(MOD-sp4) 82.4 ml      SV(MOD-sp2) 122.0 ml      SV(MOD-sp4) 81.6 ml      SVi(MOD-SP2) 55.1 ml/m2      SVi(MOD-SP4) 36.8 ml/m2      EF(MOD-sp2) 53.7 %      EF(MOD-sp4) 49.8 %      MV E max cosme 90.1 cm/sec      MV A max cosme 41.9 cm/sec      MV dec time 0.16 sec      MV E/A 2.15     MV A dur 0.14 sec      LA ESV Index (BP) 37.5 ml/m2      TR max cosme 282.3 cm/sec      TAPSE (>1.6) 1.63 cm      LA dimension (2D)  3.5 cm      LV V1 max 86.6 cm/sec      LV V1 max PG 3.0 mmHg      LV V1 mean PG Invalid mmHg      LV V1 VTI Invalid cm      Ao pk cosme 149.0 cm/sec      Ao max PG 8.9 mmHg      Ao mean PG 4.8 mmHg      Ao V2  VTI 28.4 cm      AI P1/2t 529.0 msec      MV max PG 3.8 mmHg      MV mean PG 1.77 mmHg      MV V2 VTI 28.9 cm      MV P1/2t 62.8 msec      MVA(P1/2t) 3.5 cm2      MV dec slope 469.6 cm/sec2      MR max cosme 437.8 cm/sec      MR max PG 76.7 mmHg      TR max PG 31.9 mmHg      RVSP(TR) 39.9 mmHg      RAP systole 8.0 mmHg      RV V1 max PG 1.53 mmHg      RV V1 max 61.8 cm/sec      RV V1 VTI 10.8 cm      PA V2 max 73.9 cm/sec      PA acc time 0.13 sec      Sinus 3.5 cm      STJ 2.6 cm      Dimensionless Index 0.58 (DI)     Narrative:        Left ventricular systolic function is normal. Left ventricular ejection   fraction appears to be 41 - 45%.    The left ventricular cavity is mildly dilated.    Left ventricular wall thickness is consistent with mild to moderate   concentric hypertrophy.    The following left ventricular wall segments are hypokinetic: mid   anterior, mid anterolateral, apical lateral, mid inferolateral and apex   hypokinetic. The following left ventricular wall segments are akinetic:   basal anterolateral.    Left ventricular diastolic function is consistent with (grade II w/high   LAP) pseudonormalization.    The left atrial cavity is mildly dilated.    There is mild calcification of the aortic valve mainly affecting the   left coronary and right coronary cusp(s).    Estimated right ventricular systolic pressure from tricuspid   regurgitation is mildly elevated (35-45 mmHg).    Mild pulmonary hypertension is present.                Medication Review:   I have reviewed the patient's current medication list  Scheduled Meds:[Held by provider] amitriptyline, 20 mg, Oral, Nightly  amLODIPine, 10 mg, Oral, Q24H  aspirin, 81 mg, Oral, Daily  atorvastatin, 20 mg, Oral, Daily  cetirizine, 10 mg, Oral, Daily  enalapril, 20 mg, Oral, Daily  furosemide, 20 mg, Oral, Daily  gabapentin, 300 mg, Oral, Daily  isosorbide mononitrate, 60 mg, Oral, Daily  NON FORMULARY, 200 mg, Oral, Daily  OXcarbazepine, 300 mg, Oral,  4x Daily  pantoprazole, 40 mg, Oral, Q AM  sodium chloride, 10 mL, Intravenous, Q12H      Continuous Infusions:   PRN Meds:.  acetaminophen **OR** acetaminophen **OR** acetaminophen    senna-docusate sodium **AND** polyethylene glycol **AND** bisacodyl **AND** bisacodyl    Calcium Replacement - Follow Nurse / BPA Driven Protocol    Magnesium Standard Dose Replacement - Follow Nurse / BPA Driven Protocol    nitroglycerin    ondansetron ODT    Phosphorus Replacement - Follow Nurse / BPA Driven Protocol    Potassium Replacement - Follow Nurse / BPA Driven Protocol    [COMPLETED] Insert Peripheral IV **AND** sodium chloride    sodium chloride    sodium chloride    Imaging:  Imaging Results (Last 72 Hours)       Procedure Component Value Units Date/Time    XR Chest 1 View [[id number]] Collected: 03/07/25 0910     Updated: 03/07/25 0914    Narrative:      XR CHEST 1 VW    Date of Exam: 3/7/2025 9:07 AM EST    Indication: cp soa    Comparison: CT chest 11/5/2024, AP chest 10/19/2022    Findings:  No acute airspace disease. Heart size is within normal limits. Pulmonary vascular distribution is normal. Asymmetric/defibrillator and leads appear similarly positioned. No visible pneumothorax. No acute osseous abnormalities are identified.      Impression:      Impression:  No acute chest findings.      Electronically Signed: Omayra Quinn MD    3/7/2025 9:12 AM EST    Workstation ID: [id number]              Radha Pemberton MD  03/09/25  11:50 EDT

## 2025-03-09 NOTE — PROGRESS NOTES
Hospitalist Progress Note   Mode Duffy : 1945 MRN:[id number] LOS:1     Principal Problem: Chest pain     Reason for follow up: All the medical problems listed below    Summary     Chest pain   New dilated cardiomyopathy     Assessment / Plan     #Chest pain  #CAD s/p stents x 2  -stress test with EF of 20 %   -echo repeat -- EF of 41%  -plan for cath on Monday  -eliquis on hold   -cardio following        #Acute on chronic hyponatremia  -Na 128 --> 124 today   -Patient is on oxcarbazepine and  amitriptyline ( hold )   -nephro consulted   -monitor BMP q 4 hr per nephro   -1.5 L fluid restriction     #Trigemial neuralgia, not acute issue  -continue oxacarbazepine and follow up with Dr Sifuentes on dc for med mx  -Dr Moncada rec to continue the same dose for now  -he has severe case of neuralgia and so dose or medication change will be challenging        #CHF  -TTE 23 --LVEF 56%, LV mild concentric hypertrophy, grade 1A with high LAP left ventricular diastolic dysfunction, left atrial cavity is mild to moderately dilated, mildly elevated RVSP 35 to 45 mmHg.  -Patient's proBNP is elevated at 3990     #SSS   -S/p BiV PPM     #Atrial fibrillation/Atrial flutter  -eliquis on hold for now       #Chronic hematuria  #BPH  -UTI appears to have cleared.  He has blood in his urine.  He follows with Dr. Marlow     #HLD  -Statin     #GERD  -PPI      Subjective / Review of systems     Review of Systems   Wants to go home   But he understood need cath     Objective / Physical Exam   Vital signs:  Temp: 98.3 °F (36.8 °C)  BP: 132/82  Heart Rate: 70  Resp: 14  SpO2: 95 %  Weight: 97.8 kg (215 lb 9.8 oz)    Admission Weight: Weight: 97.9 kg (215 lb 12.8 oz)  Current Weight: Weight: 97.8 kg (215 lb 9.8 oz)    Input/Output in last 24 hours:    Intake/Output Summary (Last 24 hours) at 3/9/2025 1345  Last data filed at 3/9/2025 1103  Gross per 24 hour   Intake 1320 ml   Output 1100 ml   Net 220 ml      Physical Exam   Physical  Exam  HENT:      Head: Normocephalic and atraumatic.      Nose: Nose normal.   Eyes:      Extraocular Movements: Extraocular movements intact.      Conjunctivae/sclerae: Conjunctivae normal.      Pupils: Pupils are equal, round, and reactive to light.   Cardiovascular:      Rate and Rhythm: Normal       Pulses: Normal pulses.      Heart sounds: Normal heart sounds.   Pulmonary:      Effort: normal      Breath sounds: normal   Abdominal:      General: Abdomen is flat. Bowel sounds are normal.      Palpations: Abdomen is soft.   Musculoskeletal:         General: Normal range of motion.      Cervical back: Normal range of motion and neck supple.   Skin:     General: Skin is dry.   Neurological:      General: No focal deficit present.      Mental Status: alert.   Psychiatric:         Mood and Affect: Mood normal.        Radiology and Labs     Results from last 7 days   Lab Units 03/09/25  0406 03/07/25  2350 03/07/25  0855   WBC 10*3/mm3 7.91 7.36 10.28   HEMATOCRIT % 35.3* 37.4* 39.8   PLATELETS 10*3/mm3 188 192 211      Results from last 7 days   Lab Units 03/09/25  0406 03/08/25  1334 03/07/25  2350 03/07/25  0855   SODIUM mmol/L 124*  --  128* 127*   POTASSIUM mmol/L 4.0 4.5 3.4* 4.1   CHLORIDE mmol/L 92*  --  92* 93*   CO2 mmol/L 21.8*  --  24.5 20.2*   BUN mg/dL 14  --  15 13   CREATININE mg/dL 0.76  --  0.91 0.85      Current medications   Scheduled Meds: acebutolol, 200 mg, Oral, Daily  [Held by provider] amitriptyline, 20 mg, Oral, Nightly  amLODIPine, 10 mg, Oral, Q24H  aspirin, 81 mg, Oral, Daily  atorvastatin, 20 mg, Oral, Daily  cetirizine, 10 mg, Oral, Daily  enalapril, 20 mg, Oral, Daily  furosemide, 20 mg, Oral, Daily  gabapentin, 300 mg, Oral, Daily  isosorbide mononitrate, 60 mg, Oral, Daily  OXcarbazepine, 300 mg, Oral, 4x Daily  pantoprazole, 40 mg, Oral, Q AM  sodium chloride, 10 mL, Intravenous, Q12H      Continuous Infusions: heparin, 10.3 Units/kg/hr        Reviewed all other data in the last 24  hours, including but not limited to vitals, labs, microbiology, imaging and pertinent notes from other providers.     Nayana Bonilla MD   [facility]  03/09/25   13:45 EDT

## 2025-03-10 LAB
ALBUMIN SERPL-MCNC: 3.7 G/DL (ref 3.5–5.2)
ALBUMIN/GLOB SERPL: 1.7 G/DL
ALP SERPL-CCNC: 68 U/L (ref 39–117)
ALT SERPL W P-5'-P-CCNC: 25 U/L (ref 1–41)
ANION GAP SERPL CALCULATED.3IONS-SCNC: 10.2 MMOL/L (ref 5–15)
ANION GAP SERPL CALCULATED.3IONS-SCNC: 10.6 MMOL/L (ref 5–15)
ANION GAP SERPL CALCULATED.3IONS-SCNC: 10.8 MMOL/L (ref 5–15)
ANION GAP SERPL CALCULATED.3IONS-SCNC: 11.3 MMOL/L (ref 5–15)
APTT PPP: 120.9 SECONDS (ref 22.7–35.4)
APTT PPP: 43.8 SECONDS (ref 22.7–35.4)
AST SERPL-CCNC: 20 U/L (ref 1–40)
BASOPHILS # BLD AUTO: 0.04 10*3/MM3 (ref 0–0.2)
BASOPHILS NFR BLD AUTO: 0.8 % (ref 0–1.5)
BILIRUB SERPL-MCNC: 0.6 MG/DL (ref 0–1.2)
BUN SERPL-MCNC: 11 MG/DL (ref 8–23)
BUN SERPL-MCNC: 11 MG/DL (ref 8–23)
BUN SERPL-MCNC: 16 MG/DL (ref 8–23)
BUN SERPL-MCNC: 17 MG/DL (ref 8–23)
BUN/CREAT SERPL: 17.2 (ref 7–25)
BUN/CREAT SERPL: 18.6 (ref 7–25)
BUN/CREAT SERPL: 19 (ref 7–25)
BUN/CREAT SERPL: 20 (ref 7–25)
CA-I SERPL ISE-MCNC: 1.02 MMOL/L (ref 1.15–1.3)
CALCIUM SPEC-SCNC: 6.8 MG/DL (ref 8.6–10.5)
CALCIUM SPEC-SCNC: 7.4 MG/DL (ref 8.6–10.5)
CALCIUM SPEC-SCNC: 8.6 MG/DL (ref 8.6–10.5)
CALCIUM SPEC-SCNC: 8.8 MG/DL (ref 8.6–10.5)
CHLORIDE SERPL-SCNC: 100 MMOL/L (ref 98–107)
CHLORIDE SERPL-SCNC: 100 MMOL/L (ref 98–107)
CHLORIDE SERPL-SCNC: 92 MMOL/L (ref 98–107)
CHLORIDE SERPL-SCNC: 94 MMOL/L (ref 98–107)
CO2 SERPL-SCNC: 17.4 MMOL/L (ref 22–29)
CO2 SERPL-SCNC: 19.8 MMOL/L (ref 22–29)
CO2 SERPL-SCNC: 24.2 MMOL/L (ref 22–29)
CO2 SERPL-SCNC: 24.7 MMOL/L (ref 22–29)
CORTIS SERPL-MCNC: 14.9 MCG/DL
CREAT SERPL-MCNC: 0.59 MG/DL (ref 0.76–1.27)
CREAT SERPL-MCNC: 0.64 MG/DL (ref 0.76–1.27)
CREAT SERPL-MCNC: 0.84 MG/DL (ref 0.76–1.27)
CREAT SERPL-MCNC: 0.85 MG/DL (ref 0.76–1.27)
DEPRECATED RDW RBC AUTO: 41.8 FL (ref 37–54)
EGFRCR SERPLBLD CKD-EPI 2021: 88.4 ML/MIN/1.73
EGFRCR SERPLBLD CKD-EPI 2021: 88.7 ML/MIN/1.73
EGFRCR SERPLBLD CKD-EPI 2021: 96.3 ML/MIN/1.73
EGFRCR SERPLBLD CKD-EPI 2021: 98.7 ML/MIN/1.73
EOSINOPHIL # BLD AUTO: 0.47 10*3/MM3 (ref 0–0.4)
EOSINOPHIL NFR BLD AUTO: 9.3 % (ref 0.3–6.2)
ERYTHROCYTE [DISTWIDTH] IN BLOOD BY AUTOMATED COUNT: 11.9 % (ref 12.3–15.4)
GLOBULIN UR ELPH-MCNC: 2.2 GM/DL
GLUCOSE SERPL-MCNC: 110 MG/DL (ref 65–99)
GLUCOSE SERPL-MCNC: 114 MG/DL (ref 65–99)
GLUCOSE SERPL-MCNC: 116 MG/DL (ref 65–99)
GLUCOSE SERPL-MCNC: 95 MG/DL (ref 65–99)
HCT VFR BLD AUTO: 35.9 % (ref 37.5–51)
HGB BLD-MCNC: 12.2 G/DL (ref 13–17.7)
IMM GRANULOCYTES # BLD AUTO: 0.02 10*3/MM3 (ref 0–0.05)
IMM GRANULOCYTES NFR BLD AUTO: 0.4 % (ref 0–0.5)
LYMPHOCYTES # BLD AUTO: 1.74 10*3/MM3 (ref 0.7–3.1)
LYMPHOCYTES NFR BLD AUTO: 34.5 % (ref 19.6–45.3)
MAGNESIUM SERPL-MCNC: 1.6 MG/DL (ref 1.6–2.4)
MAGNESIUM SERPL-MCNC: 1.8 MG/DL (ref 1.6–2.4)
MCH RBC QN AUTO: 32.1 PG (ref 26.6–33)
MCHC RBC AUTO-ENTMCNC: 34 G/DL (ref 31.5–35.7)
MCV RBC AUTO: 94.5 FL (ref 79–97)
MONOCYTES # BLD AUTO: 0.55 10*3/MM3 (ref 0.1–0.9)
MONOCYTES NFR BLD AUTO: 10.9 % (ref 5–12)
NEUTROPHILS NFR BLD AUTO: 2.23 10*3/MM3 (ref 1.7–7)
NEUTROPHILS NFR BLD AUTO: 44.1 % (ref 42.7–76)
NRBC BLD AUTO-RTO: 0 /100 WBC (ref 0–0.2)
PHOSPHATE SERPL-MCNC: 3.9 MG/DL (ref 2.5–4.5)
PLATELET # BLD AUTO: 210 10*3/MM3 (ref 140–450)
PMV BLD AUTO: 9.3 FL (ref 6–12)
POTASSIUM SERPL-SCNC: 3 MMOL/L (ref 3.5–5.2)
POTASSIUM SERPL-SCNC: 3.6 MMOL/L (ref 3.5–5.2)
POTASSIUM SERPL-SCNC: 3.8 MMOL/L (ref 3.5–5.2)
POTASSIUM SERPL-SCNC: 3.8 MMOL/L (ref 3.5–5.2)
PROT SERPL-MCNC: 5.9 G/DL (ref 6–8.5)
RBC # BLD AUTO: 3.8 10*6/MM3 (ref 4.14–5.8)
SODIUM SERPL-SCNC: 127 MMOL/L (ref 136–145)
SODIUM SERPL-SCNC: 128 MMOL/L (ref 136–145)
SODIUM SERPL-SCNC: 130 MMOL/L (ref 136–145)
SODIUM SERPL-SCNC: 130 MMOL/L (ref 136–145)
WBC NRBC COR # BLD AUTO: 5.05 10*3/MM3 (ref 3.4–10.8)

## 2025-03-10 PROCEDURE — 83735 ASSAY OF MAGNESIUM: CPT | Performed by: INTERNAL MEDICINE

## 2025-03-10 PROCEDURE — 99153 MOD SED SAME PHYS/QHP EA: CPT | Performed by: INTERNAL MEDICINE

## 2025-03-10 PROCEDURE — 82533 TOTAL CORTISOL: CPT

## 2025-03-10 PROCEDURE — C1894 INTRO/SHEATH, NON-LASER: HCPCS | Performed by: INTERNAL MEDICINE

## 2025-03-10 PROCEDURE — 82330 ASSAY OF CALCIUM: CPT | Performed by: INTERNAL MEDICINE

## 2025-03-10 PROCEDURE — 99152 MOD SED SAME PHYS/QHP 5/>YRS: CPT | Performed by: INTERNAL MEDICINE

## 2025-03-10 PROCEDURE — 93458 L HRT ARTERY/VENTRICLE ANGIO: CPT | Performed by: INTERNAL MEDICINE

## 2025-03-10 PROCEDURE — 85025 COMPLETE CBC W/AUTO DIFF WBC: CPT

## 2025-03-10 PROCEDURE — 25010000002 HEPARIN (PORCINE) 25000-0.45 UT/250ML-% SOLUTION: Performed by: INTERNAL MEDICINE

## 2025-03-10 PROCEDURE — 85730 THROMBOPLASTIN TIME PARTIAL: CPT | Performed by: STUDENT IN AN ORGANIZED HEALTH CARE EDUCATION/TRAINING PROGRAM

## 2025-03-10 PROCEDURE — 85347 COAGULATION TIME ACTIVATED: CPT

## 2025-03-10 PROCEDURE — 84100 ASSAY OF PHOSPHORUS: CPT | Performed by: INTERNAL MEDICINE

## 2025-03-10 PROCEDURE — 25010000003 DEXTROSE 5 % SOLUTION: Performed by: NURSE PRACTITIONER

## 2025-03-10 PROCEDURE — 25010000002 LIDOCAINE 2% SOLUTION: Performed by: INTERNAL MEDICINE

## 2025-03-10 PROCEDURE — 4A023N7 MEASUREMENT OF CARDIAC SAMPLING AND PRESSURE, LEFT HEART, PERCUTANEOUS APPROACH: ICD-10-PCS | Performed by: INTERNAL MEDICINE

## 2025-03-10 PROCEDURE — 25510000001 IOPAMIDOL PER 1 ML: Performed by: INTERNAL MEDICINE

## 2025-03-10 PROCEDURE — 80053 COMPREHEN METABOLIC PANEL: CPT | Performed by: INTERNAL MEDICINE

## 2025-03-10 PROCEDURE — 25010000002 MIDAZOLAM PER 1 MG: Performed by: INTERNAL MEDICINE

## 2025-03-10 PROCEDURE — 99232 SBSQ HOSP IP/OBS MODERATE 35: CPT | Performed by: INTERNAL MEDICINE

## 2025-03-10 PROCEDURE — 85730 THROMBOPLASTIN TIME PARTIAL: CPT | Performed by: INTERNAL MEDICINE

## 2025-03-10 PROCEDURE — 83735 ASSAY OF MAGNESIUM: CPT

## 2025-03-10 PROCEDURE — C1769 GUIDE WIRE: HCPCS | Performed by: INTERNAL MEDICINE

## 2025-03-10 PROCEDURE — 25010000002 FENTANYL CITRATE (PF) 100 MCG/2ML SOLUTION: Performed by: INTERNAL MEDICINE

## 2025-03-10 PROCEDURE — 25810000003 SODIUM CHLORIDE 0.9 % SOLUTION: Performed by: INTERNAL MEDICINE

## 2025-03-10 PROCEDURE — 25010000002 NITROGLYCERIN 5 MG/ML SOLUTION: Performed by: INTERNAL MEDICINE

## 2025-03-10 PROCEDURE — B2111ZZ FLUOROSCOPY OF MULTIPLE CORONARY ARTERIES USING LOW OSMOLAR CONTRAST: ICD-10-PCS | Performed by: INTERNAL MEDICINE

## 2025-03-10 RX ORDER — IOPAMIDOL 755 MG/ML
INJECTION, SOLUTION INTRAVASCULAR
Status: DISCONTINUED | OUTPATIENT
Start: 2025-03-10 | End: 2025-03-10 | Stop reason: HOSPADM

## 2025-03-10 RX ORDER — ACETAMINOPHEN 325 MG/1
650 TABLET ORAL EVERY 4 HOURS PRN
Status: DISCONTINUED | OUTPATIENT
Start: 2025-03-10 | End: 2025-03-12 | Stop reason: HOSPADM

## 2025-03-10 RX ORDER — DEXTROSE MONOHYDRATE 50 MG/ML
250 INJECTION, SOLUTION INTRAVENOUS ONCE
Status: COMPLETED | OUTPATIENT
Start: 2025-03-10 | End: 2025-03-10

## 2025-03-10 RX ORDER — SODIUM CHLORIDE 9 MG/ML
INJECTION, SOLUTION INTRAVENOUS
Status: COMPLETED | OUTPATIENT
Start: 2025-03-10 | End: 2025-03-10

## 2025-03-10 RX ORDER — NITROGLYCERIN 0.4 MG/1
0.4 TABLET SUBLINGUAL
Status: DISCONTINUED | OUTPATIENT
Start: 2025-03-10 | End: 2025-03-12 | Stop reason: HOSPADM

## 2025-03-10 RX ORDER — FENTANYL CITRATE 50 UG/ML
INJECTION, SOLUTION INTRAMUSCULAR; INTRAVENOUS
Status: DISCONTINUED | OUTPATIENT
Start: 2025-03-10 | End: 2025-03-10 | Stop reason: HOSPADM

## 2025-03-10 RX ORDER — NITROGLYCERIN 5 MG/ML
INJECTION, SOLUTION INTRAVENOUS
Status: DISCONTINUED | OUTPATIENT
Start: 2025-03-10 | End: 2025-03-10 | Stop reason: HOSPADM

## 2025-03-10 RX ORDER — POTASSIUM CHLORIDE 1500 MG/1
40 TABLET, EXTENDED RELEASE ORAL EVERY 4 HOURS
Status: DISPENSED | OUTPATIENT
Start: 2025-03-10 | End: 2025-03-11

## 2025-03-10 RX ORDER — CALCIUM CARBONATE 500 MG/1
1 TABLET, CHEWABLE ORAL 3 TIMES DAILY PRN
Status: DISCONTINUED | OUTPATIENT
Start: 2025-03-10 | End: 2025-03-12 | Stop reason: HOSPADM

## 2025-03-10 RX ORDER — ONDANSETRON 4 MG/1
4 TABLET, ORALLY DISINTEGRATING ORAL EVERY 6 HOURS PRN
Status: DISCONTINUED | OUTPATIENT
Start: 2025-03-10 | End: 2025-03-12 | Stop reason: HOSPADM

## 2025-03-10 RX ORDER — ONDANSETRON 2 MG/ML
4 INJECTION INTRAMUSCULAR; INTRAVENOUS EVERY 6 HOURS PRN
Status: DISCONTINUED | OUTPATIENT
Start: 2025-03-10 | End: 2025-03-12 | Stop reason: HOSPADM

## 2025-03-10 RX ORDER — LIDOCAINE HYDROCHLORIDE 20 MG/ML
INJECTION, SOLUTION INFILTRATION; PERINEURAL
Status: DISCONTINUED | OUTPATIENT
Start: 2025-03-10 | End: 2025-03-10 | Stop reason: HOSPADM

## 2025-03-10 RX ORDER — SPIRONOLACTONE 25 MG/1
12.5 TABLET ORAL DAILY
Status: DISCONTINUED | OUTPATIENT
Start: 2025-03-10 | End: 2025-03-11

## 2025-03-10 RX ORDER — MIDAZOLAM HYDROCHLORIDE 1 MG/ML
INJECTION, SOLUTION INTRAMUSCULAR; INTRAVENOUS
Status: DISCONTINUED | OUTPATIENT
Start: 2025-03-10 | End: 2025-03-10 | Stop reason: HOSPADM

## 2025-03-10 RX ADMIN — PANTOPRAZOLE SODIUM 40 MG: 40 TABLET, DELAYED RELEASE ORAL at 03:32

## 2025-03-10 RX ADMIN — OXCARBAZEPINE 300 MG: 150 TABLET, FILM COATED ORAL at 09:14

## 2025-03-10 RX ADMIN — Medication 10 ML: at 16:41

## 2025-03-10 RX ADMIN — ISOSORBIDE MONONITRATE 60 MG: 60 TABLET, EXTENDED RELEASE ORAL at 09:14

## 2025-03-10 RX ADMIN — CETIRIZINE HYDROCHLORIDE 10 MG: 10 TABLET, FILM COATED ORAL at 09:15

## 2025-03-10 RX ADMIN — GABAPENTIN 300 MG: 300 CAPSULE ORAL at 21:20

## 2025-03-10 RX ADMIN — AMLODIPINE BESYLATE 10 MG: 5 TABLET ORAL at 09:14

## 2025-03-10 RX ADMIN — ASPIRIN 81 MG: 81 TABLET, COATED ORAL at 09:15

## 2025-03-10 RX ADMIN — Medication 15 G: at 21:20

## 2025-03-10 RX ADMIN — ATORVASTATIN CALCIUM 20 MG: 20 TABLET, FILM COATED ORAL at 21:20

## 2025-03-10 RX ADMIN — ENALAPRIL MALEATE 20 MG: 5 TABLET ORAL at 21:41

## 2025-03-10 RX ADMIN — POTASSIUM CHLORIDE 40 MEQ: 1500 TABLET, EXTENDED RELEASE ORAL at 21:28

## 2025-03-10 RX ADMIN — Medication 10 ML: at 21:41

## 2025-03-10 RX ADMIN — OXCARBAZEPINE 300 MG: 150 TABLET, FILM COATED ORAL at 17:10

## 2025-03-10 RX ADMIN — SPIRONOLACTONE 12.5 MG: 25 TABLET ORAL at 17:54

## 2025-03-10 RX ADMIN — DEXTROSE MONOHYDRATE 250 ML/HR: 50 INJECTION, SOLUTION INTRAVENOUS at 06:12

## 2025-03-10 RX ADMIN — HEPARIN SODIUM 10.3 UNITS/KG/HR: 10000 INJECTION, SOLUTION INTRAVENOUS at 06:11

## 2025-03-10 RX ADMIN — POTASSIUM CHLORIDE 40 MEQ: 1500 TABLET, EXTENDED RELEASE ORAL at 17:54

## 2025-03-10 RX ADMIN — OXCARBAZEPINE 300 MG: 150 TABLET, FILM COATED ORAL at 03:32

## 2025-03-10 RX ADMIN — OXCARBAZEPINE 300 MG: 150 TABLET, FILM COATED ORAL at 21:20

## 2025-03-10 NOTE — PROGRESS NOTES
Patient Name: Mode Duffy  : 1945  MRN: [id number]  Primary Care Physician: Temo Vidal MD (Inactive)  Date of admission: 3/7/2025    Patient Care Team:  Temo Vidal MD (Inactive) as PCP - General  Temo Vidal MD (Inactive) as PCP - Family Medicine        Subjective   Subjective:     Patient is feeling better no new complaints all other review of system unremarkable  Review of systems:  All other review of system unremarkable      Allergies:  No Known Allergies    Objective   Exam:     Vital Signs  Temp:  [97.7 °F (36.5 °C)-98.3 °F (36.8 °C)] 97.7 °F (36.5 °C)  Heart Rate:  [70-75] 75  Resp:  [13-28] 20  BP: (114-136)/(65-82) 136/82  SpO2:  [95 %-98 %] 96 %  on   ;   Device (Oxygen Therapy): room air  Body mass index is 27.98 kg/m².    General: Elderly white male in no acute distress.    Head:      Normocephalic and atraumatic.    Eyes:      PERRL/EOM intact, conjunctivae and sclerae clear without nystagmus.    Neck:      No masses, thyromegaly,  trachea central with normal respiratory effort   Lungs:    Clear bilaterally to auscultation.    Heart:      Regular rate and rhythm, no murmur no gallop  Abd:        Soft, nontender, not distended, bowel sounds positive, no shifting dullness   Pulses:   Pulses palpable  Extr:        No cyanosis or clubbing-significant edema.    Neuro:    No focal deficits.   alert oriented x3  Skin:       Intact without lesions or rashes.    Psych:    Alert and cooperative; normal mood and affect; .      Results Review:  I have personally reviewed most recent Data :  CBC    Results from last 7 days   Lab Units 03/10/25  0337 25  0406 25  2350 25  0855   WBC 10*3/mm3 5.05 7.91 7.36 10.28   HEMOGLOBIN g/dL 12.2* 12.1* 12.9* 13.8   PLATELETS 10*3/mm3 210 188 192 211     CMP   Results from last 7 days   Lab Units 03/10/25  0922 03/10/25  0337 25  2346 25  2035 25  1400 25  1337 25  0406  03/07/25  2350 03/07/25  0855   SODIUM mmol/L 128* 130* 127* 130* 127* 127* 124*   < > 127*   POTASSIUM mmol/L 3.0* 3.8 3.8 4.2 4.5 4.6 4.0   < > 4.1   CHLORIDE mmol/L 100 94* 92* 92* 92* 92* 92*   < > 93*   CO2 mmol/L 17.4* 24.7 24.2 23.1 24.6 22.7 21.8*   < > 20.2*   BUN mg/dL 11 16 17 17 15 14 14   < > 13   CREATININE mg/dL 0.59* 0.84 0.85 0.94 0.84 0.85 0.76   < > 0.85   GLUCOSE mg/dL 110* 114* 116* 122* 106* 96 118*   < > 166*   ALBUMIN g/dL  --  3.7  --   --   --   --  3.4*  --  4.2   BILIRUBIN mg/dL  --  0.6  --   --   --   --  0.7  --  1.4*   ALK PHOS U/L  --  68  --   --   --   --  67  --  88   AST (SGOT) U/L  --  20  --   --   --   --  25  --  19   ALT (SGPT) U/L  --  25  --   --   --   --  24  --  19    < > = values in this interval not displayed.     ABG      No radiology results for the last day    Results for orders placed during the hospital encounter of 03/07/25    Adult Transthoracic Echo Complete W/ Cont if Necessary Per Protocol    Interpretation Summary    Left ventricular systolic function is normal. Left ventricular ejection fraction appears to be 41 - 45%.    The left ventricular cavity is mildly dilated.    Left ventricular wall thickness is consistent with mild to moderate concentric hypertrophy.    The following left ventricular wall segments are hypokinetic: mid anterior, mid anterolateral, apical lateral, mid inferolateral and apex hypokinetic. The following left ventricular wall segments are akinetic: basal anterolateral.    Left ventricular diastolic function is consistent with (grade II w/high LAP) pseudonormalization.    The left atrial cavity is mildly dilated.    There is mild calcification of the aortic valve mainly affecting the left coronary and right coronary cusp(s).    Estimated right ventricular systolic pressure from tricuspid regurgitation is mildly elevated (35-45 mmHg).    Mild pulmonary hypertension is present.    Scheduled Meds:acebutolol, 200 mg, Oral, Daily  [Held by  provider] amitriptyline, 20 mg, Oral, Nightly  amLODIPine, 10 mg, Oral, Q24H  aspirin, 81 mg, Oral, Daily  atorvastatin, 20 mg, Oral, Daily  cetirizine, 10 mg, Oral, Daily  enalapril, 20 mg, Oral, Daily  gabapentin, 300 mg, Oral, Daily  isosorbide mononitrate, 60 mg, Oral, Daily  OXcarbazepine, 300 mg, Oral, 4x Daily  pantoprazole, 40 mg, Oral, Q AM  sodium chloride, 10 mL, Intravenous, Q12H      Continuous Infusions:   PRN Meds:  acetaminophen **OR** acetaminophen **OR** acetaminophen    senna-docusate sodium **AND** polyethylene glycol **AND** bisacodyl **AND** bisacodyl    Calcium Replacement - Follow Nurse / BPA Driven Protocol    Magnesium Standard Dose Replacement - Follow Nurse / BPA Driven Protocol    nitroglycerin    ondansetron ODT    Phosphorus Replacement - Follow Nurse / BPA Driven Protocol    Potassium Replacement - Follow Nurse / BPA Driven Protocol    [COMPLETED] Insert Peripheral IV **AND** sodium chloride    sodium chloride    sodium chloride    Assessment & Plan   Assessment and Plan:         Chest pain    Dyspnea    ASSESSMENT:  Hyponatremia, chronic, likely 2/2 CHF, also on oxcarbazepine sodium of 128 slightly lower than yesterday acceptable  Mild NAGMA which has improved  Chronic hematuria, follows Urology outpatient  Recent UTI  Chest pain  Abnormal stress test, plans for cardiac cath  CHF EF 41 to 45% with grade 2 diastolic dysfunction and left ventricular hypertrophy with coronary artery disease CAD s/p PCI and stenting x 2 S/p PPM Aflutter/Afib  Hx left subclavian vein occlusion  GERD  HTN  HLD  SHELTON  BPH  Trigeminal neuralgia     Echocardiogram 3/8/25 with EF 41-45%, mild-mod LVH, RVSP 35-45 mmHg, mild pulmonary hypertension.        PLAN :      Patient with chronic hyponatremia, patient is aware, underlying CHF, with evidence of increased volume as well as risk of increased ADH, on oxcarbazepine  Sodium level of 128 slightly lower than yesterday  We will give a dose of urea increased urine  osmolality and urine sodium of 34  Patient does not look like drinking a lot of water, but might benefit with more aggressive nutritional support, start boost, continue fluid restriction 1500 mL a day, patient remains on oral Lasix, I will give dose of IV Lasix this afternoon,  Started urea 15 g twice a day will help with diuresis  Patient is going for heart catheter today we will hold Lasix today  Will check labs later today and then every 24  Monitor mild NAGMA  Renal function intact, other electrolytes acceptable  Cardiac cath as renal function stable okay for heart cath today  Strict I&O's, daily weights, 2g Na diet  Avoid NSAIDs, thiazides  We will follow  Significant hypocalcemia start some calcium supplements          Electronically signed by Jack Cody MD,   [facility] kidney consultant  [phone]  3/10/2025  10:53 EDT

## 2025-03-10 NOTE — CASE MANAGEMENT/SOCIAL WORK
Discharge Planning Assessment   Martín     Patient Name: Mode Duffy  MRN: [id number]  Today's Date: 3/10/2025    Admit Date: 3/7/2025    Plan: Routine home.   Discharge Needs Assessment       Row Name 03/10/25 1055       Living Environment    People in Home spouse    Name(s) of People in Home Elizabeth    Current Living Arrangements home    Potentially Unsafe Housing Conditions none    In the past 12 months has the electric, gas, oil, or water company threatened to shut off services in your home? No    Primary Care Provided by self    Provides Primary Care For no one    Family Caregiver if Needed spouse    Family Caregiver Names Elizabeth    Quality of Family Relationships helpful;involved;supportive    Able to Return to Prior Arrangements yes       Resource/Environmental Concerns    Resource/Environmental Concerns none    Transportation Concerns none       Transportation Needs    In the past 12 months, has lack of transportation kept you from medical appointments or from getting medications? no    In the past 12 months, has lack of transportation kept you from meetings, work, or from getting things needed for daily living? No       Food Insecurity    Within the past 12 months, you worried that your food would run out before you got the money to buy more. Never true    Within the past 12 months, the food you bought just didn't last and you didn't have money to get more. Never true       Transition Planning    Patient/Family Anticipates Transition to home;home with family    Patient/Family Anticipated Services at Transition none    Transportation Anticipated car, drives self;family or friend will provide       Discharge Needs Assessment    Readmission Within the Last 30 Days no previous admission in last 30 days    Equipment Currently Used at Home bp cuff    Concerns to be Addressed denies needs/concerns at this time;no discharge needs identified    Do you want help finding or keeping work or a job? Patient declined     Do you want help with school or training? For example, starting or completing job training or getting a high school diploma, GED or equivalent No    Anticipated Changes Related to Illness none    Equipment Needed After Discharge none    Provided Post Acute Provider List? N/A    Provided Post Acute Provider Quality & Resource List? N/A                   Discharge Plan       Row Name 03/10/25 1058       Plan    Plan Routine home.    Patient/Family in Agreement with Plan yes    Plan Comments CM met with patient and wife Elizabeth at bedside. Pt lives at home, drives, and is indepedent with ADL's. PCP and pharmacy confirmed- declined [facility]. Only DME used at home is BP cuff. No current HHC/PT services and no services anticipated at discharge. Wife Elizabeth will transport home. IMM letter provided and signature obtained.              Demographic Summary       Row Name 03/10/25 1056       General Information    Admission Type inpatient    Arrived From emergency department    Required Notices Provided Important Message from Medicare    Referral Source admission list    Reason for Consult care coordination/care conference;discharge planning    Preferred Language English       Contact Information    Permission Granted to Share Info With                    Functional Status       Row Name 03/10/25 1059       Functional Status    Usual Activity Tolerance good    Current Activity Tolerance good       Functional Status, IADL    Medications independent    Meal Preparation independent    Housekeeping independent    Laundry independent    Shopping independent    If for any reason you need help with day-to-day activities such as bathing, preparing meals, shopping, managing finances, etc., do you get the help you need? I don't need any help             Megan Naegele, RN     Office Phone: [phone]  Office Cell: [phone]

## 2025-03-10 NOTE — PROGRESS NOTES
Cardiology Consult Note      REQUESTING PHYSICIAN    Edith Lott MD    PATIENT IDENTIFICATION  Name: Mode Duffy  Age: 79 y.o.  Sex: male  :  1945  MRN: [id number]                 Cardiology assessment and plan    Chest discomfort  Abnormal stress test  Normal troponin  Abnormal elevated proBNP  Hyponatremia  Bundle branch block  Prior cardiomyopathy  Sick sinus syndrome status post biventricular pacemaker placement  Myocardial perfusion study with fixed basal inferior wall and mid inferior wall perfusion defect with no significant reversible ischemia  LV ejection fraction is low on myocardial perfusion study  Echocardiogram-the LV systolic function  Acute heart failure with preserved LV systolic function secondary to valvular heart disease  Coronary artery disease status post PCI and stenting most recently in 2014.        This was cutting Balloon angioplasty of a circumflex branch and a diagonal branch.   Dyslipidemia.  Hypertension with hypertensive cardiovascular disease with a white coat component  Cardiomyopathy with recovered LV systolic function (EF of 35 to 40% in 2019).  EF 56% with mild concentric LVH, grade 1 DD, mild to moderate LAE  Sick sinus syndrome status post biventricular permanent pacemaker placement.  Valvular heart disease  Paroxysmal atrial fibrillation  Coagulopathy secondary to Eliquis  Lumbago  Hyponatremia secondary to Trileptal  Renal cysts  History of trigeminal neuralgia presently treated with Trileptal.  Current medications include amlodipine 10 mg p.o. once a day patient is on Lipitor 20 mg p.o. once a day patient is on Vasotec 20 mg p.o. once a day isosorbide 60 mg p.o. once a day patient is on anticoagulation therapy with Eliquis 5 mg p.o. twice daily  Hyponatremia  Tmax is 99.8 pulse 75 respirations are 20 blood pressure is 136/82 sats are 96%  Sodium is 130 potassium is 3.8 creatinine is 0.8 LFTs are normal PTT is supratherapeutic  "at 128 hemoglobin is 12 point  Twelve-lead EKG shows sinus rhythm and underlying right bundle branch block  Newly diagnosed cardiomyopathy and LV dysfunction  Dilated LV  Echocardiogram to assess LV systolic function and also rule out any significant valve pathology  Discontinue Eliquis  Likely will benefit from further invasive workup during this hospital admission  Diagnosis and treatment options reviewed and discussed with patient  Close monitoring of sodium level for hyponatremia  Need for fluid restriction reviewed and discussed with patient  Plan to schedule patient for cardiac catheterization today  Start patient on anticoagulation therapy with heparin for today  Further recommendation based on patient course              REASON FOR CONSULTATION:  79 y.o. male known to Dr. aCrrizales with a history of ischemic heart disease. He underwent cardiac catheterization with resultant PCI of an obtuse marginal branch and diagonal branch in November 2014. He has additional history of hypertension with hypertensive cardiovascular disease, dyslipidemia, paroxysmal atrial fibrillation, antiplatelet therapy as well as sick sinus syndrome with complete heart block status post biventricular permanent pacemaker placement.  Patient has left-sided implant in 99 and had significant issues with RV pacing alone with heart failure symptoms and LV lead was placed epicardial lead because of left subclavian occlusion. After LV lead epicardial placement, patient was found to have RV lead malfunction needing a new RV lead placed on the right side which was removed secondary to pocket infection on the right side.     CC:  Chest pain    HISTORY OF PRESENT ILLNESS:   Patient presented to the emergency department at [facility] 3/7/2025 with complaint of \"a little something in my chest\", nonradiating.  \"A little heavy breathing\".  Symptom onset Thursday.  He describes the chest discomfort as 2/10.  He reports a recent nonproductive " cough.  Denies any lower extremity edema, GI complaints, abdominal distention.  Workup was initiated in the emergency department.  He was given aspirin as well as 40 mg IV Lasix x 1.  Cardiology was consulted for further evaluation and recommendations.  Upon my evaluation, he is sitting up in bed working a [employer] puzzle.  He denies any chest discomfort at present.  Monitor shows sinus rhythm and blood pressure is quite stable.  Patient does endorse a diet that is probably too high in sodium.      REVIEW OF SYSTEMS:  Pertinent items are noted in HPI, all other systems reviewed and negative    OBJECTIVE   High-sensitivity troponin 25, 24  proBNP 3990  Sodium 127  Chest x-ray with no acute findings  EKG was sinus rhythm, right bundle branch block-unchanged from 3/12/2024    ASSESSMENT  Chest discomfort  Shortness of breath  Cough  Acute heart failure with preserved LV systolic function secondary to valvular heart disease  Coronary artery disease status post PCI and stenting most recently in November of 2014.        This was cutting Balloon angioplasty of a circumflex branch and a diagonal branch.   Dyslipidemia.  Hypertension with hypertensive cardiovascular disease with a white coat component  Cardiomyopathy with recovered LV systolic function (EF of 35 to 40% in October 2019).  EF 56% with mild concentric LVH, grade 1 DD, mild to moderate LAE  Sick sinus syndrome status post biventricular permanent pacemaker placement.  Valvular heart disease  Paroxysmal atrial fibrillation  Coagulopathy secondary to Eliquis  Lumbago  Hyponatremia secondary to Trileptal  Renal cysts  History of trigeminal neuralgia presently treated with Trileptal.  Hip pain currently undergoing preoperative cardiovascular risk assessment for right hip surgery.  Trigeminal neuralgia            CHF Guideline Directed Medical Therapy  Beta Blocker:   ARNI/ACE/ARB:   SGLT 2 inhibitors:   Diuretics:   Aldosterone Antagonist:   Vasodilators & Nitrates:  "    Vital Signs  Visit Vitals  /82 (BP Location: Left arm, Patient Position: Lying)   Pulse 75   Temp 97.7 °F (36.5 °C) (Oral)   Resp 20   Ht 185.4 cm (73\")   Wt 96.2 kg (212 lb 1.3 oz)   SpO2 96%   BMI 27.98 kg/m²     Oxygen Therapy  SpO2: 96 %  Pulse Oximetry Type: Intermittent  Device (Oxygen Therapy): room air  Flow (L/min) (Oxygen Therapy): 2  Flowsheet Rows      Flowsheet Row First Filed Value   Admission Height 185.4 cm (73\") Documented at 03/07/2025 0823   Admission Weight 97.9 kg (215 lb 12.8 oz) Documented at 03/07/2025 0823          Intake & Output (last 3 days)         03/04 0701  03/05 0700 03/05 0701  03/06 0700 03/06 0701 03/07 0700 03/07 0701 03/08 0700    Urine (mL/kg/hr)    600    Total Output    600    Net    -600                  Lines, Drains & Airways       Active LDAs       Name Placement date Placement time Site Days    Peripheral IV 03/07/25 0856 Left Antecubital 03/07/25  0856  Antecubital  less than 1                    MEDICAL HISTORY    Past Medical History:   Diagnosis Date    Arthritis     Arthritis     BPH (benign prostatic hyperplasia)     CHF (congestive heart failure)     Coronary artery disease     dr. Carrizales    Deviated septum     FH: cataracts     Gait abnormality     GERD (gastroesophageal reflux disease)     History of trigeminal neuralgia     Hyperlipidemia     Hypertension     Renal cyst     Sciatica     Sleep apnea     unable to tolerate machine        SURGICAL HISTORY    Past Surgical History:   Procedure Laterality Date    APPENDECTOMY      CARDIAC CATHETERIZATION      CARDIAC CATHETERIZATION N/A 3/5/2020    Procedure: Percutaneous Subclavian Intervention;  Surgeon: Nick Greenberg MD;  Location: [facility] CATH INVASIVE LOCATION;  Service: Cardiovascular;  Laterality: N/A;    CARDIAC ELECTROPHYSIOLOGY PROCEDURE N/A 1/29/2020    Procedure: Biventricular Device Upgrade;  Surgeon: Dominick Jackson MD;  Location: [facility] CATH INVASIVE LOCATION;  Service: " "Cardiovascular    CARDIAC ELECTROPHYSIOLOGY PROCEDURE N/A 3/5/2020    Procedure: Lead Revision;  Surgeon: Nick Greenberg MD;  Location: [facility] CATH INVASIVE LOCATION;  Service: Cardiovascular;  Laterality: N/A;    CORONARY STENT PLACEMENT  2014    x 2    CYSTOSCOPY, URETEROSCOPY, RETROGRADE PYELOGRAM, STENT INSERTION Left 11/3/2023    Procedure: CYSTOSCOPY URETEROSCOPY STONE EXTRACTION STENT INSERTION;  Surgeon: Chris Patel MD;  Location: [facility] MAIN OR;  Service: Urology;  Laterality: Left;    IMPLANTABLE CARDIOVERTER DEFIBRILLATOR LEAD REPLACEMENT/POCKET REVISION Right 10/19/2022    Procedure: RT. TRANSVENOUS ICD LEAD EXTRACTION;  Surgeon: Naveed Rios MD;  Location: [facility];  Service: Cardiology;  Laterality: Right;    PROSTATE SURGERY      REFRACTIVE SURGERY      THORACOTOMY Left 3/4/2020    Procedure: THORACOTOMY MINI WITH LEAD PLACEMENT;  Surgeon: Justin Aguilar MD;  Location: [facility];  Service: Cardiothoracic;  Laterality: Left;    TOTAL HIP ARTHROPLASTY Bilateral     different times        FAMILY HISTORY    Family History   Problem Relation Age of Onset    Cancer Mother     Heart disease Father     Arthritis Father     Diabetes Father     Hypertension Father     Hyperlipidemia Father     Kidney disease Father     Malig Hyperthermia Neg Hx        SOCIAL HISTORY    Social History     Tobacco Use    Smoking status: Former     Current packs/day: 0.00     Types: Cigarettes     Quit date:      Years since quittin.2     Passive exposure: Past    Smokeless tobacco: Never   Substance Use Topics    Alcohol use: Yes     Alcohol/week: 7.0 standard drinks of alcohol     Types: 7 Glasses of wine per week     Comment: RED WINE BEFORE DINNER        ALLERGIES    No Known Allergies           /82 (BP Location: Left arm, Patient Position: Lying)   Pulse 75   Temp 97.7 °F (36.5 °C) (Oral)   Resp 20   Ht 185.4 cm (73\")   Wt 96.2 kg (212 lb 1.3 oz)   SpO2 96%   BMI 27.98 kg/m² "   Intake/Output last 3 shifts:  I/O last 3 completed shifts:  In: 1414 [P.O.:1164; I.V.:250]  Out: 2400 [Urine:2400]  Intake/Output this shift:  No intake/output data recorded.    PHYSICAL EXAM:    General: Alert, cooperative, no distress, appears stated age  Head:  Normocephalic, atraumatic, mucous membranes moist  Eyes:  Conjunctivae/corneas clear, EOM's intact     Neck:  Supple,  no adenopathy; no JVD or bruit  Lungs: Clear to auscultation bilaterally, no wheezes, rhonchi or rales are noted  Chest wall: No tenderness  Heart::  Regular rate and rhythm, S1 and S2 normal, 1/6 murmur base  Abdomen: Soft, nontender, nondistended, bowel sounds active  Extremities: No cyanosis, clubbing, or edema   Pulses: 2+ and symmetric all extremities  Skin:  No rashes or lesions  Neuro/psych: A&O x3. CN II through XII are grossly intact with appropriate affect      Scheduled Meds:      acebutolol, 200 mg, Oral, Daily  [Held by provider] amitriptyline, 20 mg, Oral, Nightly  amLODIPine, 10 mg, Oral, Q24H  aspirin, 81 mg, Oral, Daily  atorvastatin, 20 mg, Oral, Daily  cetirizine, 10 mg, Oral, Daily  enalapril, 20 mg, Oral, Daily  furosemide, 20 mg, Oral, Daily  gabapentin, 300 mg, Oral, Daily  isosorbide mononitrate, 60 mg, Oral, Daily  OXcarbazepine, 300 mg, Oral, 4x Daily  pantoprazole, 40 mg, Oral, Q AM  sodium chloride, 10 mL, Intravenous, Q12H        Continuous Infusions:    heparin, 10.3 Units/kg/hr, Last Rate: 10.3 Units/kg/hr (03/10/25 0611)        PRN Meds:      acetaminophen **OR** acetaminophen **OR** acetaminophen    senna-docusate sodium **AND** polyethylene glycol **AND** bisacodyl **AND** bisacodyl    Calcium Replacement - Follow Nurse / BPA Driven Protocol    heparin    heparin    Magnesium Standard Dose Replacement - Follow Nurse / BPA Driven Protocol    nitroglycerin    ondansetron ODT    Phosphorus Replacement - Follow Nurse / BPA Driven Protocol    Potassium Replacement - Follow Nurse / BPA Driven Protocol     "[COMPLETED] Insert Peripheral IV **AND** sodium chloride    sodium chloride    sodium chloride        Results Review:     I reviewed the patient's new clinical results.    CBC    Results from last 7 days   Lab Units 03/10/25  0337 03/09/25  0406 03/07/25 2350 03/07/25  0855   WBC 10*3/mm3 5.05 7.91 7.36 10.28   HEMOGLOBIN g/dL 12.2* 12.1* 12.9* 13.8   PLATELETS 10*3/mm3 210 188 192 211     Cr Clearance Estimated Creatinine Clearance: 87.1 mL/min (by C-G formula based on SCr of 0.84 mg/dL).  Coag   Results from last 7 days   Lab Units 03/10/25  0337 03/09/25  2035 03/09/25  1400 03/07/25  0855   INR   --   --  1.11* 1.21*   APTT seconds 120.9* 41.9* 29.6 31.8     HbA1C   Lab Results   Component Value Date    HGBA1C 5.9 (H) 03/02/2020     Blood Glucose No results found for: \"POCGLU\"  Infection     CMP   Results from last 7 days   Lab Units 03/10/25  0337 03/09/25  2346 03/09/25 2035 03/09/25  1400 03/09/25  1337 03/09/25 0406 03/08/25  1334 03/07/25 2350 03/07/25  0855   SODIUM mmol/L 130* 127* 130* 127* 127* 124*  --  128* 127*   POTASSIUM mmol/L 3.8 3.8 4.2 4.5 4.6 4.0 4.5 3.4* 4.1   CHLORIDE mmol/L 94* 92* 92* 92* 92* 92*  --  92* 93*   CO2 mmol/L 24.7 24.2 23.1 24.6 22.7 21.8*  --  24.5 20.2*   BUN mg/dL 16 17 17 15 14 14  --  15 13   CREATININE mg/dL 0.84 0.85 0.94 0.84 0.85 0.76  --  0.91 0.85   GLUCOSE mg/dL 114* 116* 122* 106* 96 118*  --  133* 166*   ALBUMIN g/dL 3.7  --   --   --   --  3.4*  --   --  4.2   BILIRUBIN mg/dL 0.6  --   --   --   --  0.7  --   --  1.4*   ALK PHOS U/L 68  --   --   --   --  67  --   --  88   AST (SGOT) U/L 20  --   --   --   --  25  --   --  19   ALT (SGPT) U/L 25  --   --   --   --  24  --   --  19     ABG      UA    Results from last 7 days   Lab Units 03/07/25  0919   NITRITE UA  Negative   WBC UA /HPF 0-2   BACTERIA UA /HPF None Seen   SQUAM EPITHEL UA /HPF 0-2     SULY  No results found for: \"POCMETH\", \"POCAMPHET\", \"POCBARBITUR\", \"POCBENZO\", \"POCCOCAINE\", \"POCOPIATES\", " "\"POCOXYCODO\", \"POCPHENCYC\", \"POCPROPOXY\", \"POCTHC\", \"POCTRICYC\"  Lysis Labs   Results from last 7 days   Lab Units 03/10/25  0337 03/09/25  2346 03/09/25  2035 03/09/25  1400 03/09/25  1337 03/09/25  0406 03/07/25  2350 03/07/25  0855   INR   --   --   --  1.11*  --   --   --  1.21*   APTT seconds 120.9*  --  41.9* 29.6  --   --   --  31.8   HEMOGLOBIN g/dL 12.2*  --   --   --   --  12.1* 12.9* 13.8   PLATELETS 10*3/mm3 210  --   --   --   --  188 192 211   CREATININE mg/dL 0.84 0.85 0.94 0.84 0.85 0.76 0.91 0.85     Radiology(recent) No radiology results for the last day        Results from last 7 days   Lab Units 03/07/25  1101   HSTROP T ng/L 24*       X-rays, labs reviewed personally by physician.    ECG/EMG Results (most recent)       Procedure Component Value Units Date/Time    ECG 12 Lead Chest Pain [[id number]] Collected: 03/07/25 0831     Updated: 03/08/25 0849     QT Interval 521 ms      QTC Interval 562 ms     Narrative:      HEART RATE=70  bpm  RR Tyfwyjdn=605  ms  MS Interval=58  ms  P Horizontal Axis=127  deg  P Front Axis=Invalid  deg  QRSD Rljagpha=871  ms  QT Ghkjkpue=135  ms  UAjE=256  ms  QRS Axis=138  deg  T Wave Axis=-32  deg  - ABNORMAL ECG -  Sinus rhythm  Right bundle branch block  ST elevation secondary to IVCD  When compared with ECG of 20-Oct-2022 06:34:51,  Significant change in rhythm  Significant repolarization change  Electronically Signed By: Cortes Chauhan ([email or web address]) 2025-03-08 08:48:52  Date and Time of Study:2025-03-07 08:31:37    Adult Transthoracic Echo Complete W/ Cont if Necessary Per Protocol [[id number]] Resulted: 03/08/25 1655     Updated: 03/08/25 1655     LV GLOBAL STRAIN  -12.7 %      LVIDd 5.6 cm      LVIDs 4.3 cm      IVSd 1.36 cm      LVPWd 1.32 cm      FS 23.3 %      IVS/LVPW 1.03 cm      ESV(cubed) 78.0 ml      LV Sys Vol (BSA corrected) 37.2 cm2      EDV(cubed) 173.1 ml      LV Garcia Vol (BSA corrected) 74.1 cm2      LV mass(C)d 325.1 grams      LVOT area 3.6 cm2      LVOT " diam 2.13 cm      EDV(MOD-sp2) 227.0 ml      EDV(MOD-sp4) 164.0 ml      ESV(MOD-sp2) 105.0 ml      ESV(MOD-sp4) 82.4 ml      SV(MOD-sp2) 122.0 ml      SV(MOD-sp4) 81.6 ml      SVi(MOD-SP2) 55.1 ml/m2      SVi(MOD-SP4) 36.8 ml/m2      EF(MOD-sp2) 53.7 %      EF(MOD-sp4) 49.8 %      MV E max cosme 90.1 cm/sec      MV A max cosme 41.9 cm/sec      MV dec time 0.16 sec      MV E/A 2.15     MV A dur 0.14 sec      LA ESV Index (BP) 37.5 ml/m2      TR max cosme 282.3 cm/sec      TAPSE (>1.6) 1.63 cm      LA dimension (2D)  3.5 cm      LV V1 max 86.6 cm/sec      LV V1 max PG 3.0 mmHg      LV V1 mean PG Invalid mmHg      LV V1 VTI Invalid cm      Ao pk cosme 149.0 cm/sec      Ao max PG 8.9 mmHg      Ao mean PG 4.8 mmHg      Ao V2 VTI 28.4 cm      AI P1/2t 529.0 msec      MV max PG 3.8 mmHg      MV mean PG 1.77 mmHg      MV V2 VTI 28.9 cm      MV P1/2t 62.8 msec      MVA(P1/2t) 3.5 cm2      MV dec slope 469.6 cm/sec2      MR max cosme 437.8 cm/sec      MR max PG 76.7 mmHg      TR max PG 31.9 mmHg      RVSP(TR) 39.9 mmHg      RAP systole 8.0 mmHg      RV V1 max PG 1.53 mmHg      RV V1 max 61.8 cm/sec      RV V1 VTI 10.8 cm      PA V2 max 73.9 cm/sec      PA acc time 0.13 sec      Sinus 3.5 cm      STJ 2.6 cm      Dimensionless Index 0.58 (DI)     Narrative:        Left ventricular systolic function is normal. Left ventricular ejection   fraction appears to be 41 - 45%.    The left ventricular cavity is mildly dilated.    Left ventricular wall thickness is consistent with mild to moderate   concentric hypertrophy.    The following left ventricular wall segments are hypokinetic: mid   anterior, mid anterolateral, apical lateral, mid inferolateral and apex   hypokinetic. The following left ventricular wall segments are akinetic:   basal anterolateral.    Left ventricular diastolic function is consistent with (grade II w/high   LAP) pseudonormalization.    The left atrial cavity is mildly dilated.    There is mild calcification of the aortic  valve mainly affecting the   left coronary and right coronary cusp(s).    Estimated right ventricular systolic pressure from tricuspid   regurgitation is mildly elevated (35-45 mmHg).    Mild pulmonary hypertension is present.                Medication Review:   I have reviewed the patient's current medication list  Scheduled Meds:acebutolol, 200 mg, Oral, Daily  [Held by provider] amitriptyline, 20 mg, Oral, Nightly  amLODIPine, 10 mg, Oral, Q24H  aspirin, 81 mg, Oral, Daily  atorvastatin, 20 mg, Oral, Daily  cetirizine, 10 mg, Oral, Daily  enalapril, 20 mg, Oral, Daily  furosemide, 20 mg, Oral, Daily  gabapentin, 300 mg, Oral, Daily  isosorbide mononitrate, 60 mg, Oral, Daily  OXcarbazepine, 300 mg, Oral, 4x Daily  pantoprazole, 40 mg, Oral, Q AM  sodium chloride, 10 mL, Intravenous, Q12H      Continuous Infusions:heparin, 10.3 Units/kg/hr, Last Rate: 10.3 Units/kg/hr (03/10/25 0611)      PRN Meds:.  acetaminophen **OR** acetaminophen **OR** acetaminophen    senna-docusate sodium **AND** polyethylene glycol **AND** bisacodyl **AND** bisacodyl    Calcium Replacement - Follow Nurse / BPA Driven Protocol    heparin    heparin    Magnesium Standard Dose Replacement - Follow Nurse / BPA Driven Protocol    nitroglycerin    ondansetron ODT    Phosphorus Replacement - Follow Nurse / BPA Driven Protocol    Potassium Replacement - Follow Nurse / BPA Driven Protocol    [COMPLETED] Insert Peripheral IV **AND** sodium chloride    sodium chloride    sodium chloride    Imaging:  Imaging Results (Last 72 Hours)       Procedure Component Value Units Date/Time    XR Chest 1 View [[id number]] Collected: 03/07/25 0910     Updated: 03/07/25 0914    Narrative:      XR CHEST 1 VW    Date of Exam: 3/7/2025 9:07 AM EST    Indication: cp soa    Comparison: CT chest 11/5/2024, AP chest 10/19/2022    Findings:  No acute airspace disease. Heart size is within normal limits. Pulmonary vascular distribution is normal. Asymmetric/defibrillator  and leads appear similarly positioned. No visible pneumothorax. No acute osseous abnormalities are identified.      Impression:      Impression:  No acute chest findings.      Electronically Signed: Omayra Quinn MD    3/7/2025 9:12 AM EST    Workstation ID: [id number]              Radha Pemberton MD  03/10/25  07:46 EDT

## 2025-03-10 NOTE — PROGRESS NOTES
[facility] MEDICINE SERVICE  DAILY PROGRESS NOTE    NAME: Mode Duffy  : 1945  MRN: [id number]      LOS: 2 days     PROVIDER OF SERVICE: Edith Lott MD    Chief Complaint: Chest pain    Subjective:     Interval History:    Patient seen and evaluated at bedside. No complaints this morning. Plans for [facility] today.     Treatment plan discussed with patient. All questions addressed.     Review of Systems:   Denies fevers, chills  Denies chest pain, edema  Denies shortness of breath, cough  Denies nausea, vomiting, diarrhea  Denies dysuria, hematuria    Objective:     Vital Signs  Temp:  [97.7 °F (36.5 °C)-98.3 °F (36.8 °C)] 97.9 °F (36.6 °C)  Heart Rate:  [70-75] 70  Resp:  [13-28] 18  BP: (114-165)/(65-86) 126/79   Body mass index is 27.98 kg/m².    Physical Exam   General: No acute distress, alert and oriented  CV: RRR, no peripheral edema  Pulm: CTAB, no increased work of breathing  Abd: Soft, nontender, nondistended  Skin: No rashes or lesions on exposed skin  Psych: Appropriate mood and affect    Scheduled Meds   acebutolol, 200 mg, Oral, Daily  [Held by provider] amitriptyline, 20 mg, Oral, Nightly  amLODIPine, 10 mg, Oral, Q24H  [Transfer Hold] aspirin, 81 mg, Oral, Daily  [Transfer Hold] atorvastatin, 20 mg, Oral, Daily  [Transfer Hold] cetirizine, 10 mg, Oral, Daily  enalapril, 20 mg, Oral, Daily  gabapentin, 300 mg, Oral, Daily  [Transfer Hold] isosorbide mononitrate, 60 mg, Oral, Daily  OXcarbazepine, 300 mg, Oral, 4x Daily  [Transfer Hold] pantoprazole, 40 mg, Oral, Q AM  [Transfer Hold] sodium chloride, 10 mL, Intravenous, Q12H  Urea, 15 g, Oral, BID  [Transfer Hold] vitamin D3, 5,000 Units, Oral, Daily       PRN Meds     [Transfer Hold] acetaminophen **OR** [Transfer Hold] acetaminophen **OR** [Transfer Hold] acetaminophen    [Transfer Hold] senna-docusate sodium **AND** [Transfer Hold] polyethylene glycol **AND** [Transfer Hold] bisacodyl **AND** [Transfer Hold] bisacodyl     [Transfer Hold] calcium carbonate    [Transfer Hold] Calcium Replacement - Follow Nurse / BPA Driven Protocol    [Transfer Hold] Magnesium Standard Dose Replacement - Follow Nurse / BPA Driven Protocol    [Transfer Hold] nitroglycerin    [Transfer Hold] ondansetron ODT    [Transfer Hold] Phosphorus Replacement - Follow Nurse / BPA Driven Protocol    Potassium Replacement - Follow Nurse / BPA Driven Protocol    [COMPLETED] Insert Peripheral IV **AND** [Transfer Hold] sodium chloride    [Transfer Hold] sodium chloride    [Transfer Hold] sodium chloride   Infusions         Diagnostic Data    Results from last 7 days   Lab Units 03/10/25  0922 03/10/25  0337   WBC 10*3/mm3  --  5.05   HEMOGLOBIN g/dL  --  12.2*   HEMATOCRIT %  --  35.9*   PLATELETS 10*3/mm3  --  210   GLUCOSE mg/dL 110* 114*   CREATININE mg/dL 0.59* 0.84   BUN mg/dL 11 16   SODIUM mmol/L 128* 130*   POTASSIUM mmol/L 3.0* 3.8   AST (SGOT) U/L  --  20   ALT (SGPT) U/L  --  25   ALK PHOS U/L  --  68   BILIRUBIN mg/dL  --  0.6   ANION GAP mmol/L 10.6 11.3       No radiology results for the last day    Interval results reviewed.    Assessment/Plan:     Chest pain  Coronary artery disease  - Cardiology consulted, appreciate recs  - On heparin drip with appropriate monitoring and labs  - [facility] today    Acute on chronic hyponatremia  - Nephrology consulted, appreciate recs  - Close monitoring with q4h BMP  - Fluid restriction, urea and diuretics per nephrology    Chronic diastolic heart failure  - Monitor fluid status    Sick sinus syndrome  - s/p PPM    Hyperlipidemia  - Continue statin    GERD  - Continue PPI    Trigeminal neuralgia  - Continue home meds    Treatment plan discussed with nursing staff, case management and pharmacy.     VTE Prophylaxis:  No VTE prophylaxis order currently exists.    Heparin stopped prior to cath    Code status is   Code Status and Medical Interventions: CPR (Attempt to Resuscitate); Full Support   Ordered at: 03/07/25 2398      Code Status (Patient has no pulse and is not breathing):    CPR (Attempt to Resuscitate)     Medical Interventions (Patient has pulse or is breathing):    Full Support       Plan for disposition: Pending clinical course    Barriers to discharge: [facility]    Time: 35+ minutes     Signature: Electronically signed by Edith Lott MD, 03/10/25, 16:11 EDT.  [facility]

## 2025-03-10 NOTE — PLAN OF CARE
Goal Outcome Evaluation:  Plan of Care Reviewed With: patient   Left heart catheterization noted collaterals, medical management to be provided.     Progress: improving

## 2025-03-10 NOTE — PLAN OF CARE
Problem: Adult Inpatient Plan of Care  Goal: Absence of Hospital-Acquired Illness or Injury  Outcome: Progressing  Intervention: Identify and Manage Fall Risk  Recent Flowsheet Documentation  Taken 3/10/2025 0200 by Rupali Prakash RN  Safety Promotion/Fall Prevention: safety round/check completed  Taken 3/10/2025 0000 by Rupali Prakash RN  Safety Promotion/Fall Prevention: safety round/check completed  Taken 3/9/2025 2200 by Rupali Prakash RN  Safety Promotion/Fall Prevention: safety round/check completed  Taken 3/9/2025 2030 by Rupali Prakash RN  Safety Promotion/Fall Prevention: safety round/check completed  Intervention: Prevent Skin Injury  Recent Flowsheet Documentation  Taken 3/9/2025 2030 by Rupali Prakash RN  Body Position: position changed independently  Goal: Optimal Comfort and Wellbeing  Outcome: Progressing  Goal: Readiness for Transition of Care  Outcome: Progressing     Problem: Comorbidity Management  Goal: Blood Pressure in Desired Range  Outcome: Progressing     Problem: Fall Injury Risk  Goal: Absence of Fall and Fall-Related Injury  Outcome: Progressing  Intervention: Promote Injury-Free Environment  Recent Flowsheet Documentation  Taken 3/10/2025 0200 by Rupali Prakash RN  Safety Promotion/Fall Prevention: safety round/check completed  Taken 3/10/2025 0000 by Rupali Prakash RN  Safety Promotion/Fall Prevention: safety round/check completed  Taken 3/9/2025 2200 by Rupali Prakash RN  Safety Promotion/Fall Prevention: safety round/check completed  Taken 3/9/2025 2030 by Rupali Prakash RN  Safety Promotion/Fall Prevention: safety round/check completed   Goal Outcome Evaluation:         Patient scheduled for cath on 3/10/25. Denies chest pain. Denies problems or concerns.

## 2025-03-11 ENCOUNTER — TELEPHONE (OUTPATIENT)
Dept: NEUROLOGY | Facility: CLINIC | Age: 80
End: 2025-03-11
Payer: MEDICARE

## 2025-03-11 LAB
ACT BLD: 135 SECONDS (ref 89–137)
ANION GAP SERPL CALCULATED.3IONS-SCNC: 12.6 MMOL/L (ref 5–15)
ANION GAP SERPL CALCULATED.3IONS-SCNC: 13.8 MMOL/L (ref 5–15)
BUN SERPL-MCNC: 24 MG/DL (ref 8–23)
BUN SERPL-MCNC: 30 MG/DL (ref 8–23)
BUN/CREAT SERPL: 30.8 (ref 7–25)
BUN/CREAT SERPL: 33 (ref 7–25)
CA-I SERPL ISE-MCNC: 1.11 MMOL/L (ref 1.15–1.3)
CALCIUM SPEC-SCNC: 8.2 MG/DL (ref 8.6–10.5)
CALCIUM SPEC-SCNC: 8.7 MG/DL (ref 8.6–10.5)
CHLORIDE SERPL-SCNC: 88 MMOL/L (ref 98–107)
CHLORIDE SERPL-SCNC: 98 MMOL/L (ref 98–107)
CO2 SERPL-SCNC: 19.2 MMOL/L (ref 22–29)
CO2 SERPL-SCNC: 20.4 MMOL/L (ref 22–29)
CREAT SERPL-MCNC: 0.78 MG/DL (ref 0.76–1.27)
CREAT SERPL-MCNC: 0.91 MG/DL (ref 0.76–1.27)
DEPRECATED RDW RBC AUTO: 40.9 FL (ref 37–54)
EGFRCR SERPLBLD CKD-EPI 2021: 85.7 ML/MIN/1.73
EGFRCR SERPLBLD CKD-EPI 2021: 90.7 ML/MIN/1.73
ERYTHROCYTE [DISTWIDTH] IN BLOOD BY AUTOMATED COUNT: 11.8 % (ref 12.3–15.4)
GLUCOSE SERPL-MCNC: 109 MG/DL (ref 65–99)
GLUCOSE SERPL-MCNC: 115 MG/DL (ref 65–99)
HCT VFR BLD AUTO: 37.1 % (ref 37.5–51)
HGB BLD-MCNC: 12.8 G/DL (ref 13–17.7)
MCH RBC QN AUTO: 32.1 PG (ref 26.6–33)
MCHC RBC AUTO-ENTMCNC: 34.5 G/DL (ref 31.5–35.7)
MCV RBC AUTO: 93 FL (ref 79–97)
PLATELET # BLD AUTO: 210 10*3/MM3 (ref 140–450)
PMV BLD AUTO: 8.5 FL (ref 6–12)
POTASSIUM SERPL-SCNC: 4.1 MMOL/L (ref 3.5–5.2)
POTASSIUM SERPL-SCNC: 4.3 MMOL/L (ref 3.5–5.2)
RBC # BLD AUTO: 3.99 10*6/MM3 (ref 4.14–5.8)
SODIUM SERPL-SCNC: 121 MMOL/L (ref 136–145)
SODIUM SERPL-SCNC: 131 MMOL/L (ref 136–145)
WBC NRBC COR # BLD AUTO: 5.83 10*3/MM3 (ref 3.4–10.8)

## 2025-03-11 PROCEDURE — 25010000002 FUROSEMIDE PER 20 MG: Performed by: INTERNAL MEDICINE

## 2025-03-11 PROCEDURE — 80048 BASIC METABOLIC PNL TOTAL CA: CPT | Performed by: INTERNAL MEDICINE

## 2025-03-11 PROCEDURE — 99233 SBSQ HOSP IP/OBS HIGH 50: CPT | Performed by: INTERNAL MEDICINE

## 2025-03-11 PROCEDURE — 82330 ASSAY OF CALCIUM: CPT | Performed by: INTERNAL MEDICINE

## 2025-03-11 PROCEDURE — 85027 COMPLETE CBC AUTOMATED: CPT | Performed by: INTERNAL MEDICINE

## 2025-03-11 RX ORDER — FUROSEMIDE 10 MG/ML
40 INJECTION INTRAMUSCULAR; INTRAVENOUS ONCE
Status: COMPLETED | OUTPATIENT
Start: 2025-03-11 | End: 2025-03-11

## 2025-03-11 RX ADMIN — ENALAPRIL MALEATE 20 MG: 5 TABLET ORAL at 21:16

## 2025-03-11 RX ADMIN — ATORVASTATIN CALCIUM 20 MG: 20 TABLET, FILM COATED ORAL at 21:16

## 2025-03-11 RX ADMIN — CETIRIZINE HYDROCHLORIDE 10 MG: 10 TABLET, FILM COATED ORAL at 10:08

## 2025-03-11 RX ADMIN — ACEBUTOLOL HYDROCHLORIDE 200 MG: 200 CAPSULE ORAL at 13:27

## 2025-03-11 RX ADMIN — ASPIRIN 81 MG: 81 TABLET, COATED ORAL at 10:08

## 2025-03-11 RX ADMIN — PANTOPRAZOLE SODIUM 40 MG: 40 TABLET, DELAYED RELEASE ORAL at 05:05

## 2025-03-11 RX ADMIN — Medication 10 ML: at 10:09

## 2025-03-11 RX ADMIN — OXCARBAZEPINE 300 MG: 150 TABLET, FILM COATED ORAL at 15:39

## 2025-03-11 RX ADMIN — Medication 10 ML: at 21:17

## 2025-03-11 RX ADMIN — ISOSORBIDE MONONITRATE 60 MG: 60 TABLET, EXTENDED RELEASE ORAL at 10:08

## 2025-03-11 RX ADMIN — OXCARBAZEPINE 300 MG: 150 TABLET, FILM COATED ORAL at 21:17

## 2025-03-11 RX ADMIN — GABAPENTIN 300 MG: 300 CAPSULE ORAL at 21:16

## 2025-03-11 RX ADMIN — OXCARBAZEPINE 300 MG: 150 TABLET, FILM COATED ORAL at 05:05

## 2025-03-11 RX ADMIN — FUROSEMIDE 40 MG: 10 INJECTION, SOLUTION INTRAMUSCULAR; INTRAVENOUS at 10:17

## 2025-03-11 RX ADMIN — Medication 15 G: at 10:09

## 2025-03-11 RX ADMIN — OXCARBAZEPINE 300 MG: 150 TABLET, FILM COATED ORAL at 10:08

## 2025-03-11 RX ADMIN — Medication 5000 UNITS: at 10:08

## 2025-03-11 RX ADMIN — Medication 15 G: at 21:17

## 2025-03-11 NOTE — PLAN OF CARE
Goal Outcome Evaluation:              Outcome Evaluation: Patient admitted for elevated troponin and chest pain. Patient is A&O x4, 2L O2 while asleep. Patient denies any chest pain, SOB at this time. Patient slept in and out throughout the night with no new complaint. Possible d/c on 3/11.

## 2025-03-11 NOTE — TELEPHONE ENCOUNTER
PATIENT CURRENTLY ADMITTED AT [facility].    HE IS CALLING BECAUSE HE SAYS DOCTORS THERE WILL DO NOTHING ABOUT HIS GABAPENTIN.  HE SAYS THEY DON'T KNOW ANYTHING ABOUT IT.    HE WANTS TO SPEAK WITH PROVIDER    HE IS IN ROOM 367-1 [phone]

## 2025-03-11 NOTE — PROGRESS NOTES
[facility] MEDICINE SERVICE  DAILY PROGRESS NOTE    NAME: Mode Duffy  : 1945  MRN: [id number]      LOS: 3 days     PROVIDER OF SERVICE: Edith Lott MD    Chief Complaint: Chest pain    Subjective:     Interval History:    Patient seen and evaluated at bedside. No complaints this morning. Left heart cath yesterday without complication. Noted to have drop in sodium this morning to 121. Nephrology following.     Treatment plan discussed with patient. All questions addressed.     Review of Systems:   Denies fevers, chills  Denies chest pain, edema  Denies shortness of breath, cough  Denies nausea, vomiting, diarrhea  Denies dysuria, hematuria    Objective:     Vital Signs  Temp:  [97.2 °F (36.2 °C)-97.9 °F (36.6 °C)] 97.2 °F (36.2 °C)  Heart Rate:  [70-75] 70  Resp:  [11-22] 20  BP: (109-149)/(54-82) 117/65  Flow (L/min) (Oxygen Therapy):  [2] 2   Body mass index is 28.01 kg/m².    Physical Exam   General: No acute distress, alert and oriented  CV: RRR, no peripheral edema  Pulm: CTAB, no increased work of breathing  Abd: Soft, nontender, nondistended  Skin: No rashes or lesions on exposed skin  Psych: Appropriate mood and affect    Scheduled Meds   acebutolol, 200 mg, Oral, Daily  [Held by provider] amitriptyline, 20 mg, Oral, Nightly  apixaban, 5 mg, Oral, Q12H  aspirin, 81 mg, Oral, Daily  atorvastatin, 20 mg, Oral, Daily  cetirizine, 10 mg, Oral, Daily  enalapril, 20 mg, Oral, Daily  gabapentin, 300 mg, Oral, Daily  isosorbide mononitrate, 60 mg, Oral, Daily  OXcarbazepine, 300 mg, Oral, 4x Daily  pantoprazole, 40 mg, Oral, Q AM  sodium chloride, 10 mL, Intravenous, Q12H  Urea, 15 g, Oral, BID  vitamin D3, 5,000 Units, Oral, Daily       PRN Meds     [DISCONTINUED] acetaminophen **OR** acetaminophen **OR** acetaminophen    acetaminophen    atropine    senna-docusate sodium **AND** polyethylene glycol **AND** bisacodyl **AND** bisacodyl    calcium carbonate    Calcium Replacement - Follow  Nurse / BPA Driven Protocol    Magnesium Standard Dose Replacement - Follow Nurse / BPA Driven Protocol    nitroglycerin    ondansetron ODT **OR** ondansetron    Phosphorus Replacement - Follow Nurse / BPA Driven Protocol    Potassium Replacement - Follow Nurse / BPA Driven Protocol    [COMPLETED] Insert Peripheral IV **AND** sodium chloride    sodium chloride    sodium chloride   Infusions         Diagnostic Data    Results from last 7 days   Lab Units 03/11/25  0142 03/10/25  0922 03/10/25  0337   WBC 10*3/mm3 5.83  --  5.05   HEMOGLOBIN g/dL 12.8*  --  12.2*   HEMATOCRIT % 37.1*  --  35.9*   PLATELETS 10*3/mm3 210  --  210   GLUCOSE mg/dL 115*   < > 114*   CREATININE mg/dL 0.78   < > 0.84   BUN mg/dL 24*   < > 16   SODIUM mmol/L 121*   < > 130*   POTASSIUM mmol/L 4.3   < > 3.8   AST (SGOT) U/L  --   --  20   ALT (SGPT) U/L  --   --  25   ALK PHOS U/L  --   --  68   BILIRUBIN mg/dL  --   --  0.6   ANION GAP mmol/L 12.6   < > 11.3    < > = values in this interval not displayed.       No radiology results for the last day    Interval results reviewed.    Assessment/Plan:     Chest pain  Coronary artery disease  - Cardiology consulted, appreciate recs  - [facility] 3/10, recommendation for continued medical management and outpatient follow up    Acute on chronic hyponatremia  - Nephrology consulted, appreciate recs  - Close monitoring with q4h BMP  - Fluid restriction, urea and diuretics per nephrology    Chronic diastolic heart failure  - Monitor fluid status  - Home meds as appropriate    Paroxysmal atrial fibrillation  Chronic anticoagulation  - Resume eliquis    Sick sinus syndrome  - s/p PPM    Hyperlipidemia  - Continue statin    GERD  - Continue PPI    Trigeminal neuralgia  - Continue home meds    Treatment plan discussed with nursing staff, case management and pharmacy.     VTE Prophylaxis:  Pharmacologic VTE prophylaxis orders are present.    Code status is   Code Status and Medical Interventions: CPR (Attempt to  Resuscitate); Full Support   Ordered at: 03/07/25 1329     Code Status (Patient has no pulse and is not breathing):    CPR (Attempt to Resuscitate)     Medical Interventions (Patient has pulse or is breathing):    Full Support       Plan for disposition: Home 3/12/25    Barriers to discharge: Pending improvement in sodium    Time: 35+ minutes     Signature: Electronically signed by Edith Lott MD, 03/11/25, 14:19 EDT.  [facility] Hospitalist Team

## 2025-03-11 NOTE — PROGRESS NOTES
Cardiology Consult Note      REQUESTING PHYSICIAN    Edith Lott MD    PATIENT IDENTIFICATION  Name: Mode Duffy  Age: 79 y.o.  Sex: male  :  1945  MRN: [id number]                 Cardiology assessment and plan    Coronary artery disease  Abnormal stress test  Normal troponin  Abnormal elevated proBNP  Hyponatremia  Bundle branch block  Prior cardiomyopathy  Sick sinus syndrome status post biventricular pacemaker placement  Myocardial perfusion study with fixed basal inferior wall and mid inferior wall perfusion defect with no significant reversible ischemia  LV ejection fraction is low on myocardial perfusion study  Echocardiogram-the LV systolic function  Acute heart failure with preserved LV systolic function secondary to valvular heart disease  Coronary artery disease status post PCI and stenting most recently in 2014.        This was cutting Balloon angioplasty of a circumflex branch and a diagonal branch.   Dyslipidemia.  Hypertension with hypertensive cardiovascular disease with a white coat component  Cardiomyopathy with recovered LV systolic function (EF of 35 to 40% in 2019).  EF 56% with mild concentric LVH, grade 1 DD, mild to moderate LAE  Sick sinus syndrome status post biventricular permanent pacemaker placement.  Valvular heart disease  Paroxysmal atrial fibrillation  Coagulopathy secondary to Eliquis  Lumbago  Hyponatremia secondary to Trileptal  Renal cysts  History of trigeminal neuralgia presently treated with Trileptal.  Current medications include amlodipine 10 mg p.o. once a day patient is on Lipitor 20 mg p.o. once a day patient is on Vasotec 20 mg p.o. once a day isosorbide 60 mg p.o. once a day patient is on anticoagulation therapy with Eliquis 5 mg p.o. twice daily  Hyponatremia  Worsening hyponatremia  Tmax is 98.2 pulse is 76 respirations are 20 blood pressure is 124/73  Sodium is 121 and repeat sodium is 131 creatinine 0.9 hemoglobin is  12.8  Twelve-lead EKG shows sinus rhythm and underlying right bundle branch block  Newly diagnosed cardiomyopathy and LV dysfunction  Dilated LV  Severe single-vessel coronary artery disease involving the mid circumflex artery with 100% occlusion with left to left collaterals  Moderate to severe LV dysfunction with estimate LV ejection fraction of 35 to 40%  Mild elevated left-sided filling pressures  No anginal symptoms  Added Aldactone to help with cardiomyopathy  Consider adding SGLT2 inhibitor if there is no contraindication after discussing with nephrology  Current medications include aspirin 81 mg p.o. once a day Lipitor 20 mg p.o. once a day and Lipitor 20 mg p.o. once a day isosorbide 60 mg p.o. once a day  Okay to resume anticoagulation therapy with Eliquis tomorrow as long as there is no groin bleeding  Diagnosis and treatment options and risk benefits and alternatives reviewed and discussed patient                  REASON FOR CONSULTATION:  79 y.o. male known to Dr. Carrizales with a history of ischemic heart disease. He underwent cardiac catheterization with resultant PCI of an obtuse marginal branch and diagonal branch in November 2014. He has additional history of hypertension with hypertensive cardiovascular disease, dyslipidemia, paroxysmal atrial fibrillation, antiplatelet therapy as well as sick sinus syndrome with complete heart block status post biventricular permanent pacemaker placement.  Patient has left-sided implant in 99 and had significant issues with RV pacing alone with heart failure symptoms and LV lead was placed epicardial lead because of left subclavian occlusion. After LV lead epicardial placement, patient was found to have RV lead malfunction needing a new RV lead placed on the right side which was removed secondary to pocket infection on the right side.     CC:  Chest pain    HISTORY OF PRESENT ILLNESS:   Patient presented to the emergency department at [facility] 3/7/2025  "with complaint of \"a little something in my chest\", nonradiating.  \"A little heavy breathing\".  Symptom onset Thursday.  He describes the chest discomfort as 2/10.  He reports a recent nonproductive cough.  Denies any lower extremity edema, GI complaints, abdominal distention.  Workup was initiated in the emergency department.  He was given aspirin as well as 40 mg IV Lasix x 1.  Cardiology was consulted for further evaluation and recommendations.  Upon my evaluation, he is sitting up in bed working a crossword puzzle.  He denies any chest discomfort at present.  Monitor shows sinus rhythm and blood pressure is quite stable.  Patient does endorse a diet that is probably too high in sodium.      REVIEW OF SYSTEMS:  Pertinent items are noted in HPI, all other systems reviewed and negative    OBJECTIVE   High-sensitivity troponin 25, 24  proBNP 3990  Sodium 127  Chest x-ray with no acute findings  EKG was sinus rhythm, right bundle branch block-unchanged from 3/12/2024    ASSESSMENT  Chest discomfort  Shortness of breath  Cough  Acute heart failure with preserved LV systolic function secondary to valvular heart disease  Coronary artery disease status post PCI and stenting most recently in November of 2014.        This was cutting Balloon angioplasty of a circumflex branch and a diagonal branch.   Dyslipidemia.  Hypertension with hypertensive cardiovascular disease with a white coat component  Cardiomyopathy with recovered LV systolic function (EF of 35 to 40% in October 2019).  EF 56% with mild concentric LVH, grade 1 DD, mild to moderate LAE  Sick sinus syndrome status post biventricular permanent pacemaker placement.  Valvular heart disease  Paroxysmal atrial fibrillation  Coagulopathy secondary to Eliquis  Lumbago  Hyponatremia secondary to Trileptal  Renal cysts  History of trigeminal neuralgia presently treated with Trileptal.  Hip pain currently undergoing preoperative cardiovascular risk assessment for right hip " "surgery.  Trigeminal neuralgia            CHF Guideline Directed Medical Therapy  Beta Blocker:   ARNI/ACE/ARB:   SGLT 2 inhibitors:   Diuretics:   Aldosterone Antagonist:   Vasodilators & Nitrates:     Vital Signs  Visit Vitals  /54 (BP Location: Right arm, Patient Position: Lying)   Pulse 70   Temp 97.4 °F (36.3 °C) (Axillary)   Resp 13   Ht 185.4 cm (73\")   Wt 96.3 kg (212 lb 4.9 oz)   SpO2 90%   BMI 28.01 kg/m²     Oxygen Therapy  SpO2: 90 %  Pulse Oximetry Type: Intermittent  Device (Oxygen Therapy): nasal cannula  Flow (L/min) (Oxygen Therapy): 2  Flowsheet Rows      Flowsheet Row First Filed Value   Admission Height 185.4 cm (73\") Documented at 03/07/2025 0823   Admission Weight 97.9 kg (215 lb 12.8 oz) Documented at 03/07/2025 0823          Intake & Output (last 3 days)         03/04 0701  03/05 0700 03/05 0701  03/06 0700 03/06 0701  03/07 0700 03/07 0701  03/08 0700    Urine (mL/kg/hr)    600    Total Output    600    Net    -600                  Lines, Drains & Airways       Active LDAs       Name Placement date Placement time Site Days    Peripheral IV 03/07/25 0856 Left Antecubital 03/07/25  0856  Antecubital  less than 1                    MEDICAL HISTORY    Past Medical History:   Diagnosis Date    Arthritis     Arthritis     BPH (benign prostatic hyperplasia)     CHF (congestive heart failure)     Coronary artery disease     dr. Carrizales    Deviated septum     FH: cataracts     Gait abnormality     GERD (gastroesophageal reflux disease)     History of trigeminal neuralgia     Hyperlipidemia     Hypertension     Renal cyst     Sciatica     Sleep apnea     unable to tolerate machine        SURGICAL HISTORY    Past Surgical History:   Procedure Laterality Date    APPENDECTOMY      CARDIAC CATHETERIZATION      CARDIAC CATHETERIZATION N/A 3/5/2020    Procedure: Percutaneous Subclavian Intervention;  Surgeon: Nick Greenberg MD;  Location: [facility] CATH INVASIVE LOCATION;  Service: " Cardiovascular;  Laterality: N/A;    CARDIAC CATHETERIZATION Right 3/10/2025    Procedure: LEFT HEART CATH with possible PCI;  Surgeon: Radha Pemberton MD;  Location: [facility] CATH INVASIVE LOCATION;  Service: Cardiovascular;  Laterality: Right;  Local and IV sedation    CARDIAC ELECTROPHYSIOLOGY PROCEDURE N/A 2020    Procedure: Biventricular Device Upgrade;  Surgeon: Dominick Jackson MD;  Location: [facility] CATH INVASIVE LOCATION;  Service: Cardiovascular    CARDIAC ELECTROPHYSIOLOGY PROCEDURE N/A 3/5/2020    Procedure: Lead Revision;  Surgeon: Nick Greenberg MD;  Location: [facility] CATH INVASIVE LOCATION;  Service: Cardiovascular;  Laterality: N/A;    CORONARY STENT PLACEMENT  2014    x 2    CYSTOSCOPY, URETEROSCOPY, RETROGRADE PYELOGRAM, STENT INSERTION Left 11/3/2023    Procedure: CYSTOSCOPY URETEROSCOPY STONE EXTRACTION STENT INSERTION;  Surgeon: Chris Patel MD;  Location: [facility] MAIN OR;  Service: Urology;  Laterality: Left;    IMPLANTABLE CARDIOVERTER DEFIBRILLATOR LEAD REPLACEMENT/POCKET REVISION Right 10/19/2022    Procedure: RT. TRANSVENOUS ICD LEAD EXTRACTION;  Surgeon: Naveed Rios MD;  Location: [facility];  Service: Cardiology;  Laterality: Right;    PROSTATE SURGERY      REFRACTIVE SURGERY      THORACOTOMY Left 3/4/2020    Procedure: THORACOTOMY MINI WITH LEAD PLACEMENT;  Surgeon: Justin Aguilar MD;  Location: [facility];  Service: Cardiothoracic;  Laterality: Left;    TOTAL HIP ARTHROPLASTY Bilateral     different times        FAMILY HISTORY    Family History   Problem Relation Age of Onset    Cancer Mother     Heart disease Father     Arthritis Father     Diabetes Father     Hypertension Father     Hyperlipidemia Father     Kidney disease Father     Malig Hyperthermia Neg Hx        SOCIAL HISTORY    Social History     Tobacco Use    Smoking status: Former     Current packs/day: 0.00     Types: Cigarettes     Quit date:      Years since quittin.2      "Passive exposure: Past    Smokeless tobacco: Never   Substance Use Topics    Alcohol use: Yes     Alcohol/week: 7.0 standard drinks of alcohol     Types: 7 Glasses of wine per week     Comment: RED WINE BEFORE DINNER        ALLERGIES    No Known Allergies           /54 (BP Location: Right arm, Patient Position: Lying)   Pulse 70   Temp 97.4 °F (36.3 °C) (Axillary)   Resp 13   Ht 185.4 cm (73\")   Wt 96.3 kg (212 lb 4.9 oz)   SpO2 90%   BMI 28.01 kg/m²   Intake/Output last 3 shifts:  I/O last 3 completed shifts:  In: 1010 [P.O.:760; I.V.:250]  Out: 2590 [Urine:2590]  Intake/Output this shift:  No intake/output data recorded.    PHYSICAL EXAM:    General: Alert, cooperative, no distress, appears stated age  Head:  Normocephalic, atraumatic, mucous membranes moist  Eyes:  Conjunctivae/corneas clear, EOM's intact     Neck:  Supple,  no adenopathy; no JVD or bruit  Lungs: Clear to auscultation bilaterally, no wheezes, rhonchi or rales are noted  Chest wall: No tenderness  Heart::  Regular rate and rhythm, S1 and S2 normal, 1/6 murmur base  Abdomen: Soft, nontender, nondistended, bowel sounds active  Extremities: No cyanosis, clubbing, or edema   Pulses: 2+ and symmetric all extremities  Skin:  No rashes or lesions  Neuro/psych: A&O x3. CN II through XII are grossly intact with appropriate affect      Scheduled Meds:      acebutolol, 200 mg, Oral, Daily  [Held by provider] amitriptyline, 20 mg, Oral, Nightly  aspirin, 81 mg, Oral, Daily  atorvastatin, 20 mg, Oral, Daily  cetirizine, 10 mg, Oral, Daily  enalapril, 20 mg, Oral, Daily  gabapentin, 300 mg, Oral, Daily  isosorbide mononitrate, 60 mg, Oral, Daily  OXcarbazepine, 300 mg, Oral, 4x Daily  pantoprazole, 40 mg, Oral, Q AM  sodium chloride, 10 mL, Intravenous, Q12H  spironolactone, 12.5 mg, Oral, Daily  Urea, 15 g, Oral, BID  vitamin D3, 5,000 Units, Oral, Daily        Continuous Infusions:           PRN Meds:      [DISCONTINUED] acetaminophen **OR** " "acetaminophen **OR** acetaminophen    acetaminophen    atropine    senna-docusate sodium **AND** polyethylene glycol **AND** bisacodyl **AND** bisacodyl    calcium carbonate    Calcium Replacement - Follow Nurse / BPA Driven Protocol    Magnesium Standard Dose Replacement - Follow Nurse / BPA Driven Protocol    nitroglycerin    ondansetron ODT **OR** ondansetron    Phosphorus Replacement - Follow Nurse / BPA Driven Protocol    Potassium Replacement - Follow Nurse / BPA Driven Protocol    [COMPLETED] Insert Peripheral IV **AND** sodium chloride    sodium chloride    sodium chloride        Results Review:     I reviewed the patient's new clinical results.    CBC    Results from last 7 days   Lab Units 03/11/25  0142 03/10/25  0337 03/09/25  0406 03/07/25  2350 03/07/25  0855   WBC 10*3/mm3 5.83 5.05 7.91 7.36 10.28   HEMOGLOBIN g/dL 12.8* 12.2* 12.1* 12.9* 13.8   PLATELETS 10*3/mm3 210 210 188 192 211     Cr Clearance Estimated Creatinine Clearance: 94 mL/min (by C-G formula based on SCr of 0.78 mg/dL).  Coag   Results from last 7 days   Lab Units 03/10/25  1214 03/10/25  0337 03/09/25 2035 03/09/25  1400 03/07/25  0855   INR   --   --   --  1.11* 1.21*   APTT seconds 43.8* 120.9* 41.9* 29.6 31.8     HbA1C   Lab Results   Component Value Date    HGBA1C 5.9 (H) 03/02/2020     Blood Glucose No results found for: \"POCGLU\"  Infection     CMP   Results from last 7 days   Lab Units 03/11/25  0142 03/10/25  1708 03/10/25  0922 03/10/25  0337 03/09/25  2346 03/09/25 2035 03/09/25  1400 03/09/25  1337 03/09/25  0406 03/07/25  2350 03/07/25  0855   SODIUM mmol/L 121* 130* 128* 130* 127* 130* 127*   < > 124*   < > 127*   POTASSIUM mmol/L 4.3 3.6 3.0* 3.8 3.8 4.2 4.5   < > 4.0   < > 4.1   CHLORIDE mmol/L 88* 100 100 94* 92* 92* 92*   < > 92*   < > 93*   CO2 mmol/L 20.4* 19.8* 17.4* 24.7 24.2 23.1 24.6   < > 21.8*   < > 20.2*   BUN mg/dL 24* 11 11 16 17 17 15   < > 14   < > 13   CREATININE mg/dL 0.78 0.64* 0.59* 0.84 0.85 0.94 " "0.84   < > 0.76   < > 0.85   GLUCOSE mg/dL 115* 95 110* 114* 116* 122* 106*   < > 118*   < > 166*   ALBUMIN g/dL  --   --   --  3.7  --   --   --   --  3.4*  --  4.2   BILIRUBIN mg/dL  --   --   --  0.6  --   --   --   --  0.7  --  1.4*   ALK PHOS U/L  --   --   --  68  --   --   --   --  67  --  88   AST (SGOT) U/L  --   --   --  20  --   --   --   --  25  --  19   ALT (SGPT) U/L  --   --   --  25  --   --   --   --  24  --  19    < > = values in this interval not displayed.     ABG      UA    Results from last 7 days   Lab Units 03/07/25  0919   NITRITE UA  Negative   WBC UA /HPF 0-2   BACTERIA UA /HPF None Seen   SQUAM EPITHEL UA /HPF 0-2     SULY  No results found for: \"POCMETH\", \"POCAMPHET\", \"POCBARBITUR\", \"POCBENZO\", \"POCCOCAINE\", \"POCOPIATES\", \"POCOXYCODO\", \"POCPHENCYC\", \"POCPROPOXY\", \"POCTHC\", \"POCTRICYC\"  Lysis Labs   Results from last 7 days   Lab Units 03/11/25  0142 03/10/25  1708 03/10/25  1214 03/10/25  0922 03/10/25  0337 03/09/25  2346 03/09/25  2035 03/09/25  1400 03/09/25  1337 03/09/25  0406 03/07/25  2350 03/07/25  0855   INR   --   --   --   --   --   --   --  1.11*  --   --   --  1.21*   APTT seconds  --   --  43.8*  --  120.9*  --  41.9* 29.6  --   --   --  31.8   HEMOGLOBIN g/dL 12.8*  --   --   --  12.2*  --   --   --   --  12.1* 12.9* 13.8   PLATELETS 10*3/mm3 210  --   --   --  210  --   --   --   --  188 192 211   CREATININE mg/dL 0.78 0.64*  --  0.59* 0.84 0.85 0.94 0.84   < > 0.76 0.91 0.85    < > = values in this interval not displayed.     Radiology(recent) No radiology results for the last day        Results from last 7 days   Lab Units 03/07/25  1101   HSTROP T ng/L 24*       X-rays, labs reviewed personally by physician.    ECG/EMG Results (most recent)       Procedure Component Value Units Date/Time    ECG 12 Lead Chest Pain [[id number]] Collected: 03/07/25 0831     Updated: 03/08/25 0849     QT Interval 521 ms      QTC Interval 562 ms     Narrative:      HEART RATE=70  bpm  RR " Snrhbhnq=652  ms  KY Interval=58  ms  P Horizontal Axis=127  deg  P Front Axis=Invalid  deg  QRSD Wncgwswo=964  ms  QT Zjdncxzt=224  ms  HEcL=075  ms  QRS Axis=138  deg  T Wave Axis=-32  deg  - ABNORMAL ECG -  Sinus rhythm  Right bundle branch block  ST elevation secondary to IVCD  When compared with ECG of 20-Oct-2022 06:34:51,  Significant change in rhythm  Significant repolarization change  Electronically Signed By: Cortes Chauhan ([email or web address]) 2025-03-08 08:48:52  Date and Time of Study:2025-03-07 08:31:37    Adult Transthoracic Echo Complete W/ Cont if Necessary Per Protocol [[id number]] Resulted: 03/08/25 1655     Updated: 03/08/25 1655     LV GLOBAL STRAIN  -12.7 %      LVIDd 5.6 cm      LVIDs 4.3 cm      IVSd 1.36 cm      LVPWd 1.32 cm      FS 23.3 %      IVS/LVPW 1.03 cm      ESV(cubed) 78.0 ml      LV Sys Vol (BSA corrected) 37.2 cm2      EDV(cubed) 173.1 ml      LV Garcia Vol (BSA corrected) 74.1 cm2      LV mass(C)d 325.1 grams      LVOT area 3.6 cm2      LVOT diam 2.13 cm      EDV(MOD-sp2) 227.0 ml      EDV(MOD-sp4) 164.0 ml      ESV(MOD-sp2) 105.0 ml      ESV(MOD-sp4) 82.4 ml      SV(MOD-sp2) 122.0 ml      SV(MOD-sp4) 81.6 ml      SVi(MOD-SP2) 55.1 ml/m2      SVi(MOD-SP4) 36.8 ml/m2      EF(MOD-sp2) 53.7 %      EF(MOD-sp4) 49.8 %      MV E max cosme 90.1 cm/sec      MV A max cosme 41.9 cm/sec      MV dec time 0.16 sec      MV E/A 2.15     MV A dur 0.14 sec      LA ESV Index (BP) 37.5 ml/m2      TR max cosme 282.3 cm/sec      TAPSE (>1.6) 1.63 cm      LA dimension (2D)  3.5 cm      LV V1 max 86.6 cm/sec      LV V1 max PG 3.0 mmHg      LV V1 mean PG Invalid mmHg      LV V1 VTI Invalid cm      Ao pk cosme 149.0 cm/sec      Ao max PG 8.9 mmHg      Ao mean PG 4.8 mmHg      Ao V2 VTI 28.4 cm      AI P1/2t 529.0 msec      MV max PG 3.8 mmHg      MV mean PG 1.77 mmHg      MV V2 VTI 28.9 cm      MV P1/2t 62.8 msec      MVA(P1/2t) 3.5 cm2      MV dec slope 469.6 cm/sec2      MR max cosme 437.8 cm/sec      MR max PG 76.7 mmHg      TR  max PG 31.9 mmHg      RVSP(TR) 39.9 mmHg      RAP systole 8.0 mmHg      RV V1 max PG 1.53 mmHg      RV V1 max 61.8 cm/sec      RV V1 VTI 10.8 cm      PA V2 max 73.9 cm/sec      PA acc time 0.13 sec      Sinus 3.5 cm      STJ 2.6 cm      Dimensionless Index 0.58 (DI)     Narrative:        Left ventricular systolic function is normal. Left ventricular ejection   fraction appears to be 41 - 45%.    The left ventricular cavity is mildly dilated.    Left ventricular wall thickness is consistent with mild to moderate   concentric hypertrophy.    The following left ventricular wall segments are hypokinetic: mid   anterior, mid anterolateral, apical lateral, mid inferolateral and apex   hypokinetic. The following left ventricular wall segments are akinetic:   basal anterolateral.    Left ventricular diastolic function is consistent with (grade II w/high   LAP) pseudonormalization.    The left atrial cavity is mildly dilated.    There is mild calcification of the aortic valve mainly affecting the   left coronary and right coronary cusp(s).    Estimated right ventricular systolic pressure from tricuspid   regurgitation is mildly elevated (35-45 mmHg).    Mild pulmonary hypertension is present.      Telemetry Scan [[id number]] Resulted: 03/07/25     Updated: 03/10/25 1147    Telemetry Scan [[id number]] Resulted: 03/07/25     Updated: 03/11/25 0559              Medication Review:   I have reviewed the patient's current medication list  Scheduled Meds:acebutolol, 200 mg, Oral, Daily  [Held by provider] amitriptyline, 20 mg, Oral, Nightly  aspirin, 81 mg, Oral, Daily  atorvastatin, 20 mg, Oral, Daily  cetirizine, 10 mg, Oral, Daily  enalapril, 20 mg, Oral, Daily  gabapentin, 300 mg, Oral, Daily  isosorbide mononitrate, 60 mg, Oral, Daily  OXcarbazepine, 300 mg, Oral, 4x Daily  pantoprazole, 40 mg, Oral, Q AM  sodium chloride, 10 mL, Intravenous, Q12H  spironolactone, 12.5 mg, Oral, Daily  Urea, 15 g, Oral, BID  vitamin D3, 5,000  Units, Oral, Daily      Continuous Infusions:     PRN Meds:.  [DISCONTINUED] acetaminophen **OR** acetaminophen **OR** acetaminophen    acetaminophen    atropine    senna-docusate sodium **AND** polyethylene glycol **AND** bisacodyl **AND** bisacodyl    calcium carbonate    Calcium Replacement - Follow Nurse / BPA Driven Protocol    Magnesium Standard Dose Replacement - Follow Nurse / BPA Driven Protocol    nitroglycerin    ondansetron ODT **OR** ondansetron    Phosphorus Replacement - Follow Nurse / BPA Driven Protocol    Potassium Replacement - Follow Nurse / BPA Driven Protocol    [COMPLETED] Insert Peripheral IV **AND** sodium chloride    sodium chloride    sodium chloride    Imaging:  Imaging Results (Last 72 Hours)       ** No results found for the last 72 hours. **              Radha Pemberton MD  03/11/25  07:19 EDT

## 2025-03-11 NOTE — PLAN OF CARE
Goal Outcome Evaluation:  Plan of Care Reviewed With: patient   Monitoring hyponatremia. Administering urea and will redraw for sodium level     Progress: no change

## 2025-03-11 NOTE — PROGRESS NOTES
Patient Name: Mode Duffy  : 1945  MRN: [id number]  Primary Care Physician: Temo Vidal MD (Inactive)  Date of admission: 3/7/2025    Patient Care Team:  Temo Vidal MD (Inactive) as PCP - General  Temo Vidal MD (Inactive) as PCP - Family Medicine        Subjective   Subjective:     Patient is feeling better no new complaints all other review of system unremarkable  Review of systems:  All other review of system unremarkable      Allergies:  No Known Allergies    Objective   Exam:     Vital Signs  Temp:  [97.3 °F (36.3 °C)-97.9 °F (36.6 °C)] 97.9 °F (36.6 °C)  Heart Rate:  [70-75] 70  Resp:  [11-22] 12  BP: (109-165)/(54-86) 149/75  SpO2:  [89 %-98 %] 90 %  on  Flow (L/min) (Oxygen Therapy):  [2] 2;   Device (Oxygen Therapy): room air  Body mass index is 28.01 kg/m².    General: Elderly white male in no acute distress.    Head:      Normocephalic and atraumatic.    Eyes:      PERRL/EOM intact, conjunctivae and sclerae clear without nystagmus.    Neck:      No masses, thyromegaly,  trachea central with normal respiratory effort   Lungs:    Clear bilaterally to auscultation.    Heart:      Regular rate and rhythm, no murmur no gallop  Abd:        Soft, nontender, not distended, bowel sounds positive, no shifting dullness   Pulses:   Pulses palpable  Extr:        No cyanosis or clubbing-significant edema.    Neuro:    No focal deficits.   alert oriented x3  Skin:       Intact without lesions or rashes.    Psych:    Alert and cooperative; normal mood and affect; .      Results Review:  I have personally reviewed most recent Data :  CBC    Results from last 7 days   Lab Units 25  0142 03/10/25  0337 25  0406 25  2350 25  0855   WBC 10*3/mm3 5.83 5.05 7.91 7.36 10.28   HEMOGLOBIN g/dL 12.8* 12.2* 12.1* 12.9* 13.8   PLATELETS 10*3/mm3 210 210 188 192 211     CMP   Results from last 7 days   Lab Units 25  0142 03/10/25  1708 03/10/25  0922  03/10/25  0337 03/09/25  2346 03/09/25  2035 03/09/25  1400 03/09/25  1337 03/09/25  0406 03/07/25  2350 03/07/25  0855   SODIUM mmol/L 121* 130* 128* 130* 127* 130* 127*   < > 124*   < > 127*   POTASSIUM mmol/L 4.3 3.6 3.0* 3.8 3.8 4.2 4.5   < > 4.0   < > 4.1   CHLORIDE mmol/L 88* 100 100 94* 92* 92* 92*   < > 92*   < > 93*   CO2 mmol/L 20.4* 19.8* 17.4* 24.7 24.2 23.1 24.6   < > 21.8*   < > 20.2*   BUN mg/dL 24* 11 11 16 17 17 15   < > 14   < > 13   CREATININE mg/dL 0.78 0.64* 0.59* 0.84 0.85 0.94 0.84   < > 0.76   < > 0.85   GLUCOSE mg/dL 115* 95 110* 114* 116* 122* 106*   < > 118*   < > 166*   ALBUMIN g/dL  --   --   --  3.7  --   --   --   --  3.4*  --  4.2   BILIRUBIN mg/dL  --   --   --  0.6  --   --   --   --  0.7  --  1.4*   ALK PHOS U/L  --   --   --  68  --   --   --   --  67  --  88   AST (SGOT) U/L  --   --   --  20  --   --   --   --  25  --  19   ALT (SGPT) U/L  --   --   --  25  --   --   --   --  24  --  19    < > = values in this interval not displayed.     ABG      No radiology results for the last day    Results for orders placed during the hospital encounter of 03/07/25    Adult Transthoracic Echo Complete W/ Cont if Necessary Per Protocol    Interpretation Summary    Left ventricular systolic function is normal. Left ventricular ejection fraction appears to be 41 - 45%.    The left ventricular cavity is mildly dilated.    Left ventricular wall thickness is consistent with mild to moderate concentric hypertrophy.    The following left ventricular wall segments are hypokinetic: mid anterior, mid anterolateral, apical lateral, mid inferolateral and apex hypokinetic. The following left ventricular wall segments are akinetic: basal anterolateral.    Left ventricular diastolic function is consistent with (grade II w/high LAP) pseudonormalization.    The left atrial cavity is mildly dilated.    There is mild calcification of the aortic valve mainly affecting the left coronary and right coronary  cusp(s).    Estimated right ventricular systolic pressure from tricuspid regurgitation is mildly elevated (35-45 mmHg).    Mild pulmonary hypertension is present.    Scheduled Meds:acebutolol, 200 mg, Oral, Daily  [Held by provider] amitriptyline, 20 mg, Oral, Nightly  aspirin, 81 mg, Oral, Daily  atorvastatin, 20 mg, Oral, Daily  cetirizine, 10 mg, Oral, Daily  enalapril, 20 mg, Oral, Daily  gabapentin, 300 mg, Oral, Daily  isosorbide mononitrate, 60 mg, Oral, Daily  OXcarbazepine, 300 mg, Oral, 4x Daily  pantoprazole, 40 mg, Oral, Q AM  sodium chloride, 10 mL, Intravenous, Q12H  Urea, 15 g, Oral, BID  vitamin D3, 5,000 Units, Oral, Daily      Continuous Infusions:   PRN Meds:  [DISCONTINUED] acetaminophen **OR** acetaminophen **OR** acetaminophen    acetaminophen    atropine    senna-docusate sodium **AND** polyethylene glycol **AND** bisacodyl **AND** bisacodyl    calcium carbonate    Calcium Replacement - Follow Nurse / BPA Driven Protocol    Magnesium Standard Dose Replacement - Follow Nurse / BPA Driven Protocol    nitroglycerin    ondansetron ODT **OR** ondansetron    Phosphorus Replacement - Follow Nurse / BPA Driven Protocol    Potassium Replacement - Follow Nurse / BPA Driven Protocol    [COMPLETED] Insert Peripheral IV **AND** sodium chloride    sodium chloride    sodium chloride    Assessment & Plan   Assessment and Plan:         Chest pain    Dyspnea    ASSESSMENT:  Hyponatremia, chronic, likely 2/2 CHF, also on oxcarbazepine sodium of 128 slightly lower than yesterday acceptable  Mild NAGMA which has improved  Chronic hematuria, follows Urology outpatient  Recent UTI  Chest pain  Abnormal stress test, plans for cardiac cath  CHF EF 41 to 45% with grade 2 diastolic dysfunction and left ventricular hypertrophy with coronary artery disease CAD s/p PCI and stenting x 2 S/p PPM Aflutter/Afib  Hx left subclavian vein occlusion  GERD  HTN  HLD  SHELTON  BPH  Trigeminal neuralgia     Echocardiogram 3/8/25 with EF  41-45%, mild-mod LVH, RVSP 35-45 mmHg, mild pulmonary hypertension.        PLAN :      Patient with chronic hyponatremia, patient is aware, underlying CHF, with evidence of increased volume as well as risk of increased ADH, on oxcarbazepine  Significant drop in the sodium again  Lasix 40 mg IV will be given  Continue urea 15 g twice a day will help with diuresis  Continue fluid restriction  Repeat labs at 11  If sodium level improving okay to be discharged  Heart cath result appreciated consistent with ischemic cardiomyopathy only medical management need  Monitor mild NAGMA  Renal function intact, other electrolytes acceptable  Avoid NSAIDs, thiazides  We will follow  Hypocalcemia improved with calcium and vitamin D discontinue calcium supplement           Electronically signed by Jack Cody MD,   [facility] kidney consultant  [phone]  3/11/2025  10:46 EDT

## 2025-03-11 NOTE — TELEPHONE ENCOUNTER
I tried to call the patient and his phone is busy.  He has trigeminal neuralgia.  If he is having significant face pain asked him to ask the hospitalist that is following him to consult neurology and patient so they can get his gabapentin renewed.

## 2025-03-12 ENCOUNTER — READMISSION MANAGEMENT (OUTPATIENT)
Dept: CALL CENTER | Facility: HOSPITAL | Age: 80
End: 2025-03-12
Payer: MEDICARE

## 2025-03-12 VITALS
TEMPERATURE: 98.6 F | DIASTOLIC BLOOD PRESSURE: 73 MMHG | BODY MASS INDEX: 28.14 KG/M2 | SYSTOLIC BLOOD PRESSURE: 136 MMHG | HEIGHT: 73 IN | RESPIRATION RATE: 16 BRPM | WEIGHT: 212.3 LBS | OXYGEN SATURATION: 94 % | HEART RATE: 70 BPM

## 2025-03-12 LAB
ANION GAP SERPL CALCULATED.3IONS-SCNC: 10.8 MMOL/L (ref 5–15)
BUN SERPL-MCNC: 36 MG/DL (ref 8–23)
BUN/CREAT SERPL: 39.6 (ref 7–25)
CA-I SERPL ISE-MCNC: 1.16 MMOL/L (ref 1.15–1.3)
CALCIUM SPEC-SCNC: 9.2 MG/DL (ref 8.6–10.5)
CHLORIDE SERPL-SCNC: 94 MMOL/L (ref 98–107)
CO2 SERPL-SCNC: 25.2 MMOL/L (ref 22–29)
CREAT SERPL-MCNC: 0.91 MG/DL (ref 0.76–1.27)
DEPRECATED RDW RBC AUTO: 41.6 FL (ref 37–54)
EGFRCR SERPLBLD CKD-EPI 2021: 85.7 ML/MIN/1.73
ERYTHROCYTE [DISTWIDTH] IN BLOOD BY AUTOMATED COUNT: 11.9 % (ref 12.3–15.4)
GLUCOSE SERPL-MCNC: 112 MG/DL (ref 65–99)
HCT VFR BLD AUTO: 37.9 % (ref 37.5–51)
HGB BLD-MCNC: 12.9 G/DL (ref 13–17.7)
MCH RBC QN AUTO: 32.2 PG (ref 26.6–33)
MCHC RBC AUTO-ENTMCNC: 34 G/DL (ref 31.5–35.7)
MCV RBC AUTO: 94.5 FL (ref 79–97)
PLATELET # BLD AUTO: 219 10*3/MM3 (ref 140–450)
PMV BLD AUTO: 8.5 FL (ref 6–12)
POTASSIUM SERPL-SCNC: 4.3 MMOL/L (ref 3.5–5.2)
RBC # BLD AUTO: 4.01 10*6/MM3 (ref 4.14–5.8)
SODIUM SERPL-SCNC: 130 MMOL/L (ref 136–145)
WBC NRBC COR # BLD AUTO: 4.93 10*3/MM3 (ref 3.4–10.8)

## 2025-03-12 PROCEDURE — 85027 COMPLETE CBC AUTOMATED: CPT | Performed by: STUDENT IN AN ORGANIZED HEALTH CARE EDUCATION/TRAINING PROGRAM

## 2025-03-12 PROCEDURE — 80048 BASIC METABOLIC PNL TOTAL CA: CPT | Performed by: STUDENT IN AN ORGANIZED HEALTH CARE EDUCATION/TRAINING PROGRAM

## 2025-03-12 PROCEDURE — 82330 ASSAY OF CALCIUM: CPT | Performed by: INTERNAL MEDICINE

## 2025-03-12 RX ORDER — FUROSEMIDE 40 MG/1
40 TABLET ORAL 2 TIMES DAILY
Qty: 60 TABLET | Refills: 0 | Status: SHIPPED | OUTPATIENT
Start: 2025-03-12 | End: 2025-03-24 | Stop reason: SDUPTHER

## 2025-03-12 RX ADMIN — OXCARBAZEPINE 300 MG: 150 TABLET, FILM COATED ORAL at 08:39

## 2025-03-12 RX ADMIN — ASPIRIN 81 MG: 81 TABLET, COATED ORAL at 08:39

## 2025-03-12 RX ADMIN — CETIRIZINE HYDROCHLORIDE 10 MG: 10 TABLET, FILM COATED ORAL at 08:39

## 2025-03-12 RX ADMIN — Medication 10 ML: at 08:39

## 2025-03-12 RX ADMIN — Medication 15 G: at 08:42

## 2025-03-12 RX ADMIN — ACEBUTOLOL HYDROCHLORIDE 200 MG: 200 CAPSULE ORAL at 08:53

## 2025-03-12 RX ADMIN — APIXABAN 5 MG: 5 TABLET, FILM COATED ORAL at 08:39

## 2025-03-12 RX ADMIN — PANTOPRAZOLE SODIUM 40 MG: 40 TABLET, DELAYED RELEASE ORAL at 05:11

## 2025-03-12 RX ADMIN — Medication 5000 UNITS: at 08:39

## 2025-03-12 RX ADMIN — ISOSORBIDE MONONITRATE 60 MG: 60 TABLET, EXTENDED RELEASE ORAL at 08:39

## 2025-03-12 RX ADMIN — OXCARBAZEPINE 300 MG: 150 TABLET, FILM COATED ORAL at 05:11

## 2025-03-12 NOTE — CASE MANAGEMENT/SOCIAL WORK
Case Management Discharge Note      Final Note: Home.      Selected Continued Care - Discharged on 3/12/2025 Admission date: 3/7/2025 - Discharge disposition: Home or Self Care       Transportation Services  Private: Car    Final Discharge Disposition Code: 01 - home or self-care

## 2025-03-12 NOTE — DISCHARGE SUMMARY
"             [facility] Medicine Services  Discharge Summary    Date of Service: 3/12/25  Patient Name: Mode Duffy  : 1945  MRN: [id number]    Date of Admission: 3/7/2025  Discharge Diagnosis: Chest pain; acute on chronic hyponatremia  Date of Discharge:  3/12/25  Primary Care Physician: Temo Vidal MD (Inactive)    Presenting Problem:   Chest pain [R07.9]  Dyspnea, unspecified type [R06.00]  Chest pain, unspecified type [R07.9]    Active and Resolved Hospital Problems:  Active Hospital Problems    Diagnosis POA    **Chest pain [R07.9] Yes    Dyspnea [R06.00] Yes      Resolved Hospital Problems   No resolved problems to display.         Hospital Course     HPI:  Per the H&P written by Jaye Wakefield, dated 3/7/25:  \"Mode Duffy is a 79 y.o. male with a CMH of CAD s/p PCI and stenting x 2, PPM, A-flutter/A-fib, CHF, left subclavian vein occlusion, GERD, HTN, HLD, SHELTON, BPH, trigeminal neuralgia who presented to [facility] on 3/7/2025 with  chest pain.     Patient presented to the emergency department with complaint of midsternal chest pain which began yesterday as he was getting out of bed.  He states the pain lasted a few seconds. He describes that pain as a discomfort and says the pain was very minimal.  He feels as if his breathing is labored when he has the pain.  He says the pain happened more than once but he did not tell me how many times this happened.  He does have a slight cough.  He has not had any palpitations, swelling in his legs, syncope, near-syncope, dizziness, nausea, diaphoresis.  He states that his symptoms are similar to the symptoms he experiences around 10 years ago at which time he was found to have coronary artery disease. He had a cardiac cath and PCI with stenting of obtuse marginal branch and diagonal branch in 2014. He says prior to the cardiac cath, he had a stress test which \"did not show anything\".  Currently he is anticoagulated with " "Eliquis and he is also on 81 mg aspirin daily.     Patient notes that he has been treated for a UTI recently.  He finished his third round of antibiotics yesterday.  He does not have any acute urinary symptoms.  He has chronic hematuria.  He also has a history of low sodium and he says that his neurologist monitors this because he is on oxcarbazepine.      In the ED, his initial blood pressure was 161/81, 95% on RA, HR 80.  Labs are remarkable for troponin 25, repeat 24, proBNP 3990, sodium 127, bicarb 20.2 glucose 166, total bilirubin 1.4, INR 1.21.  UA with small amount of blood.  COVID, flu, RSV negative.  Chest x-ray showed no acute findings.\"    Hospital Course:  Patient admitted as above. Cardiology consulted and patient underwent LHC with severe single vessel coronary artery disease. No intervention. Plans for medical management with follow up. Nephrology consulted for hyponatremia. Treated with fluid restriction, diuretics and urea with improvement. Plans for dc with diuretics and urea per nephrology. Stable for discharge with outpatient follow up.     DISCHARGE Follow Up Recommendations for labs and diagnostics:   - Follow up with PCP within 3 days of discharge  - Recommend repeat BMP to monitor sodium levels and renal function in setting of new diuretic  - Follow up with cardiology within 2 weeks of discharge    Day of Discharge     Vital Signs:  Temp:  [96.5 °F (35.8 °C)-98.1 °F (36.7 °C)] 96.5 °F (35.8 °C)  Heart Rate:  [70-76] 71  Resp:  [12-21] 12  BP: (117-124)/(62-73) 122/62  Flow (L/min) (Oxygen Therapy):  [2] 2    Physical Exam:   General: No acute distress, alert and oriented  CV: RRR, no peripheral edema  Pulm: CTAB, no increased work of breathing  Abd: Soft, nontender, nondistended  Skin: No rashes or lesions on exposed skin  Psych: Appropriate mood and affect    Pertinent  and/or Most Recent Results     LAB RESULTS:      Lab 03/12/25  0243 03/11/25  0142 03/10/25  1214 03/10/25  0337 " 03/09/25  2035 03/09/25  1400 03/09/25  0406 03/07/25  2350 03/07/25  0855   WBC 4.93 5.83  --  5.05  --   --  7.91 7.36 10.28   HEMOGLOBIN 12.9* 12.8*  --  12.2*  --   --  12.1* 12.9* 13.8   HEMATOCRIT 37.9 37.1*  --  35.9*  --   --  35.3* 37.4* 39.8   PLATELETS 219 210  --  210  --   --  188 192 211   NEUTROS ABS  --   --   --  2.23  --   --  5.55 4.52 8.40*   IMMATURE GRANS (ABS)  --   --   --  0.02  --   --  0.02 0.03 0.03   LYMPHS ABS  --   --   --  1.74  --   --  1.03 1.26 0.54*   MONOS ABS  --   --   --  0.55  --   --  0.72 0.85 0.98*   EOS ABS  --   --   --  0.47*  --   --  0.55* 0.67* 0.28   MCV 94.5 93.0  --  94.5  --   --  94.1 95.4 94.1   PROTIME  --   --   --   --   --  14.2  --   --  15.2*   APTT  --   --  43.8* 120.9* 41.9* 29.6  --   --  31.8         Lab 03/12/25  0243 03/11/25  1328 03/11/25  0142 03/10/25  1708 03/10/25  1214 03/10/25  0922 03/10/25  0337 03/09/25  1337 03/09/25  0406 03/08/25  1334 03/07/25  2350 03/07/25  0855   SODIUM 130* 131* 121* 130*  --  128* 130*   < > 124*  --  128* 127*   POTASSIUM 4.3 4.1 4.3 3.6  --  3.0* 3.8   < > 4.0   < > 3.4* 4.1   CHLORIDE 94* 98 88* 100  --  100 94*   < > 92*  --  92* 93*   CO2 25.2 19.2* 20.4* 19.8*  --  17.4* 24.7   < > 21.8*  --  24.5 20.2*   ANION GAP 10.8 13.8 12.6 10.2  --  10.6 11.3   < > 10.2  --  11.5 13.8   BUN 36* 30* 24* 11  --  11 16   < > 14  --  15 13   CREATININE 0.91 0.91 0.78 0.64*  --  0.59* 0.84   < > 0.76  --  0.91 0.85   EGFR 85.7 85.7 90.7 96.3  --  98.7 88.7   < > 91.4  --  85.7 88.4   GLUCOSE 112* 109* 115* 95  --  110* 114*   < > 118*  --  133* 166*   CALCIUM 9.2 8.2* 8.7 7.4*  --  6.8* 8.8   < > 8.3*  --  9.2 8.8   IONIZED CALCIUM 1.16  --  1.11*  --  1.02*  --   --   --   --   --   --   --    MAGNESIUM  --   --   --  1.6  --   --  1.8  --  1.8  --  1.9 1.8   PHOSPHORUS  --   --   --   --   --   --  3.9  --  3.4  --   --   --    TSH  --   --   --   --   --   --   --   --  3.260  --   --   --     < > = values in this  interval not displayed.         Lab 03/10/25  0337 03/09/25  0406 03/07/25  0855   TOTAL PROTEIN 5.9* 5.4* 6.5   ALBUMIN 3.7 3.4* 4.2   GLOBULIN 2.2 2.0 2.3   ALT (SGPT) 25 24 19   AST (SGOT) 20 25 19   BILIRUBIN 0.6 0.7 1.4*   ALK PHOS 68 67 88         Lab 03/09/25  1400 03/07/25  1101 03/07/25  0855   PROBNP  --   --  3,990.0*   HSTROP T  --  24* 25*   PROTIME 14.2  --  15.2*   INR 1.11*  --  1.21*                 Brief Urine Lab Results  (Last result in the past 365 days)        Color   Clarity   Blood   Leuk Est   Nitrite   Protein   CREAT   Urine HCG        03/08/25 1236             185.4               Microbiology Results (last 10 days)       Procedure Component Value - Date/Time    COVID-19, FLU A/B, RSV PCR 1 HR TAT - Swab, Nasopharynx [[id number]]  (Normal) Collected: 03/07/25 0855    Lab Status: Final result Specimen: Swab from Nasopharynx Updated: 03/07/25 0947     COVID19 Not Detected     Influenza A PCR Not Detected     Influenza B PCR Not Detected     RSV, PCR Not Detected    Narrative:      Fact sheet for providers: https://www.fda.gov/media/626598/download    Fact sheet for patients: https://www.fda.gov/media/461952/download    Test performed by PCR.          XR Chest 1 View  Result Date: 3/7/2025  Impression: Impression: No acute chest findings. Electronically Signed: Omayra Quinn MD  3/7/2025 9:12 AM EST  Workstation ID: [id number]    Results for orders placed during the hospital encounter of 03/02/20    Bilateral Carotid Duplex    Interpretation Summary  · Proximal right internal carotid artery plaque without significant stenosis.  · Proximal left internal carotid artery plaque without significant stenosis.    Results for orders placed during the hospital encounter of 03/02/20    Bilateral Carotid Duplex    Interpretation Summary  · Proximal right internal carotid artery plaque without significant stenosis.  · Proximal left internal carotid artery plaque without significant stenosis.    Results for  orders placed during the hospital encounter of 03/07/25    Adult Transthoracic Echo Complete W/ Cont if Necessary Per Protocol    Interpretation Summary    Left ventricular systolic function is normal. Left ventricular ejection fraction appears to be 41 - 45%.    The left ventricular cavity is mildly dilated.    Left ventricular wall thickness is consistent with mild to moderate concentric hypertrophy.    The following left ventricular wall segments are hypokinetic: mid anterior, mid anterolateral, apical lateral, mid inferolateral and apex hypokinetic. The following left ventricular wall segments are akinetic: basal anterolateral.    Left ventricular diastolic function is consistent with (grade II w/high LAP) pseudonormalization.    The left atrial cavity is mildly dilated.    There is mild calcification of the aortic valve mainly affecting the left coronary and right coronary cusp(s).    Estimated right ventricular systolic pressure from tricuspid regurgitation is mildly elevated (35-45 mmHg).    Mild pulmonary hypertension is present.      Labs Pending at Discharge:   Pending Results       None            Procedures Performed   Procedure(s):  LEFT HEART CATH with possible PCI       Consults:   Consults       Date and Time Order Name Status Description    3/9/2025 11:59 AM Inpatient Neurology Consult General      3/9/2025  9:25 AM Inpatient Nephrology Consult Completed     3/7/2025 11:29 AM Cardiology (on-call MD unless specified) Completed     3/7/2025  9:39 AM Hospitalist (on-call MD unless specified)              Discharge Details        Discharge Medications        Continue These Medications        Instructions Start Date   acebutolol 200 MG capsule  Commonly known as: SECTRAL   200 mg, Oral, Daily      amitriptyline 10 MG tablet  Commonly known as: ELAVIL   TAKE 2 TABLETS AT BEDTIME      amLODIPine 10 MG tablet  Commonly known as: NORVASC   1 tablet, Every Night at Bedtime      apixaban 5 MG tablet  tablet  Commonly known as: Eliquis   5 mg, Oral, Every 12 Hours      aspirin 81 MG tablet   1 tablet, Daily      atorvastatin 20 MG tablet  Commonly known as: LIPITOR   TAKE 1 TABLET EVERY DAY      CALCIUM 500 + D PO   1 tablet, Daily      cetirizine 10 MG tablet  Commonly known as: zyrTEC   1 tablet, Daily      Cinnamon 500 MG capsule   500 mg, Daily      enalapril 20 MG tablet  Commonly known as: VASOTEC   1 tablet, Daily      esomeprazole 40 MG capsule  Commonly known as: nexIUM   1 capsule, Daily      glucosamine-chondroitin 500-400 MG capsule capsule   3 Times Daily With Meals      isosorbide mononitrate 60 MG 24 hr tablet  Commonly known as: IMDUR   1 tablet, Daily      OXcarbazepine 300 MG tablet  Commonly known as: TRILEPTAL   300 mg, Oral, 4 Times Daily, Titrate down if pain controlled      PROBIOTIC ADVANCED PO   1 capsule, Daily      Turmeric 500 MG capsule   Take  by mouth.      vitamin C 250 MG tablet  Commonly known as: ASCORBIC ACID   1 tablet, Daily      Zinc 25 MG tablet   25 mg, Daily             ASK your doctor about these medications        Instructions Start Date   gabapentin 300 MG capsule  Commonly known as: NEURONTIN  Ask about: Which instructions should I use?   300 mg, Daily      nitrofurantoin (macrocrystal-monohydrate) 100 MG capsule  Commonly known as: MACROBID  Ask about: Should I take this medication?   100 mg, 2 Times Daily               No Known Allergies    Discharge Disposition:   Home    Diet:  Heart healthy    Discharge Activity:   As tolerated    CODE STATUS:  Code Status and Medical Interventions: CPR (Attempt to Resuscitate); Full Support   Ordered at: 03/07/25 1329     Code Status (Patient has no pulse and is not breathing):    CPR (Attempt to Resuscitate)     Medical Interventions (Patient has pulse or is breathing):    Full Support       Future Appointments   Date Time Provider Department Center   3/20/2025  8:00 PM [facility] SLEEP ROOM 2 [facility] SLEEP [facility]   3/26/2025 11:00 AM  Sarita Davalos APRN [facility]   3/31/2025  9:40 AM German Johnson MD [facility] None   5/23/2025  9:00 AM Dominick Jackson MD [facility]   6/2/2025 10:00 AM [facility] 3 [facility]   6/23/2025  9:30 AM German Johnson MD [facility] None   9/26/2025 10:30 AM Yonathan Carrizales DO [facility]           Time spent on Discharge including face to face service:  >30 minutes    Signature: Electronically signed by Edith Lott MD, 03/12/25, 08:27 EDT.  [facility], [place] Hospitalist Team

## 2025-03-12 NOTE — PLAN OF CARE
Goal Outcome Evaluation:                                          Patient doing well this shift, no new complaints. Pending new sodium level, if sodium level is still good then patient should discharge home today.

## 2025-03-13 NOTE — OUTREACH NOTE
Prep Survey      Flowsheet Row Responses   [facility] facility patient discharged from? [facility]   Is LACE score < 7 ? No   Eligibility Readm Mgmt   Discharge diagnosis Chest pain, heart cath   Does the patient have one of the following disease processes/diagnoses(primary or secondary)? Other   Does the patient have Home health ordered? No   Is there a DME ordered? No   Prep survey completed? Yes            Richa KENNEDY - Registered Nurse

## 2025-03-13 NOTE — PAYOR COMM NOTE
"Mode Duffy (79 y.o. Male)  1945  Ref #[id number]   DC Notice- Pt dc'd 3/12/25 Routine to home    SHANA MARIANO LPN UR  Utilization Review Nurse  [facility]  Direct & confidential phone # [phone]  Fax # [phone]        Date of Birth   1945    Social Security Number       Address   [address] [place] [address] [place] [facility]    Home Phone   [phone]    MRN   [id number]       [place]   None    Marital Status                               Admission Date   3/7/2025    Admission Type   Emergency    Admitting Provider   Nayana Bonilla MD    Attending Provider       Department, Room/Bed   [facility] MEDICAL INPATIENT, 367       Discharge Date   3/12/2025    Discharge Disposition   Home or Self Care    Discharge Destination                                 Attending Provider: (none)   Allergies: No Known Allergies    Isolation: None   Infection: None   Code Status: Prior    Ht: 185.4 cm (73\")   Wt: 96.3 kg (212 lb 4.9 oz)    Admission Cmt: None   Principal Problem: Chest pain [R07.9]                   Active Insurance as of 3/7/2025       Primary Coverage       Payor Plan Insurance Group Employer/Plan Group    [employer] MEDICARE REPLACEMENT A[employer]NA MEDICARE AD[id number]       Payor Plan Address Payor Plan Phone Number Payor Plan Fax Number Effective Dates    [address] [phone]  2025 - None Entered    [facility] [id number]         Subscriber Name Subscriber Birth Date Member ID       MODE DUFFY 1945 [id number]                     Emergency Contacts        (Rel.) Home Phone Work Phone Mobile Phone    Radha Duffy (Spouse) [phone] -- [phone]    Jovana Garcia (Daughter) [phone] -- [phone]              Discharge Summary    No notes of this type exist for this encounter.       "

## 2025-03-17 ENCOUNTER — TELEPHONE (OUTPATIENT)
Dept: CARDIOLOGY | Facility: CLINIC | Age: 80
End: 2025-03-17
Payer: MEDICARE

## 2025-03-17 ENCOUNTER — READMISSION MANAGEMENT (OUTPATIENT)
Dept: CALL CENTER | Facility: HOSPITAL | Age: 80
End: 2025-03-17
Payer: MEDICARE

## 2025-03-17 NOTE — OUTREACH NOTE
Medical Week 1 Survey      Flowsheet Row Responses   [facility] facility patient discharged from? [facility]   Does the patient have one of the following disease processes/diagnoses(primary or secondary)? Other   Week 1 attempt successful? No   Unsuccessful attempts Attempt 1            Monica FORD - Licensed Nurse

## 2025-03-17 NOTE — TELEPHONE ENCOUNTER
Caller: Mode Duffy    Relationship: Self    Best call back number: [phone]    What is the best time to reach you: ANY    Who are you requesting to speak with (clinical staff, provider,  specific staff member): CLINICAL       What was the call regarding: PATIENT REQUIRING HOSPITAL FOLLOW-UP NO TIME FRAME GIVEN IN CHART PLEASE ADVISE.     Is it okay if the provider responds through MyChart: NO

## 2025-03-20 ENCOUNTER — HOSPITAL ENCOUNTER (OUTPATIENT)
Dept: SLEEP MEDICINE | Facility: HOSPITAL | Age: 80
Discharge: HOME | End: 2025-03-20
Admitting: INTERNAL MEDICINE
Payer: MEDICARE

## 2025-03-20 VITALS — WEIGHT: 212.3 LBS | BODY MASS INDEX: 28.14 KG/M2 | HEIGHT: 73 IN

## 2025-03-20 DIAGNOSIS — G47.33 OSA (OBSTRUCTIVE SLEEP APNEA): ICD-10-CM

## 2025-03-20 PROCEDURE — 95811 POLYSOM 6/>YRS CPAP 4/> PARM: CPT

## 2025-03-21 ENCOUNTER — READMISSION MANAGEMENT (OUTPATIENT)
Dept: CALL CENTER | Facility: HOSPITAL | Age: 80
End: 2025-03-21
Payer: MEDICARE

## 2025-03-21 NOTE — OUTREACH NOTE
Medical Week 1 Survey      Flowsheet Row Responses   [facility] in [place] patient [facility] from? [facility]   Does the patient have one of the following disease processes/diagnoses(primary or secondary)? Other   Week 1 attempt successful? Yes   Call start time 1306   Call end time 1331   Discharge diagnosis Chest pain, heart cath   Meds reviewed with patient/caregiver? Yes   Is the patient having any side effects they believe may be caused by any medication additions or changes? No   Does the patient have all medications ordered at discharge? Yes   Prescription comments Pt reports that he thought he was going to be on 2 new meds to improve heart function based on conversation with with Dr. Hernandez.  Pt has concerns with decline in EF.  Pt discharged with lasix and urea only. Chart reviewed and noted that pt was briefly started on aldactone and there was consideration for sglt2 inhibitor but needed to be cleared with nephro due to some concerns with renal function.  Pt will f/u with his own cardio on 3/24/25 and discuss any med changes.   Is the patient taking all medications as directed (includes completed medication regime)? Yes   Comments regarding appointments Cardio 3/24/25   Does the patient have a primary care provider?  Yes   Does the patient have an appointment with their PCP within 7 days of discharge? Yes   Has the patient kept scheduled appointments due by today? Yes   Has home health visited the patient within 72 hours of discharge? N/A   DME comments Pt had sleep study last night, did qualify for CPAP   Psychosocial issues? No   Did the patient receive a copy of their discharge instructions? Yes   Nursing interventions Reviewed instructions with patient   What is the patient's perception of their health status since discharge? Same  [Pt reports he has had labs and reports renal function is better.  Pt denies any ongoing issues with fluid retention, no edema noted.  Encouraged to monitor for chest pain and  also to monitor daily wts,  reviewed wt gain parameters with pt.]   Is the patient/caregiver able to teach back signs and symptoms related to disease process for when to call PCP? Yes   Is the patient/caregiver able to teach back signs and symptoms related to disease process for when to call 911? Yes   Week 1 call completed? Yes   Call end time 1331            DARRON CHARLES - Registered Nurse

## 2025-03-24 ENCOUNTER — OFFICE VISIT (OUTPATIENT)
Dept: CARDIOLOGY | Facility: CLINIC | Age: 80
End: 2025-03-24
Payer: MEDICARE

## 2025-03-24 VITALS
BODY MASS INDEX: 29.12 KG/M2 | DIASTOLIC BLOOD PRESSURE: 80 MMHG | HEIGHT: 72 IN | SYSTOLIC BLOOD PRESSURE: 137 MMHG | OXYGEN SATURATION: 96 % | HEART RATE: 70 BPM | WEIGHT: 215 LBS

## 2025-03-24 DIAGNOSIS — I25.5 ISCHEMIC CARDIOMYOPATHY: Primary | ICD-10-CM

## 2025-03-24 DIAGNOSIS — I25.10 CORONARY ARTERY DISEASE INVOLVING NATIVE CORONARY ARTERY OF NATIVE HEART WITHOUT ANGINA PECTORIS: ICD-10-CM

## 2025-03-24 DIAGNOSIS — I50.22 CHRONIC SYSTOLIC HEART FAILURE: ICD-10-CM

## 2025-03-24 DIAGNOSIS — I49.5 SICK SINUS SYNDROME: ICD-10-CM

## 2025-03-24 PROCEDURE — 3079F DIAST BP 80-89 MM HG: CPT | Performed by: NURSE PRACTITIONER

## 2025-03-24 PROCEDURE — 1160F RVW MEDS BY RX/DR IN RCRD: CPT | Performed by: NURSE PRACTITIONER

## 2025-03-24 PROCEDURE — 3075F SYST BP GE 130 - 139MM HG: CPT | Performed by: NURSE PRACTITIONER

## 2025-03-24 PROCEDURE — 1159F MED LIST DOCD IN RCRD: CPT | Performed by: NURSE PRACTITIONER

## 2025-03-24 PROCEDURE — 99214 OFFICE O/P EST MOD 30 MIN: CPT | Performed by: NURSE PRACTITIONER

## 2025-03-24 PROCEDURE — G2211 COMPLEX E/M VISIT ADD ON: HCPCS | Performed by: NURSE PRACTITIONER

## 2025-03-24 RX ORDER — FUROSEMIDE 40 MG/1
40 TABLET ORAL DAILY
Qty: 90 TABLET | Refills: 0 | Status: SHIPPED | OUTPATIENT
Start: 2025-03-24

## 2025-03-24 RX ORDER — SPIRONOLACTONE 25 MG/1
25 TABLET ORAL DAILY
Qty: 90 TABLET | Refills: 3 | Status: SHIPPED | OUTPATIENT
Start: 2025-03-24

## 2025-03-24 NOTE — LETTER
"March 24, 2025     BERTHA Joiner  [address]  [address]  [place] [address]    Patient: Mode Duffy   YOB: 1945   Date of Visit: 3/24/2025     Dear BERTHA Joiner:       Thank you for referring Mode Duffy to me for evaluation. Below are the relevant portions of my assessment and plan of care.    If you have questions, please do not hesitate to call me. I look forward to following Mode along with you.         Sincerely,        BERTHA Castillo        CC: No Recipients    Iliana Kerr APRN  03/24/25 1123  Sign when Signing Visit    [facility] CARDIOLOGY      REASON FOR FOLLOW-UP:  Follow-up heart catheterization          Chief Complaint   Patient presents with   • Heart Problem         Dear Temo Vidal MD (Inactive)        Heart Problem     It was my pleasure to see Mode Duffy in the office today.  He is a 79 y.o. male with a history of ischemic heart disease. He underwent cardiac catheterization with resultant PCI of an obtuse marginal branch and diagonal branch in November 2014. He has additional history of hypertension with hypertensive cardiovascular disease, dyslipidemia, paroxysmal atrial fibrillation, antiplatelet therapy as well as sick sinus syndrome with complete heart block status post biventricular permanent pacemaker placement followed by Dr. Jackson.  Patient has left-sided implant in 1999 and had significant issues with RV pacing alone with heart failure symptoms and LV lead was placed epicardial lead because of left subclavian occlusion. After LV lead epicardial placement, patient was found to have RV lead malfunction needing a new RV lead placed on the right side which was removed secondary to pocket infection on the right side.    Mr. Duffy presented to the emergency department at [facility] 3/7/2025 with complaint of \"a little something in my chest\", nonradiating.  \"A little " "heavy breathing\".  TTE performed on 3/8/2025 showed mildly reduced LV systolic function with EF 41-45%, mild to moderate concentric LVH, grade 2 DD, RVSP mildly elevated at 35-45 mmHg.  Pharmacological nuclear stress testing showed fixed defect in basal inferior wall and basal anterior wall consistent with old infarct, no evidence of significant myocardial ischemia.  Severely reduced LVEF around 20%.  He subsequently underwent heart catheterization on 3/10/2025:  Severe single-vessel coronary artery disease involving the mid circumflex artery with 100% occlusion with left to left collaterals  Moderate to severe LV dysfunction with estimate LV ejection fraction of 35 to 40%  Mild elevated left-sided filling pressures    Medical management for cardiomyopathy recommended.  Recommendation for Aldactone prescribed at discharge and SGLT2 consideration if okay with nephrology.  He and his wife present today in follow-up from that hospitalization.    Today, Mr. Duffy reports that he has experienced no further chest discomfort.  He stated that he may have \"a little shortness of breath\", but it has improved significantly.  He denies any lower extremity edema, dizziness or lightheadedness.  No orthopnea.  He has undergone sleep study and states that CPAP has been recommended.  He is awaiting delivery of his device.  His groin site is well-healed without hematoma or bruising.  His only concern today is frequent urination.  He reported that his sleep study was interrupted frequently due to frequent urination.    CHF education was provided to the patient and wife including low-salt diet, monitoring fluid intake (he does have history of hyponatremia), daily weights.  He had several questions that were answered to his satisfaction.      ASSESSMENT:  Ischemic cardiomyopathy  Coronary artery disease status post PCI and stenting most recently in November of 2014.        This was cutting Balloon angioplasty of a circumflex branch and " a diagonal branch.   Dyslipidemia.  Hypertension with hypertensive cardiovascular disease with a white coat component  Cardiomyopathy most recent EF of 35 to 40% in October 2019 echocardiogram  Sick sinus syndrome status post biventricular permanent pacemaker placement.  Paroxysmal atrial fibrillation  Coagulopathy secondary to Eliquis  Lumbago  Hyponatremia secondary to Trileptal  Renal cysts  History of trigeminal neuralgia presently treated with Trileptal.  Hip pain currently undergoing preoperative cardiovascular risk assessment for right hip surgery.  Trigeminal neuralgia    PLAN:  Will add Aldactone 25 mg daily per GDMT.  Will hold on Jardiance and consider at follow-up with cardiologist  Continue current CV plan of care to include acebutolol, amlodipine, aspirin, statin, enalapril, nitrate.  Patient can decrease Lasix frequency to 40 mg daily  Multiple CHF handouts were provided to the patient.  Continue Eliquis for elevated chads vascular score          CHF Guideline Directed Medical Therapy  Beta Blocker: Acebutolol 200 mg daily  ARNI/ACE/ARB: Enalapril 20 mg daily  SGLT 2 inhibitors: Consider at follow-up  Diuretics: Lasix 40 mg daily  Aldosterone Antagonist: Spironolactone 25 mg daily  Vasodilators & Nitrates: Isosorbide 60 mg daily      Diagnoses and all orders for this visit:    1. Ischemic cardiomyopathy (Primary)  Overview:  Added automatically from request for surgery [id number]      2. Chronic systolic heart failure  Overview:  Added automatically from request for surgery [id number]    Orders:  -     Basic Metabolic Panel; Future    3. Coronary artery disease involving native coronary artery of native heart without angina pectoris    4. Sick sinus syndrome    Other orders  -     furosemide (Lasix) 40 MG tablet; Take 1 tablet by mouth Daily.  Dispense: 90 tablet; Refill: 0  -     spironolactone (ALDACTONE) 25 MG tablet; Take 1 tablet by mouth Daily.  Dispense: 90 tablet; Refill: 3          The following  portions of the patient's history were reviewed and updated as appropriate: allergies, current medications, past family history, past medical history, past social history, past surgical history, and problem list.    REVIEW OF SYSTEMS:    Review of Systems   All other systems reviewed and are negative.      Vitals:    03/24/25 1025   BP: 137/80   Pulse: 70   SpO2: 96%         PHYSICAL EXAM:    General: Alert, cooperative, no distress, appears stated age  Head:  Normocephalic, atraumatic, mucous membranes moist  Eyes:  Conjunctiva/corneas clear, EOM's intact     Neck:  Supple,  no JVD or bruit     Lungs: Clear to auscultation bilaterally, no wheezes rhonchi rales are noted  Chest wall: No tenderness  Musculoskeletal:   Ambulates freely without assistance  Heart::  Regular rate and rhythm, S1 and S2 normal, no murmur, rub or gallop  Abdomen: Soft, non-tender, nondistended, bowel sounds active, no abdominal bruit  Extremities: No cyanosis, clubbing, or edema   Pulses: 2+ and symmetric all extremities  Skin:  No rashes or lesions  Neuro/psych: A&O x3. CN II through XII are grossly intact with appropriate affect        Past Medical History:   Diagnosis Date   • Arthritis    • Arthritis    • BPH (benign prostatic hyperplasia)    • CHF (congestive heart failure)    • Coronary artery disease     dr. Carrizales   • Deviated septum    • FH: cataracts    • Gait abnormality    • GERD (gastroesophageal reflux disease)    • History of trigeminal neuralgia    • Hyperlipidemia    • Hypertension    • Renal cyst    • Sciatica    • Sleep apnea     unable to tolerate machine       Past Surgical History:   Procedure Laterality Date   • APPENDECTOMY     • CARDIAC CATHETERIZATION     • CARDIAC CATHETERIZATION N/A 3/5/2020    Procedure: Percutaneous Subclavian Intervention;  Surgeon: Nick Greenberg MD;  Location: [facility] INVASIVE LOCATION;  Service: Cardiovascular;  Laterality: N/A;   • CARDIAC CATHETERIZATION Right 3/10/2025     Procedure: LEFT HEART CATH with possible PCI;  Surgeon: Radha Pemberton MD;  Location: [facility] CATH INVASIVE LOCATION;  Service: Cardiovascular;  Laterality: Right;  Local and IV sedation   • CARDIAC ELECTROPHYSIOLOGY PROCEDURE N/A 1/29/2020    Procedure: Biventricular Device Upgrade;  Surgeon: Dominick Jackson MD;  Location: [facility] CATH INVASIVE LOCATION;  Service: Cardiovascular   • CARDIAC ELECTROPHYSIOLOGY PROCEDURE N/A 3/5/2020    Procedure: Lead Revision;  Surgeon: Nick Greenberg MD;  Location: [facility] CATH INVASIVE LOCATION;  Service: Cardiovascular;  Laterality: N/A;   • CORONARY STENT PLACEMENT  2014    x 2   • CYSTOSCOPY, URETEROSCOPY, RETROGRADE PYELOGRAM, STENT INSERTION Left 11/3/2023    Procedure: CYSTOSCOPY URETEROSCOPY STONE EXTRACTION STENT INSERTION;  Surgeon: Chris Patel MD;  Location: [facility] MAIN OR;  Service: Urology;  Laterality: Left;   • IMPLANTABLE CARDIOVERTER DEFIBRILLATOR LEAD REPLACEMENT/POCKET REVISION Right 10/19/2022    Procedure: RT. TRANSVENOUS ICD LEAD EXTRACTION;  Surgeon: Naveed Rios MD;  Location: [facility];  Service: Cardiology;  Laterality: Right;   • PROSTATE SURGERY     • REFRACTIVE SURGERY     • THORACOTOMY Left 3/4/2020    Procedure: THORACOTOMY MINI WITH LEAD PLACEMENT;  Surgeon: Justin Aguilar MD;  Location: [facility];  Service: Cardiothoracic;  Laterality: Left;   • TOTAL HIP ARTHROPLASTY Bilateral     different times         Current Outpatient Medications:   •  acebutolol (SECTRAL) 200 MG capsule, Take 1 capsule by mouth Daily., Disp: 90 capsule, Rfl: 1  •  amLODIPine (NORVASC) 10 MG tablet, Take 1 tablet by mouth every night at bedtime. Indications: High Blood Pressure, Disp: , Rfl:   •  apixaban (Eliquis) 5 MG tablet tablet, Take 1 tablet by mouth Every 12 (Twelve) Hours. Indications: Atrial Fibrillation, Disp: 36 tablet, Rfl: 0  •  aspirin 81 MG tablet, Take 1 tablet by mouth Daily. Do not take day of surgery  Indications:  Venous Thromboembolism, Disp: , Rfl:   •  atorvastatin (LIPITOR) 20 MG tablet, TAKE 1 TABLET EVERY DAY, Disp: 90 tablet, Rfl: 3  •  Calcium Carb-Cholecalciferol (CALCIUM 500 + D PO), Take 1 tablet by mouth Daily., Disp: , Rfl:   •  cetirizine (zyrTEC) 10 MG tablet, Take 1 tablet by mouth Daily. Indications: Upper Respiratory Tract Allergy, Disp: , Rfl:   •  Cinnamon 500 MG capsule, Take 1 capsule by mouth Daily., Disp: , Rfl:   •  enalapril (VASOTEC) 20 MG tablet, Take 1 tablet by mouth Daily. Indications: High Blood Pressure, Disp: , Rfl:   •  esomeprazole (nexIUM) 40 MG capsule, Take 1 capsule by mouth Daily. Indications: Gastroesophageal Reflux Disease, Disp: , Rfl:   •  furosemide (Lasix) 40 MG tablet, Take 1 tablet by mouth Daily., Disp: 90 tablet, Rfl: 0  •  gabapentin (NEURONTIN) 300 MG capsule, Take 1 capsule by mouth Daily., Disp: , Rfl:   •  glucosamine-chondroitin 500-400 MG capsule capsule, Take  by mouth 3 (Three) Times a Day With Meals., Disp: , Rfl:   •  isosorbide mononitrate (IMDUR) 60 MG 24 hr tablet, Take 1 tablet by mouth Daily. Indications: Stable Angina Pectoris, Disp: , Rfl:   •  OXcarbazepine (TRILEPTAL) 300 MG tablet, Take 1 tablet by mouth 4 (Four) Times a Day. Titrate down if pain controlled, Disp: 120 tablet, Rfl: 12  •  Probiotic Product (PROBIOTIC ADVANCED PO), Take 1 capsule by mouth Daily. Stop on 2/26 for surgery  Indications: Edith replacement, Disp: , Rfl:   •  Turmeric 500 MG capsule, Take  by mouth., Disp: , Rfl:   •  Urea (URE-NA) 15 g pack packet, Take 15 g by mouth 2 (Two) Times a Day for 30 days., Disp: 60 packet, Rfl: 0  •  vitamin C (ASCORBIC ACID) 250 MG tablet, Take 1 tablet by mouth Daily. Indications: supplement, Disp: , Rfl:   •  Zinc 25 MG tablet, Take 25 mg by mouth Daily. Indications: Zinc Deficiency, Disp: , Rfl:   •  spironolactone (ALDACTONE) 25 MG tablet, Take 1 tablet by mouth Daily., Disp: 90 tablet, Rfl: 3    No Known Allergies    Family History   Problem  "Relation Age of Onset   • Cancer Mother    • Heart disease Father    • Arthritis Father    • Diabetes Father    • Hypertension Father    • Hyperlipidemia Father    • Kidney disease Father    • Malig Hyperthermia Neg Hx        Social History     Tobacco Use   • Smoking status: Former     Current packs/day: 0.00     Types: Cigarettes     Quit date:      Years since quittin.2     Passive exposure: Past   • Smokeless tobacco: Never   Substance Use Topics   • Alcohol use: Yes     Alcohol/week: 7.0 standard drinks of alcohol     Types: 7 Glasses of wine per week     Comment: RED WINE BEFORE DINNER           Current Electrocardiogram:  Procedures        EMR Dragon/Transcription:   \"Dictated utilizing Dragon dictation\".     Copied text in this note has been reviewed by me and is accurate as of 25.    "

## 2025-03-24 NOTE — PROGRESS NOTES
"  [facility] CARDIOLOGY      REASON FOR FOLLOW-UP:  Follow-up heart catheterization          Chief Complaint   Patient presents with    Heart Problem         Dear Temo Vidal MD (Inactive)        Heart Problem     It was my pleasure to see Mode Duffy in the office today.  He is a 79 y.o. male with a history of ischemic heart disease. He underwent cardiac catheterization with resultant PCI of an obtuse marginal branch and diagonal branch in November 2014. He has additional history of hypertension with hypertensive cardiovascular disease, dyslipidemia, paroxysmal atrial fibrillation, antiplatelet therapy as well as sick sinus syndrome with complete heart block status post biventricular permanent pacemaker placement followed by Dr. Jackson.  Patient has left-sided implant in 1999 and had significant issues with RV pacing alone with heart failure symptoms and LV lead was placed epicardial lead because of left subclavian occlusion. After LV lead epicardial placement, patient was found to have RV lead malfunction needing a new RV lead placed on the right side which was removed secondary to pocket infection on the right side.    Mr. Duffy presented to the emergency department at [facility] 3/7/2025 with complaint of \"a little something in my chest\", nonradiating.  \"A little heavy breathing\".  TTE performed on 3/8/2025 showed mildly reduced LV systolic function with EF 41-45%, mild to moderate concentric LVH, grade 2 DD, RVSP mildly elevated at 35-45 mmHg.  Pharmacological nuclear stress testing showed fixed defect in basal inferior wall and basal anterior wall consistent with old infarct, no evidence of significant myocardial ischemia.  Severely reduced LVEF around 20%.  He subsequently underwent heart catheterization on 3/10/2025:  Severe single-vessel coronary artery disease involving the mid circumflex artery with 100% occlusion with left to left collaterals  Moderate to " "severe LV dysfunction with estimate LV ejection fraction of 35 to 40%  Mild elevated left-sided filling pressures    Medical management for cardiomyopathy recommended.  Recommendation for Aldactone prescribed at discharge and SGLT2 consideration if okay with nephrology.  He and his wife present today in follow-up from that hospitalization.    Today, Mr. Duffy reports that he has experienced no further chest discomfort.  He stated that he may have \"a little shortness of breath\", but it has improved significantly.  He denies any lower extremity edema, dizziness or lightheadedness.  No orthopnea.  He has undergone sleep study and states that CPAP has been recommended.  He is awaiting delivery of his device.  His groin site is well-healed without hematoma or bruising.  His only concern today is frequent urination.  He reported that his sleep study was interrupted frequently due to frequent urination.    CHF education was provided to the patient and wife including low-salt diet, monitoring fluid intake (he does have history of hyponatremia), daily weights.  He had several questions that were answered to his satisfaction.      ASSESSMENT:  Ischemic cardiomyopathy  Coronary artery disease status post PCI and stenting most recently in November of 2014.        This was cutting Balloon angioplasty of a circumflex branch and a diagonal branch.   Dyslipidemia.  Hypertension with hypertensive cardiovascular disease with a white coat component  Cardiomyopathy most recent EF of 35 to 40% in October 2019 echocardiogram  Sick sinus syndrome status post biventricular permanent pacemaker placement.  Paroxysmal atrial fibrillation  Coagulopathy secondary to Eliquis  Lumbago  Hyponatremia secondary to Trileptal  Renal cysts  History of trigeminal neuralgia presently treated with Trileptal.  Hip pain currently undergoing preoperative cardiovascular risk assessment for right hip surgery.  Trigeminal neuralgia    PLAN:  Will add Aldactone " 25 mg daily per GDMT.  Will hold on Jardiance and consider at follow-up with cardiologist  Continue current CV plan of care to include acebutolol, amlodipine, aspirin, statin, enalapril, nitrate.  Patient can decrease Lasix frequency to 40 mg daily  Multiple CHF handouts were provided to the patient.  Continue Eliquis for elevated chads vascular score          CHF Guideline Directed Medical Therapy  Beta Blocker: Acebutolol 200 mg daily  ARNI/ACE/ARB: Enalapril 20 mg daily  SGLT 2 inhibitors: Consider at follow-up  Diuretics: Lasix 40 mg daily  Aldosterone Antagonist: Spironolactone 25 mg daily  Vasodilators & Nitrates: Isosorbide 60 mg daily      Diagnoses and all orders for this visit:    1. Ischemic cardiomyopathy (Primary)  Overview:  Added automatically from request for surgery [id number]      2. Chronic systolic heart failure  Overview:  Added automatically from request for surgery [id number]    Orders:  -     Basic Metabolic Panel; Future    3. Coronary artery disease involving native coronary artery of native heart without angina pectoris    4. Sick sinus syndrome    Other orders  -     furosemide (Lasix) 40 MG tablet; Take 1 tablet by mouth Daily.  Dispense: 90 tablet; Refill: 0  -     spironolactone (ALDACTONE) 25 MG tablet; Take 1 tablet by mouth Daily.  Dispense: 90 tablet; Refill: 3          The following portions of the patient's history were reviewed and updated as appropriate: allergies, current medications, past family history, past medical history, past social history, past surgical history, and problem list.    REVIEW OF SYSTEMS:    Review of Systems   All other systems reviewed and are negative.      Vitals:    03/24/25 1025   BP: 137/80   Pulse: 70   SpO2: 96%         PHYSICAL EXAM:    General: Alert, cooperative, no distress, appears stated age  Head:  Normocephalic, atraumatic, mucous membranes moist  Eyes:  Conjunctiva/corneas clear, EOM's intact     Neck:  Supple,  no JVD or bruit     Lungs: Clear  to auscultation bilaterally, no wheezes rhonchi rales are noted  Chest wall: No tenderness  Musculoskeletal:   Ambulates freely without assistance  Heart::  Regular rate and rhythm, S1 and S2 normal, no murmur, rub or gallop  Abdomen: Soft, non-tender, nondistended, bowel sounds active, no abdominal bruit  Extremities: No cyanosis, clubbing, or edema   Pulses: 2+ and symmetric all extremities  Skin:  No rashes or lesions  Neuro/psych: A&O x3. CN II through XII are grossly intact with appropriate affect        Past Medical History:   Diagnosis Date    Arthritis     Arthritis     BPH (benign prostatic hyperplasia)     CHF (congestive heart failure)     Coronary artery disease     dr. Carrizales    Deviated septum     FH: cataracts     Gait abnormality     GERD (gastroesophageal reflux disease)     History of trigeminal neuralgia     Hyperlipidemia     Hypertension     Renal cyst     Sciatica     Sleep apnea     unable to tolerate machine       Past Surgical History:   Procedure Laterality Date    APPENDECTOMY      CARDIAC CATHETERIZATION      CARDIAC CATHETERIZATION N/A 3/5/2020    Procedure: Percutaneous Subclavian Intervention;  Surgeon: Nick Greenberg MD;  Location: [facility] CATH INVASIVE LOCATION;  Service: Cardiovascular;  Laterality: N/A;    CARDIAC CATHETERIZATION Right 3/10/2025    Procedure: LEFT HEART CATH with possible PCI;  Surgeon: Radha Pemberton MD;  Location: [facility] CATH INVASIVE LOCATION;  Service: Cardiovascular;  Laterality: Right;  Local and IV sedation    CARDIAC ELECTROPHYSIOLOGY PROCEDURE N/A 1/29/2020    Procedure: Biventricular Device Upgrade;  Surgeon: Dominick Jackson MD;  Location: [facility] CATH INVASIVE LOCATION;  Service: Cardiovascular    CARDIAC ELECTROPHYSIOLOGY PROCEDURE N/A 3/5/2020    Procedure: Lead Revision;  Surgeon: Nick Greenberg MD;  Location: [facility] CATH INVASIVE LOCATION;  Service: Cardiovascular;  Laterality: N/A;    CORONARY STENT PLACEMENT  2014     x 2    CYSTOSCOPY, URETEROSCOPY, RETROGRADE PYELOGRAM, STENT INSERTION Left 11/3/2023    Procedure: CYSTOSCOPY URETEROSCOPY STONE EXTRACTION STENT INSERTION;  Surgeon: Chris Patel MD;  Location: [facility] MAIN OR;  Service: Urology;  Laterality: Left;    IMPLANTABLE CARDIOVERTER DEFIBRILLATOR LEAD REPLACEMENT/POCKET REVISION Right 10/19/2022    Procedure: RT. TRANSVENOUS ICD LEAD EXTRACTION;  Surgeon: Naveed Rios MD;  Location: [facility];  Service: Cardiology;  Laterality: Right;    PROSTATE SURGERY      REFRACTIVE SURGERY      THORACOTOMY Left 3/4/2020    Procedure: THORACOTOMY MINI WITH LEAD PLACEMENT;  Surgeon: Justin Aguilar MD;  Location: [facility];  Service: Cardiothoracic;  Laterality: Left;    TOTAL HIP ARTHROPLASTY Bilateral     different times         Current Outpatient Medications:     acebutolol (SECTRAL) 200 MG capsule, Take 1 capsule by mouth Daily., Disp: 90 capsule, Rfl: 1    amLODIPine (NORVASC) 10 MG tablet, Take 1 tablet by mouth every night at bedtime. Indications: High Blood Pressure, Disp: , Rfl:     apixaban (Eliquis) 5 MG tablet tablet, Take 1 tablet by mouth Every 12 (Twelve) Hours. Indications: Atrial Fibrillation, Disp: 36 tablet, Rfl: 0    aspirin 81 MG tablet, Take 1 tablet by mouth Daily. Do not take day of surgery  Indications: Venous Thromboembolism, Disp: , Rfl:     atorvastatin (LIPITOR) 20 MG tablet, TAKE 1 TABLET EVERY DAY, Disp: 90 tablet, Rfl: 3    Calcium Carb-Cholecalciferol (CALCIUM 500 + D PO), Take 1 tablet by mouth Daily., Disp: , Rfl:     cetirizine (zyrTEC) 10 MG tablet, Take 1 tablet by mouth Daily. Indications: Upper Respiratory Tract Allergy, Disp: , Rfl:     Cinnamon 500 MG capsule, Take 1 capsule by mouth Daily., Disp: , Rfl:     enalapril (VASOTEC) 20 MG tablet, Take 1 tablet by mouth Daily. Indications: High Blood Pressure, Disp: , Rfl:     esomeprazole (nexIUM) 40 MG capsule, Take 1 capsule by mouth Daily. Indications: Gastroesophageal Reflux  Disease, Disp: , Rfl:     furosemide (Lasix) 40 MG tablet, Take 1 tablet by mouth Daily., Disp: 90 tablet, Rfl: 0    gabapentin (NEURONTIN) 300 MG capsule, Take 1 capsule by mouth Daily., Disp: , Rfl:     glucosamine-chondroitin 500-400 MG capsule capsule, Take  by mouth 3 (Three) Times a Day With Meals., Disp: , Rfl:     isosorbide mononitrate (IMDUR) 60 MG 24 hr tablet, Take 1 tablet by mouth Daily. Indications: Stable Angina Pectoris, Disp: , Rfl:     OXcarbazepine (TRILEPTAL) 300 MG tablet, Take 1 tablet by mouth 4 (Four) Times a Day. Titrate down if pain controlled, Disp: 120 tablet, Rfl: 12    Probiotic Product (PROBIOTIC ADVANCED PO), Take 1 capsule by mouth Daily. Stop on  for surgery  Indications: Edith replacement, Disp: , Rfl:     Turmeric 500 MG capsule, Take  by mouth., Disp: , Rfl:     Urea (URE-NA) 15 g pack packet, Take 15 g by mouth 2 (Two) Times a Day for 30 days., Disp: 60 packet, Rfl: 0    vitamin C (ASCORBIC ACID) 250 MG tablet, Take 1 tablet by mouth Daily. Indications: supplement, Disp: , Rfl:     Zinc 25 MG tablet, Take 25 mg by mouth Daily. Indications: Zinc Deficiency, Disp: , Rfl:     spironolactone (ALDACTONE) 25 MG tablet, Take 1 tablet by mouth Daily., Disp: 90 tablet, Rfl: 3    No Known Allergies    Family History   Problem Relation Age of Onset    Cancer Mother     Heart disease Father     Arthritis Father     Diabetes Father     Hypertension Father     Hyperlipidemia Father     Kidney disease Father     Malig Hyperthermia Neg Hx        Social History     Tobacco Use    Smoking status: Former     Current packs/day: 0.00     Types: Cigarettes     Quit date:      Years since quittin.2     Passive exposure: Past    Smokeless tobacco: Never   Substance Use Topics    Alcohol use: Yes     Alcohol/week: 7.0 standard drinks of alcohol     Types: 7 Glasses of wine per week     Comment: RED WINE BEFORE DINNER           Current Electrocardiogram:  Procedures        EMR  "Dragon/Transcription:   \"Dictated utilizing Dragon dictation\".     Copied text in this note has been reviewed by me and is accurate as of 03/24/25.    "

## 2025-03-25 NOTE — PROGRESS NOTES
Subjective: Trigeminal neuralgia    Patient ID: Mode Duffy is a 79 y.o. male.    History of Present Illness Mr. Duffy is a very pleasant 79-year-old  male who has presented today for a follow-up on [facility].  The patient has a history of proton beam therapy per Dr. Shepherd to the [facility] which helped for approximately 6 months then the pain returned.  He has been managed by Dr. Seiple on Trileptal 1 300 mg pill 4 times daily and Gabapentin 300 mg once per day.      The patient states he had a little flareup a couple of months ago, he is interested in increasing the gabapentin to 300 mg twice a day in addition to the Trileptal.  He was hospitalized recently for cardiac issues and his sodium dropped however they did get it raised back up.  He is on a low-sodium diet then he takes Trileptal 4 times a day which can also decrease his sodium.  The patient states he is due to get repeat lab next month.  He reports Dr. Vidal is keeping a close eye on the sodium now.        4  Trileptal 300 mg  a day  Gabapentin 300 mg daily (bid)      Testing:  Testing 7/1/2012 CT of Brain at [facility] read by Dr Acacia DELGADO other than sinus disese  Cannot have MRI due to pacemaker      The following portions of the patient's history were reviewed and updated as appropriate: allergies, current medications, past family history, past medical history, past social history, past surgical history and problem list.    Family History   Problem Relation Age of Onset    Cancer Mother     Heart disease Father     Arthritis Father     Diabetes Father     Hypertension Father     Hyperlipidemia Father     Kidney disease Father     Malig Hyperthermia Neg Hx        Past Medical History:   Diagnosis Date    Arthritis     Arthritis     BPH (benign prostatic hyperplasia)     CHF (congestive heart failure)     Coronary artery disease     dr. Carrizales    Deviated septum     FH: cataracts     Gait abnormality     GERD (gastroesophageal reflux disease)      History of trigeminal neuralgia     Hyperlipidemia     Hypertension     Renal cyst     Sciatica     Sleep apnea     unable to tolerate machine       Social History     Socioeconomic History    Marital status:    Tobacco Use    Smoking status: Former     Current packs/day: 0.00     Types: Cigarettes     Quit date:      Years since quittin.2     Passive exposure: Past    Smokeless tobacco: Never   Vaping Use    Vaping status: Never Used   Substance and Sexual Activity    Alcohol use: Yes     Alcohol/week: 7.0 standard drinks of alcohol     Types: 7 Glasses of wine per week     Comment: RED WINE BEFORE DINNER    Drug use: No    Sexual activity: Defer         Current Outpatient Medications:     acebutolol (SECTRAL) 200 MG capsule, Take 1 capsule by mouth Daily., Disp: 90 capsule, Rfl: 1    amLODIPine (NORVASC) 10 MG tablet, Take 1 tablet by mouth every night at bedtime. Indications: High Blood Pressure, Disp: , Rfl:     apixaban (Eliquis) 5 MG tablet tablet, Take 1 tablet by mouth Every 12 (Twelve) Hours. Indications: Atrial Fibrillation, Disp: 36 tablet, Rfl: 0    aspirin 81 MG tablet, Take 1 tablet by mouth Daily. Do not take day of surgery  Indications: Venous Thromboembolism, Disp: , Rfl:     atorvastatin (LIPITOR) 20 MG tablet, TAKE 1 TABLET EVERY DAY, Disp: 90 tablet, Rfl: 3    Calcium Carb-Cholecalciferol (CALCIUM 500 + D PO), Take 1 tablet by mouth Daily., Disp: , Rfl:     cetirizine (zyrTEC) 10 MG tablet, Take 1 tablet by mouth Daily. Indications: Upper Respiratory Tract Allergy, Disp: , Rfl:     Cinnamon 500 MG capsule, Take 1 capsule by mouth Daily., Disp: , Rfl:     enalapril (VASOTEC) 20 MG tablet, Take 1 tablet by mouth Daily. Indications: High Blood Pressure, Disp: , Rfl:     esomeprazole (nexIUM) 40 MG capsule, Take 1 capsule by mouth Daily. Indications: Gastroesophageal Reflux Disease, Disp: , Rfl:     furosemide (Lasix) 40 MG tablet, Take 1 tablet by mouth Daily., Disp: 90 tablet, Rfl:  0    gabapentin (NEURONTIN) 300 MG capsule, Take 1 capsule by mouth 2 (Two) Times a Day., Disp: 60 capsule, Rfl: 5    glucosamine-chondroitin 500-400 MG capsule capsule, Take  by mouth 3 (Three) Times a Day With Meals., Disp: , Rfl:     isosorbide mononitrate (IMDUR) 60 MG 24 hr tablet, Take 1 tablet by mouth Daily. Indications: Stable Angina Pectoris, Disp: , Rfl:     Magnesium Oxide 140 MG capsule, Take 400 mg by mouth., Disp: , Rfl:     OXcarbazepine (TRILEPTAL) 300 MG tablet, Take 1 tablet by mouth 4 (Four) Times a Day. Titrate down if pain controlled, Disp: 360 tablet, Rfl: 3    Probiotic Product (PROBIOTIC ADVANCED PO), Take 1 capsule by mouth Daily. Stop on 2/26 for surgery  Indications: Edith replacement, Disp: , Rfl:     spironolactone (ALDACTONE) 25 MG tablet, Take 1 tablet by mouth Daily., Disp: 90 tablet, Rfl: 3    Turmeric 500 MG capsule, Take  by mouth., Disp: , Rfl:     Urea (URE-NA) 15 g pack packet, Take 15 g by mouth 2 (Two) Times a Day for 30 days., Disp: 60 packet, Rfl: 0    vitamin C (ASCORBIC ACID) 250 MG tablet, Take 1 tablet by mouth Daily. Indications: supplement, Disp: , Rfl:     Zinc 25 MG tablet, Take 25 mg by mouth Daily. Indications: Zinc Deficiency, Disp: , Rfl:     Review of Systems   All other systems reviewed and are negative.         I have reviewed ROS completed by medical assistant.     Objective:      Neurological Exam  Mental Status  Awake and alert. Oriented only to person, place, time and situation. Speech is normal. Language is fluent with no aphasia. Attention and concentration are normal. Fund of knowledge is appropriate for level of education.    Cranial Nerves  CN II: Tested with correction. Right visual acuity: Normal. Left visual acuity: Normal. Right normal visual field. Left normal visual field.  CN III, IV, VI: Extraocular movements intact bilaterally. No nystagmus. Normal saccades. Normal smooth pursuit.   Right pupil: 2 mm.   Left pupil: 2 mm.  Patient denies any  facial pain today.    Motor  Normal muscle bulk throughout. No fasciculations present.    Sensory  Light touch is normal in upper and lower extremities.     Gait  Casual gait is normal including stance, stride, and arm swing.       Assessment/Plan:  The patient will follow-up with Dr. Vidal for labs on sodium.  He will be increased to gabapentin 300 mg twice a day alongside the oxcarbazepine 300 mg 4 times a day.  He is to call the clinic or have [facility] if he has any increase in facial pain.  He was notified we do have more wiggle room with the gabapentin.  I did review recent sodium levels.      Diagnoses and all orders for this visit:    1. Trigeminal neuralgia (Primary)  -     gabapentin (NEURONTIN) 300 MG capsule; Take 1 capsule by mouth 2 (Two) Times a Day.  Dispense: 60 capsule; Refill: 5  -     OXcarbazepine (TRILEPTAL) 300 MG tablet; Take 1 tablet by mouth 4 (Four) Times a Day. Titrate down if pain controlled  Dispense: 360 tablet; Refill: 3          Return in about 6 months (around 9/26/2025) for Sarita Davalos .     I spent 31 minutes caring for Mode on this date of service. This time includes time spent by me in the following activities: preparing for the visit, reviewing tests, obtaining and/or reviewing a separately obtained history, performing a medically appropriate examination and/or evaluation, counseling and educating the patient/family/caregiver, ordering medications, tests, or procedures, and documenting information in the medical record.      This document has been electronically signed by MIKAYLA Joiner on March 26, 2025 11:13 EDT

## 2025-03-26 ENCOUNTER — OFFICE VISIT (OUTPATIENT)
Dept: NEUROLOGY | Facility: CLINIC | Age: 80
End: 2025-03-26
Payer: MEDICARE

## 2025-03-26 VITALS
HEIGHT: 72 IN | WEIGHT: 216 LBS | SYSTOLIC BLOOD PRESSURE: 147 MMHG | BODY MASS INDEX: 29.26 KG/M2 | DIASTOLIC BLOOD PRESSURE: 81 MMHG | HEART RATE: 70 BPM

## 2025-03-26 DIAGNOSIS — G50.0 TRIGEMINAL NEURALGIA: Primary | ICD-10-CM

## 2025-03-26 RX ORDER — GABAPENTIN 300 MG/1
300 CAPSULE ORAL 2 TIMES DAILY
Qty: 60 CAPSULE | Refills: 5 | Status: SHIPPED | OUTPATIENT
Start: 2025-03-26

## 2025-03-26 RX ORDER — OXCARBAZEPINE 300 MG/1
300 TABLET, FILM COATED ORAL 4 TIMES DAILY
Qty: 360 TABLET | Refills: 3 | Status: SHIPPED | OUTPATIENT
Start: 2025-03-26

## 2025-03-31 ENCOUNTER — OFFICE VISIT (OUTPATIENT)
Dept: PULMONOLOGY | Facility: HOSPITAL | Age: 80
End: 2025-03-31
Payer: MEDICARE

## 2025-03-31 VITALS
WEIGHT: 215 LBS | SYSTOLIC BLOOD PRESSURE: 129 MMHG | OXYGEN SATURATION: 98 % | HEIGHT: 72 IN | DIASTOLIC BLOOD PRESSURE: 72 MMHG | BODY MASS INDEX: 29.12 KG/M2 | HEART RATE: 70 BPM

## 2025-03-31 DIAGNOSIS — R91.1 PULMONARY NODULE: Primary | ICD-10-CM

## 2025-03-31 DIAGNOSIS — G47.33 OSA (OBSTRUCTIVE SLEEP APNEA): ICD-10-CM

## 2025-03-31 PROCEDURE — G0463 HOSPITAL OUTPT CLINIC VISIT: HCPCS

## 2025-03-31 NOTE — TELEPHONE ENCOUNTER
Caller: Mode Duffy    Relationship to patient: Self    Best call back number: [phone]     Patient is needing: PT WAS HAVING DIZZINESS AND LIGHT HEADEDNESS SINCE HE STARTED NEW MEDS, PT DECLINED THESE SYMPTOMS CURRENTLY. PT IS WONDERING WHAT HE NEEDS TO DO, PLS CALL & ADVISE.

## 2025-03-31 NOTE — PROGRESS NOTES
PULMONARY/ CRITICAL CARE/ SLEEP MEDICINE OUTPATIENT CONSULT/ FOLLOW UP NOTE        Patient Name:  Mode Duffy    :  1945    Medical Record:  [id number]    PRIMARY CARE PHYSICIAN     Temo Vidal MD (Inactive)    REASON FOR CONSULTATION    Mode Duffy is a 79 y.o. male who is referred for consultation for nodule  REVIEW OF SYSTEMS    Constitutional:  Denies fever or chills   Eyes:  Denies change in visual acuity   HENT:  Denies nasal congestion or sore throat   Respiratory:  Denies cough or shortness of breath   Cardiovascular:  Denies chest pain or edema   GI:  Denies abdominal pain, nausea, vomiting, bloody stools or diarrhea   :  Denies dysuria   Musculoskeletal:  Denies back pain or joint pain   Integument:  Denies rash   Neurologic:  Denies headache, focal weakness or sensory changes   Endocrine:  Denies polyuria or polydipsia   Lymphatic:  Denies swollen glands   Psychiatric:  Denies depression or anxiety     MEDICAL HISTORY    Past Medical History:   Diagnosis Date    Arthritis     Arthritis     BPH (benign prostatic hyperplasia)     CHF (congestive heart failure)     Coronary artery disease     dr. Carrizales    Deviated septum     FH: cataracts     Gait abnormality     GERD (gastroesophageal reflux disease)     History of trigeminal neuralgia     Hyperlipidemia     Hypertension     Renal cyst     Sciatica     Sleep apnea     unable to tolerate machine        SURGICAL HISTORY    Past Surgical History:   Procedure Laterality Date    APPENDECTOMY      CARDIAC CATHETERIZATION      CARDIAC CATHETERIZATION N/A 3/5/2020    Procedure: Percutaneous Subclavian Intervention;  Surgeon: Nick Greenberg MD;  Location: [facility] CATH INVASIVE LOCATION;  Service: Cardiovascular;  Laterality: N/A;    CARDIAC CATHETERIZATION Right 3/10/2025    Procedure: LEFT HEART CATH with possible PCI;  Surgeon: Radha Pemberton MD;  Location: [facility] CATH INVASIVE LOCATION;  Service: Cardiovascular;   Laterality: Right;  Local and IV sedation    CARDIAC ELECTROPHYSIOLOGY PROCEDURE N/A 2020    Procedure: Biventricular Device Upgrade;  Surgeon: Dominick Jackson MD;  Location: [facility] CATH INVASIVE LOCATION;  Service: Cardiovascular    CARDIAC ELECTROPHYSIOLOGY PROCEDURE N/A 3/5/2020    Procedure: Lead Revision;  Surgeon: Nick Greenberg MD;  Location: [facility] CATH INVASIVE LOCATION;  Service: Cardiovascular;  Laterality: N/A;    CORONARY STENT PLACEMENT  2014    x 2    CYSTOSCOPY, URETEROSCOPY, RETROGRADE PYELOGRAM, STENT INSERTION Left 11/3/2023    Procedure: CYSTOSCOPY URETEROSCOPY STONE EXTRACTION STENT INSERTION;  Surgeon: Chris Patel MD;  Location: [facility] MAIN OR;  Service: Urology;  Laterality: Left;    IMPLANTABLE CARDIOVERTER DEFIBRILLATOR LEAD REPLACEMENT/POCKET REVISION Right 10/19/2022    Procedure: RT. TRANSVENOUS ICD LEAD EXTRACTION;  Surgeon: Naveed Rios MD;  Location: [facility];  Service: Cardiology;  Laterality: Right;    PROSTATE SURGERY      REFRACTIVE SURGERY      THORACOTOMY Left 3/4/2020    Procedure: THORACOTOMY MINI WITH LEAD PLACEMENT;  Surgeon: Justin Aguilar MD;  Location: [facility];  Service: Cardiothoracic;  Laterality: Left;    TOTAL HIP ARTHROPLASTY Bilateral     different times        FAMILY HISTORY    Family History   Problem Relation Age of Onset    Cancer Mother     Heart disease Father     Arthritis Father     Diabetes Father     Hypertension Father     Hyperlipidemia Father     Kidney disease Father     Malig Hyperthermia Neg Hx        SOCIAL HISTORY    Social History     Tobacco Use    Smoking status: Former     Current packs/day: 0.00     Types: Cigarettes     Quit date:      Years since quittin.2     Passive exposure: Past    Smokeless tobacco: Never   Substance Use Topics    Alcohol use: Yes     Alcohol/week: 7.0 standard drinks of alcohol     Types: 7 Glasses of wine per week     Comment: RED WINE BEFORE DINNER        ALLERGIES     No Known Allergies      MEDICATIONS    Current Outpatient Medications on File Prior to Visit   Medication Sig Dispense Refill    acebutolol (SECTRAL) 200 MG capsule Take 1 capsule by mouth Daily. 90 capsule 1    amLODIPine (NORVASC) 10 MG tablet Take 1 tablet by mouth every night at bedtime. Indications: High Blood Pressure      apixaban (Eliquis) 5 MG tablet tablet Take 1 tablet by mouth Every 12 (Twelve) Hours. Indications: Atrial Fibrillation 36 tablet 0    aspirin 81 MG tablet Take 1 tablet by mouth Daily. Do not take day of surgery  Indications: Venous Thromboembolism      atorvastatin (LIPITOR) 20 MG tablet TAKE 1 TABLET EVERY DAY 90 tablet 3    Calcium Carb-Cholecalciferol (CALCIUM 500 + D PO) Take 1 tablet by mouth Daily.      cetirizine (zyrTEC) 10 MG tablet Take 1 tablet by mouth Daily. Indications: Upper Respiratory Tract Allergy      Cinnamon 500 MG capsule Take 1 capsule by mouth Daily.      enalapril (VASOTEC) 20 MG tablet Take 1 tablet by mouth Daily. Indications: High Blood Pressure      esomeprazole (nexIUM) 40 MG capsule Take 1 capsule by mouth Daily. Indications: Gastroesophageal Reflux Disease      furosemide (Lasix) 40 MG tablet Take 1 tablet by mouth Daily. 90 tablet 0    gabapentin (NEURONTIN) 300 MG capsule Take 1 capsule by mouth 2 (Two) Times a Day. 60 capsule 5    glucosamine-chondroitin 500-400 MG capsule capsule Take  by mouth 3 (Three) Times a Day With Meals.      isosorbide mononitrate (IMDUR) 60 MG 24 hr tablet Take 1 tablet by mouth Daily. Indications: Stable Angina Pectoris      Magnesium Oxide 140 MG capsule Take 400 mg by mouth.      OXcarbazepine (TRILEPTAL) 300 MG tablet Take 1 tablet by mouth 4 (Four) Times a Day. Titrate down if pain controlled 360 tablet 3    Probiotic Product (PROBIOTIC ADVANCED PO) Take 1 capsule by mouth Daily. Stop on 2/26 for surgery  Indications: Edith replacement      spironolactone (ALDACTONE) 25 MG tablet Take 1 tablet by mouth Daily. 90 tablet 3     Turmeric 500 MG capsule Take  by mouth.      Urea (URE-NA) 15 g pack packet Take 15 g by mouth 2 (Two) Times a Day for 30 days. 60 packet 0    vitamin C (ASCORBIC ACID) 250 MG tablet Take 1 tablet by mouth Daily. Indications: supplement      Zinc 25 MG tablet Take 25 mg by mouth Daily. Indications: Zinc Deficiency       No current facility-administered medications on file prior to visit.       PHYSICAL EXAM    There were no vitals filed for this visit.     Constitutional:  Well developed, well nourished, no acute distress, non-toxic appearance   Eyes:  PERRL, conjunctiva normal   HENT:  Atraumatic, external ears normal, nose normal, oropharynx moist, no pharyngeal exudates. mallampatti   Neck- normal range of motion, no tenderness, supple   Respiratory:  No respiratory distress, normal breath sounds, no rales, no wheezing   Cardiovascular:  Normal rate, normal rhythm, no murmurs, no gallops, no rubs   GI:  Soft, nondistended, normal bowel sounds, nontender, no organomegaly, no mass, no rebound, no guarding   :  No costovertebral angle tenderness   Musculoskeletal:  No edema, no tenderness, no deformities. Back- no tenderness  Integument:  Well hydrated, no rash   Lymphatic:  No lymphadenopathy noted   Neurologic:  Alert & oriented x 3, CN 2-12 normal, normal motor function, normal sensory function, no focal deficits noted   Psychiatric:  Speech and behavior appropriate     XR Chest 1 View  Result Date: 3/7/2025  Impression: No acute chest findings. Electronically Signed: Omayra Quinn MD  3/7/2025 9:12 AM EST  Workstation ID: [id number]     Results for orders placed during the hospital encounter of 03/07/25    Adult Transthoracic Echo Complete W/ Cont if Necessary Per Protocol    Interpretation Summary    Left ventricular systolic function is normal. Left ventricular ejection fraction appears to be 41 - 45%.    The left ventricular cavity is mildly dilated.    Left ventricular wall thickness is consistent with  mild to moderate concentric hypertrophy.    The following left ventricular wall segments are hypokinetic: mid anterior, mid anterolateral, apical lateral, mid inferolateral and apex hypokinetic. The following left ventricular wall segments are akinetic: basal anterolateral.    Left ventricular diastolic function is consistent with (grade II w/high LAP) pseudonormalization.    The left atrial cavity is mildly dilated.    There is mild calcification of the aortic valve mainly affecting the left coronary and right coronary cusp(s).    Estimated right ventricular systolic pressure from tricuspid regurgitation is mildly elevated (35-45 mmHg).    Mild pulmonary hypertension is present.      ASSESSMENT & PLAN:      left lower lobe 11 mm nodule on CT abdomen November 2023, CT chest November 2024 showed no change in size  He is non-smoker  Mother had lung cancer  Repeat CT chest 11/2024   The provided history indicates renal mass as the reason for the examination. If thisrepresents the correct history, then dedicated CT or MRI abdomen without and with contrastrenal mass protocol would be recommended.2. 11 mm noncalcified left lower lobe lung nodule is stable since 11/3/2023. Additional 4 mmnoncalcified right upper lobe lung nodule. 6-month CT chest follow-up is suggested.3. 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 Pacemaker leadtraverses through the herniated lung parenchyma, terminating along the margin of the leftlateral ventricular wall.4.. Dense coronary artery calcifications       Severe SHELTON AHI 63    A-fib       Plan  Repeat CT scan of the chest scheduled for June 2025  Awaiting set up with BiPAP machine               This document has been electronically signed by  German Johnson MD  09:40 EDT

## 2025-04-01 ENCOUNTER — OFFICE VISIT (OUTPATIENT)
Dept: CARDIOLOGY | Facility: CLINIC | Age: 80
End: 2025-04-01
Payer: MEDICARE

## 2025-04-01 ENCOUNTER — LAB (OUTPATIENT)
Dept: LAB | Facility: HOSPITAL | Age: 80
End: 2025-04-01
Payer: MEDICARE

## 2025-04-01 VITALS
WEIGHT: 221 LBS | DIASTOLIC BLOOD PRESSURE: 74 MMHG | OXYGEN SATURATION: 97 % | HEART RATE: 70 BPM | SYSTOLIC BLOOD PRESSURE: 129 MMHG | HEIGHT: 72 IN | BODY MASS INDEX: 29.93 KG/M2

## 2025-04-01 DIAGNOSIS — I25.10 CORONARY ARTERY DISEASE INVOLVING NATIVE CORONARY ARTERY OF NATIVE HEART WITHOUT ANGINA PECTORIS: ICD-10-CM

## 2025-04-01 DIAGNOSIS — I50.22 CHRONIC SYSTOLIC HEART FAILURE: ICD-10-CM

## 2025-04-01 DIAGNOSIS — I42.8 OTHER CARDIOMYOPATHY: Primary | ICD-10-CM

## 2025-04-01 LAB
ANION GAP SERPL CALCULATED.3IONS-SCNC: 10.4 MMOL/L (ref 5–15)
BUN SERPL-MCNC: 13 MG/DL (ref 8–23)
BUN/CREAT SERPL: 14.4 (ref 7–25)
CALCIUM SPEC-SCNC: 9.2 MG/DL (ref 8.6–10.5)
CHLORIDE SERPL-SCNC: 92 MMOL/L (ref 98–107)
CO2 SERPL-SCNC: 26.6 MMOL/L (ref 22–29)
CREAT SERPL-MCNC: 0.9 MG/DL (ref 0.76–1.27)
EGFRCR SERPLBLD CKD-EPI 2021: 86.9 ML/MIN/1.73
GLUCOSE SERPL-MCNC: 115 MG/DL (ref 65–99)
POTASSIUM SERPL-SCNC: 4.8 MMOL/L (ref 3.5–5.2)
SODIUM SERPL-SCNC: 129 MMOL/L (ref 136–145)

## 2025-04-01 PROCEDURE — 80048 BASIC METABOLIC PNL TOTAL CA: CPT

## 2025-04-01 PROCEDURE — 36415 COLL VENOUS BLD VENIPUNCTURE: CPT

## 2025-04-01 NOTE — PROGRESS NOTES
Cardiology Office Follow Up Visit      Primary Care Provider:  Temo Vidal MD (Inactive)    Reason for f/u:     Hospital Follow-Up      Subjective       History of Present Illness       Mode Duffy is a 79 y.o. male seen in clinic today for hospital follow-up.    Patient has a past cardiac history of CAD status post PCI in 2014, PAF (anticoagulated with Eliquis), ischemic cardiomyopathy, and SSS status post BiV PPM.  PMH includes HTN, HLD, SHELTON, and trigeminal neuralgia.    Patient recently presented to the ER 3/2025 with complaints of chest pain and underwent heart catheterization showing  of the LCx with collaterals.  He is planned for medical management unless worsening symptoms or ischemia develops then he can be considered for high risk PCI.  2D echo showed an EF of 41 to 45% with grade 2 diastolic dysfunction and mild AI/MR/TR.  Hospital course was complicated by CHF and hyponatremia in which nephrology was consulted and he was treated with diuretics and fluid restriction.    Since the addition of spironolactone last visit patient complains of periods of dizziness described as lightheadedness which is occurred while shopping and during a [employer] service.  He had follow-up labs scheduled yesterday but got busy and did not have them done.  He tells me his SBP has been running in the 130s which is normal for him.  He tells me he stays reasonably hydrated but does drink 2 cups of coffee per day.  He denies lower extremity edema.  He reports mild headache.  He is still been able to go on his morning 2 mile walk without unusual difficulty.  Patient reports that he is awaiting a CPAP machine to treat his sleep apnea.      ASSESSMENT/PLAN:      Diagnoses and all orders for this visit:    1. Other cardiomyopathy (Primary)    2. Coronary artery disease involving native coronary artery of native heart without angina pectoris            MEDICAL DECISION MAKING:    Patient appears hypovolemic on exam today.   BP is normotensive.  Will discontinue spironolactone and check BMP today.  Counseled on caffeine intake.      RTC: We will keep scheduled follow-up appointments with Dr. Carrizales 5/20 and Dr. Jackson 5/23.      Addendum: BMP 4/1 as follows: Na 129 (stable), K 4.8, Cr 0.90.    Past Medical History:   Diagnosis Date    Arthritis     Arthritis     BPH (benign prostatic hyperplasia)     CHF (congestive heart failure)     Coronary artery disease     dr. Carrizales    Deviated septum     FH: cataracts     Gait abnormality     GERD (gastroesophageal reflux disease)     History of trigeminal neuralgia     Hyperlipidemia     Hypertension     Renal cyst     Sciatica     Sleep apnea     unable to tolerate machine       Past Surgical History:   Procedure Laterality Date    APPENDECTOMY      CARDIAC CATHETERIZATION      CARDIAC CATHETERIZATION N/A 3/5/2020    Procedure: Percutaneous Subclavian Intervention;  Surgeon: Nick Greenberg MD;  Location: [facility] CATH INVASIVE LOCATION;  Service: Cardiovascular;  Laterality: N/A;    CARDIAC CATHETERIZATION Right 3/10/2025    Procedure: LEFT HEART CATH with possible PCI;  Surgeon: Radha Pemberton MD;  Location: [facility] CATH INVASIVE LOCATION;  Service: Cardiovascular;  Laterality: Right;  Local and IV sedation    CARDIAC ELECTROPHYSIOLOGY PROCEDURE N/A 1/29/2020    Procedure: Biventricular Device Upgrade;  Surgeon: Dominick Jackson MD;  Location: [facility] CATH INVASIVE LOCATION;  Service: Cardiovascular    CARDIAC ELECTROPHYSIOLOGY PROCEDURE N/A 3/5/2020    Procedure: Lead Revision;  Surgeon: Nick Greenberg MD;  Location: [facility] CATH INVASIVE LOCATION;  Service: Cardiovascular;  Laterality: N/A;    CORONARY STENT PLACEMENT  2014    x 2    CYSTOSCOPY, URETEROSCOPY, RETROGRADE PYELOGRAM, STENT INSERTION Left 11/3/2023    Procedure: CYSTOSCOPY URETEROSCOPY STONE EXTRACTION STENT INSERTION;  Surgeon: Chris Patel MD;  Location: [facility] MAIN OR;  Service: Urology;   Laterality: Left;    IMPLANTABLE CARDIOVERTER DEFIBRILLATOR LEAD REPLACEMENT/POCKET REVISION Right 10/19/2022    Procedure: RT. TRANSVENOUS ICD LEAD EXTRACTION;  Surgeon: Naveed Rios MD;  Location: [facility];  Service: Cardiology;  Laterality: Right;    PROSTATE SURGERY      REFRACTIVE SURGERY      THORACOTOMY Left 3/4/2020    Procedure: THORACOTOMY MINI WITH LEAD PLACEMENT;  Surgeon: Justin Aguilar MD;  Location: [facility];  Service: Cardiothoracic;  Laterality: Left;    TOTAL HIP ARTHROPLASTY Bilateral     different times         Current Outpatient Medications:     acebutolol (SECTRAL) 200 MG capsule, Take 1 capsule by mouth Daily., Disp: 90 capsule, Rfl: 1    amLODIPine (NORVASC) 10 MG tablet, Take 1 tablet by mouth every night at bedtime. Indications: High Blood Pressure, Disp: , Rfl:     apixaban (Eliquis) 5 MG tablet tablet, Take 1 tablet by mouth Every 12 (Twelve) Hours. Indications: Atrial Fibrillation, Disp: 36 tablet, Rfl: 0    aspirin 81 MG tablet, Take 1 tablet by mouth Daily. Do not take day of surgery  Indications: Venous Thromboembolism, Disp: , Rfl:     atorvastatin (LIPITOR) 20 MG tablet, TAKE 1 TABLET EVERY DAY, Disp: 90 tablet, Rfl: 3    Calcium Carb-Cholecalciferol (CALCIUM 500 + D PO), Take 1 tablet by mouth Daily., Disp: , Rfl:     cetirizine (zyrTEC) 10 MG tablet, Take 1 tablet by mouth Daily. Indications: Upper Respiratory Tract Allergy, Disp: , Rfl:     Cinnamon 500 MG capsule, Take 1 capsule by mouth Daily., Disp: , Rfl:     enalapril (VASOTEC) 20 MG tablet, Take 1 tablet by mouth Daily. Indications: High Blood Pressure, Disp: , Rfl:     esomeprazole (nexIUM) 40 MG capsule, Take 1 capsule by mouth Daily. Indications: Gastroesophageal Reflux Disease, Disp: , Rfl:     furosemide (Lasix) 40 MG tablet, Take 1 tablet by mouth Daily., Disp: 90 tablet, Rfl: 0    gabapentin (NEURONTIN) 300 MG capsule, Take 1 capsule by mouth 2 (Two) Times a Day., Disp: 60 capsule, Rfl: 5     "glucosamine-chondroitin 500-400 MG capsule capsule, Take  by mouth 3 (Three) Times a Day With Meals., Disp: , Rfl:     isosorbide mononitrate (IMDUR) 60 MG 24 hr tablet, Take 1 tablet by mouth Daily. Indications: Stable Angina Pectoris, Disp: , Rfl:     Magnesium Oxide 140 MG capsule, Take 400 mg by mouth., Disp: , Rfl:     OXcarbazepine (TRILEPTAL) 300 MG tablet, Take 1 tablet by mouth 4 (Four) Times a Day. Titrate down if pain controlled, Disp: 360 tablet, Rfl: 3    Probiotic Product (PROBIOTIC ADVANCED PO), Take 1 capsule by mouth Daily. Stop on  for surgery  Indications: Edith replacement, Disp: , Rfl:     Turmeric 500 MG capsule, Take  by mouth., Disp: , Rfl:     vitamin C (ASCORBIC ACID) 250 MG tablet, Take 1 tablet by mouth Daily. Indications: supplement, Disp: , Rfl:     Zinc 25 MG tablet, Take 25 mg by mouth Daily. Indications: Zinc Deficiency, Disp: , Rfl:     Social History     Socioeconomic History    Marital status:    Tobacco Use    Smoking status: Former     Current packs/day: 0.00     Types: Cigarettes     Quit date:      Years since quittin.2     Passive exposure: Past    Smokeless tobacco: Never   Vaping Use    Vaping status: Never Used   Substance and Sexual Activity    Alcohol use: Yes     Alcohol/week: 7.0 standard drinks of alcohol     Types: 7 Glasses of wine per week     Comment: RED WINE BEFORE DINNER    Drug use: No    Sexual activity: Defer       Family History   Problem Relation Age of Onset    Cancer Mother     Heart disease Father     Arthritis Father     Diabetes Father     Hypertension Father     Hyperlipidemia Father     Kidney disease Father     Malig Hyperthermia Neg Hx        The following portions of the patient's history were reviewed and updated as appropriate: allergies, current medications, past family history, past medical history, past social history, past surgical history and problem list.    ROS  /74   Pulse 70   Ht 182.9 cm (72\")   Wt 100 kg " (221 lb)   SpO2 97%   BMI 29.97 kg/m² .  Objective     Physical Exam    Physical Exam:  Neuro:  CV:  Resp:  GI:  Ext:  Pysch: AAOx3, no gross deficits  S1S2 RRR, no murmur  Non-labored, CTA  BS+, abd soft  Pedal pulses palp, no edema  Calm and cooperative  Skin: sluggish turger       Procedures

## 2025-04-02 ENCOUNTER — READMISSION MANAGEMENT (OUTPATIENT)
Dept: CALL CENTER | Facility: HOSPITAL | Age: 80
End: 2025-04-02
Payer: MEDICARE

## 2025-04-02 ENCOUNTER — TELEPHONE (OUTPATIENT)
Dept: NEUROLOGY | Facility: CLINIC | Age: 80
End: 2025-04-02
Payer: MEDICARE

## 2025-04-02 DIAGNOSIS — G47.33 OSA (OBSTRUCTIVE SLEEP APNEA): Primary | ICD-10-CM

## 2025-04-02 NOTE — OUTREACH NOTE
Medical Week 3 Survey      Flowsheet Row Responses   [facility] patient discharged from? [facility]   Does the patient have one of the following disease processes/diagnoses(primary or secondary)? Other   Week 3 attempt successful? Yes   Call start time 1338   Call end time 1340   Discharge diagnosis Chest pain, heart cath   Meds reviewed with patient/caregiver? Yes   Is the patient taking all medications as directed (includes completed medication regime)? Yes   Does the patient have a primary care provider?  Yes   Does the patient have an appointment with their PCP within 7 days of discharge? Yes   Has the patient kept scheduled appointments due by today? Yes   Has home health visited the patient within 72 hours of discharge? N/A   Psychosocial issues? No   Did the patient receive a copy of their discharge instructions? Yes   Nursing interventions Reviewed instructions with patient   What is the patient's perception of their health status since discharge? Improving   Is the patient/caregiver able to teach back the hierarchy of who to call/visit for symptoms/problems? PCP, Specialist, Home health nurse, Urgent Care, ED, 911 Yes   Week 3 Call Completed? Yes   Graduated Yes   Graduated/Revoked comments Pt reports he is doing ok. He is going to FU appts and some meds have been decreased. Pt aware of when to call the Dr or seek treatment. No needs at this time.   Call end time 1340            LINDSAY DEL TORO - Registered Nurse

## 2025-04-02 NOTE — TELEPHONE ENCOUNTER
Caller: Mode Duffy    Relationship: Self    Best call back number: [phone]    Which medication are you concerned about: OXCARBAZEPINE & GABAPENTIN    Who prescribed you this medication: BERTHA SOUZA    When did you start taking this medication: A LONG TIME AGO    What are your concerns: PATIENT RECENTLY GOT LAB WORK AND HIS SODIUM IS A LITTLE LOW. CAN HE CHANGE TO 3 OXCARBAZEPINE PER DAY AND 2 GABAPENTIN PER DAY. HE WANTS TO KNOW IF THESE CHANGES WOULD BE ACCEPTABLE TO HELP STABILIZE HIS SODIUM    How long have you had these concerns: TODAY

## 2025-04-04 ENCOUNTER — TELEPHONE (OUTPATIENT)
Dept: CARDIOLOGY | Facility: CLINIC | Age: 80
End: 2025-04-04

## 2025-04-04 NOTE — TELEPHONE ENCOUNTER
Caller: Mode Duffy    Relationship: Self    Best call back number:       PATIENT REQUESTING CALL BACK TO DISCUSS RECENT LABS

## 2025-04-04 NOTE — TELEPHONE ENCOUNTER
Hub staff attempted to follow warm transfer process and was unsuccessful     Caller: Mode Duffy    Relationship to patient: Self    Best call back number: [phone]    Patient is needing: PATIENT STATED THAT HE WAS RETURNING A CALL ABOUT LAB RESULTS FROM OFFICE. PATIENT WOULD LIKE A CALL BACK TO DISCUSS MEDICATIONS AND WHAT HE NEEDS TO STOP. PATIENT STATED THAT HE WOULD RATHER SPEAK WITH SOMEONE IN OFFICE THAN USE [facility] BECAUSE HE HAS TROUBLE WORKING IT.

## 2025-04-04 NOTE — TELEPHONE ENCOUNTER
Per Catarina Kerr-   His sodium is still on the low side-but has been for several weeks.  Aurea stopped aldactone at his OV on 4/1.  Ask him to stop Lasix also.  Re-check BMP next Thurs/Fri.  If sodium is still low, will have to refer to nephrology for their recommendations.     [employer] message sent- patient was not at home when I called.

## 2025-04-11 ENCOUNTER — OFFICE VISIT (OUTPATIENT)
Dept: CARDIOLOGY | Facility: CLINIC | Age: 80
End: 2025-04-11
Payer: MEDICARE

## 2025-04-11 VITALS
OXYGEN SATURATION: 98 % | HEART RATE: 72 BPM | DIASTOLIC BLOOD PRESSURE: 82 MMHG | BODY MASS INDEX: 30.75 KG/M2 | WEIGHT: 227 LBS | HEIGHT: 72 IN | SYSTOLIC BLOOD PRESSURE: 144 MMHG

## 2025-04-11 DIAGNOSIS — I50.22 CHRONIC SYSTOLIC HEART FAILURE: Primary | ICD-10-CM

## 2025-04-11 NOTE — PROGRESS NOTES
Cardiology Office Visit      Encounter Date:  2025    PATIENT IDENTIFICATION    Name: Mode Duffy  Age: 79 y.o. Sex: male : 1945  MRN: [id number]    Reason For Followup:  Edema    Brief Clinical History:  Patient is a 79 y.o.  male  coming to cardiology office for follow up.    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    Patient was recently admitted to the hospital 2025 when he was found to have  in left circumflex artery, placed on medical management.  At that time he was found to be hyponatremic, sodium as low as 120.  During recent follow-up visit he was found to be hypovolemic and diuretics were discontinued.    Patient requested follow-up appointment due to worsening lower extremity edema over the past week.  He also reports some weight gain over the past weeks of approximately 7 pounds.  Denies shortness of breath, chest pain, palpitations, dizziness, lightheadedness, presyncope syncope.    Prior cardiology workup.    [facility] 3/10/25  Severe single-vessel coronary artery disease involving the mid circumflex artery with 100% occlusion with left to left collaterals  Moderate to severe LV dysfunction with estimate LV ejection fraction of 35 to 40%  Mild elevated left-sided filling pressures  No anginal symptoms    TTE 3/8/2025    Left ventricular systolic function is normal. Left ventricular ejection fraction appears to be 41 - 45%.    The left ventricular cavity is mildly dilated.    Left ventricular wall thickness is consistent with mild to moderate concentric hypertrophy.    The following left ventricular wall segments are hypokinetic: mid anterior, mid anterolateral, apical lateral, mid inferolateral and apex hypokinetic. The following left ventricular wall segments are akinetic: basal anterolateral.    " Left ventricular diastolic function is consistent with (grade II w/high LAP) pseudonormalization.    The left atrial cavity is mildly dilated.    There is mild calcification of the aortic valve mainly affecting the left coronary and right coronary cusp(s).    Estimated right ventricular systolic pressure from tricuspid regurgitation is mildly elevated (35-45 mmHg).    Mild pulmonary hypertension is present.  Assessment & Plan    Impressions:  Ischemic cardiomyopathy, EF 40-45  CAD, status post PCI to LAD 2014,  mid Lcx on LHC 2025  SSS status post BiV PPM  Hypertension  Hyperlipidemia  SHELTON  Trigeminal neuralgia on oxcarbazepine    Recommendations:  Patient is coming with lower extremity edema and weight gain over the past days.  His symptoms worsen after furosemide and spironolactone were discontinued recently.  Advised patient to take furosemide for 3 days, while monitoring his weight closely.    Suspect he will need low-dose diuretic therapy as maintenance for volume control.  Continue current antihypertensive regimen  Continue anticoagulation given elevated [id number] score        Objective:    Vitals:  Vitals:    04/11/25 1056   BP: 144/82   Pulse: 72   SpO2: 98%   Weight: 103 kg (227 lb)   Height: 182.9 cm (72\")     Body mass index is 30.79 kg/m².      Physical Exam:  General: Alert, cooperative, no distress, appears stated age  Lungs:  Clear to auscultation bilaterally, no wheezes, rhonchi or rales are noted  Chest wall: No tenderness  Heart::  Regular rate and  irregular rhythm, S1 and S2 normal, no murmur.   Abdomen: Soft, nontender, nondistended, bowel sounds active  Extremities: No cyanosis, clubbing, 1+ pitting edema bilaterally  Neuro/psych: No gross focal deficits        Allergies:  No Known Allergies    Medication Review:     Current Outpatient Medications:     acebutolol (SECTRAL) 200 MG capsule, Take 1 capsule by mouth Daily., Disp: 90 capsule, Rfl: 1    amLODIPine (NORVASC) 10 MG tablet, Take " 1 tablet by mouth every night at bedtime. Indications: High Blood Pressure, Disp: , Rfl:     apixaban (Eliquis) 5 MG tablet tablet, Take 1 tablet by mouth Every 12 (Twelve) Hours. Indications: Atrial Fibrillation, Disp: 36 tablet, Rfl: 0    aspirin 81 MG tablet, Take 1 tablet by mouth Daily. Do not take day of surgery  Indications: Venous Thromboembolism, Disp: , Rfl:     atorvastatin (LIPITOR) 20 MG tablet, TAKE 1 TABLET EVERY DAY, Disp: 90 tablet, Rfl: 3    Calcium Carb-Cholecalciferol (CALCIUM 500 + D PO), Take 1 tablet by mouth Daily., Disp: , Rfl:     cetirizine (zyrTEC) 10 MG tablet, Take 1 tablet by mouth Daily. Indications: Upper Respiratory Tract Allergy, Disp: , Rfl:     Cinnamon 500 MG capsule, Take 1 capsule by mouth Daily., Disp: , Rfl:     enalapril (VASOTEC) 20 MG tablet, Take 1 tablet by mouth Daily. Indications: High Blood Pressure, Disp: , Rfl:     esomeprazole (nexIUM) 40 MG capsule, Take 1 capsule by mouth Daily. Indications: Gastroesophageal Reflux Disease, Disp: , Rfl:     furosemide (Lasix) 40 MG tablet, Take 1 tablet by mouth Daily., Disp: 90 tablet, Rfl: 0    gabapentin (NEURONTIN) 300 MG capsule, Take 1 capsule by mouth 2 (Two) Times a Day., Disp: 60 capsule, Rfl: 5    glucosamine-chondroitin 500-400 MG capsule capsule, Take  by mouth 3 (Three) Times a Day With Meals., Disp: , Rfl:     isosorbide mononitrate (IMDUR) 60 MG 24 hr tablet, Take 1 tablet by mouth Daily. Indications: Stable Angina Pectoris, Disp: , Rfl:     Magnesium Oxide 140 MG capsule, Take 400 mg by mouth., Disp: , Rfl:     OXcarbazepine (TRILEPTAL) 300 MG tablet, Take 1 tablet by mouth 4 (Four) Times a Day. Titrate down if pain controlled, Disp: 360 tablet, Rfl: 3    Probiotic Product (PROBIOTIC ADVANCED PO), Take 1 capsule by mouth Daily. Stop on 2/26 for surgery  Indications: Edith replacement, Disp: , Rfl:     Turmeric 500 MG capsule, Take  by mouth., Disp: , Rfl:     vitamin C (ASCORBIC ACID) 250 MG tablet, Take 1 tablet  by mouth Daily. Indications: supplement, Disp: , Rfl:     Zinc 25 MG tablet, Take 25 mg by mouth Daily. Indications: Zinc Deficiency, Disp: , Rfl:     Family History:  Family History   Problem Relation Age of Onset    Cancer Mother     Heart disease Father     Arthritis Father     Diabetes Father     Hypertension Father     Hyperlipidemia Father     Kidney disease Father     Malig Hyperthermia Neg Hx        Past Medical History:  Past Medical History:   Diagnosis Date    Arthritis     Arthritis     BPH (benign prostatic hyperplasia)     CHF (congestive heart failure)     Coronary artery disease     dr. Carrizales    Deviated septum     FH: cataracts     Gait abnormality     GERD (gastroesophageal reflux disease)     History of trigeminal neuralgia     Hyperlipidemia     Hypertension     Renal cyst     Sciatica     Sleep apnea     unable to tolerate machine       Past Surgical History:  Past Surgical History:   Procedure Laterality Date    APPENDECTOMY      CARDIAC CATHETERIZATION      CARDIAC CATHETERIZATION N/A 3/5/2020    Procedure: Percutaneous Subclavian Intervention;  Surgeon: Nick Greenberg MD;  Location: [facility] CATH INVASIVE LOCATION;  Service: Cardiovascular;  Laterality: N/A;    CARDIAC CATHETERIZATION Right 3/10/2025    Procedure: LEFT HEART CATH with possible PCI;  Surgeon: Radha Pemberton MD;  Location: [facility] CATH INVASIVE LOCATION;  Service: Cardiovascular;  Laterality: Right;  Local and IV sedation    CARDIAC ELECTROPHYSIOLOGY PROCEDURE N/A 1/29/2020    Procedure: Biventricular Device Upgrade;  Surgeon: Dominick Jackson MD;  Location: [facility] CATH INVASIVE LOCATION;  Service: Cardiovascular    CARDIAC ELECTROPHYSIOLOGY PROCEDURE N/A 3/5/2020    Procedure: Lead Revision;  Surgeon: Nick Greenberg MD;  Location: [facility] CATH INVASIVE LOCATION;  Service: Cardiovascular;  Laterality: N/A;    CORONARY STENT PLACEMENT  2014    x 2    CYSTOSCOPY, URETEROSCOPY, RETROGRADE  PYELOGRAM, STENT INSERTION Left 11/3/2023    Procedure: CYSTOSCOPY URETEROSCOPY STONE EXTRACTION STENT INSERTION;  Surgeon: Chris Patel MD;  Location: [facility];  Service: Urology;  Laterality: Left;    IMPLANTABLE CARDIOVERTER DEFIBRILLATOR LEAD REPLACEMENT/POCKET REVISION Right 10/19/2022    Procedure: RT. TRANSVENOUS ICD LEAD EXTRACTION;  Surgeon: Naveed Rios MD;  Location: [facility];  Service: Cardiology;  Laterality: Right;    PROSTATE SURGERY      REFRACTIVE SURGERY      THORACOTOMY Left 3/4/2020    Procedure: THORACOTOMY MINI WITH LEAD PLACEMENT;  Surgeon: Justin Aguilar MD;  Location: [facility];  Service: Cardiothoracic;  Laterality: Left;    TOTAL HIP ARTHROPLASTY Bilateral     different times       Social History:  Social History     Socioeconomic History    Marital status:    Tobacco Use    Smoking status: Former     Current packs/day: 0.00     Types: Cigarettes     Quit date:      Years since quittin.3     Passive exposure: Past    Smokeless tobacco: Never   Vaping Use    Vaping status: Never Used   Substance and Sexual Activity    Alcohol use: Yes     Alcohol/week: 7.0 standard drinks of alcohol     Types: 7 Glasses of wine per week     Comment: RED WINE BEFORE DINNER    Drug use: No    Sexual activity: Defer       Review of Systems:  The following systems were reviewed as they relate to the cardiovascular system: Constitutional, Eyes, ENT, Cardiovascular, Respiratory, Gastrointestinal, Integumentary, Neurological, Psychiatric, Hematologic, Endocrine, Musculoskeletal, and Genitourinary. The pertinent cardiovascular findings are reported above with all other cardiovascular points within those systems being negative.    Diagnostic Study Review:       Laboratory Data:  Lab Results   Component Value Date    GLUCOSE 115 (H) 2025    BUN 13 2025    CREATININE 0.90 2025    EGFRIFNONA 102 2020    BCR 14.4 2025    K 4.8 2025    CO2 26.6  04/01/2025    CALCIUM 9.2 04/01/2025    ALBUMIN 3.7 03/10/2025    AST 20 03/10/2025    ALT 25 03/10/2025     Lab Results   Component Value Date    GLUCOSE 115 (H) 04/01/2025    CALCIUM 9.2 04/01/2025     (L) 04/01/2025    K 4.8 04/01/2025    CO2 26.6 04/01/2025    CL 92 (L) 04/01/2025    BUN 13 04/01/2025    CREATININE 0.90 04/01/2025    EGFRIFNONA 102 03/07/2020    BCR 14.4 04/01/2025    ANIONGAP 10.4 04/01/2025     Lab Results   Component Value Date    WBC 4.93 03/12/2025    HGB 12.9 (L) 03/12/2025    HCT 37.9 03/12/2025    MCV 94.5 03/12/2025     03/12/2025     Lab Results   Component Value Date    CHOL 137 03/02/2020    TRIG 115 03/02/2020    HDL 55 03/02/2020    LDL 59 03/02/2020     Lab Results   Component Value Date    HGBA1C 5.9 (H) 03/02/2020     Lab Results   Component Value Date    INR 1.11 (H) 03/09/2025    INR 1.21 (H) 03/07/2025    INR 0.99 10/05/2022    PROTIME 14.2 03/09/2025    PROTIME 15.2 (H) 03/07/2025    PROTIME 10.2 10/05/2022       Most Recent Echo:  Results for orders placed during the hospital encounter of 03/07/25    Adult Transthoracic Echo Complete W/ Cont if Necessary Per Protocol    Interpretation Summary    Left ventricular systolic function is normal. Left ventricular ejection fraction appears to be 41 - 45%.    The left ventricular cavity is mildly dilated.    Left ventricular wall thickness is consistent with mild to moderate concentric hypertrophy.    The following left ventricular wall segments are hypokinetic: mid anterior, mid anterolateral, apical lateral, mid inferolateral and apex hypokinetic. The following left ventricular wall segments are akinetic: basal anterolateral.    Left ventricular diastolic function is consistent with (grade II w/high LAP) pseudonormalization.    The left atrial cavity is mildly dilated.    There is mild calcification of the aortic valve mainly affecting the left coronary and right coronary cusp(s).    Estimated right ventricular  systolic pressure from tricuspid regurgitation is mildly elevated (35-45 mmHg).    Mild pulmonary hypertension is present.       Most Recent Stress Test:  Results for orders placed during the hospital encounter of 03/07/25    Stress Test With Myocardial Perfusion One Day    Interpretation Summary    Patient has fixed defect in basal inferior wall and basal anterior wall consistent with old infarct.  No obvious evidence of significant myocardial ischemia.  Severely reduced LVEF around 20%.  Clinical correlation recommended.    Left ventricular ejection fraction is severely reduced (Calculated EF = 16%).    Electronically signed by Jam Cruz MD, 03/08/25, 10:18 AM EST.       Most Recent Cardiac Catheterization:   Results for orders placed during the hospital encounter of 03/07/25    Cardiac Catheterization/Vascular Study    Conclusion  Table formatting from the original result was not included.  Cardiac Catheterization Operative Report    Mode Duffy  [id number]  3/10/2025  @PCP@    He underwent cardiac catheterization.    Indications for the procedure include: abnormal stress test.    Results for orders placed during the hospital encounter of 03/07/25    Adult Transthoracic Echo Complete W/ Cont if Necessary Per Protocol    Interpretation Summary    Left ventricular systolic function is normal. Left ventricular ejection fraction appears to be 41 - 45%.    The left ventricular cavity is mildly dilated.    Left ventricular wall thickness is consistent with mild to moderate concentric hypertrophy.    The following left ventricular wall segments are hypokinetic: mid anterior, mid anterolateral, apical lateral, mid inferolateral and apex hypokinetic. The following left ventricular wall segments are akinetic: basal anterolateral.    Left ventricular diastolic function is consistent with (grade II w/high LAP) pseudonormalization.    The left atrial cavity is mildly dilated.    There is mild  calcification of the aortic valve mainly affecting the left coronary and right coronary cusp(s).    Estimated right ventricular systolic pressure from tricuspid regurgitation is mildly elevated (35-45 mmHg).    Mild pulmonary hypertension is present.    Results for orders placed during the hospital encounter of 03/07/25    Stress Test With Myocardial Perfusion One Day    Interpretation Summary    Patient has fixed defect in basal inferior wall and basal anterior wall consistent with old infarct.  No obvious evidence of significant myocardial ischemia.  Severely reduced LVEF around 20%.  Clinical correlation recommended.    Left ventricular ejection fraction is severely reduced (Calculated EF = 16%).    Electronically signed by Jam Cruz MD, 03/08/25, 10:18 AM EST.        Procedure Details:  The risks, benefits, complications, treatment options, and expected outcomes were discussed with the patient. The patient and/or family concurred with the proposed plan, giving informed consent.    After informed consent the patient was brought to the cath lab after appropriate IV hydration was begun and oral premedication was given. He was further sedated with fentanyl. He was prepped and draped in the usual manner. Using the modified Seldinger access technique, a 6 Georgian sheath was placed in the femoral artery. A left heart catheterization with coronary arteriography was performed. Findings are discussed below.    After the procedure was completed, sedation was stopped and the sheaths and catheters were all removed. Hemostasis was achieved per established hospital protocols.    Conscious sedation:  Conscious sedation was performed according to protocol.  I supervised and directed an independent trained observer with the assistance of monitoring the patient's level of consciousness and physiologic status throughout the procedure.  Intraoperative service time was 60  minutes.    Findings:    Hemodynamics Central aortic pressure systolic 118 diastolic 60 with a mean pressure of 84 mmHg  LV end-diastolic pressure of 19 mmHg  There was no gradient across the aortic valve on the pullback of the pigtail catheter  Left Main Large-caliber vessel angiographically normal bifurcates into left artery descending and left circumflex arteries  RCA Large-caliber dominant vessel  Right coronary artery has a mid angiographic 30% stenosis  Right coronary artery is a large-caliber dominant vessel  Right coronary artery provides a large caliber PDA and PLB branches  PLB branch of the right coronary artery is angiographically normal  PDA branch of the right coronary artery has ostial proximal angiographic 50% stenosis  PLB and PDA branch the right coronary artery provides collaterals to the marginal branch of the left circumflex artery  LAD Large-caliber vessel with significant calcification involving the proximal and ostial portion of the LAD  LAD has a mid angiographic 20 to 30% stenosis  First diagonal branch of the LAD is a large-caliber vessel angiographically normal  Second diagonal branch of the LAD is a large-caliber vessel with ostial 30 to 40% stenosis  Patent stent is noted in the second diagonal branch of the LAD  Circ Large-caliber vessel with a mid 100% occlusion with left to left collaterals filling the marginal branches of the left circumflex artery  LV Global LV hypokinesis mostly involving the basal and mid segments with mild hypokinesis involving the distal and apical segment with estimated LV ejection fraction of 35 to 40%  Coronary Dominance Right coronary artery    Estimated Blood Loss:  Minimal    Specimens: None    Complications:  None; patient tolerated the procedure well.    Disposition: PACU - hemodynamically stable.    Condition: stable    Impressions:  Severe single-vessel coronary artery disease involving the mid circumflex artery with 100% occlusion with left to left  "collaterals  Moderate to severe LV dysfunction with estimate LV ejection fraction of 35 to 40%  Mild elevated left-sided filling pressures  No anginal symptoms    Recommendations:  Medical management for cardiomyopathy  If patient has symptoms or significant reversible ischemia consider high risk PCI attempt of opening chronic total occlusion of the left circumflex artery       NOTE: The following portions of the patient's note were reviewed, confirmed and/or updated this visit as appropriate: History of present illness/Interval history, physical examination, assessment & plan, allergies, current medications, past family history, past medical history, past social history, past surgical history and problem list.    Labs pertinent to today's visit on 04/11/2025 (including but not limited to CBC, CMP, and lipid profiles) were requested from the patient's primary care provider/hospital/clinical laboratory.  If the labs were available for the visit, they were reviewed with the patient.  If they were not available, when received, special interest will be made to the labs pertinent to this visit.  The patient's most recent \"in-house\" labs are noted below and have been reviewed.  Outside labs pertinent to this visit are scanned into the record and have been reviewed.    Discussions held today, 04/11/2025,regarding procedures included risk, benefits, and options including but not limited to: Death, MI, stroke, pain, bleeding, infection, and possible need for vascular/thoracic/cardiothoracic surgery.    Copied information within this note was reviewed and is current as of 04/11/2025.    Assessment and plan noted herein represents the current plan of care as of 04/11/2025.    Significant resources from our office and staff are inherent in engaging this patient in a continuous and active collaborative plan of care related to their chronic cardiovascular conditions outlined herein.  The management of these conditions requires " the direction of our service with specialized clinical knowledge, skills, and experience.  This collaborative care includes but is not limited to patient education, expectations and responsibilities, shared decision making around therapeutic goals, and shared commitments to achieve those goals.

## 2025-04-24 ENCOUNTER — TELEPHONE (OUTPATIENT)
Dept: CARDIOLOGY | Facility: CLINIC | Age: 80
End: 2025-04-24

## 2025-04-24 NOTE — TELEPHONE ENCOUNTER
Caller: Mode Duffy    Relationship: Self    Best call back number: [phone]        What was the call regarding: PATIENT WAS PRESCRIBED A MUSCLE RELAXER AFTER PULLING A MUSCLE IN HIS BACK. PCP PRESCRIBED METHOCARBAMOL. PATIENT WANTS TO KNOW IF ITS SAFE TO TAKE PLEASE CALL AND ADVISE.

## 2025-05-19 NOTE — PROGRESS NOTES
Cardiology Office Visit      Encounter Date:  05/20/2025    Patient ID:   Mode Duffy is a 79 y.o. male.    Reason For Followup:  Coronary artery disease  Cardiomyopathy    Brief Clinical History:  Dear Dr. Vidal, Temo Hadley MD (Inactive)    I had the pleasure of seeing Mode Duffy today. As you are well aware, this is a 79 y.o. male with a known history of ischemic heart disease. He underwent cardiac catheterization with resultant PCI of an obtuse marginal branch and diagonal branch in November 2014. He has additional history of hypertension with hypertensive cardiovascular disease, dyslipidemia, paroxysmal atrial fibrillation, antiplatelet therapy as well as sick sinus syndrome status post biventricular permanent pacemaker placement. He presents today for followup on the above conditions.     Interval History:  He denies any chest pain pressure heaviness or tightness.    He denies any shortness of breath out of character.  He denies any PND orthopnea.  He denies any syncope or near syncope.  He reports feeling in his usual state of cardiac health.    Although his blood pressure is elevated today, he reports that his reading at home before coming in was 138/80.    [facility]  We reviewed those today.    His blood pressure is elevated today.  This is typical of his office visits.  His blood pressure at home was 138/80 before coming to the office today.    03/12/2024        Patient reports no chest pain or shortness of breath. He has no palpitations. No new issues to report. Blood pressure is elevated today but was 137/78 at home before coming to the office.     12/06/2024        He reports doing well today. No chest pain. No new issues. Is following regularly with EP. Still walking 3 miles daily. Blood pressure is elevated today but at home before visit at home it was 138/82.     05/20/2025        He was diagnosed with sleep apnea but he is struggling with the mask.  His blood pressure is elevated today but he reports poor sleep last night. He was 137/77 before he came in . He has had some swelling. He is on amlodipine. He may have some neuropathy. His swelling doesn't bother him so he will stay on the same medication.     He was hospitalized in March and uncderwent a cardiac catheterization. He had moderate disease of the LAD and RCA. His circumflex was 100% occluded with collaterals. His EF was 35-40%. He was on GDMT including spironolactone, ACE, furosemide, BB.  Furosemide and spironolactone were discontinued because of hyponatremia and lightheadedness.    We discussed his swelling.  If his swelling remains after a night of sleep, we can consider medication adjustments.     Assessment & Plan     Impressions:  Coronary artery disease status post PCI and stenting most recently in November of 2014.        This was cutting Balloon angioplasty of a circumflex branch and a diagonal branch.   Dyslipidemia.  Hypertension with hypertensive cardiovascular disease with a white coat component  Cardiomyopathy most recent EF of 35 to 40% in October 2019 echocardiogram  Sick sinus syndrome status post biventricular permanent pacemaker placement.  Paroxysmal atrial fibrillation  Coagulopathy secondary to Eliquis  Lumbago  Hyponatremia secondary to Trileptal  Renal cysts  History of trigeminal neuralgia presently treated with Trileptal.  Hip pain currently undergoing preoperative cardiovascular risk assessment for right hip surgery.  Trigeminal neuralgia     Recommendations:  Continuation of his current cardiovascular regimen at the present time.     This includes antihypertensives, anticoagulants, and antiplatelets.  Follow-up with EP as scheduled  Follow-up in 9 months, sooner should there be difficulties    Diagnoses and all orders for this visit:    1. Other cardiomyopathy (Primary)  Overview:  Added automatically from request for surgery [id number]      2. Cardiac pacemaker in situ    3.  "Complete heart block    4. Coronary artery disease involving native coronary artery of native heart without angina pectoris    5. Mixed hyperlipidemia    6. Primary hypertension                Objective:    Vitals:  Vitals:    05/20/25 1054   BP: 146/78   BP Location: Left arm   Patient Position: Sitting   Cuff Size: Adult   Pulse: 70   SpO2: 93%   Weight: 101 kg (222 lb)   Height: 182.9 cm (72\")         Body mass index is 30.11 kg/m².      Physical Exam:    General: Alert, cooperative, no distress, appears stated age  Head:  Normocephalic, atraumatic, mucous membranes moist  Eyes:  Conjunctiva/corneas clear, EOM's intact     Neck:  Supple,  no bruit    Lungs: Clear to auscultation bilaterally, no wheezes rhonchi rales are noted  Chest wall: No tenderness.  Pacing device in place  Heart::  Regular rate and rhythm, S1 and S2 normal, 1/6 holosystolic murmur.  No rub or gallop  Abdomen: Soft, non-tender, nondistended bowel sounds active  Extremities: No cyanosis, clubbing, or edema  Pulses: 2+ and symmetric all extremities  Skin:  No rashes or lesions  Neuro/psych: A&O x3. CN II through XII are grossly intact with appropriate affect      Allergies:  No Known Allergies    Medication Review:     Current Outpatient Medications:     acebutolol (SECTRAL) 200 MG capsule, Take 1 capsule by mouth Daily., Disp: 90 capsule, Rfl: 1    amLODIPine (NORVASC) 10 MG tablet, Take 1 tablet by mouth every night at bedtime. Indications: High Blood Pressure, Disp: , Rfl:     apixaban (Eliquis) 5 MG tablet tablet, Take 1 tablet by mouth Every 12 (Twelve) Hours. Indications: Atrial Fibrillation, Disp: 36 tablet, Rfl: 0    aspirin 81 MG tablet, Take 1 tablet by mouth Daily. Do not take day of surgery  Indications: Blood Clot Within a Vein, Disp: , Rfl:     atorvastatin (LIPITOR) 20 MG tablet, TAKE 1 TABLET EVERY DAY, Disp: 90 tablet, Rfl: 3    Calcium Carb-Cholecalciferol (CALCIUM 500 + D PO), Take 1 tablet by mouth Daily., Disp: , Rfl:     " cetirizine (zyrTEC) 10 MG tablet, Take 1 tablet by mouth Daily. Indications: Upper Respiratory Tract Allergy, Disp: , Rfl:     Cinnamon 500 MG capsule, Take 1 capsule by mouth Daily., Disp: , Rfl:     enalapril (VASOTEC) 20 MG tablet, Take 1 tablet by mouth Daily. Indications: High Blood Pressure, Disp: , Rfl:     esomeprazole (nexIUM) 40 MG capsule, Take 1 capsule by mouth Daily. Indications: Gastroesophageal Reflux Disease, Disp: , Rfl:     gabapentin (NEURONTIN) 300 MG capsule, Take 1 capsule by mouth 2 (Two) Times a Day., Disp: 60 capsule, Rfl: 5    glucosamine-chondroitin 500-400 MG capsule capsule, Take  by mouth 3 (Three) Times a Day With Meals., Disp: , Rfl:     isosorbide mononitrate (IMDUR) 60 MG 24 hr tablet, Take 1 tablet by mouth Daily. Indications: Stable Angina Pectoris, Disp: , Rfl:     Magnesium Oxide 140 MG capsule, Take 400 mg by mouth., Disp: , Rfl:     OXcarbazepine (TRILEPTAL) 300 MG tablet, Take 1 tablet by mouth 4 (Four) Times a Day. Titrate down if pain controlled, Disp: 360 tablet, Rfl: 3    Probiotic Product (PROBIOTIC ADVANCED PO), Take 1 capsule by mouth Daily. Stop on 2/26 for surgery  Indications: Edith replacement, Disp: , Rfl:     Turmeric 500 MG capsule, Take  by mouth., Disp: , Rfl:     vitamin C (ASCORBIC ACID) 250 MG tablet, Take 1 tablet by mouth Daily. Indications: supplement, Disp: , Rfl:     Zinc 25 MG tablet, Take 25 mg by mouth Daily. Indications: Zinc Deficiency, Disp: , Rfl:     furosemide (Lasix) 40 MG tablet, Take 1 tablet by mouth Daily. (Patient not taking: Reported on 5/20/2025), Disp: 90 tablet, Rfl: 0    Family History:  Family History   Problem Relation Age of Onset    Cancer Mother     Heart disease Father     Arthritis Father     Diabetes Father     Hypertension Father     Hyperlipidemia Father     Kidney disease Father     Malig Hyperthermia Neg Hx        Past Medical History:  Past Medical History:   Diagnosis Date    Arthritis     Arthritis     BPH (benign  prostatic hyperplasia)     CHF (congestive heart failure)     Coronary artery disease     dr. Carrizales    Deviated septum     FH: cataracts     Gait abnormality     GERD (gastroesophageal reflux disease)     History of trigeminal neuralgia     Hyperlipidemia     Hypertension     Renal cyst     Sciatica     Sleep apnea     unable to tolerate machine       Past Surgical History:  Past Surgical History:   Procedure Laterality Date    APPENDECTOMY      CARDIAC CATHETERIZATION      CARDIAC CATHETERIZATION N/A 3/5/2020    Procedure: Percutaneous Subclavian Intervention;  Surgeon: Nick Greenberg MD;  Location: [facility] CATH INVASIVE LOCATION;  Service: Cardiovascular;  Laterality: N/A;    CARDIAC CATHETERIZATION Right 3/10/2025    Procedure: LEFT HEART CATH with possible PCI;  Surgeon: Radha Pemberton MD;  Location: [facility] CATH INVASIVE LOCATION;  Service: Cardiovascular;  Laterality: Right;  Local and IV sedation    CARDIAC ELECTROPHYSIOLOGY PROCEDURE N/A 1/29/2020    Procedure: Biventricular Device Upgrade;  Surgeon: Dominick Jackson MD;  Location: [facility] CATH INVASIVE LOCATION;  Service: Cardiovascular    CARDIAC ELECTROPHYSIOLOGY PROCEDURE N/A 3/5/2020    Procedure: Lead Revision;  Surgeon: Nick Greenberg MD;  Location: [facility] CATH INVASIVE LOCATION;  Service: Cardiovascular;  Laterality: N/A;    CORONARY STENT PLACEMENT  2014    x 2    CYSTOSCOPY, URETEROSCOPY, RETROGRADE PYELOGRAM, STENT INSERTION Left 11/3/2023    Procedure: CYSTOSCOPY URETEROSCOPY STONE EXTRACTION STENT INSERTION;  Surgeon: Chris Patel MD;  Location: [facility] OR;  Service: Urology;  Laterality: Left;    IMPLANTABLE CARDIOVERTER DEFIBRILLATOR LEAD REPLACEMENT/POCKET REVISION Right 10/19/2022    Procedure: RT. TRANSVENOUS ICD LEAD EXTRACTION;  Surgeon: Naveed Rios MD;  Location: [facility];  Service: Cardiology;  Laterality: Right;    PROSTATE SURGERY      REFRACTIVE SURGERY      THORACOTOMY Left  3/4/2020    Procedure: THORACOTOMY MINI WITH LEAD PLACEMENT;  Surgeon: Justin Aguilar MD;  Location: [facility];  Service: Cardiothoracic;  Laterality: Left;    TOTAL HIP ARTHROPLASTY Bilateral     different times       Social History:  Social History     Socioeconomic History    Marital status:    Tobacco Use    Smoking status: Former     Current packs/day: 0.00     Types: Cigarettes     Quit date:      Years since quittin.4     Passive exposure: Past    Smokeless tobacco: Never   Vaping Use    Vaping status: Never Used   Substance and Sexual Activity    Alcohol use: Yes     Alcohol/week: 7.0 standard drinks of alcohol     Types: 7 Glasses of wine per week     Comment: RED WINE BEFORE DINNER    Drug use: No    Sexual activity: Defer       Review of Systems:  The following systems were reviewed as they relate to the cardiovascular system: Constitutional, Eyes, ENT, Cardiovascular, Respiratory, Gastrointestinal, Integumentary, Neurological, Psychiatric, Hematologic, Endocrine, Musculoskeletal, and Genitourinary. The pertinent cardiovascular findings are reported above with all other cardiovascular points within those systems being negative.    Diagnostic Study Review:     Current Electrocardiogram:  Procedures no new EKG.  EKG dated 2025 demonstrates sinus rhythm with a ventricular rate of 70 bpm.  Right bundle branch block.    Laboratory Data:  Lab Results   Component Value Date    GLUCOSE 115 (H) 2025    BUN 13 2025    CREATININE 0.90 2025    EGFRIFNONA 102 2020    BCR 14.4 2025    K 4.8 2025    CO2 26.6 2025    CALCIUM 9.2 2025    ALBUMIN 3.7 03/10/2025    AST 20 03/10/2025    ALT 25 03/10/2025     Lab Results   Component Value Date    GLUCOSE 115 (H) 2025    CALCIUM 9.2 2025     (L) 2025    K 4.8 2025    CO2 26.6 2025    CL 92 (L) 2025    BUN 13 2025    CREATININE 0.90 2025     EGFRIFNONA 102 03/07/2020    BCR 14.4 04/01/2025    ANIONGAP 10.4 04/01/2025     Lab Results   Component Value Date    WBC 4.93 03/12/2025    HGB 12.9 (L) 03/12/2025    HCT 37.9 03/12/2025    MCV 94.5 03/12/2025     03/12/2025     Lab Results   Component Value Date    CHOL 137 03/02/2020    TRIG 115 03/02/2020    HDL 55 03/02/2020    LDL 59 03/02/2020     Lab Results   Component Value Date    HGBA1C 5.9 (H) 03/02/2020     Lab Results   Component Value Date    INR 1.11 (H) 03/09/2025    INR 1.21 (H) 03/07/2025    INR 0.99 10/05/2022    PROTIME 14.2 03/09/2025    PROTIME 15.2 (H) 03/07/2025    PROTIME 10.2 10/05/2022       Most Recent Echo:  Results for orders placed during the hospital encounter of 03/07/25    Adult Transthoracic Echo Complete W/ Cont if Necessary Per Protocol    Interpretation Summary    Left ventricular systolic function is normal. Left ventricular ejection fraction appears to be 41 - 45%.    The left ventricular cavity is mildly dilated.    Left ventricular wall thickness is consistent with mild to moderate concentric hypertrophy.    The following left ventricular wall segments are hypokinetic: mid anterior, mid anterolateral, apical lateral, mid inferolateral and apex hypokinetic. The following left ventricular wall segments are akinetic: basal anterolateral.    Left ventricular diastolic function is consistent with (grade II w/high LAP) pseudonormalization.    The left atrial cavity is mildly dilated.    There is mild calcification of the aortic valve mainly affecting the left coronary and right coronary cusp(s).    Estimated right ventricular systolic pressure from tricuspid regurgitation is mildly elevated (35-45 mmHg).    Mild pulmonary hypertension is present.       Most Recent Stress Test:  Results for orders placed during the hospital encounter of 03/07/25    Stress Test With Myocardial Perfusion One Day    Interpretation Summary    Patient has fixed defect in basal inferior wall  and basal anterior wall consistent with old infarct.  No obvious evidence of significant myocardial ischemia.  Severely reduced LVEF around 20%.  Clinical correlation recommended.    Left ventricular ejection fraction is severely reduced (Calculated EF = 16%).    Electronically signed by Jam Cruz MD, 03/08/25, 10:18 AM EST.       Most Recent Cardiac Catheterization:   Results for orders placed during the hospital encounter of 03/04/20    Cardiac Catheterization/Vascular Study    Narrative  Lead revision procedure note.    Procedure:  Failed PTA attempt to occluded left subclavian vein.  Placement of RV  lead through right subclavian vein approach and tunneled into left subclavian pocket and open pocket and connected RV lead to the generator and capped the existing malfunctioning RV lead.  Temporary pacemaker placement through right femoral venous approach    Indication: This is a 74-year-old with complete heart block and previous history of dual-chamber pacemaker over 21 years ago and LV dysfunction EF of 35 to 40% probably due to 100% pacer dependent status and CHF with lower extremity edema and tiredness and history of CAD and PCI, hypertension, dyslipidemia here for procedure.  Patient was initially attempted to have left subclavian approach LV lead placement but was found to have a occluded left subclavian vein therefore was referred to surgery and underwent thoracotomy for LV lead placement and was noticed to have RV lead malfunction, postoperatively, therefore is here for procedure.      Hardware:  Right ventricular   lead is [id number] [id number] serial number [id number].      Thresholds:  Right ventricular threshold revealed a R wave amplitude of over 6 mV at 1.2V and impedance of 700 ohms        Description of procedure:  Under conscious sedation (initially was given by me and monitored.  Then subsequently anesthesiologist came in gave and monitored anesthesia ) and local  anesthesia , existing left subclavian pocket was opened and subclavian vein was accessed and a 035 Glidewire was advanced into the occlusion and with the help of a glide catheter attempts were done to cross the 100% occluded subclavian vein but was unsuccessful.  Then the right subclavian vein was accessed and [employer] 65 cm lead was placed in the RV adequate thresholds obtained then it was secured on the right side then tunneled to the left side with the help of electrophysiologist Dr. Jackson, then the generator was explanted from the pocket pocket cleaned with antibiotic solution existing RV lead was disconnected and capped and the new RV lead from the right subclavian which was tunneled was connected to the generator placed in the pocket and closed pocket with sutures. patient tolerated procedure well complications were none.    Blood loss :blood loss was 5 cc.        Conclusion: Successful placement of right ventricular lead through right subclavian vein approach which was tunneled to the left infraclavicular area and connected to the generator      Plan:  Per primary electrophysiologist Dr. Jackson  We will monitor overnight.  Routine post pacemaker device implant care.  Recheck device in a.m.  Pacer care and wound care education  Follow-up in 1 week in the office for staple removal  Follow-up in pacemaker clinic       NOTE:     Today,05/20/2025 ,the following portions of the patient's note were reviewed, confirmed and/or updated as appropriate: History of present illness/Interval history, physical examination, assessment & plan, allergies, current medications, past family history, past medical history, past social history, past surgical history and problem list.    Labs pertinent to today's visit on 05/20/2025 (including but not limited to CBC, CMP, and lipid profiles) were requested from the patient's primary care provider/hospital/clinical laboratory.  If the labs were available for the visit, they  "were reviewed with the patient.  If they were not available, when received, special interest will be made to the labs pertinent to this visit.  The patient's most recent \"in-house\" labs are noted below and have been reviewed.  Outside labs pertinent to this visit are scanned into the record and have been reviewed.    Discussions held today, 05/20/2025,regarding procedures included risk, benefits, and options including but not limited to: Death, MI, stroke, pain, bleeding, infection, and possible need for vascular/thoracic/cardiothoracic surgery.    Copied information within this note was reviewed and is current as of 05/20/2025.    Assessment and plan noted herein represents the current plan of care as of 05/20/2025.    Significant resources from our office and staff are inherent in engaging this patient today,05/20/2025,and in a continuous and active collaborative plan of care related to their chronic cardiovascular conditions outlined below.  The management of these conditions requires the direction of our service with specialized clinical knowledge, skills, and experience.  This collaborative care includes but is not limited to patient education, expectations and responsibilities, shared decision making around therapeutic goals, and shared commitments to achieve those goals.  "

## 2025-05-20 ENCOUNTER — OFFICE VISIT (OUTPATIENT)
Dept: CARDIOLOGY | Facility: CLINIC | Age: 80
End: 2025-05-20
Payer: MEDICARE

## 2025-05-20 VITALS
OXYGEN SATURATION: 93 % | WEIGHT: 222 LBS | BODY MASS INDEX: 30.07 KG/M2 | HEIGHT: 72 IN | DIASTOLIC BLOOD PRESSURE: 78 MMHG | SYSTOLIC BLOOD PRESSURE: 146 MMHG | HEART RATE: 70 BPM

## 2025-05-20 DIAGNOSIS — I25.10 CORONARY ARTERY DISEASE INVOLVING NATIVE CORONARY ARTERY OF NATIVE HEART WITHOUT ANGINA PECTORIS: ICD-10-CM

## 2025-05-20 DIAGNOSIS — I44.2 COMPLETE HEART BLOCK: ICD-10-CM

## 2025-05-20 DIAGNOSIS — E78.2 MIXED HYPERLIPIDEMIA: ICD-10-CM

## 2025-05-20 DIAGNOSIS — Z95.0 CARDIAC PACEMAKER IN SITU: ICD-10-CM

## 2025-05-20 DIAGNOSIS — I10 PRIMARY HYPERTENSION: ICD-10-CM

## 2025-05-20 DIAGNOSIS — I42.8 OTHER CARDIOMYOPATHY: Primary | ICD-10-CM

## 2025-06-02 ENCOUNTER — HOSPITAL ENCOUNTER (OUTPATIENT)
Dept: CT IMAGING | Facility: HOSPITAL | Age: 80
Discharge: HOME | End: 2025-06-02
Admitting: INTERNAL MEDICINE
Payer: MEDICARE

## 2025-06-02 DIAGNOSIS — R91.1 PULMONARY NODULE: ICD-10-CM

## 2025-06-02 PROCEDURE — 71250 CT THORAX DX C-: CPT

## 2025-06-04 ENCOUNTER — OFFICE VISIT (OUTPATIENT)
Dept: PULMONOLOGY | Facility: HOSPITAL | Age: 80
End: 2025-06-04
Payer: MEDICARE

## 2025-06-04 VITALS
WEIGHT: 224 LBS | DIASTOLIC BLOOD PRESSURE: 82 MMHG | HEIGHT: 72 IN | BODY MASS INDEX: 30.34 KG/M2 | RESPIRATION RATE: 14 BRPM | OXYGEN SATURATION: 96 % | SYSTOLIC BLOOD PRESSURE: 144 MMHG | HEART RATE: 70 BPM

## 2025-06-04 DIAGNOSIS — R91.1 PULMONARY NODULE: Primary | ICD-10-CM

## 2025-06-04 DIAGNOSIS — G47.33 OSA (OBSTRUCTIVE SLEEP APNEA): ICD-10-CM

## 2025-06-04 PROCEDURE — G0463 HOSPITAL OUTPT CLINIC VISIT: HCPCS

## 2025-06-04 NOTE — PROGRESS NOTES
PULMONARY/ CRITICAL CARE/ SLEEP MEDICINE OUTPATIENT CONSULT/ FOLLOW UP NOTE        Patient Name:  Mode Duffy    :  1945    Medical Record:  [id number]    PRIMARY CARE PHYSICIAN     Temo Vidal MD (Inactive)    REASON FOR CONSULTATION    Mode Duffy is a 79 y.o. male who is referred for consultation for [facility]  REVIEW OF SYSTEMS    Constitutional:  Denies fever or chills   Eyes:  Denies change in visual acuity   HENT:  Denies nasal congestion or sore throat   Respiratory:  Denies cough or shortness of breath   Cardiovascular:  Denies chest pain or edema   GI:  Denies abdominal pain, nausea, vomiting, bloody stools or diarrhea   :  Denies dysuria   Musculoskeletal:  Denies back pain or joint pain   Integument:  Denies rash   Neurologic:  Denies headache, focal weakness or sensory changes   Endocrine:  Denies polyuria or polydipsia   Lymphatic:  Denies swollen glands   Psychiatric:  Denies depression or anxiety     MEDICAL HISTORY    Past Medical History:   Diagnosis Date    Arthritis     Arthritis     BPH (benign prostatic hyperplasia)     CHF (congestive heart failure)     Coronary artery disease     dr. Carrizales    Deviated septum     FH: cataracts     Gait abnormality     GERD (gastroesophageal reflux disease)     History of trigeminal neuralgia     Hyperlipidemia     Hypertension     Renal cyst     Sciatica     Sleep apnea     CPAP Therapy        SURGICAL HISTORY    Past Surgical History:   Procedure Laterality Date    APPENDECTOMY      CARDIAC CATHETERIZATION      CARDIAC CATHETERIZATION N/A 3/5/2020    Procedure: Percutaneous Subclavian Intervention;  Surgeon: Nick Greenberg MD;  Location: [facility] CATH INVASIVE LOCATION;  Service: Cardiovascular;  Laterality: N/A;    CARDIAC CATHETERIZATION Right 3/10/2025    Procedure: LEFT HEART CATH with possible PCI;  Surgeon: Radha Pemberton MD;  Location: [facility] CATH INVASIVE LOCATION;  Service: Cardiovascular;  Laterality:  Right;  Local and IV sedation    CARDIAC ELECTROPHYSIOLOGY PROCEDURE N/A 2020    Procedure: Biventricular Device Upgrade;  Surgeon: Dominick Jackson MD;  Location: [facility] CATH INVASIVE LOCATION;  Service: Cardiovascular    CARDIAC ELECTROPHYSIOLOGY PROCEDURE N/A 3/5/2020    Procedure: Lead Revision;  Surgeon: Nick Greenberg MD;  Location: [facility] CATH INVASIVE LOCATION;  Service: Cardiovascular;  Laterality: N/A;    CORONARY STENT PLACEMENT  2014    x 2    CYSTOSCOPY, URETEROSCOPY, RETROGRADE PYELOGRAM, STENT INSERTION Left 11/3/2023    Procedure: CYSTOSCOPY URETEROSCOPY STONE EXTRACTION STENT INSERTION;  Surgeon: Chris Patel MD;  Location: [facility] MAIN OR;  Service: Urology;  Laterality: Left;    IMPLANTABLE CARDIOVERTER DEFIBRILLATOR LEAD REPLACEMENT/POCKET REVISION Right 10/19/2022    Procedure: RT. TRANSVENOUS ICD LEAD EXTRACTION;  Surgeon: Naveed Rios MD;  Location: [facility];  Service: Cardiology;  Laterality: Right;    PROSTATE SURGERY      REFRACTIVE SURGERY      THORACOTOMY Left 3/4/2020    Procedure: THORACOTOMY MINI WITH LEAD PLACEMENT;  Surgeon: Justin Aguilar MD;  Location: [facility];  Service: Cardiothoracic;  Laterality: Left;    TOTAL HIP ARTHROPLASTY Bilateral     different times        FAMILY HISTORY    Family History   Problem Relation Age of Onset    Cancer Mother     Heart disease Father     Arthritis Father     Diabetes Father     Hypertension Father     Hyperlipidemia Father     Kidney disease Father     Malig Hyperthermia Neg Hx        SOCIAL HISTORY    Social History     Tobacco Use    Smoking status: Former     Current packs/day: 0.00     Types: Cigarettes     Quit date:      Years since quittin.4     Passive exposure: Past    Smokeless tobacco: Never   Substance Use Topics    Alcohol use: Yes     Alcohol/week: 7.0 standard drinks of alcohol     Types: 7 Glasses of wine per week     Comment: RED WINE BEFORE DINNER        ALLERGIES    No Known  Allergies      MEDICATIONS    Current Outpatient Medications on File Prior to Visit   Medication Sig Dispense Refill    acebutolol (SECTRAL) 200 MG capsule Take 1 capsule by mouth Daily. 90 capsule 1    amLODIPine (NORVASC) 10 MG tablet Take 1 tablet by mouth every night at bedtime. Indications: High Blood Pressure      apixaban (Eliquis) 5 MG tablet tablet Take 1 tablet by mouth Every 12 (Twelve) Hours. Indications: Atrial Fibrillation 36 tablet 0    aspirin 81 MG tablet Take 1 tablet by mouth Daily. Do not take day of surgery  Indications: Blood Clot Within a Vein      atorvastatin (LIPITOR) 20 MG tablet TAKE 1 TABLET EVERY DAY 90 tablet 3    Calcium Carb-Cholecalciferol (CALCIUM 500 + D PO) Take 1 tablet by mouth Daily.      cetirizine (zyrTEC) 10 MG tablet Take 1 tablet by mouth Daily. Indications: Upper Respiratory Tract Allergy      Cinnamon 500 MG capsule Take 1 capsule by mouth Daily.      enalapril (VASOTEC) 20 MG tablet Take 1 tablet by mouth Daily. Indications: High Blood Pressure      esomeprazole (nexIUM) 40 MG capsule Take 1 capsule by mouth Daily. Indications: Gastroesophageal Reflux Disease      gabapentin (NEURONTIN) 300 MG capsule Take 1 capsule by mouth 2 (Two) Times a Day. 60 capsule 5    glucosamine-chondroitin 500-400 MG capsule capsule Take  by mouth 3 (Three) Times a Day With Meals.      isosorbide mononitrate (IMDUR) 60 MG 24 hr tablet Take 1 tablet by mouth Daily. Indications: Stable Angina Pectoris      Magnesium Oxide 140 MG capsule Take 400 mg by mouth.      OXcarbazepine (TRILEPTAL) 300 MG tablet Take 1 tablet by mouth 4 (Four) Times a Day. Titrate down if pain controlled 360 tablet 3    Probiotic Product (PROBIOTIC ADVANCED PO) Take 1 capsule by mouth Daily. Stop on 2/26 for surgery  Indications: Edith replacement      Turmeric 500 MG capsule Take  by mouth.      vitamin C (ASCORBIC ACID) 250 MG tablet Take 1 tablet by mouth Daily. Indications: supplement      Zinc 25 MG tablet Take 25  "mg by mouth Daily. Indications: Zinc Deficiency      [DISCONTINUED] furosemide (Lasix) 40 MG tablet Take 1 tablet by mouth Daily. 90 tablet 0     No current facility-administered medications on file prior to visit.       PHYSICAL EXAM    Vitals:    06/04/25 0907   BP: 144/82   BP Location: Left arm   Patient Position: Sitting   Cuff Size: Adult   Pulse: 70   Resp: 14   SpO2: 96%   Weight: 102 kg (224 lb)   Height: 182.9 cm (72\")        Constitutional:  Well developed, well nourished, no acute distress, non-toxic appearance   Eyes:  PERRL, conjunctiva normal   HENT:  Atraumatic, external ears normal, nose normal, oropharynx moist, no pharyngeal exudates. mallampatti   Neck- normal range of motion, no tenderness, supple   Respiratory:  No respiratory distress, normal breath sounds, no rales, no wheezing   Cardiovascular:  Normal rate, normal rhythm, no murmurs, no gallops, no rubs   GI:  Soft, nondistended, normal bowel sounds, nontender, no organomegaly, no mass, no rebound, no guarding   :  No costovertebral angle tenderness   Musculoskeletal:  No edema, no tenderness, no deformities. Back- no tenderness  Integument:  Well hydrated, no rash   Lymphatic:  No lymphadenopathy noted   Neurologic:  Alert & oriented x 3, CN 2-12 normal, normal motor function, normal sensory function, no focal deficits noted   Psychiatric:  Speech and behavior appropriate     XR Chest 1 View  Result Date: 3/7/2025  Impression: No acute chest findings. Electronically Signed: Omayra Quinn MD  3/7/2025 9:12 AM EST  Workstation ID: [id number]     Results for orders placed during the hospital encounter of 03/07/25    Adult Transthoracic Echo Complete W/ Cont if Necessary Per Protocol    Interpretation Summary    Left ventricular systolic function is normal. Left ventricular ejection fraction appears to be 41 - 45%.    The left ventricular cavity is mildly dilated.    Left ventricular wall thickness is consistent with mild to moderate " concentric hypertrophy.    The following left ventricular wall segments are hypokinetic: mid anterior, mid anterolateral, apical lateral, mid inferolateral and apex hypokinetic. The following left ventricular wall segments are akinetic: basal anterolateral.    Left ventricular diastolic function is consistent with (grade II w/high LAP) pseudonormalization.    The left atrial cavity is mildly dilated.    There is mild calcification of the aortic valve mainly affecting the left coronary and right coronary cusp(s).    Estimated right ventricular systolic pressure from tricuspid regurgitation is mildly elevated (35-45 mmHg).    Mild pulmonary hypertension is present.      ASSESSMENT & PLAN:      left lower lobe 11 mm nodule on CT abdomen November 2023, CT chest November 2024 showed no change in size  He is non-smoker  Mother had lung cancer    No change on Repeat CT scan of the chest scheduled for June, 2nd 2025      Repeat CT chest 11/2024   The provided history indicates renal mass as the reason for the examination. If thisrepresents the correct history, then dedicated CT or MRI abdomen without and with contrastrenal mass protocol would be recommended.2. 11 mm noncalcified left lower lobe lung nodule is stable since 11/3/2023. Additional 4 mmnoncalcified right upper lobe lung nodule. 6-month CT chest follow-up is suggested.3. 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 Pacemaker leadtraverses through the herniated lung parenchyma, terminating along the margin of the leftlateral ventricular wall.4.. Dense coronary artery calcifications        Severe SHEL[place] AHI 63     A-fib        Plan    CT chest no contrast June 2026    Continue auto BiPAP 10-24 with pressure support of 5  Residual AHI 3.6, central apneas 2.0, compliance is 100%, average use is 5 hours 22 minutes                      This document has been electronically signed by  German Johnson MD  09:35 EDT

## 2025-06-23 ENCOUNTER — OFFICE VISIT (OUTPATIENT)
Dept: CARDIOLOGY | Facility: CLINIC | Age: 80
End: 2025-06-23
Payer: MEDICARE

## 2025-06-23 ENCOUNTER — PREP FOR SURGERY (OUTPATIENT)
Dept: OTHER | Facility: HOSPITAL | Age: 80
End: 2025-06-23
Payer: MEDICARE

## 2025-06-23 VITALS
BODY MASS INDEX: 29.93 KG/M2 | OXYGEN SATURATION: 93 % | WEIGHT: 221 LBS | HEART RATE: 70 BPM | DIASTOLIC BLOOD PRESSURE: 72 MMHG | SYSTOLIC BLOOD PRESSURE: 136 MMHG | HEIGHT: 72 IN

## 2025-06-23 DIAGNOSIS — I44.2 COMPLETE HEART BLOCK: Primary | ICD-10-CM

## 2025-06-23 DIAGNOSIS — I25.10 CORONARY ARTERY DISEASE INVOLVING NATIVE CORONARY ARTERY OF NATIVE HEART WITHOUT ANGINA PECTORIS: ICD-10-CM

## 2025-06-23 DIAGNOSIS — I25.5 ISCHEMIC CARDIOMYOPATHY: ICD-10-CM

## 2025-06-23 DIAGNOSIS — I48.19 PERSISTENT ATRIAL FIBRILLATION: ICD-10-CM

## 2025-06-23 DIAGNOSIS — Z95.0 CARDIAC PACEMAKER IN SITU: ICD-10-CM

## 2025-06-23 DIAGNOSIS — I10 PRIMARY HYPERTENSION: ICD-10-CM

## 2025-06-23 DIAGNOSIS — I50.22 CHRONIC SYSTOLIC HEART FAILURE: ICD-10-CM

## 2025-06-23 PROCEDURE — 93000 ELECTROCARDIOGRAM COMPLETE: CPT | Performed by: INTERNAL MEDICINE

## 2025-06-23 PROCEDURE — 3078F DIAST BP <80 MM HG: CPT | Performed by: INTERNAL MEDICINE

## 2025-06-23 PROCEDURE — 1160F RVW MEDS BY RX/DR IN RCRD: CPT | Performed by: INTERNAL MEDICINE

## 2025-06-23 PROCEDURE — 1159F MED LIST DOCD IN RCRD: CPT | Performed by: INTERNAL MEDICINE

## 2025-06-23 PROCEDURE — 3075F SYST BP GE 130 - 139MM HG: CPT | Performed by: INTERNAL MEDICINE

## 2025-06-23 PROCEDURE — 99214 OFFICE O/P EST MOD 30 MIN: CPT | Performed by: INTERNAL MEDICINE

## 2025-06-23 RX ORDER — SODIUM CHLORIDE 9 MG/ML
40 INJECTION, SOLUTION INTRAVENOUS AS NEEDED
OUTPATIENT
Start: 2025-06-23

## 2025-06-23 RX ORDER — SODIUM CHLORIDE 0.9 % (FLUSH) 0.9 %
3-10 SYRINGE (ML) INJECTION AS NEEDED
OUTPATIENT
Start: 2025-06-23

## 2025-06-23 RX ORDER — SODIUM CHLORIDE 0.9 % (FLUSH) 0.9 %
3 SYRINGE (ML) INJECTION EVERY 12 HOURS SCHEDULED
OUTPATIENT
Start: 2025-06-23

## 2025-06-23 NOTE — PROGRESS NOTES
CC--complete heart block, coronary artery disease    Sub--79-year-old male patient underwent recent cardiac catheterization March 2025 which revealed      Severe single-vessel coronary artery disease involving the mid circumflex artery with 100% occlusion with left to left collaterals  Moderate to severe LV dysfunction with estimate LV ejection fraction of 35 to 40%  Mild elevated left-sided filling pressures  No anginal symptoms    2D echocardiography revealed EF of 41 to 45% and report is enclosed below for reference  Left ventricular systolic function is normal. Left ventricular ejection fraction appears to be 41 - 45%.    The left ventricular cavity is mildly dilated.    Left ventricular wall thickness is consistent with mild to moderate concentric hypertrophy.    The following left ventricular wall segments are hypokinetic: mid anterior, mid anterolateral, apical lateral, mid inferolateral and apex hypokinetic. The following left ventricular wall segments are akinetic: basal anterolateral.    Left ventricular diastolic function is consistent with (grade II w/high LAP) pseudonormalization.    The left atrial cavity is mildly dilated.    There is mild calcification of the aortic valve mainly affecting the left coronary and right coronary cusp(s).    Estimated right ventricular systolic pressure from tricuspid regurgitation is mildly elevated (35-45 mmHg).    Mild pulmonary hypertension is present.      My previous history attached below for reference    79-year-old pleasant patient has atrial fibrillation and complete heart block and coronary disease.  Patient had prior biventricular pacemaker with a right ventricular pacing lead placed from right subclavian approach because of left subclavian occlusion.  Patient had erosion of right-sided pocket and subsequently underwent lead extraction on the right side and left to LV pacing only.  Patient has left-sided implant in 99 and had significant issues with RV pacing  alone with heart failure symptoms and LV lead was placed epicardial lead because of left subclavian occlusion.  After LV lead epicardial placement, patient was found to have RV lead malfunction needing a new RV lead placed on the right side which was removed secondary to pocket infection on the right side.      Past Medical History:   Diagnosis Date    Arthritis     Arthritis     BPH (benign prostatic hyperplasia)     CHF (congestive heart failure) (HCC)     Coronary artery disease     dr. Carrizalse    Deviated septum     FH: cataracts     Gait abnormality     GERD (gastroesophageal reflux disease)     History of trigeminal neuralgia     Hyperlipidemia     Hypertension     Renal cyst     Sciatica     Sleep apnea     unable to tolerate machine     Past Surgical History:   Procedure Laterality Date    APPENDECTOMY      CARDIAC CATHETERIZATION      CARDIAC CATHETERIZATION N/A 3/5/2020    Procedure: Percutaneous Subclavian Intervention;  Surgeon: Nick Greenberg MD;  Location: [facility] CATH INVASIVE LOCATION;  Service: Cardiovascular;  Laterality: N/A;    CARDIAC ELECTROPHYSIOLOGY PROCEDURE N/A 1/29/2020    Procedure: Biventricular Device Upgrade;  Surgeon: Dominick Jackson MD;  Location: [facility] CATH INVASIVE LOCATION;  Service: Cardiovascular    CARDIAC ELECTROPHYSIOLOGY PROCEDURE N/A 3/5/2020    Procedure: Lead Revision;  Surgeon: Nick Greenberg MD;  Location: [facility] CATH INVASIVE LOCATION;  Service: Cardiovascular;  Laterality: N/A;    CORONARY STENT PLACEMENT  2014    x 2    PROSTATE SURGERY      REFRACTIVE SURGERY      THORACOTOMY Left 3/4/2020    Procedure: THORACOTOMY MINI WITH LEAD PLACEMENT;  Surgeon: Justin Aguilar MD;  Location: [facility] CVOR;  Service: Cardiothoracic;  Laterality: Left;    TOTAL HIP ARTHROPLASTY Bilateral     different times         Physical Exam    General:      well developed, well nourished, in no acute distress.    Head:      normocephalic and atraumatic.    Eyes:       PERRL/EOM intact, conjunctivae and sclerae clear without nystagmus.    Neck:      no  thyromegaly, trachea central with normal respiratory effort  Lungs:      clear bilaterally to auscultation.    Heart:       regular rate and rhythm, S1, S2 without murmurs, rubs, or gallops  Skin:      intact without lesions or rashes.    Psych:      alert and cooperative; normal mood and affect; normal attention span and concentration.        A/P    Recent TSH is normal.  Potassium and creatinine normal platelets are normal and hemoglobin 12.9  Recent investigations revealed an EF around 40% on medical management  Ischemic cardiomyopathy currently on enalapril/acebutolol/aspirin/atorvastatin  Pacemaker in situ with a right atrium transvenous pacing lead and epicardial coronary sinus lead  Chronic left subclavian venous occlusion  Persistent atrial arrhythmia in the form of atrial fibrillation atrial flutter currently on apixaban  Dyslipidemia on atorvastatin  Hypertension controlled with acebutolol enalapril  Therapeutic options for ongoing atrial arrhythmia discussed with the patient--patient does have some symptoms of mild ankle edema and fatigue and after discussing options about atrial arrhythmia patient scheduled for AF ablation.  Risks and benefits and outcomes educated--- advantages of AV synchronization and reduction of AF will reduce the risk of stroke and also potentially improve the EF on this patient  Medications reviewed and follow-up appointments made        ECG 12 Lead    Date/Time: 6/23/2025 10:20 AM  Performed by: Dominick Jackson MD    Authorized by: Dominick Jackson MD  Comparison: compared with previous ECG   Rhythm: atrial flutter, atrial fibrillation and paced  Rate: normal      Electronically signed by Dominick Jackson MD, 06/23/25, 10:20 AM EDT.

## 2025-06-23 NOTE — H&P (VIEW-ONLY)
CC--complete heart block, coronary artery disease    Sub--79-year-old male patient underwent recent cardiac catheterization March 2025 which revealed      Severe single-vessel coronary artery disease involving the mid circumflex artery with 100% occlusion with left to left collaterals  Moderate to severe LV dysfunction with estimate LV ejection fraction of 35 to 40%  Mild elevated left-sided filling pressures  No anginal symptoms    2D echocardiography revealed EF of 41 to 45% and report is enclosed below for reference  Left ventricular systolic function is normal. Left ventricular ejection fraction appears to be 41 - 45%.    The left ventricular cavity is mildly dilated.    Left ventricular wall thickness is consistent with mild to moderate concentric hypertrophy.    The following left ventricular wall segments are hypokinetic: mid anterior, mid anterolateral, apical lateral, mid inferolateral and apex hypokinetic. The following left ventricular wall segments are akinetic: basal anterolateral.    Left ventricular diastolic function is consistent with (grade II w/high LAP) pseudonormalization.    The left atrial cavity is mildly dilated.    There is mild calcification of the aortic valve mainly affecting the left coronary and right coronary cusp(s).    Estimated right ventricular systolic pressure from tricuspid regurgitation is mildly elevated (35-45 mmHg).    Mild pulmonary hypertension is present.      My previous history attached below for reference    79-year-old pleasant patient has atrial fibrillation and complete heart block and coronary disease.  Patient had prior biventricular pacemaker with a right ventricular pacing lead placed from right subclavian approach because of left subclavian occlusion.  Patient had erosion of right-sided pocket and subsequently underwent lead extraction on the right side and left to LV pacing only.  Patient has left-sided implant in 99 and had significant issues with RV pacing  alone with heart failure symptoms and LV lead was placed epicardial lead because of left subclavian occlusion.  After LV lead epicardial placement, patient was found to have RV lead malfunction needing a new RV lead placed on the right side which was removed secondary to pocket infection on the right side.      Past Medical History:   Diagnosis Date    Arthritis     Arthritis     BPH (benign prostatic hyperplasia)     CHF (congestive heart failure) (HCC)     Coronary artery disease     dr. Carrizales    Deviated septum     FH: cataracts     Gait abnormality     GERD (gastroesophageal reflux disease)     History of trigeminal neuralgia     Hyperlipidemia     Hypertension     Renal cyst     Sciatica     Sleep apnea     unable to tolerate machine     Past Surgical History:   Procedure Laterality Date    APPENDECTOMY      CARDIAC CATHETERIZATION      CARDIAC CATHETERIZATION N/A 3/5/2020    Procedure: Percutaneous Subclavian Intervention;  Surgeon: Nick Greenberg MD;  Location: [facility] CATH INVASIVE LOCATION;  Service: Cardiovascular;  Laterality: N/A;    CARDIAC ELECTROPHYSIOLOGY PROCEDURE N/A 1/29/2020    Procedure: Biventricular Device Upgrade;  Surgeon: Dominick Jackson MD;  Location: [facility] CATH INVASIVE LOCATION;  Service: Cardiovascular    CARDIAC ELECTROPHYSIOLOGY PROCEDURE N/A 3/5/2020    Procedure: Lead Revision;  Surgeon: Nick Greenberg MD;  Location: [facility] CATH INVASIVE LOCATION;  Service: Cardiovascular;  Laterality: N/A;    CORONARY STENT PLACEMENT  2014    x 2    PROSTATE SURGERY      REFRACTIVE SURGERY      THORACOTOMY Left 3/4/2020    Procedure: THORACOTOMY MINI WITH LEAD PLACEMENT;  Surgeon: Justin Aguilar MD;  Location: [facility] CVOR;  Service: Cardiothoracic;  Laterality: Left;    TOTAL HIP ARTHROPLASTY Bilateral     different times         Physical Exam    General:      well developed, well nourished, in no acute distress.    Head:      normocephalic and atraumatic.    Eyes:       PERRL/EOM intact, conjunctivae and sclerae clear without nystagmus.    Neck:      no  thyromegaly, trachea central with normal respiratory effort  Lungs:      clear bilaterally to auscultation.    Heart:       regular rate and rhythm, S1, S2 without murmurs, rubs, or gallops  Skin:      intact without lesions or rashes.    Psych:      alert and cooperative; normal mood and affect; normal attention span and concentration.        A/P    Recent TSH is normal.  Potassium and creatinine normal platelets are normal and hemoglobin 12.9  Recent investigations revealed an EF around 40% on medical management  Ischemic cardiomyopathy currently on enalapril/acebutolol/aspirin/atorvastatin  Pacemaker in situ with a right atrium transvenous pacing lead and epicardial coronary sinus lead  Chronic left subclavian venous occlusion  Persistent atrial arrhythmia in the form of atrial fibrillation atrial flutter currently on apixaban  Dyslipidemia on atorvastatin  Hypertension controlled with acebutolol enalapril  Therapeutic options for ongoing atrial arrhythmia discussed with the patient--patient does have some symptoms of mild ankle edema and fatigue and after discussing options about atrial arrhythmia patient scheduled for AF ablation.  Risks and benefits and outcomes educated--- advantages of AV synchronization and reduction of AF will reduce the risk of stroke and also potentially improve the EF on this patient  Medications reviewed and follow-up appointments made        ECG 12 Lead    Date/Time: 6/23/2025 10:20 AM  Performed by: Dominick Jackson MD    Authorized by: Dominick Jackson MD  Comparison: compared with previous ECG   Rhythm: atrial flutter, atrial fibrillation and paced  Rate: normal      Electronically signed by Dominick Jackson MD, 06/23/25, 10:20 AM EDT.

## 2025-06-24 ENCOUNTER — OFFICE VISIT (OUTPATIENT)
Dept: PULMONOLOGY | Facility: HOSPITAL | Age: 80
End: 2025-06-24
Payer: MEDICARE

## 2025-06-24 VITALS
DIASTOLIC BLOOD PRESSURE: 84 MMHG | WEIGHT: 221 LBS | BODY MASS INDEX: 29.93 KG/M2 | HEIGHT: 72 IN | HEART RATE: 69 BPM | RESPIRATION RATE: 16 BRPM | OXYGEN SATURATION: 97 % | SYSTOLIC BLOOD PRESSURE: 156 MMHG

## 2025-06-24 DIAGNOSIS — R91.1 PULMONARY NODULE: Primary | ICD-10-CM

## 2025-06-24 PROCEDURE — G0463 HOSPITAL OUTPT CLINIC VISIT: HCPCS

## 2025-06-24 NOTE — PROGRESS NOTES
PULMONARY/ CRITICAL CARE/ SLEEP MEDICINE OUTPATIENT CONSULT/ FOLLOW UP NOTE        Patient Name:  Mode Duffy    :  1945    Medical Record:  [id number]    PRIMARY CARE PHYSICIAN     Temo Vidal MD (Inactive)    REASON FOR CONSULTATION    Mode Duffy is a 79 y.o. male who is referred for consultation for SHELTON  REVIEW OF SYSTEMS    Constitutional:  Denies fever or chills   Eyes:  Denies change in visual acuity   HENT:  Denies nasal congestion or sore throat   Respiratory:  Denies cough or shortness of breath   Cardiovascular:  Denies chest pain or edema   GI:  Denies abdominal pain, nausea, vomiting, bloody stools or diarrhea   :  Denies dysuria   Musculoskeletal:  Denies back pain or joint pain   Integument:  Denies rash   Neurologic:  Denies headache, focal weakness or sensory changes   Endocrine:  Denies polyuria or polydipsia   Lymphatic:  Denies swollen glands   Psychiatric:  Denies depression or anxiety     MEDICAL HISTORY    Past Medical History:   Diagnosis Date    Arthritis     Arthritis     BPH (benign prostatic hyperplasia)     CHF (congestive heart failure)     Coronary artery disease     dr. Carrizales    Deviated septum     FH: cataracts     Gait abnormality     GERD (gastroesophageal reflux disease)     History of trigeminal neuralgia     Hyperlipidemia     Hypertension     Renal cyst     Sciatica     Sleep apnea     CPAP Therapy        SURGICAL HISTORY    Past Surgical History:   Procedure Laterality Date    APPENDECTOMY      CARDIAC CATHETERIZATION      CARDIAC CATHETERIZATION N/A 3/5/2020    Procedure: Percutaneous Subclavian Intervention;  Surgeon: Nick Greenberg MD;  Location: [facility] CATH INVASIVE LOCATION;  Service: Cardiovascular;  Laterality: N/A;    CARDIAC CATHETERIZATION Right 3/10/2025    Procedure: LEFT HEART CATH with possible PCI;  Surgeon: Radha Pemberton MD;  Location: [facility] CATH INVASIVE LOCATION;  Service: Cardiovascular;  Laterality:  Right;  Local and IV sedation    CARDIAC ELECTROPHYSIOLOGY PROCEDURE N/A 2020    Procedure: Biventricular Device Upgrade;  Surgeon: Dominick Jackson MD;  Location: [facility] CATH INVASIVE LOCATION;  Service: Cardiovascular    CARDIAC ELECTROPHYSIOLOGY PROCEDURE N/A 3/5/2020    Procedure: Lead Revision;  Surgeon: Nick Greenberg MD;  Location: [facility] CATH INVASIVE LOCATION;  Service: Cardiovascular;  Laterality: N/A;    CORONARY STENT PLACEMENT  2014    x 2    CYSTOSCOPY, URETEROSCOPY, RETROGRADE PYELOGRAM, STENT INSERTION Left 11/3/2023    Procedure: CYSTOSCOPY URETEROSCOPY STONE EXTRACTION STENT INSERTION;  Surgeon: Chris Patel MD;  Location: [facility] MAIN OR;  Service: Urology;  Laterality: Left;    IMPLANTABLE CARDIOVERTER DEFIBRILLATOR LEAD REPLACEMENT/POCKET REVISION Right 10/19/2022    Procedure: RT. TRANSVENOUS ICD LEAD EXTRACTION;  Surgeon: Naveed Rios MD;  Location: [facility];  Service: Cardiology;  Laterality: Right;    PROSTATE SURGERY      REFRACTIVE SURGERY      THORACOTOMY Left 3/4/2020    Procedure: THORACOTOMY MINI WITH LEAD PLACEMENT;  Surgeon: Justin Aguilar MD;  Location: [facility];  Service: Cardiothoracic;  Laterality: Left;    TOTAL HIP ARTHROPLASTY Bilateral     different times        FAMILY HISTORY    Family History   Problem Relation Age of Onset    Cancer Mother     Heart disease Father     Arthritis Father     Diabetes Father     Hypertension Father     Hyperlipidemia Father     Kidney disease Father     Malig Hyperthermia Neg Hx        SOCIAL HISTORY    Social History     Tobacco Use    Smoking status: Former     Current packs/day: 0.00     Types: Cigarettes     Quit date:      Years since quittin.5     Passive exposure: Past    Smokeless tobacco: Never   Substance Use Topics    Alcohol use: Yes     Alcohol/week: 7.0 standard drinks of alcohol     Types: 7 Glasses of wine per week     Comment: RED WINE BEFORE DINNER        ALLERGIES    No Known  Allergies      MEDICATIONS    Current Outpatient Medications on File Prior to Visit   Medication Sig Dispense Refill    acebutolol (SECTRAL) 200 MG capsule Take 1 capsule by mouth Daily. 90 capsule 1    amLODIPine (NORVASC) 10 MG tablet Take 1 tablet by mouth every night at bedtime. Indications: High Blood Pressure      apixaban (Eliquis) 5 MG tablet tablet Take 1 tablet by mouth Every 12 (Twelve) Hours. Indications: Atrial Fibrillation 36 tablet 0    aspirin 81 MG tablet Take 1 tablet by mouth Daily. Do not take day of surgery  Indications: Blood Clot Within a Vein      atorvastatin (LIPITOR) 20 MG tablet TAKE 1 TABLET EVERY DAY 90 tablet 3    Calcium Carb-Cholecalciferol (CALCIUM 500 + D PO) Take 1 tablet by mouth Daily.      cetirizine (zyrTEC) 10 MG tablet Take 1 tablet by mouth Daily. Indications: Upper Respiratory Tract Allergy      Cinnamon 500 MG capsule Take 1 capsule by mouth Daily.      enalapril (VASOTEC) 20 MG tablet Take 1 tablet by mouth Daily. Indications: High Blood Pressure      esomeprazole (nexIUM) 40 MG capsule Take 1 capsule by mouth Daily. Indications: Gastroesophageal Reflux Disease      gabapentin (NEURONTIN) 300 MG capsule Take 1 capsule by mouth 2 (Two) Times a Day. 60 capsule 5    glucosamine-chondroitin 500-400 MG capsule capsule Take  by mouth 3 (Three) Times a Day With Meals.      isosorbide mononitrate (IMDUR) 60 MG 24 hr tablet Take 1 tablet by mouth Daily. Indications: Stable Angina Pectoris      Magnesium Oxide 140 MG capsule Take 400 mg by mouth.      OXcarbazepine (TRILEPTAL) 300 MG tablet Take 1 tablet by mouth 4 (Four) Times a Day. Titrate down if pain controlled 360 tablet 3    Probiotic Product (PROBIOTIC ADVANCED PO) Take 1 capsule by mouth Daily. Stop on 2/26 for surgery  Indications: Edith replacement      Turmeric 500 MG capsule Take  by mouth.      vitamin C (ASCORBIC ACID) 250 MG tablet Take 1 tablet by mouth Daily. Indications: supplement      Zinc 25 MG tablet Take 25  "mg by mouth Daily. Indications: Zinc Deficiency       No current facility-administered medications on file prior to visit.       PHYSICAL EXAM    Vitals:    06/24/25 1137   Weight: 100 kg (221 lb)   Height: 182.9 cm (72\")        Constitutional:  Well developed, well nourished, no acute distress, non-toxic appearance   Eyes:  PERRL, conjunctiva normal   HENT:  Atraumatic, external ears normal, nose normal, oropharynx moist, no pharyngeal exudates. mallampatti   Neck- normal range of motion, no tenderness, supple   Respiratory:  No respiratory distress, normal breath sounds, no rales, no wheezing   Cardiovascular:  Normal rate, normal rhythm, no murmurs, no gallops, no rubs   GI:  Soft, nondistended, normal bowel sounds, nontender, no organomegaly, no mass, no rebound, no guarding   :  No costovertebral angle tenderness   Musculoskeletal:  No edema, no tenderness, no deformities. Back- no tenderness  Integument:  Well hydrated, no rash   Lymphatic:  No lymphadenopathy noted   Neurologic:  Alert & oriented x 3, CN 2-12 normal, normal motor function, normal sensory function, no focal deficits noted   Psychiatric:  Speech and behavior appropriate     CT Chest Without Contrast Diagnostic  Result Date: 6/6/2025  Impression: 1. No acute intrathoracic process. 2. No new or enlarging pulmonary nodule. Stable 11 mm left lower lobe pulmonary nodule. This is unchanged from November 2023 which favors benign etiology, follow-up could be considered in 1 year to document greater than 2 years of stability. 3. Stable chronic lung herniation between anterior left fifth and sixth ribs. 4. Cardiomegaly and coronary atherosclerotic calcifications. 5. Additional chronic findings above. Electronically Signed: Otis Parada MD  6/6/2025 8:09 AM EDT  Workstation ID: [id number]     Results for orders placed during the hospital encounter of 03/07/25    Adult Transthoracic Echo Complete W/ Cont if Necessary Per Protocol    Interpretation " Summary    Left ventricular systolic function is normal. Left ventricular ejection fraction appears to be 41 - 45%.    The left ventricular cavity is mildly dilated.    Left ventricular wall thickness is consistent with mild to moderate concentric hypertrophy.    The following left ventricular wall segments are hypokinetic: mid anterior, mid anterolateral, apical lateral, mid inferolateral and apex hypokinetic. The following left ventricular wall segments are akinetic: basal anterolateral.    Left ventricular diastolic function is consistent with (grade II w/high LAP) pseudonormalization.    The left atrial cavity is mildly dilated.    There is mild calcification of the aortic valve mainly affecting the left coronary and right coronary cusp(s).    Estimated right ventricular systolic pressure from tricuspid regurgitation is mildly elevated (35-45 mmHg).    Mild pulmonary hypertension is present.      ASSESSMENT & PLAN:      left lower lobe 11 mm nodule on CT abdomen November 2023, CT chest November 2024 showed no change in size  He is non-smoker  Mother had lung cancer    Repeat CT scan of the chest scheduled for June 2025  Stable 11 mm left lower lobe pulmonary nodule.This is unchanged from November 2023 which favors benign etiology, follow-up could beconsidered in 1 year to document greater than 2 years of stability    Repeat CT chest 11/2024   The provided history indicates renal mass as the reason for the examination. If thisrepresents the correct history, then dedicated CT or MRI abdomen without and with contrastrenal mass protocol would be recommended.2. 11 mm noncalcified left lower lobe lung nodule is stable since 11/3/2023. Additional 4 mmnoncalcified right upper lobe lung nodule. 6-month CT chest follow-up is suggested.3. 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 Pacemaker leadtraverses through the herniated lung parenchyma, terminating along the margin of the leftlateral  ventricular wall.4.. Dense coronary artery calcifications        Severe SHELTON AHI 63     A-fib        Plan    Discussed with patient about treated for severe obstructive sleep apnea patient is unable to tolerate the mask he tried nasal pillows as well as fullface mask although his numbers from the download looks excellent with 97% compliance and residual AHI 3.3 and average use is 5 hours 30 minutes    Alternative is oral device however his baseline severe SHELTON with AHI 63    We discussed also inspire device    Repeat CT chest no contrast June 2026                 This document has been electronically signed by  German Johnson MD  11:39 EDT

## 2025-06-27 RX ORDER — ACEBUTOLOL HYDROCHLORIDE 200 MG/1
200 CAPSULE ORAL DAILY
Qty: 90 CAPSULE | Refills: 1 | Status: SHIPPED | OUTPATIENT
Start: 2025-06-27

## 2025-07-11 ENCOUNTER — HOSPITAL ENCOUNTER (EMERGENCY)
Facility: HOSPITAL | Age: 80
Discharge: HOME | End: 2025-07-11
Attending: EMERGENCY MEDICINE
Payer: MEDICARE

## 2025-07-11 VITALS
RESPIRATION RATE: 16 BRPM | SYSTOLIC BLOOD PRESSURE: 131 MMHG | BODY MASS INDEX: 29.48 KG/M2 | HEART RATE: 70 BPM | HEIGHT: 73 IN | TEMPERATURE: 98.3 F | DIASTOLIC BLOOD PRESSURE: 73 MMHG | OXYGEN SATURATION: 95 % | WEIGHT: 222.44 LBS

## 2025-07-11 DIAGNOSIS — R30.0 DYSURIA: Primary | ICD-10-CM

## 2025-07-11 LAB
BACTERIA UR QL AUTO: ABNORMAL /HPF
BILIRUB UR QL STRIP: NEGATIVE
CLARITY UR: CLEAR
COLOR UR: ABNORMAL
GLUCOSE UR STRIP-MCNC: NEGATIVE MG/DL
HGB UR QL STRIP.AUTO: NEGATIVE
HYALINE CASTS UR QL AUTO: ABNORMAL /LPF
KETONES UR QL STRIP: NEGATIVE
LEUKOCYTE ESTERASE UR QL STRIP.AUTO: ABNORMAL
NITRITE UR QL STRIP: NEGATIVE
PH UR STRIP.AUTO: 6 [PH] (ref 5–8)
PROT UR QL STRIP: NEGATIVE
RBC # UR STRIP: ABNORMAL /HPF
REF LAB TEST METHOD: ABNORMAL
SP GR UR STRIP: 1.02 (ref 1–1.03)
SQUAMOUS #/AREA URNS HPF: ABNORMAL /HPF
UROBILINOGEN UR QL STRIP: ABNORMAL
WBC # UR STRIP: ABNORMAL /HPF

## 2025-07-11 PROCEDURE — 51798 US URINE CAPACITY MEASURE: CPT

## 2025-07-11 PROCEDURE — 99283 EMERGENCY DEPT VISIT LOW MDM: CPT

## 2025-07-11 PROCEDURE — 81001 URINALYSIS AUTO W/SCOPE: CPT | Performed by: EMERGENCY MEDICINE

## 2025-07-11 NOTE — ED PROVIDER NOTES
Subjective   History of Present Illness  Patient is an adult male with a history of recurrent urinary tract infections over the past eight months who presents with ongoing mild dysuria. He reports multiple episodes of UTI, with at least two urine cultures performed recently: the first culture grew >100,000 colonies of Klebsiella sensitive to Bactrim, for which he was treated, but symptoms persisted. A repeat culture on the 9th showed no growth. He reports that Bactrim did not resolve his symptoms, and he has experienced variable urinary frequency with different antibiotics. He denies fever, vomiting, abdominal pain or flank pain. He expresses concern about persistent symptoms and the possibility of infection spreading, and is seeking further evaluation as he was advised to see a urologist but did not want to wait until his scheduled appointment in August.  Review of Systems  See HPI.  Past Medical History:   Diagnosis Date    Arthritis     Arthritis     BPH (benign prostatic hyperplasia)     CHF (congestive heart failure)     Coronary artery disease     dr. Carrizales    Deviated septum     FH: cataracts     Gait abnormality     GERD (gastroesophageal reflux disease)     History of trigeminal neuralgia     Hyperlipidemia     Hypertension     Renal cyst     Sciatica     Sleep apnea     CPAP Therapy       No Known Allergies    Past Surgical History:   Procedure Laterality Date    APPENDECTOMY      CARDIAC CATHETERIZATION      CARDIAC CATHETERIZATION N/A 3/5/2020    Procedure: Percutaneous Subclavian Intervention;  Surgeon: Nick Greenberg MD;  Location: [facility] CATH INVASIVE LOCATION;  Service: Cardiovascular;  Laterality: N/A;    CARDIAC CATHETERIZATION Right 3/10/2025    Procedure: LEFT HEART CATH with possible PCI;  Surgeon: Radha Pemberton MD;  Location: [facility] CATH INVASIVE LOCATION;  Service: Cardiovascular;  Laterality: Right;  Local and IV sedation    CARDIAC ELECTROPHYSIOLOGY PROCEDURE N/A  2020    Procedure: Biventricular Device Upgrade;  Surgeon: Dominick Jackson MD;  Location: [facility] CATH INVASIVE LOCATION;  Service: Cardiovascular    CARDIAC ELECTROPHYSIOLOGY PROCEDURE N/A 3/5/2020    Procedure: Lead Revision;  Surgeon: Nick Greenberg MD;  Location: [facility] CATH INVASIVE LOCATION;  Service: Cardiovascular;  Laterality: N/A;    CORONARY STENT PLACEMENT  2014    x 2    CYSTOSCOPY, URETEROSCOPY, RETROGRADE PYELOGRAM, STENT INSERTION Left 11/3/2023    Procedure: CYSTOSCOPY URETEROSCOPY STONE EXTRACTION STENT INSERTION;  Surgeon: Chris Patel MD;  Location: [facility] MAIN OR;  Service: Urology;  Laterality: Left;    IMPLANTABLE CARDIOVERTER DEFIBRILLATOR LEAD REPLACEMENT/POCKET REVISION Right 10/19/2022    Procedure: RT. TRANSVENOUS ICD LEAD EXTRACTION;  Surgeon: Naveed Rios MD;  Location: [facility];  Service: Cardiology;  Laterality: Right;    PROSTATE SURGERY      REFRACTIVE SURGERY      THORACOTOMY Left 3/4/2020    Procedure: THORACOTOMY MINI WITH LEAD PLACEMENT;  Surgeon: Justin Aguilar MD;  Location: [facility];  Service: Cardiothoracic;  Laterality: Left;    TOTAL HIP ARTHROPLASTY Bilateral     different times       Family History   Problem Relation Age of Onset    Cancer Mother     Heart disease Father     Arthritis Father     Diabetes Father     Hypertension Father     Hyperlipidemia Father     Kidney disease Father     Malig Hyperthermia Neg Hx        Social History     Socioeconomic History    Marital status:    Tobacco Use    Smoking status: Former     Current packs/day: 0.00     Types: Cigarettes     Quit date:      Years since quittin.5     Passive exposure: Past    Smokeless tobacco: Never   Vaping Use    Vaping status: Never Used   Substance and Sexual Activity    Alcohol use: Yes     Alcohol/week: 7.0 standard drinks of alcohol     Types: 7 Glasses of wine per week     Comment: RED WINE BEFORE DINNER    Drug use: No    Sexual activity:  "Defer           Objective   Physical Exam  No acute distress. Normocephalic. No scleral icterus. Moist oral mucosa. No observable neck masses on external visualization. No respiratory distress. No tachypnea or increased work of breathing. Normal heart rate. Abdomen soft and nontender without peritoneal signs. No costovertebral angle tenderness bilaterally. Alert. Normal speech.  Procedures           ED Course      /73   Pulse 70   Temp 98.3 °F (36.8 °C)   Resp 16   Ht 185.4 cm (73\")   Wt 101 kg (222 lb 7.1 oz)   SpO2 95%   BMI 29.35 kg/m²   Labs Reviewed   URINALYSIS W/ CULTURE IF INDICATED - Abnormal; Notable for the following components:       Result Value    Color, UA Dark Yellow (*)     Leuk Esterase, UA Trace (*)     All other components within normal limits    Narrative:     In absence of clinical symptoms, the presence of pyuria, bacteria, and/or nitrites on the urinalysis result does not correlate with infection.   URINALYSIS, MICROSCOPIC ONLY - Abnormal; Notable for the following components:    RBC, UA 3-5 (*)     Squamous Epithelial Cells, UA 3-6 (*)     All other components within normal limits     Medications - No data to display  No orders to display                                                      Medical Decision Making  Problems Addressed:  Dysuria: acute illness or injury    Postvoid residual less than 50.  UA without evidence of infection here.  Patient nontoxic-appearing with reassuring vital signs.  Will DC.  Return ER precautions discussed.    Final diagnoses:   Dysuria       ED Disposition  ED Disposition       ED Disposition   Discharge    Condition   Stable    Comment   --               Temo Vidal MD  [address]  [place] [address]  [phone]    In 3 days           Medication List      No changes were made to your prescriptions during this visit.            Todd Baires MD  07/12/25 0048    "

## 2025-07-21 ENCOUNTER — LAB (OUTPATIENT)
Dept: LAB | Facility: HOSPITAL | Age: 80
End: 2025-07-21
Payer: MEDICARE

## 2025-07-21 DIAGNOSIS — I25.10 CORONARY ARTERY DISEASE INVOLVING NATIVE CORONARY ARTERY OF NATIVE HEART WITHOUT ANGINA PECTORIS: ICD-10-CM

## 2025-07-21 DIAGNOSIS — I48.19 PERSISTENT ATRIAL FIBRILLATION: ICD-10-CM

## 2025-07-21 DIAGNOSIS — Z95.0 CARDIAC PACEMAKER IN SITU: ICD-10-CM

## 2025-07-21 DIAGNOSIS — I44.2 COMPLETE HEART BLOCK: ICD-10-CM

## 2025-07-21 DIAGNOSIS — I25.5 ISCHEMIC CARDIOMYOPATHY: ICD-10-CM

## 2025-07-21 LAB
ALBUMIN SERPL-MCNC: 4.3 G/DL (ref 3.5–5.2)
ALBUMIN/GLOB SERPL: 1.9 G/DL
ALP SERPL-CCNC: 84 U/L (ref 39–117)
ALT SERPL W P-5'-P-CCNC: 21 U/L (ref 1–41)
ANION GAP SERPL CALCULATED.3IONS-SCNC: 12 MMOL/L (ref 5–15)
AST SERPL-CCNC: 18 U/L (ref 1–40)
BASOPHILS # BLD AUTO: 0.04 10*3/MM3 (ref 0–0.2)
BASOPHILS NFR BLD AUTO: 0.8 % (ref 0–1.5)
BILIRUB SERPL-MCNC: 0.7 MG/DL (ref 0–1.2)
BUN SERPL-MCNC: 12 MG/DL (ref 8–23)
BUN/CREAT SERPL: 13.3 (ref 7–25)
CALCIUM SPEC-SCNC: 9.2 MG/DL (ref 8.6–10.5)
CHLORIDE SERPL-SCNC: 99 MMOL/L (ref 98–107)
CO2 SERPL-SCNC: 25 MMOL/L (ref 22–29)
CREAT SERPL-MCNC: 0.9 MG/DL (ref 0.76–1.27)
DEPRECATED RDW RBC AUTO: 41.8 FL (ref 37–54)
EGFRCR SERPLBLD CKD-EPI 2021: 86.9 ML/MIN/1.73
EOSINOPHIL # BLD AUTO: 0.21 10*3/MM3 (ref 0–0.4)
EOSINOPHIL NFR BLD AUTO: 4.2 % (ref 0.3–6.2)
ERYTHROCYTE [DISTWIDTH] IN BLOOD BY AUTOMATED COUNT: 12.2 % (ref 12.3–15.4)
GLOBULIN UR ELPH-MCNC: 2.3 GM/DL
GLUCOSE SERPL-MCNC: 110 MG/DL (ref 65–99)
HCT VFR BLD AUTO: 39.9 % (ref 37.5–51)
HGB BLD-MCNC: 13.9 G/DL (ref 13–17.7)
IMM GRANULOCYTES # BLD AUTO: 0.01 10*3/MM3 (ref 0–0.05)
IMM GRANULOCYTES NFR BLD AUTO: 0.2 % (ref 0–0.5)
LYMPHOCYTES # BLD AUTO: 1.11 10*3/MM3 (ref 0.7–3.1)
LYMPHOCYTES NFR BLD AUTO: 22.3 % (ref 19.6–45.3)
MAGNESIUM SERPL-MCNC: 2.2 MG/DL (ref 1.6–2.4)
MCH RBC QN AUTO: 33.3 PG (ref 26.6–33)
MCHC RBC AUTO-ENTMCNC: 34.8 G/DL (ref 31.5–35.7)
MCV RBC AUTO: 95.5 FL (ref 79–97)
MONOCYTES # BLD AUTO: 0.41 10*3/MM3 (ref 0.1–0.9)
MONOCYTES NFR BLD AUTO: 8.2 % (ref 5–12)
NEUTROPHILS NFR BLD AUTO: 3.19 10*3/MM3 (ref 1.7–7)
NEUTROPHILS NFR BLD AUTO: 64.3 % (ref 42.7–76)
NRBC BLD AUTO-RTO: 0 /100 WBC (ref 0–0.2)
PLATELET # BLD AUTO: 214 10*3/MM3 (ref 140–450)
PMV BLD AUTO: 8.9 FL (ref 6–12)
POTASSIUM SERPL-SCNC: 4.4 MMOL/L (ref 3.5–5.2)
PROT SERPL-MCNC: 6.6 G/DL (ref 6–8.5)
RBC # BLD AUTO: 4.18 10*6/MM3 (ref 4.14–5.8)
SODIUM SERPL-SCNC: 136 MMOL/L (ref 136–145)
WBC NRBC COR # BLD AUTO: 4.97 10*3/MM3 (ref 3.4–10.8)

## 2025-07-21 PROCEDURE — 80053 COMPREHEN METABOLIC PANEL: CPT

## 2025-07-21 PROCEDURE — 85025 COMPLETE CBC W/AUTO DIFF WBC: CPT

## 2025-07-21 PROCEDURE — 36415 COLL VENOUS BLD VENIPUNCTURE: CPT

## 2025-07-21 PROCEDURE — 83735 ASSAY OF MAGNESIUM: CPT

## 2025-07-22 ENCOUNTER — ANESTHESIA EVENT (OUTPATIENT)
Dept: CARDIOLOGY | Facility: HOSPITAL | Age: 80
End: 2025-07-22
Payer: MEDICARE

## 2025-07-22 NOTE — ANESTHESIA PREPROCEDURE EVALUATION
Anesthesia Evaluation     NPO Solid Status: > 8 hours  NPO Liquid Status: > 8 hours           Airway   Dental      Pulmonary    (+) ,shortness of breath, sleep apnea    ROS comment: Recent TSH is normal.  Potassium and creatinine normal platelets are normal and hemoglobin 12.9  Recent investigations revealed an EF around 40% on medical management  Ischemic cardiomyopathy currently on enalapril/acebutolol/aspirin/atorvastatin  Pacemaker in situ with a right atrium transvenous pacing lead and epicardial coronary sinus lead  Chronic left subclavian venous occlusion  Persistent atrial arrhythmia in the form of atrial fibrillation atrial flutter currently on apixaban  Dyslipidemia on atorvastatin  Hypertension controlled with acebutolol enalapril  Therapeutic options for ongoing atrial arrhythmia discussed with the patient--patient does have some symptoms of mild ankle edema and fatigue and after discussing options about atrial arrhythmia patient scheduled for AF ablation  GARIMELLA  Cardiovascular     (+) pacemaker pacemaker, hypertension, CAD, dysrhythmias, CHF , hyperlipidemia    ROS comment: Impressions:  Severe single-vessel coronary artery disease involving the mid circumflex artery with 100% occlusion with left to left collaterals  Moderate to severe LV dysfunction with estimate LV ejection fraction of 35 to 40%  Mild elevated left-sided filling pressures  No anginal symptoms     Recommendations:  Medical management for cardiomyopathy  If patient has symptoms or significant reversible ischemia consider high risk PCI attempt of opening chronic total occlusion of the left circumflex artery  3/2025    Neuro/Psych  (+) numbness  GI/Hepatic/Renal/Endo    (+) GERD, renal disease-    Musculoskeletal     Abdominal    Substance History      OB/GYN          Other   arthritis,     ROS/Med Hx Other: TRIGEMINAL NEURALGIA  ELIQUIS  THORACOTOMY EXT LEAD PLACEMENT 2020 MAC4 GRIIA  EF 41-45              Anesthesia Plan    ASA 4      general     intravenous induction     Anesthetic plan, risks, benefits, and alternatives have been provided, discussed and informed consent has been obtained with: patient.  Pre-procedure education provided  Plan discussed with CRNA.    CODE STATUS:

## 2025-07-23 ENCOUNTER — HOSPITAL ENCOUNTER (OUTPATIENT)
Facility: HOSPITAL | Age: 80
Setting detail: HOSPITAL OUTPATIENT SURGERY
Discharge: HOME | End: 2025-07-23
Attending: INTERNAL MEDICINE | Admitting: INTERNAL MEDICINE
Payer: MEDICARE

## 2025-07-23 ENCOUNTER — ANESTHESIA (OUTPATIENT)
Dept: CARDIOLOGY | Facility: HOSPITAL | Age: 80
End: 2025-07-23
Payer: MEDICARE

## 2025-07-23 ENCOUNTER — HOSPITAL ENCOUNTER (OUTPATIENT)
Dept: CARDIOLOGY | Facility: HOSPITAL | Age: 80
Discharge: HOME | End: 2025-07-23
Payer: MEDICARE

## 2025-07-23 VITALS
HEART RATE: 70 BPM | BODY MASS INDEX: 29.98 KG/M2 | OXYGEN SATURATION: 94 % | TEMPERATURE: 97.9 F | HEIGHT: 72 IN | SYSTOLIC BLOOD PRESSURE: 128 MMHG | WEIGHT: 221.34 LBS | DIASTOLIC BLOOD PRESSURE: 73 MMHG | RESPIRATION RATE: 13 BRPM

## 2025-07-23 DIAGNOSIS — I48.19 PERSISTENT ATRIAL FIBRILLATION: ICD-10-CM

## 2025-07-23 DIAGNOSIS — I25.5 ISCHEMIC CARDIOMYOPATHY: ICD-10-CM

## 2025-07-23 DIAGNOSIS — I44.2 COMPLETE HEART BLOCK: ICD-10-CM

## 2025-07-23 DIAGNOSIS — I25.10 CORONARY ARTERY DISEASE INVOLVING NATIVE CORONARY ARTERY OF NATIVE HEART WITHOUT ANGINA PECTORIS: ICD-10-CM

## 2025-07-23 DIAGNOSIS — Z95.0 CARDIAC PACEMAKER IN SITU: ICD-10-CM

## 2025-07-23 PROBLEM — I42.9 CARDIOMYOPATHY: Status: RESOLVED | Noted: 2020-01-13 | Resolved: 2025-07-23

## 2025-07-23 LAB
ACT BLD: 129 SECONDS (ref 89–137)
ACT BLD: 273 SECONDS (ref 89–137)
ACT BLD: 348 SECONDS (ref 89–137)
BH CV ECHO SHUNT ASSESSMENT PERFORMED (HIDDEN SCRIPTING): 1
LV EF 2D ECHO EST: 60 %

## 2025-07-23 PROCEDURE — 93657 TX L/R ATRIAL FIB ADDL: CPT | Performed by: INTERNAL MEDICINE

## 2025-07-23 PROCEDURE — 93325 DOPPLER ECHO COLOR FLOW MAPG: CPT

## 2025-07-23 PROCEDURE — C1894 INTRO/SHEATH, NON-LASER: HCPCS | Performed by: INTERNAL MEDICINE

## 2025-07-23 PROCEDURE — C1733 CATH, EP, OTHR THAN COOL-TIP: HCPCS | Performed by: INTERNAL MEDICINE

## 2025-07-23 PROCEDURE — C1759 CATH, INTRA ECHOCARDIOGRAPHY: HCPCS | Performed by: INTERNAL MEDICINE

## 2025-07-23 PROCEDURE — 25010000002 SUGAMMADEX 200 MG/2ML SOLUTION: Performed by: NURSE ANESTHETIST, CERTIFIED REGISTERED

## 2025-07-23 PROCEDURE — C1893 INTRO/SHEATH, FIXED,NON-PEEL: HCPCS | Performed by: INTERNAL MEDICINE

## 2025-07-23 PROCEDURE — 25010000002 HEPARIN (PORCINE) PER 1000 UNITS: Performed by: NURSE ANESTHETIST, CERTIFIED REGISTERED

## 2025-07-23 PROCEDURE — 93655 ICAR CATH ABLTJ DSCRT ARRHYT: CPT | Performed by: INTERNAL MEDICINE

## 2025-07-23 PROCEDURE — 25010000002 ONDANSETRON PER 1 MG: Performed by: NURSE ANESTHETIST, CERTIFIED REGISTERED

## 2025-07-23 PROCEDURE — 25010000002 LIDOCAINE PF 1% 1 % SOLUTION: Performed by: INTERNAL MEDICINE

## 2025-07-23 PROCEDURE — 25010000002 PROTAMINE SULFATE PER 10 MG: Performed by: NURSE ANESTHETIST, CERTIFIED REGISTERED

## 2025-07-23 PROCEDURE — C1769 GUIDE WIRE: HCPCS | Performed by: INTERNAL MEDICINE

## 2025-07-23 PROCEDURE — 85347 COAGULATION TIME ACTIVATED: CPT

## 2025-07-23 PROCEDURE — 25010000002 PROPOFOL 10 MG/ML EMULSION: Performed by: NURSE ANESTHETIST, CERTIFIED REGISTERED

## 2025-07-23 PROCEDURE — 25010000002 CEFAZOLIN PER 500 MG: Performed by: NURSE ANESTHETIST, CERTIFIED REGISTERED

## 2025-07-23 PROCEDURE — C1732 CATH, EP, DIAG/ABL, 3D/VECT: HCPCS | Performed by: INTERNAL MEDICINE

## 2025-07-23 PROCEDURE — 25010000002 FAMOTIDINE (PF) 20 MG/2ML SOLUTION: Performed by: NURSE ANESTHETIST, CERTIFIED REGISTERED

## 2025-07-23 PROCEDURE — 25010000002 DEXAMETHASONE PER 1 MG: Performed by: NURSE ANESTHETIST, CERTIFIED REGISTERED

## 2025-07-23 PROCEDURE — 25810000003 SODIUM CHLORIDE 0.9 % SOLUTION 250 ML FLEX CONT: Performed by: NURSE ANESTHETIST, CERTIFIED REGISTERED

## 2025-07-23 PROCEDURE — 93312 ECHO TRANSESOPHAGEAL: CPT

## 2025-07-23 PROCEDURE — 93656 COMPRE EP EVAL ABLTJ ATR FIB: CPT | Performed by: INTERNAL MEDICINE

## 2025-07-23 PROCEDURE — C1730 CATH, EP, 19 OR FEW ELECT: HCPCS | Performed by: INTERNAL MEDICINE

## 2025-07-23 PROCEDURE — C1766 INTRO/SHEATH,STRBLE,NON-PEEL: HCPCS | Performed by: INTERNAL MEDICINE

## 2025-07-23 PROCEDURE — 25810000003 SODIUM CHLORIDE 0.9 % SOLUTION: Performed by: NURSE ANESTHETIST, CERTIFIED REGISTERED

## 2025-07-23 PROCEDURE — 25010000002 PHENYLEPHRINE 10 MG/ML SOLUTION 5 ML VIAL: Performed by: NURSE ANESTHETIST, CERTIFIED REGISTERED

## 2025-07-23 PROCEDURE — 93321 DOPPLER ECHO F-UP/LMTD STD: CPT

## 2025-07-23 PROCEDURE — 25010000002 SUCCINYLCHOLINE PER 20 MG: Performed by: NURSE ANESTHETIST, CERTIFIED REGISTERED

## 2025-07-23 PROCEDURE — 25010000002 LIDOCAINE PF 2% 2 % SOLUTION: Performed by: NURSE ANESTHETIST, CERTIFIED REGISTERED

## 2025-07-23 RX ORDER — DIPHENHYDRAMINE HYDROCHLORIDE 50 MG/ML
12.5 INJECTION, SOLUTION INTRAMUSCULAR; INTRAVENOUS
Status: DISCONTINUED | OUTPATIENT
Start: 2025-07-23 | End: 2025-07-23 | Stop reason: HOSPADM

## 2025-07-23 RX ORDER — OXCARBAZEPINE 150 MG/1
300 TABLET, FILM COATED ORAL ONCE
Status: COMPLETED | OUTPATIENT
Start: 2025-07-23 | End: 2025-07-23

## 2025-07-23 RX ORDER — OXYCODONE HYDROCHLORIDE 5 MG/1
5 TABLET ORAL ONCE AS NEEDED
Status: DISCONTINUED | OUTPATIENT
Start: 2025-07-23 | End: 2025-07-23 | Stop reason: HOSPADM

## 2025-07-23 RX ORDER — FLUMAZENIL 0.1 MG/ML
0.2 INJECTION INTRAVENOUS AS NEEDED
Status: DISCONTINUED | OUTPATIENT
Start: 2025-07-23 | End: 2025-07-23 | Stop reason: HOSPADM

## 2025-07-23 RX ORDER — ROCURONIUM BROMIDE 10 MG/ML
INJECTION, SOLUTION INTRAVENOUS AS NEEDED
Status: DISCONTINUED | OUTPATIENT
Start: 2025-07-23 | End: 2025-07-23 | Stop reason: SURG

## 2025-07-23 RX ORDER — FAMOTIDINE 10 MG/ML
INJECTION, SOLUTION INTRAVENOUS AS NEEDED
Status: DISCONTINUED | OUTPATIENT
Start: 2025-07-23 | End: 2025-07-23 | Stop reason: SURG

## 2025-07-23 RX ORDER — DIPHENHYDRAMINE HYDROCHLORIDE 50 MG/ML
12.5 INJECTION, SOLUTION INTRAMUSCULAR; INTRAVENOUS ONCE AS NEEDED
Status: DISCONTINUED | OUTPATIENT
Start: 2025-07-23 | End: 2025-07-23 | Stop reason: HOSPADM

## 2025-07-23 RX ORDER — IPRATROPIUM BROMIDE AND ALBUTEROL SULFATE 2.5; .5 MG/3ML; MG/3ML
3 SOLUTION RESPIRATORY (INHALATION) ONCE AS NEEDED
Status: DISCONTINUED | OUTPATIENT
Start: 2025-07-23 | End: 2025-07-23 | Stop reason: HOSPADM

## 2025-07-23 RX ORDER — CEFAZOLIN SODIUM 1 G/3ML
INJECTION, POWDER, FOR SOLUTION INTRAMUSCULAR; INTRAVENOUS AS NEEDED
Status: DISCONTINUED | OUTPATIENT
Start: 2025-07-23 | End: 2025-07-23 | Stop reason: SURG

## 2025-07-23 RX ORDER — NALOXONE HCL 0.4 MG/ML
0.4 VIAL (ML) INJECTION AS NEEDED
Status: DISCONTINUED | OUTPATIENT
Start: 2025-07-23 | End: 2025-07-23 | Stop reason: HOSPADM

## 2025-07-23 RX ORDER — HYDROCODONE BITARTRATE AND ACETAMINOPHEN 5; 325 MG/1; MG/1
1 TABLET ORAL EVERY 4 HOURS PRN
Status: DISCONTINUED | OUTPATIENT
Start: 2025-07-23 | End: 2025-07-23 | Stop reason: HOSPADM

## 2025-07-23 RX ORDER — ONDANSETRON 2 MG/ML
4 INJECTION INTRAMUSCULAR; INTRAVENOUS EVERY 6 HOURS PRN
Status: DISCONTINUED | OUTPATIENT
Start: 2025-07-23 | End: 2025-07-23 | Stop reason: HOSPADM

## 2025-07-23 RX ORDER — LIDOCAINE HYDROCHLORIDE 10 MG/ML
INJECTION, SOLUTION EPIDURAL; INFILTRATION; INTRACAUDAL; PERINEURAL
Status: DISCONTINUED | OUTPATIENT
Start: 2025-07-23 | End: 2025-07-23 | Stop reason: HOSPADM

## 2025-07-23 RX ORDER — EPHEDRINE SULFATE 5 MG/ML
5 INJECTION INTRAVENOUS ONCE AS NEEDED
Status: DISCONTINUED | OUTPATIENT
Start: 2025-07-23 | End: 2025-07-23 | Stop reason: HOSPADM

## 2025-07-23 RX ORDER — PROTAMINE SULFATE 10 MG/ML
INJECTION, SOLUTION INTRAVENOUS AS NEEDED
Status: DISCONTINUED | OUTPATIENT
Start: 2025-07-23 | End: 2025-07-23 | Stop reason: SURG

## 2025-07-23 RX ORDER — SUCCINYLCHOLINE CHLORIDE 20 MG/ML
INJECTION INTRAMUSCULAR; INTRAVENOUS AS NEEDED
Status: DISCONTINUED | OUTPATIENT
Start: 2025-07-23 | End: 2025-07-23 | Stop reason: SURG

## 2025-07-23 RX ORDER — FENTANYL CITRATE 50 UG/ML
50 INJECTION, SOLUTION INTRAMUSCULAR; INTRAVENOUS
Status: DISCONTINUED | OUTPATIENT
Start: 2025-07-23 | End: 2025-07-23 | Stop reason: HOSPADM

## 2025-07-23 RX ORDER — HYDRALAZINE HYDROCHLORIDE 20 MG/ML
5 INJECTION INTRAMUSCULAR; INTRAVENOUS
Status: DISCONTINUED | OUTPATIENT
Start: 2025-07-23 | End: 2025-07-23 | Stop reason: HOSPADM

## 2025-07-23 RX ORDER — ACETAMINOPHEN 650 MG/1
650 SUPPOSITORY RECTAL EVERY 4 HOURS PRN
Status: DISCONTINUED | OUTPATIENT
Start: 2025-07-23 | End: 2025-07-23 | Stop reason: HOSPADM

## 2025-07-23 RX ORDER — SODIUM CHLORIDE 9 MG/ML
40 INJECTION, SOLUTION INTRAVENOUS AS NEEDED
Status: DISCONTINUED | OUTPATIENT
Start: 2025-07-23 | End: 2025-07-23 | Stop reason: HOSPADM

## 2025-07-23 RX ORDER — DEXAMETHASONE SODIUM PHOSPHATE 4 MG/ML
INJECTION, SOLUTION INTRA-ARTICULAR; INTRALESIONAL; INTRAMUSCULAR; INTRAVENOUS; SOFT TISSUE AS NEEDED
Status: DISCONTINUED | OUTPATIENT
Start: 2025-07-23 | End: 2025-07-23 | Stop reason: SURG

## 2025-07-23 RX ORDER — ACETAMINOPHEN 325 MG/1
650 TABLET ORAL EVERY 4 HOURS PRN
Status: DISCONTINUED | OUTPATIENT
Start: 2025-07-23 | End: 2025-07-23 | Stop reason: HOSPADM

## 2025-07-23 RX ORDER — HEPARIN SODIUM 1000 [USP'U]/ML
INJECTION, SOLUTION INTRAVENOUS; SUBCUTANEOUS AS NEEDED
Status: DISCONTINUED | OUTPATIENT
Start: 2025-07-23 | End: 2025-07-23 | Stop reason: SURG

## 2025-07-23 RX ORDER — SODIUM CHLORIDE 9 MG/ML
INJECTION, SOLUTION INTRAVENOUS CONTINUOUS PRN
Status: DISCONTINUED | OUTPATIENT
Start: 2025-07-23 | End: 2025-07-23 | Stop reason: SURG

## 2025-07-23 RX ORDER — OXYCODONE HYDROCHLORIDE 5 MG/1
10 TABLET ORAL EVERY 4 HOURS PRN
Status: DISCONTINUED | OUTPATIENT
Start: 2025-07-23 | End: 2025-07-23 | Stop reason: HOSPADM

## 2025-07-23 RX ORDER — ONDANSETRON 2 MG/ML
INJECTION INTRAMUSCULAR; INTRAVENOUS AS NEEDED
Status: DISCONTINUED | OUTPATIENT
Start: 2025-07-23 | End: 2025-07-23 | Stop reason: SURG

## 2025-07-23 RX ORDER — SODIUM CHLORIDE 0.9 % (FLUSH) 0.9 %
3 SYRINGE (ML) INJECTION EVERY 12 HOURS SCHEDULED
Status: DISCONTINUED | OUTPATIENT
Start: 2025-07-23 | End: 2025-07-23 | Stop reason: HOSPADM

## 2025-07-23 RX ORDER — ONDANSETRON 2 MG/ML
4 INJECTION INTRAMUSCULAR; INTRAVENOUS ONCE AS NEEDED
Status: DISCONTINUED | OUTPATIENT
Start: 2025-07-23 | End: 2025-07-23 | Stop reason: HOSPADM

## 2025-07-23 RX ORDER — LABETALOL HYDROCHLORIDE 5 MG/ML
5 INJECTION, SOLUTION INTRAVENOUS
Status: DISCONTINUED | OUTPATIENT
Start: 2025-07-23 | End: 2025-07-23 | Stop reason: HOSPADM

## 2025-07-23 RX ORDER — SODIUM CHLORIDE 0.9 % (FLUSH) 0.9 %
3-10 SYRINGE (ML) INJECTION AS NEEDED
Status: DISCONTINUED | OUTPATIENT
Start: 2025-07-23 | End: 2025-07-23 | Stop reason: HOSPADM

## 2025-07-23 RX ORDER — PROPOFOL 10 MG/ML
VIAL (ML) INTRAVENOUS AS NEEDED
Status: DISCONTINUED | OUTPATIENT
Start: 2025-07-23 | End: 2025-07-23 | Stop reason: SURG

## 2025-07-23 RX ADMIN — PROTAMINE SULFATE 100 MG: 10 INJECTION, SOLUTION INTRAVENOUS at 10:58

## 2025-07-23 RX ADMIN — OXCARBAZEPINE 300 MG: 150 TABLET, FILM COATED ORAL at 13:47

## 2025-07-23 RX ADMIN — ROCURONIUM BROMIDE 50 MG: 10 INJECTION INTRAVENOUS at 09:05

## 2025-07-23 RX ADMIN — DEXAMETHASONE SODIUM PHOSPHATE 4 MG: 4 INJECTION, SOLUTION INTRA-ARTICULAR; INTRALESIONAL; INTRAMUSCULAR; INTRAVENOUS; SOFT TISSUE at 08:40

## 2025-07-23 RX ADMIN — LIDOCAINE HYDROCHLORIDE 100 MG: 20 INJECTION, SOLUTION EPIDURAL; INFILTRATION; INTRACAUDAL; PERINEURAL at 08:28

## 2025-07-23 RX ADMIN — HEPARIN SODIUM 3000 UNITS: 1000 INJECTION, SOLUTION INTRAVENOUS; SUBCUTANEOUS at 09:27

## 2025-07-23 RX ADMIN — CEFAZOLIN 2 G: 1 INJECTION, POWDER, FOR SOLUTION INTRAMUSCULAR; INTRAVENOUS at 08:56

## 2025-07-23 RX ADMIN — ROCURONIUM BROMIDE 20 MG: 10 INJECTION INTRAVENOUS at 08:35

## 2025-07-23 RX ADMIN — SUGAMMADEX 200 MG: 100 INJECTION, SOLUTION INTRAVENOUS at 10:58

## 2025-07-23 RX ADMIN — Medication 3 ML: at 09:00

## 2025-07-23 RX ADMIN — SODIUM CHLORIDE: 9 INJECTION, SOLUTION INTRAVENOUS at 08:22

## 2025-07-23 RX ADMIN — PROPOFOL 115 MCG/KG/MIN: 10 INJECTION, EMULSION INTRAVENOUS at 08:30

## 2025-07-23 RX ADMIN — ROCURONIUM BROMIDE 10 MG: 10 INJECTION INTRAVENOUS at 10:38

## 2025-07-23 RX ADMIN — PHENYLEPHRINE HYDROCHLORIDE 0.5 MCG/KG/MIN: 10 INJECTION INTRAVENOUS at 08:42

## 2025-07-23 RX ADMIN — HEPARIN SODIUM 15000 UNITS: 1000 INJECTION, SOLUTION INTRAVENOUS; SUBCUTANEOUS at 09:13

## 2025-07-23 RX ADMIN — ROCURONIUM BROMIDE 10 MG: 10 INJECTION INTRAVENOUS at 08:28

## 2025-07-23 RX ADMIN — ROCURONIUM BROMIDE 10 MG: 10 INJECTION INTRAVENOUS at 10:02

## 2025-07-23 RX ADMIN — FAMOTIDINE 20 MG: 10 INJECTION INTRAVENOUS at 09:05

## 2025-07-23 RX ADMIN — ONDANSETRON 4 MG: 2 INJECTION, SOLUTION INTRAMUSCULAR; INTRAVENOUS at 08:40

## 2025-07-23 RX ADMIN — PROPOFOL 115 MCG/KG/MIN: 10 INJECTION, EMULSION INTRAVENOUS at 09:34

## 2025-07-23 RX ADMIN — SUCCINYLCHOLINE CHLORIDE 120 MG: 20 INJECTION, SOLUTION INTRAMUSCULAR; INTRAVENOUS at 08:28

## 2025-07-23 RX ADMIN — PROPOFOL 130 MG: 10 INJECTION, EMULSION INTRAVENOUS at 08:28

## 2025-07-23 RX ADMIN — ROCURONIUM BROMIDE 20 MG: 10 INJECTION INTRAVENOUS at 09:01

## 2025-07-23 NOTE — ANESTHESIA POSTPROCEDURE EVALUATION
Patient: Mode Duffy    Procedure Summary       Date: 07/23/25 Room / Location: [facility] CATH LAB 3 / [facility] CATH INVASIVE LOCATION    Anesthesia Start: 0822 Anesthesia Stop: 1129    Procedure: Ablation atrial fibrillation (Right: Groin) Diagnosis:       Complete heart block      Coronary artery disease involving native coronary artery of native heart without angina pectoris      Ischemic cardiomyopathy      Persistent atrial fibrillation      Cardiac pacemaker in situ      (Persistent atrial fibrillation and ischemic cardiomyopathy)    Providers: Dominick Jackson MD Provider: Kali Small MD    Anesthesia Type: general ASA Status: 4            Anesthesia Type: general    Vitals  Vitals Value Taken Time   /74 07/23/25 12:16   Temp     Pulse 70 07/23/25 12:25   Resp     SpO2 93 % 07/23/25 12:25   Vitals shown include unfiled device data.        Post Anesthesia Care and Evaluation    Patient location during evaluation: PACU  Patient participation: complete - patient participated  Level of consciousness: awake  Pain scale: See nurse's notes for pain score.  Pain management: adequate    Airway patency: patent  Anesthetic complications: No anesthetic complications  PONV Status: none  Cardiovascular status: acceptable  Respiratory status: acceptable and spontaneous ventilation  Hydration status: acceptable    Comments: Patient seen and examined postoperatively; vital signs stable; SpO2 greater than or equal to 90%; cardiopulmonary status stable; nausea/vomiting adequately controlled; pain adequately controlled; no apparent anesthesia complications; patient discharged from anesthesia care when discharge criteria were met

## 2025-07-23 NOTE — ANESTHESIA PROCEDURE NOTES
Airway  Reason: elective    Date/Time: 7/23/2025 8:30 AM  Airway not difficult    General Information and Staff    Patient location during procedure: OR    Indications and Patient Condition  Indications for airway management: airway protection    Preoxygenated: yes    Mask difficulty assessment: 1 - vent by mask    Final Airway Details    Final airway type: endotracheal airway      Successful airway: ETT   Successful intubation technique: video laryngoscopy  Adjuncts used in placement: intubating stylet  Endotracheal tube insertion site: oral  Blade: Sullivan  Blade size: 3  ETT size (mm): 7.5  Cormack-Lehane Classification: grade I - full view of glottis  Placement verified by: capnometry   Measured from: gums  ETT/EBT to gums (cm): 22  Number of attempts at approach: 1  Assessment: lips, teeth, and gum same as pre-op

## 2025-08-08 ENCOUNTER — OFFICE VISIT (OUTPATIENT)
Dept: CARDIOLOGY | Facility: CLINIC | Age: 80
End: 2025-08-08
Payer: MEDICARE

## 2025-08-08 VITALS
DIASTOLIC BLOOD PRESSURE: 91 MMHG | BODY MASS INDEX: 31.22 KG/M2 | SYSTOLIC BLOOD PRESSURE: 155 MMHG | WEIGHT: 223 LBS | OXYGEN SATURATION: 96 % | HEIGHT: 71 IN | HEART RATE: 70 BPM

## 2025-08-08 DIAGNOSIS — I10 PRIMARY HYPERTENSION: ICD-10-CM

## 2025-08-08 DIAGNOSIS — I48.0 PAF (PAROXYSMAL ATRIAL FIBRILLATION): Primary | ICD-10-CM

## 2025-08-08 DIAGNOSIS — Z95.0 CARDIAC PACEMAKER IN SITU: ICD-10-CM

## 2025-08-27 LAB
MDC_IDC_MSMT_BATTERY_REMAINING_LONGEVITY: 44 MO
MDC_IDC_MSMT_BATTERY_REMAINING_PERCENTAGE: 67 %
MDC_IDC_MSMT_BATTERY_RRT_TRIGGER: 2.6
MDC_IDC_MSMT_BATTERY_STATUS: NORMAL
MDC_IDC_MSMT_BATTERY_VOLTAGE: 2.96
MDC_IDC_MSMT_LEADCHNL_RA_IMPEDANCE_VALUE: 330
MDC_IDC_MSMT_LEADCHNL_RA_PACING_THRESHOLD_POLARITY: NORMAL
MDC_IDC_MSMT_LEADCHNL_RA_SENSING_INTR_AMPL: 0.4
MDC_IDC_MSMT_LEADCHNL_RV_DTM: NORMAL
MDC_IDC_MSMT_LEADCHNL_RV_IMPEDANCE_VALUE: 310
MDC_IDC_MSMT_LEADCHNL_RV_PACING_THRESHOLD_AMPLITUDE: 1.12
MDC_IDC_MSMT_LEADCHNL_RV_PACING_THRESHOLD_POLARITY: NORMAL
MDC_IDC_MSMT_LEADCHNL_RV_PACING_THRESHOLD_PULSEWIDTH: 0.4
MDC_IDC_MSMT_LEADCHNL_RV_SENSING_INTR_AMPL: 4.5
MDC_IDC_PG_IMPLANT_DTM: NORMAL
MDC_IDC_PG_MFG: NORMAL
MDC_IDC_PG_MODEL: NORMAL
MDC_IDC_PG_SERIAL: NORMAL
MDC_IDC_PG_TYPE: NORMAL
MDC_IDC_SESS_DTM: NORMAL
MDC_IDC_SESS_TYPE: NORMAL
MDC_IDC_SET_BRADY_AT_MODE_SWITCH_RATE: 180
MDC_IDC_SET_BRADY_LOWRATE: 70
MDC_IDC_SET_BRADY_MAX_SENSOR_RATE: 120
MDC_IDC_SET_BRADY_MAX_TRACKING_RATE: 120
MDC_IDC_SET_BRADY_MODE: NORMAL
MDC_IDC_SET_BRADY_PAV_DELAY: 180
MDC_IDC_SET_BRADY_SAV_DELAY: 150
MDC_IDC_SET_LEADCHNL_RA_PACING_AMPLITUDE: 3.5
MDC_IDC_SET_LEADCHNL_RA_PACING_POLARITY: NORMAL
MDC_IDC_SET_LEADCHNL_RA_PACING_PULSEWIDTH: 0.6
MDC_IDC_SET_LEADCHNL_RA_SENSING_POLARITY: NORMAL
MDC_IDC_SET_LEADCHNL_RA_SENSING_SENSITIVITY: 0.4
MDC_IDC_SET_LEADCHNL_RV_PACING_AMPLITUDE: 1.38
MDC_IDC_SET_LEADCHNL_RV_PACING_POLARITY: NORMAL
MDC_IDC_SET_LEADCHNL_RV_PACING_PULSEWIDTH: 0.4
MDC_IDC_SET_LEADCHNL_RV_SENSING_POLARITY: NORMAL
MDC_IDC_SET_LEADCHNL_RV_SENSING_SENSITIVITY: 2
MDC_IDC_STAT_AT_BURDEN_PERCENT: 62
MDC_IDC_STAT_BRADY_RA_PERCENT_PACED: 39
MDC_IDC_STAT_BRADY_RV_PERCENT_PACED: 99

## (undated) DEVICE — Device: Brand: CARTO 3

## (undated) DEVICE — CATH DIAG IMPULSE PIG .056 6F 110CM

## (undated) DEVICE — GW WHOLEY HITORQ MOD/J .035 145CM

## (undated) DEVICE — SUT ETHIB 0/0 SH 60IN D8724

## (undated) DEVICE — SUT SILK 0 CT1 CR8 18IN C021D

## (undated) DEVICE — 3M™ IOBAN™ 2 ANTIMICROBIAL INCISE DRAPE 6650EZ: Brand: IOBAN™ 2

## (undated) DEVICE — Device

## (undated) DEVICE — ELECTRD DEFIB M/FUNC PROPADZ RADIOL 2PK

## (undated) DEVICE — DRAPE SHEET ULTRAGARD: Brand: MEDLINE

## (undated) DEVICE — PK CYSTO 50

## (undated) DEVICE — INTRO SHEATH ART/FEM ENGAGE .035 6F12CM

## (undated) DEVICE — CATH ANGIO TRCN NB BCN .035 5F 100CM DAV

## (undated) DEVICE — BOWL PLSTC MD 16OZ BLU STRL

## (undated) DEVICE — SHT AIR TRANSFR COMFRT GLIDE LAT 40X80IN

## (undated) DEVICE — WR CLIP ROTOBLATOR

## (undated) DEVICE — Device: Brand: RFP-100A CONNECTOR CABLE

## (undated) DEVICE — CONTAINER,SPECIMEN,OR STERILE,4OZ: Brand: MEDLINE

## (undated) DEVICE — SOL ISO/ALC RUB 70PCT 4OZ

## (undated) DEVICE — DISPOSABLE ADAPTER

## (undated) DEVICE — Device: Brand: ASAHI SION BLUE

## (undated) DEVICE — PINNACLE INTRODUCER SHEATH: Brand: PINNACLE

## (undated) DEVICE — PAD E/S GRND SGL/FOIL 9FT/CORD DISP

## (undated) DEVICE — SLV STRL PROB

## (undated) DEVICE — SUT VIC COAT PLS ANTIBAC TP1 27IN

## (undated) DEVICE — PACEMAKER CDS: Brand: MEDLINE INDUSTRIES, INC.

## (undated) DEVICE — SENSR CERBRL O2 PK/2

## (undated) DEVICE — CVR PROB ULTRASND CIVFLEX GEN/PURP TELESCP/FOLD 5.5X96IN LF

## (undated) DEVICE — SUT SILK 2/0 TIES 18IN A185H

## (undated) DEVICE — PK TRY HEART CATH 50

## (undated) DEVICE — ST DIL 8TO20F

## (undated) DEVICE — 3M™ PATIENT PLATE, CORDED, SPLIT, LARGE, 40 PER CASE, 1179: Brand: 3M™

## (undated) DEVICE — TOWEL,OR,DSP,ST,BLUE,STD,4/PK,20PK/CS: Brand: MEDLINE

## (undated) DEVICE — ANTIBACTERIAL UNDYED BRAIDED (POLYGLACTIN 910), SYNTHETIC ABSORBABLE SUTURE: Brand: COATED VICRYL

## (undated) DEVICE — TUBING, SUCTION, 1/4" X 12', STRAIGHT: Brand: MEDLINE

## (undated) DEVICE — DEV INFL COMPAK W/ACCESSPLUS IN4530

## (undated) DEVICE — GW AMPLATZ  XSTIFF CVD .032 3MM 180CM

## (undated) DEVICE — ST ACC MICROPUNCTURE STFF/CANN PLAT/TP 4F 21G 40CM

## (undated) DEVICE — IRRIGATOR BULB ASEPTO 60CC STRL

## (undated) DEVICE — RADIFOCUS GLIDECATH: Brand: GLIDECATH

## (undated) DEVICE — INTRO SHEATH PRELUDE SNAP .038 6F 13CM W/SDPRT

## (undated) DEVICE — LOU PACE DEFIB: Brand: MEDLINE INDUSTRIES, INC.

## (undated) DEVICE — RADIFOCUS GLIDEWIRE: Brand: GLIDEWIRE

## (undated) DEVICE — ANESTH CIRCUIT 60IN 3LTR-LF: Brand: MEDLINE INDUSTRIES, INC.

## (undated) DEVICE — SUT PROLN 6/0 RB2 D/A 30IN 8711H

## (undated) DEVICE — SWAN-GANZ BIPOLAR PACING CATHETER: Brand: SWAN-GANZ

## (undated) DEVICE — COVER,MAYO STAND,STERILE: Brand: MEDLINE

## (undated) DEVICE — RADIFOCUS TORQUE DEVICE MULTI-TORQUE VISE: Brand: RADIFOCUS TORQUE DEVICE

## (undated) DEVICE — DRVR HEXSTAR

## (undated) DEVICE — BLANKT WARM UNDER/BDY FUL/ACC A/ 90X206CM

## (undated) DEVICE — SUT MNCRYL 2/0 CT1 36IN

## (undated) DEVICE — LEAD CUTTER™: Brand: LEAD CUTTER™

## (undated) DEVICE — NITINOL STONE RETRIEVAL BASKET: Brand: ZERO TIP

## (undated) DEVICE — ST IV EXT VLV 4WAY STPCK

## (undated) DEVICE — DRSNG SURESITE WNDW 4X4.5

## (undated) DEVICE — SYR LUER/TIP MONOJECT RGD/PK 60CC STRL

## (undated) DEVICE — EQUIPMENT COVER BAG TYPE 48” X 36” (122CM X 91CM): Brand: EQUIPMENT COVER BAG TYPE

## (undated) DEVICE — SUT SILK 4/0 TIES 18IN A183H

## (undated) DEVICE — Device: Brand: REFERENCE PATCH CARTO 3

## (undated) DEVICE — ADHS LIQ MASTISOL 2/3ML

## (undated) DEVICE — SNAP KAP: Brand: UNBRANDED

## (undated) DEVICE — COVADERM: Brand: DEROYAL

## (undated) DEVICE — PK PROC MAJ 40

## (undated) DEVICE — LLD™ ACCESSORY KIT: Brand: LLD™

## (undated) DEVICE — TBG PRESS/MONITOR FIX M/F LL A/ 24IN STRL

## (undated) DEVICE — PREF.GUIDING SHEATH W/MULT.CRV: Brand: PREFACE

## (undated) DEVICE — TX ECO EXT CABLE: Brand: TX ECO EXT CABLE

## (undated) DEVICE — SYR LL TP 10ML STRL

## (undated) DEVICE — Device: Brand: EZ STEER NAV DS

## (undated) DEVICE — Device: Brand: NRG TRANSSEPTAL NEEDLE

## (undated) DEVICE — INTRO CLASSIC SAFESHEATH SHT 7F 13CM

## (undated) DEVICE — PREP SPRY PVPI 10P 2OZ

## (undated) DEVICE — DECANTER BAG 9": Brand: MEDLINE INDUSTRIES, INC.

## (undated) DEVICE — SUT MNCRYL 3/0 SH 27 IN Y416H

## (undated) DEVICE — SOL IRR NACL 0.9PCT BT 1000ML

## (undated) DEVICE — TBG IV DRIP CHAMBER MACRO SGL 72IN

## (undated) DEVICE — WRENCH KT PM ST JUDE

## (undated) DEVICE — GLV SURG SIGNATURE ESSENTIAL PF LTX SZ8

## (undated) DEVICE — VIOLET BRAIDED (POLYGLACTIN 910), SYNTHETIC ABSORBABLE SUTURE: Brand: COATED VICRYL

## (undated) DEVICE — SUT ETHIB 0/0 MO6 I8IN CX45D

## (undated) DEVICE — SUT PROLN 4/0 V5 36IN 8935H

## (undated) DEVICE — PK OPN HEART BRING BACK CUST

## (undated) DEVICE — KT SURG TURNOVER 050

## (undated) DEVICE — 28 FR STRAIGHT – SOFT PVC CATHETER: Brand: PVC THORACIC CATHETERS

## (undated) DEVICE — GUIDE CATHETER: Brand: MACH1™

## (undated) DEVICE — PULSED FIELD ABLATION CATHETER: Brand: FARAWAVE™

## (undated) DEVICE — GLV SURG SIGNATURE ESSENTIAL PF LTX SZ7

## (undated) DEVICE — SKIN PREP TRAY W/CHG: Brand: MEDLINE INDUSTRIES, INC.

## (undated) DEVICE — TOWEL,OR,DSP,ST,WHITE,DLX,4/PK,20PK/CS: Brand: MEDLINE

## (undated) DEVICE — APPL CHLORAPREP HI/LITE 26ML ORNG

## (undated) DEVICE — SKIN AFFIX SURG ADHESIVE 72/CS 0.55ML: Brand: MEDLINE

## (undated) DEVICE — PROVE COVER: Brand: UNBRANDED

## (undated) DEVICE — Device: Brand: WEBSTER CS

## (undated) DEVICE — GW CHOICE PT PLS .014 182CM

## (undated) DEVICE — 3M™ STERI-STRIP™ REINFORCED ADHESIVE SKIN CLOSURES, R1547, 1/2 IN X 4 IN (12 MM X 100 MM), 6 STRIPS/ENVELOPE: Brand: 3M™ STERI-STRIP™

## (undated) DEVICE — SOL IRRIG H2O 1000ML STRL

## (undated) DEVICE — SOUNDSTAR ECO 8F G CATHETER: Brand: SOUNDSTAR

## (undated) DEVICE — ASMBL SPK CONTRST CONTRL

## (undated) DEVICE — Device: Brand: TORAYGUIDE GUIDEWIRE

## (undated) DEVICE — GW XCHG AMPLTZ XSTIF PTFE CRV .035IN 3X180CM

## (undated) DEVICE — LN INJ CONTRST FLXCIL HP F/M LL 1200PSI72

## (undated) DEVICE — ELECTRD DEFIB M/FUNC PROPADZ STRL 2PK

## (undated) DEVICE — NITINOL WIRE WITH HYDROPHILIC TIP: Brand: SENSOR

## (undated) DEVICE — OCTA,PERSEID,2-2-2-2-2,D-CURVE: Brand: OCTARAY MAPPING CATHETER

## (undated) DEVICE — SOL IRRG H2O BG 3000ML STRL

## (undated) DEVICE — DGW .035 FC J3MM 150CM TEF HEP: Brand: EMERALD

## (undated) DEVICE — CATH DIAG IMPULSE FL4 6F 100CM

## (undated) DEVICE — SYS PREP BRIDGESHEATH OCCL HEMOS

## (undated) DEVICE — CABL PACE BIPOL THRSHLD SHROUD PIN 8FT

## (undated) DEVICE — NDL PERC 1PRT THNWALL W/BASEPLT 18G 7CM

## (undated) DEVICE — CATH DIAG IMPULSE FR4 6F 100CM

## (undated) DEVICE — SOLUTION,WATER,IRRIGATION,1000ML,STERILE: Brand: MEDLINE

## (undated) DEVICE — CULT AER/ANAEROB FASTIDIOUS BACT

## (undated) DEVICE — SOL NACL 0.9PCT 1000ML

## (undated) DEVICE — INTRO PERFORMER CHECKFLO/LG RAD/BND NO/GW 18F .038IN 30CM

## (undated) DEVICE — GAUZE,SPONGE,4"X4",16PLY,XRAY,STRL,LF: Brand: MEDLINE

## (undated) DEVICE — CLENS PERI ALOEVESTA 3 IN 1 4OZ

## (undated) DEVICE — STEERABLE SHEATH CLEAR: Brand: FARADRIVE™

## (undated) DEVICE — SUCTION CANISTER, 1500CC,SAFELINER: Brand: DEROYAL

## (undated) DEVICE — ELECTRD BLD EZ CLN STD 6.5IN

## (undated) DEVICE — SUT ETHIB 0/0 CT1 30IN X424H

## (undated) DEVICE — DRSNG WND BORDR/ADHS NONADHR/GZ LF 4X4IN STRL

## (undated) DEVICE — CATHETER CONNECTION CABLE: Brand: FARASTAR™